# Patient Record
Sex: MALE | Race: WHITE | Employment: OTHER | URBAN - METROPOLITAN AREA
[De-identification: names, ages, dates, MRNs, and addresses within clinical notes are randomized per-mention and may not be internally consistent; named-entity substitution may affect disease eponyms.]

---

## 2017-01-17 ENCOUNTER — OFFICE VISIT (OUTPATIENT)
Dept: INTERNAL MEDICINE CLINIC | Age: 77
End: 2017-01-17

## 2017-01-17 VITALS
WEIGHT: 202 LBS | HEART RATE: 88 BPM | SYSTOLIC BLOOD PRESSURE: 132 MMHG | BODY MASS INDEX: 27.4 KG/M2 | OXYGEN SATURATION: 98 % | DIASTOLIC BLOOD PRESSURE: 78 MMHG

## 2017-01-17 DIAGNOSIS — J01.00 ACUTE NON-RECURRENT MAXILLARY SINUSITIS: Primary | ICD-10-CM

## 2017-01-17 DIAGNOSIS — E03.9 ACQUIRED HYPOTHYROIDISM: ICD-10-CM

## 2017-01-17 PROCEDURE — 99214 OFFICE O/P EST MOD 30 MIN: CPT | Performed by: FAMILY MEDICINE

## 2017-01-17 RX ORDER — AZELASTINE 1 MG/ML
2 SPRAY, METERED NASAL
COMMUNITY
Start: 2016-09-07 | End: 2018-08-15 | Stop reason: SDUPTHER

## 2017-01-17 RX ORDER — AMOXICILLIN AND CLAVULANATE POTASSIUM 875; 125 MG/1; MG/1
1 TABLET, FILM COATED ORAL 2 TIMES DAILY
Qty: 20 TABLET | Refills: 0 | Status: SHIPPED | OUTPATIENT
Start: 2017-01-17 | End: 2017-01-27

## 2017-01-17 ASSESSMENT — ENCOUNTER SYMPTOMS
NAUSEA: 0
SORE THROAT: 0
VOMITING: 0
DIARRHEA: 0
EYE DISCHARGE: 0
SINUS PRESSURE: 1
CHEST TIGHTNESS: 0
RHINORRHEA: 1
WHEEZING: 0
COUGH: 1
TROUBLE SWALLOWING: 0

## 2017-02-02 ENCOUNTER — HOSPITAL ENCOUNTER (OUTPATIENT)
Dept: GENERAL RADIOLOGY | Age: 77
Discharge: HOME | End: 2017-02-02
Attending: FAMILY MEDICINE | Admitting: FAMILY MEDICINE

## 2017-02-02 DIAGNOSIS — E03.9 ACQUIRED HYPOTHYROIDISM: ICD-10-CM

## 2017-02-02 LAB — TSH SERPL DL<=0.05 MIU/L-ACNC: 0.54 UIU/ML (ref 0.27–4.2)

## 2017-02-07 ENCOUNTER — TELEPHONE (OUTPATIENT)
Dept: INTERNAL MEDICINE CLINIC | Age: 77
End: 2017-02-07

## 2017-02-07 RX ORDER — LEVOTHYROXINE SODIUM 0.15 MG/1
150 TABLET ORAL DAILY
Qty: 30 TABLET | Refills: 11 | Status: SHIPPED | OUTPATIENT
Start: 2017-02-07 | End: 2017-04-05

## 2017-03-23 ENCOUNTER — OFFICE VISIT (OUTPATIENT)
Dept: INTERNAL MEDICINE CLINIC | Age: 77
End: 2017-03-23

## 2017-03-23 VITALS
SYSTOLIC BLOOD PRESSURE: 124 MMHG | OXYGEN SATURATION: 95 % | WEIGHT: 203 LBS | HEART RATE: 53 BPM | BODY MASS INDEX: 27.5 KG/M2 | DIASTOLIC BLOOD PRESSURE: 70 MMHG | HEIGHT: 72 IN

## 2017-03-23 DIAGNOSIS — K46.9 HERNIA: Primary | ICD-10-CM

## 2017-03-23 DIAGNOSIS — I35.0 AORTIC VALVE STENOSIS, UNSPECIFIED ETIOLOGY: ICD-10-CM

## 2017-03-23 LAB
BILIRUBIN, POC: NORMAL
BLOOD URINE, POC: NORMAL
CLARITY, POC: CLEAR
COLOR, POC: YELLOW
GLUCOSE URINE, POC: NORMAL
KETONES, POC: NORMAL
LEUKOCYTE EST, POC: NORMAL
NITRITE, POC: NORMAL
PH, POC: 5
PROTEIN, POC: NORMAL
SPECIFIC GRAVITY, POC: 1.01
UROBILINOGEN, POC: 0.2

## 2017-03-23 PROCEDURE — 99214 OFFICE O/P EST MOD 30 MIN: CPT | Performed by: INTERNAL MEDICINE

## 2017-03-23 PROCEDURE — 81002 URINALYSIS NONAUTO W/O SCOPE: CPT | Performed by: INTERNAL MEDICINE

## 2017-03-31 ENCOUNTER — SURG/PROC ORDERS (OUTPATIENT)
Dept: SURGERY | Age: 77
End: 2017-03-31

## 2017-03-31 ENCOUNTER — OFFICE VISIT (OUTPATIENT)
Dept: SURGERY | Age: 77
End: 2017-03-31

## 2017-03-31 VITALS
DIASTOLIC BLOOD PRESSURE: 76 MMHG | SYSTOLIC BLOOD PRESSURE: 122 MMHG | BODY MASS INDEX: 27.71 KG/M2 | WEIGHT: 204.6 LBS | HEART RATE: 72 BPM | HEIGHT: 72 IN

## 2017-03-31 DIAGNOSIS — K40.91 UNILATERAL RECURRENT INGUINAL HERNIA WITHOUT OBSTRUCTION OR GANGRENE: ICD-10-CM

## 2017-03-31 PROCEDURE — 99213 OFFICE O/P EST LOW 20 MIN: CPT | Performed by: SURGERY

## 2017-03-31 RX ORDER — SODIUM CHLORIDE 0.9 % (FLUSH) 0.9 %
10 SYRINGE (ML) INJECTION EVERY 12 HOURS SCHEDULED
Status: CANCELLED | OUTPATIENT
Start: 2017-03-31

## 2017-03-31 RX ORDER — SODIUM CHLORIDE 0.9 % (FLUSH) 0.9 %
10 SYRINGE (ML) INJECTION PRN
Status: CANCELLED | OUTPATIENT
Start: 2017-03-31

## 2017-04-05 ENCOUNTER — OFFICE VISIT (OUTPATIENT)
Dept: INTERNAL MEDICINE CLINIC | Age: 77
End: 2017-04-05

## 2017-04-05 ENCOUNTER — HOSPITAL ENCOUNTER (OUTPATIENT)
Dept: GENERAL RADIOLOGY | Age: 77
Discharge: HOME | End: 2017-04-05
Attending: NURSE PRACTITIONER | Admitting: NURSE PRACTITIONER

## 2017-04-05 VITALS
TEMPERATURE: 97.7 F | SYSTOLIC BLOOD PRESSURE: 120 MMHG | WEIGHT: 203 LBS | DIASTOLIC BLOOD PRESSURE: 68 MMHG | HEART RATE: 60 BPM | HEIGHT: 72 IN | BODY MASS INDEX: 27.5 KG/M2

## 2017-04-05 DIAGNOSIS — K40.91 UNILATERAL RECURRENT INGUINAL HERNIA WITHOUT OBSTRUCTION OR GANGRENE: Primary | ICD-10-CM

## 2017-04-05 DIAGNOSIS — Z01.818 PRE-OP EXAM: ICD-10-CM

## 2017-04-05 DIAGNOSIS — R42 VERTIGO: ICD-10-CM

## 2017-04-05 DIAGNOSIS — I35.0 AORTIC VALVE STENOSIS, UNSPECIFIED ETIOLOGY: ICD-10-CM

## 2017-04-05 DIAGNOSIS — I25.83 CORONARY ARTERY DISEASE DUE TO LIPID RICH PLAQUE: ICD-10-CM

## 2017-04-05 DIAGNOSIS — I25.10 CORONARY ARTERY DISEASE DUE TO LIPID RICH PLAQUE: ICD-10-CM

## 2017-04-05 DIAGNOSIS — E03.9 ACQUIRED HYPOTHYROIDISM: ICD-10-CM

## 2017-04-05 DIAGNOSIS — G45.9 TRANSIENT CEREBRAL ISCHEMIA, UNSPECIFIED TYPE: ICD-10-CM

## 2017-04-05 LAB
A/G RATIO: 1.8 (ref 1.1–2.2)
ALBUMIN SERPL-MCNC: 4.4 G/DL (ref 3.4–5)
ALP BLD-CCNC: 81 U/L (ref 40–129)
ALT SERPL-CCNC: 21 U/L (ref 10–40)
ANION GAP SERPL CALCULATED.3IONS-SCNC: 13 MMOL/L (ref 3–16)
AST SERPL-CCNC: 18 U/L (ref 15–37)
BILIRUB SERPL-MCNC: 0.6 MG/DL (ref 0–1)
BUN BLDV-MCNC: 13 MG/DL (ref 7–20)
CALCIUM SERPL-MCNC: 9.4 MG/DL (ref 8.3–10.6)
CHLORIDE BLD-SCNC: 100 MMOL/L (ref 99–110)
CO2: 28 MMOL/L (ref 21–32)
CREAT SERPL-MCNC: 0.7 MG/DL (ref 0.8–1.3)
GFR AFRICAN AMERICAN: >60
GFR NON-AFRICAN AMERICAN: >60
GLOBULIN: 2.5 G/DL
GLUCOSE BLD-MCNC: 59 MG/DL (ref 70–99)
HCT VFR BLD CALC: 40.4 % (ref 40.5–52.5)
HEMOGLOBIN: 13.4 G/DL (ref 13.5–17.5)
MCH RBC QN AUTO: 31.9 PG (ref 26–34)
MCHC RBC AUTO-ENTMCNC: 33.2 G/DL (ref 31–36)
MCV RBC AUTO: 96 FL (ref 80–100)
PDW BLD-RTO: 14.8 % (ref 12.4–15.4)
PLATELET # BLD: 176 K/UL (ref 135–450)
PMV BLD AUTO: 8.5 FL (ref 5–10.5)
POTASSIUM SERPL-SCNC: 4.4 MMOL/L (ref 3.5–5.1)
RBC # BLD: 4.21 M/UL (ref 4.2–5.9)
SODIUM BLD-SCNC: 141 MMOL/L (ref 136–145)
TOTAL PROTEIN: 6.9 G/DL (ref 6.4–8.2)
WBC # BLD: 6.7 K/UL (ref 4–11)

## 2017-04-05 PROCEDURE — 99214 OFFICE O/P EST MOD 30 MIN: CPT | Performed by: NURSE PRACTITIONER

## 2017-04-05 PROCEDURE — 93000 ELECTROCARDIOGRAM COMPLETE: CPT | Performed by: NURSE PRACTITIONER

## 2017-04-05 RX ORDER — LEVOTHYROXINE SODIUM 175 UG/1
TABLET ORAL
Refills: 3 | COMMUNITY
Start: 2017-02-28 | End: 2017-04-20 | Stop reason: DRUGHIGH

## 2017-04-07 ENCOUNTER — TELEPHONE (OUTPATIENT)
Dept: INTERNAL MEDICINE CLINIC | Age: 77
End: 2017-04-07

## 2017-04-12 ENCOUNTER — HOSPITAL ENCOUNTER (OUTPATIENT)
Dept: SURGERY | Age: 77
Discharge: HOME | End: 2017-04-12
Attending: SURGERY | Admitting: SURGERY

## 2017-04-12 VITALS
SYSTOLIC BLOOD PRESSURE: 130 MMHG | BODY MASS INDEX: 27.44 KG/M2 | OXYGEN SATURATION: 94 % | DIASTOLIC BLOOD PRESSURE: 84 MMHG | TEMPERATURE: 97.5 F | WEIGHT: 202.6 LBS | HEIGHT: 72 IN | RESPIRATION RATE: 16 BRPM | HEART RATE: 94 BPM

## 2017-04-12 PROCEDURE — 49651 LAP ING HERNIA REPAIR RECUR: CPT | Performed by: SURGERY

## 2017-04-12 RX ORDER — SODIUM CHLORIDE 0.9 % (FLUSH) 0.9 %
10 SYRINGE (ML) INJECTION EVERY 12 HOURS SCHEDULED
Status: DISCONTINUED | OUTPATIENT
Start: 2017-04-12 | End: 2017-04-12 | Stop reason: SDUPTHER

## 2017-04-12 RX ORDER — SODIUM CHLORIDE, SODIUM LACTATE, POTASSIUM CHLORIDE, CALCIUM CHLORIDE 600; 310; 30; 20 MG/100ML; MG/100ML; MG/100ML; MG/100ML
INJECTION, SOLUTION INTRAVENOUS CONTINUOUS
Status: DISCONTINUED | OUTPATIENT
Start: 2017-04-12 | End: 2017-04-13 | Stop reason: HOSPADM

## 2017-04-12 RX ORDER — PROMETHAZINE HYDROCHLORIDE 25 MG/ML
6.25 INJECTION, SOLUTION INTRAMUSCULAR; INTRAVENOUS
Status: ACTIVE | OUTPATIENT
Start: 2017-04-12 | End: 2017-04-12

## 2017-04-12 RX ORDER — OXYCODONE HYDROCHLORIDE AND ACETAMINOPHEN 5; 325 MG/1; MG/1
2 TABLET ORAL PRN
Status: ACTIVE | OUTPATIENT
Start: 2017-04-12 | End: 2017-04-12

## 2017-04-12 RX ORDER — SODIUM CHLORIDE 0.9 % (FLUSH) 0.9 %
10 SYRINGE (ML) INJECTION EVERY 12 HOURS SCHEDULED
Status: DISCONTINUED | OUTPATIENT
Start: 2017-04-12 | End: 2017-04-13 | Stop reason: HOSPADM

## 2017-04-12 RX ORDER — MEPERIDINE HYDROCHLORIDE 50 MG/ML
12.5 INJECTION INTRAMUSCULAR; INTRAVENOUS; SUBCUTANEOUS EVERY 5 MIN PRN
Status: DISCONTINUED | OUTPATIENT
Start: 2017-04-12 | End: 2017-04-13 | Stop reason: HOSPADM

## 2017-04-12 RX ORDER — SODIUM CHLORIDE 0.9 % (FLUSH) 0.9 %
10 SYRINGE (ML) INJECTION PRN
Status: DISCONTINUED | OUTPATIENT
Start: 2017-04-12 | End: 2017-04-13 | Stop reason: HOSPADM

## 2017-04-12 RX ORDER — DIPHENHYDRAMINE HYDROCHLORIDE 50 MG/ML
12.5 INJECTION INTRAMUSCULAR; INTRAVENOUS
Status: ACTIVE | OUTPATIENT
Start: 2017-04-12 | End: 2017-04-12

## 2017-04-12 RX ORDER — MORPHINE SULFATE 2 MG/ML
2 INJECTION, SOLUTION INTRAMUSCULAR; INTRAVENOUS EVERY 5 MIN PRN
Status: DISCONTINUED | OUTPATIENT
Start: 2017-04-12 | End: 2017-04-13 | Stop reason: HOSPADM

## 2017-04-12 RX ORDER — SODIUM CHLORIDE 0.9 % (FLUSH) 0.9 %
10 SYRINGE (ML) INJECTION PRN
Status: DISCONTINUED | OUTPATIENT
Start: 2017-04-12 | End: 2017-04-12 | Stop reason: SDUPTHER

## 2017-04-12 RX ORDER — OXYCODONE HYDROCHLORIDE AND ACETAMINOPHEN 5; 325 MG/1; MG/1
1-2 TABLET ORAL EVERY 4 HOURS PRN
Qty: 20 TABLET | Refills: 0 | Status: SHIPPED | OUTPATIENT
Start: 2017-04-12 | End: 2017-04-19

## 2017-04-12 RX ORDER — MORPHINE SULFATE 2 MG/ML
1 INJECTION, SOLUTION INTRAMUSCULAR; INTRAVENOUS EVERY 5 MIN PRN
Status: DISCONTINUED | OUTPATIENT
Start: 2017-04-12 | End: 2017-04-13 | Stop reason: HOSPADM

## 2017-04-12 RX ORDER — OXYCODONE HYDROCHLORIDE AND ACETAMINOPHEN 5; 325 MG/1; MG/1
1 TABLET ORAL PRN
Status: ACTIVE | OUTPATIENT
Start: 2017-04-12 | End: 2017-04-12

## 2017-04-12 RX ORDER — HYDRALAZINE HYDROCHLORIDE 20 MG/ML
5 INJECTION INTRAMUSCULAR; INTRAVENOUS EVERY 10 MIN PRN
Status: DISCONTINUED | OUTPATIENT
Start: 2017-04-12 | End: 2017-04-13 | Stop reason: HOSPADM

## 2017-04-12 RX ORDER — LIDOCAINE HYDROCHLORIDE 10 MG/ML
1 INJECTION, SOLUTION EPIDURAL; INFILTRATION; INTRACAUDAL; PERINEURAL
Status: ACTIVE | OUTPATIENT
Start: 2017-04-12 | End: 2017-04-12

## 2017-04-12 RX ORDER — LABETALOL HYDROCHLORIDE 5 MG/ML
5 INJECTION, SOLUTION INTRAVENOUS EVERY 10 MIN PRN
Status: DISCONTINUED | OUTPATIENT
Start: 2017-04-12 | End: 2017-04-13 | Stop reason: HOSPADM

## 2017-04-12 RX ORDER — ONDANSETRON 2 MG/ML
4 INJECTION INTRAMUSCULAR; INTRAVENOUS
Status: ACTIVE | OUTPATIENT
Start: 2017-04-12 | End: 2017-04-12

## 2017-04-12 RX ADMIN — HYDRALAZINE HYDROCHLORIDE 5 MG: 20 INJECTION INTRAMUSCULAR; INTRAVENOUS at 14:13

## 2017-04-12 ASSESSMENT — PAIN SCALES - GENERAL
PAINLEVEL_OUTOF10: 0
PAINLEVEL_OUTOF10: 0

## 2017-04-12 ASSESSMENT — PAIN - FUNCTIONAL ASSESSMENT
PAIN_FUNCTIONAL_ASSESSMENT: 0-10
PAIN_FUNCTIONAL_ASSESSMENT: 0-10

## 2017-04-20 ENCOUNTER — HOSPITAL ENCOUNTER (OUTPATIENT)
Dept: GENERAL RADIOLOGY | Age: 77
Discharge: HOME | End: 2017-04-20
Attending: FAMILY MEDICINE | Admitting: FAMILY MEDICINE

## 2017-04-20 ENCOUNTER — OFFICE VISIT (OUTPATIENT)
Dept: INTERNAL MEDICINE CLINIC | Age: 77
End: 2017-04-20

## 2017-04-20 VITALS
BODY MASS INDEX: 27.26 KG/M2 | SYSTOLIC BLOOD PRESSURE: 148 MMHG | WEIGHT: 201 LBS | HEART RATE: 81 BPM | DIASTOLIC BLOOD PRESSURE: 64 MMHG | OXYGEN SATURATION: 96 %

## 2017-04-20 DIAGNOSIS — R10.31 GROIN PAIN, CHRONIC, RIGHT: ICD-10-CM

## 2017-04-20 DIAGNOSIS — R63.4 WEIGHT LOSS: ICD-10-CM

## 2017-04-20 DIAGNOSIS — R10.31 GROIN PAIN, CHRONIC, RIGHT: Primary | ICD-10-CM

## 2017-04-20 DIAGNOSIS — G89.29 GROIN PAIN, CHRONIC, RIGHT: ICD-10-CM

## 2017-04-20 DIAGNOSIS — R03.0 ELEVATED BP WITHOUT DIAGNOSIS OF HYPERTENSION: ICD-10-CM

## 2017-04-20 DIAGNOSIS — G89.29 GROIN PAIN, CHRONIC, RIGHT: Primary | ICD-10-CM

## 2017-04-20 LAB
ANION GAP SERPL CALCULATED.3IONS-SCNC: 14 MMOL/L (ref 3–16)
BUN BLDV-MCNC: 13 MG/DL (ref 7–20)
C-REACTIVE PROTEIN: 7 MG/L (ref 0–5.1)
CALCIUM SERPL-MCNC: 9.4 MG/DL (ref 8.3–10.6)
CHLORIDE BLD-SCNC: 97 MMOL/L (ref 99–110)
CO2: 26 MMOL/L (ref 21–32)
CREAT SERPL-MCNC: 0.7 MG/DL (ref 0.8–1.3)
GFR AFRICAN AMERICAN: >60
GFR NON-AFRICAN AMERICAN: >60
GLUCOSE BLD-MCNC: 94 MG/DL (ref 70–99)
POTASSIUM SERPL-SCNC: 5.5 MMOL/L (ref 3.5–5.1)
SEDIMENTATION RATE, ERYTHROCYTE: 30 MM/HR (ref 0–20)
SODIUM BLD-SCNC: 137 MMOL/L (ref 136–145)
TSH SERPL DL<=0.05 MIU/L-ACNC: 0.5 UIU/ML (ref 0.27–4.2)

## 2017-04-20 PROCEDURE — 99214 OFFICE O/P EST MOD 30 MIN: CPT | Performed by: FAMILY MEDICINE

## 2017-04-20 RX ORDER — LEVOTHYROXINE SODIUM 0.15 MG/1
150 TABLET ORAL DAILY
COMMUNITY
End: 2018-06-22 | Stop reason: SDUPTHER

## 2017-04-21 ENCOUNTER — TELEPHONE (OUTPATIENT)
Dept: INTERNAL MEDICINE CLINIC | Age: 77
End: 2017-04-21

## 2017-04-21 DIAGNOSIS — E87.5 HYPERKALEMIA: Primary | ICD-10-CM

## 2017-04-21 ASSESSMENT — ENCOUNTER SYMPTOMS
DIARRHEA: 0
RECTAL PAIN: 0
COUGH: 0
NAUSEA: 0
ABDOMINAL PAIN: 0
CONSTIPATION: 0

## 2017-04-26 ENCOUNTER — HOSPITAL ENCOUNTER (OUTPATIENT)
Dept: CT IMAGING | Age: 77
Discharge: HOME | End: 2017-04-26
Attending: FAMILY MEDICINE | Admitting: FAMILY MEDICINE

## 2017-04-26 DIAGNOSIS — G89.29 GROIN PAIN, CHRONIC, RIGHT: ICD-10-CM

## 2017-04-26 DIAGNOSIS — R10.31 GROIN PAIN, CHRONIC, RIGHT: ICD-10-CM

## 2017-04-26 DIAGNOSIS — R10.31 RIGHT LOWER QUADRANT PAIN: ICD-10-CM

## 2017-05-02 ENCOUNTER — OFFICE VISIT (OUTPATIENT)
Dept: SURGERY | Age: 77
End: 2017-05-02

## 2017-05-02 VITALS
WEIGHT: 204.6 LBS | HEIGHT: 72 IN | HEART RATE: 71 BPM | DIASTOLIC BLOOD PRESSURE: 77 MMHG | BODY MASS INDEX: 27.71 KG/M2 | SYSTOLIC BLOOD PRESSURE: 132 MMHG

## 2017-05-02 DIAGNOSIS — Z09 POSTOP CHECK: Primary | ICD-10-CM

## 2017-05-02 PROCEDURE — 99024 POSTOP FOLLOW-UP VISIT: CPT | Performed by: SURGERY

## 2017-06-13 ENCOUNTER — OFFICE VISIT (OUTPATIENT)
Dept: INTERNAL MEDICINE CLINIC | Age: 77
End: 2017-06-13

## 2017-06-13 VITALS
WEIGHT: 209 LBS | TEMPERATURE: 98.6 F | HEART RATE: 62 BPM | SYSTOLIC BLOOD PRESSURE: 134 MMHG | OXYGEN SATURATION: 97 % | DIASTOLIC BLOOD PRESSURE: 72 MMHG | BODY MASS INDEX: 28.31 KG/M2 | HEIGHT: 72 IN

## 2017-06-13 DIAGNOSIS — H69.83 EUSTACHIAN TUBE DYSFUNCTION, BILATERAL: Primary | ICD-10-CM

## 2017-06-13 DIAGNOSIS — R06.2 WHEEZING: ICD-10-CM

## 2017-06-13 DIAGNOSIS — R05.9 COUGH: ICD-10-CM

## 2017-06-13 PROCEDURE — 99213 OFFICE O/P EST LOW 20 MIN: CPT | Performed by: NURSE PRACTITIONER

## 2017-06-13 RX ORDER — ALBUTEROL SULFATE 90 UG/1
2 AEROSOL, METERED RESPIRATORY (INHALATION) EVERY 6 HOURS PRN
Qty: 1 INHALER | Refills: 3 | Status: SHIPPED | OUTPATIENT
Start: 2017-06-13 | End: 2017-11-15 | Stop reason: CLARIF

## 2017-06-13 ASSESSMENT — ENCOUNTER SYMPTOMS
BLOOD IN STOOL: 0
SHORTNESS OF BREATH: 0
WHEEZING: 0
DIARRHEA: 0
CONSTIPATION: 0
EYES NEGATIVE: 1
COUGH: 1
SORE THROAT: 0
BACK PAIN: 0
RHINORRHEA: 0
COLOR CHANGE: 0
VOICE CHANGE: 1
SINUS PRESSURE: 0

## 2017-06-16 ENCOUNTER — TELEPHONE (OUTPATIENT)
Dept: INTERNAL MEDICINE CLINIC | Age: 77
End: 2017-06-16

## 2017-06-16 DIAGNOSIS — J40 BRONCHITIS: ICD-10-CM

## 2017-06-16 RX ORDER — AZITHROMYCIN 250 MG/1
TABLET, FILM COATED ORAL
Qty: 6 TABLET | Refills: 0 | Status: SHIPPED | OUTPATIENT
Start: 2017-06-16 | End: 2017-11-15 | Stop reason: CLARIF

## 2017-08-28 ENCOUNTER — TELEPHONE (OUTPATIENT)
Dept: INTERNAL MEDICINE CLINIC | Age: 77
End: 2017-08-28

## 2017-09-15 ENCOUNTER — OFFICE VISIT (OUTPATIENT)
Dept: INTERNAL MEDICINE CLINIC | Age: 77
End: 2017-09-15

## 2017-09-15 VITALS
HEART RATE: 64 BPM | HEIGHT: 72 IN | SYSTOLIC BLOOD PRESSURE: 110 MMHG | OXYGEN SATURATION: 97 % | DIASTOLIC BLOOD PRESSURE: 50 MMHG | WEIGHT: 204.8 LBS | TEMPERATURE: 98.8 F | BODY MASS INDEX: 27.74 KG/M2

## 2017-09-15 DIAGNOSIS — J01.90 ACUTE BACTERIAL SINUSITIS: Primary | ICD-10-CM

## 2017-09-15 DIAGNOSIS — B96.89 ACUTE BACTERIAL SINUSITIS: Primary | ICD-10-CM

## 2017-09-15 PROCEDURE — 99213 OFFICE O/P EST LOW 20 MIN: CPT | Performed by: FAMILY MEDICINE

## 2017-09-15 RX ORDER — AMOXICILLIN AND CLAVULANATE POTASSIUM 875; 125 MG/1; MG/1
1 TABLET, FILM COATED ORAL 2 TIMES DAILY
Qty: 20 TABLET | Refills: 0 | Status: SHIPPED | OUTPATIENT
Start: 2017-09-15 | End: 2017-09-25

## 2017-09-15 NOTE — MR AVS SNAPSHOT
After Visit Summary             Martin Dobson   9/15/2017 10:00 AM   Office Visit    Description:  Male : 1940   Provider:  Luanne Edmonds MD   Department:  [facility]              Your Follow-Up and Future Appointments         Below is a list of your follow-up and future appointments. This may not be a complete list as you may have made appointments directly with providers that we are not aware of or your providers may have made some for you. Please call your providers to confirm appointments. It is important to keep your appointments. Please bring your current insurance card, photo ID, co-pay, and all medication bottles to your appointment. If self-pay, payment is expected at the time of service. Your To-Do List     Follow-Up    Return if symptoms worsen or fail to improve. Information from Your Visit        Department     Name Address Phone Fax    [facility] [address]  [address] [phone]      You Were Seen for:         Comments    Acute bacterial sinusitis   [[id number]]         Vital Signs     Blood Pressure Pulse Temperature Height Weight Oxygen Saturation    110/50 64 98.8 °F (37.1 °C) 6' (1.829 m) 204 lb 12.8 oz (92.9 kg) 97%    Body Mass Index Smoking Status                27.78 kg/m2 Former Smoker          Additional Information about your Body Mass Index (BMI)           Your BMI as listed above is considered overweight (25.0-29.9). BMI is an estimate of body fat, calculated from your height and weight. The higher your BMI, the greater your risk of heart disease, high blood pressure, type 2 diabetes, stroke, gallstones, arthritis, sleep apnea, and certain cancers. BMI is not perfect. It may overestimate body fat in athletes and people who are more muscular. If your body fat is high you can improve your BMI by decreasing your calorie intake and becoming more physically active. Learn more at: [employer]ightsco.uk          Instructions         Sinusitis: Care Instructions  Your Care Instructions    Sinusitis is an infection of the lining of the sinus cavities in your head. Sinusitis often follows a cold. It causes pain and pressure in your head and face. In most cases, sinusitis gets better on its own in 1 to 2 weeks. But some mild symptoms may last for several weeks. Sometimes antibiotics are needed. Follow-up care is a key part of your treatment and safety. Be sure to make and go to all appointments, and call your doctor if you are having problems. It's also a good idea to know your test results and keep a list of the medicines you take. How can you care for yourself at home? · Take an over-the-counter pain medicine, such as acetaminophen (Tylenol), ibuprofen (Advil, Motrin), or naproxen (Aleve). Read and follow all instructions on the label. · If the doctor prescribed antibiotics, take them as directed. Do not stop taking them just because you feel better. You need to take the full course of antibiotics. · Be careful when taking over-the-counter cold or flu medicines and Tylenol at the same time. Many of these medicines have acetaminophen, which is Tylenol. Read the labels to make sure that you are not taking more than the recommended dose. Too much acetaminophen (Tylenol) can be harmful. · Breathe warm, moist air from a steamy shower, a hot bath, or a sink filled with hot water. Avoid cold, dry air. Using a humidifier in your home may help. Follow the directions for cleaning the machine. · Use saline (saltwater) nasal washes to help keep your nasal passages open and wash out mucus and bacteria. You can buy saline nose drops at a grocery store or drugstore. Or you can make your own at home by adding 1 teaspoon of salt and 1 teaspoon of baking soda to 2 cups of distilled water.  If you make your own, fill a bulb syringe with the solution, insert These medications were sent to [facility] - [address] [place], [address] [place], [place] [address] [address], [address] [facility]    Hours:  24-hours Phone:  [phone]     amoxicillin-clavulanate 875-125 MG per tablet               Your Current Medications Are              amoxicillin-clavulanate (AUGMENTIN) 875-125 MG per tablet Take 1 tablet by mouth 2 times daily for 10 days    levothyroxine (SYNTHROID) 150 MCG tablet Take 150 mcg by mouth Daily    azelastine (ASTELIN) 0.1 % nasal spray 2 sprays by Nasal route    atorvastatin (LIPITOR) 80 MG tablet Take 1 tablet by mouth daily    Ferrous Sulfate ER (SLOW FE) 142 (45 FE) MG Take 142 mg by mouth daily. cetirizine (ZYRTEC) 10 MG tablet Take 10 mg by mouth daily. Multiple Vitamin (THERA) TABS 1 Tab daily. Calcium Carbonate-Vitamin D (CALCIUM + D PO) 1 Tab daily. Glucosamine-Chondroitin (GLUCOSAMINE CHONDR COMPLEX PO) Take  by mouth. aspirin 81 MG chewable tablet Take 81 mg by mouth nightly. Coenzyme Q-10 100 MG CAPS Take 1 capsule by mouth daily.       azithromycin (ZITHROMAX) 250 MG tablet Take 2 tablets by mouth on Day 1, and 1 tablet by mouth daily on Days 2-5.    albuterol sulfate HFA (PROVENTIL HFA) 108 (90 Base) MCG/ACT inhaler Inhale 2 puffs into the lungs every 6 hours as needed for Wheezing      Allergies              Reopro [Abciximab Injection]          Additional Information        Basic Information     Date Of Birth Sex Race Ethnicity Preferred Language    1940 Male White Non-/Non  English      Problem List as of 9/15/2017  Date Reviewed: 9/15/2017                Sinusitis    Vertigo    Eustachian tube dysfunction    Unilateral recurrent inguinal hernia without obstruction or gangrene    Postop check    Aortic stenosis    Herpes zoster    Cough    Osteoarthritis    Hypothyroid    Food allergy    CAD (coronary artery disease)    TIA (transient ischemic attack) Hyperlipidemia    Generalized weakness      Immunizations as of 9/15/2017     Name Date    Influenza Virus Vaccine 10/21/2011, 10/2/2010, 10/11/2008, 10/6/2007, 10/28/2006, 10/24/2006, 10/30/2003    Influenza, High Dose 9/30/2016, 9/29/2015, 9/24/2014    Pneumococcal 13-valent Conjugate (Hrdkfjh10) 9/29/2015    Pneumococcal Conjugate 7-valent 11/11/2003, 10/15/2000    Pneumococcal Polysaccharide ([id number]) 12/5/2016    Tdap (Boostrix, Adacel) 10/23/2009    Zoster 12/12/2011      Preventive Care        Date Due    Yearly Flu Vaccine (1) 9/1/2017    Tetanus Combination Vaccine (2 - Td) 10/23/2019    Cholesterol Screening 12/5/2021            [employer] Signup           Our records indicate that you have an active Verient account. You can view your After Visit Summary by going to [email or web address] and logging in with your Verient username and password. If you don't have a Verient username and password but a parent or guardian has access to your record, the parent or guardian should login with their own Verient username and password and access your record to view the After Visit Summary. Additional Information  If you have questions, please contact the physician practice where you receive care. Remember, Verient is NOT to be used for urgent needs. For medical emergencies, dial 911. For questions regarding your Verient account call [phone]. If you have a clinical question, please call your doctor's office.

## 2017-09-15 NOTE — PATIENT INSTRUCTIONS
Sinusitis: Care Instructions  Your Care Instructions    Sinusitis is an infection of the lining of the sinus cavities in your head. Sinusitis often follows a cold. It causes pain and pressure in your head and face. In most cases, sinusitis gets better on its own in 1 to 2 weeks. But some mild symptoms may last for several weeks. Sometimes antibiotics are needed. Follow-up care is a key part of your treatment and safety. Be sure to make and go to all appointments, and call your doctor if you are having problems. It's also a good idea to know your test results and keep a list of the medicines you take. How can you care for yourself at home? · Take an over-the-counter pain medicine, such as acetaminophen (Tylenol), ibuprofen (Advil, Motrin), or naproxen (Aleve). Read and follow all instructions on the label. · If the doctor prescribed antibiotics, take them as directed. Do not stop taking them just because you feel better. You need to take the full course of antibiotics. · Be careful when taking over-the-counter cold or flu medicines and Tylenol at the same time. Many of these medicines have acetaminophen, which is Tylenol. Read the labels to make sure that you are not taking more than the recommended dose. Too much acetaminophen (Tylenol) can be harmful. · Breathe warm, moist air from a steamy shower, a hot bath, or a sink filled with hot water. Avoid cold, dry air. Using a humidifier in your home may help. Follow the directions for cleaning the machine. · Use saline (saltwater) nasal washes to help keep your nasal passages open and wash out mucus and bacteria. You can buy saline nose drops at a grocery store or drugstore. Or you can make your own at home by adding 1 teaspoon of salt and 1 teaspoon of baking soda to 2 cups of distilled water. If you make your own, fill a bulb syringe with the solution, insert the tip into your nostril, and squeeze gently. Arturo Cespedes your nose.   · Put a hot, wet towel or a warm gel pack on your face 3 or 4 times a day for 5 to 10 minutes each time. · Try a decongestant nasal spray like oxymetazoline (Afrin). Do not use it for more than 3 days in a row. Using it for more than 3 days can make your congestion worse. When should you call for help? Call your doctor now or seek immediate medical care if:  · You have new or worse swelling or redness in your face or around your eyes. · You have a new or higher fever. Watch closely for changes in your health, and be sure to contact your doctor if:  · You have new or worse facial pain. · The mucus from your nose becomes thicker (like pus) or has new blood in it. · You are not getting better as expected. Where can you learn more? Go to [email or web address] and sign in to your [employer] account. Enter L755 in the [employer] box to learn more about \"Sinusitis: Care Instructions. \"     If you do not have an account, please click on the \"Sign Up Now\" link. Current as of: July 29, 2016  Content Version: 11.3  © 4950-0880 [employer]. Care instructions adapted under license by [facility] ([facility]). If you have questions about a medical condition or this instruction, always ask your healthcare professional. [address] any warranty or liability for your use of this information.

## 2017-09-15 NOTE — PROGRESS NOTES
EXAM:  Vitals:    09/15/17 1015   BP: (!) 110/50   Pulse: 64   Temp: 98.8 °F (37.1 °C)   SpO2: 97%   Weight: 204 lb 12.8 oz (92.9 kg)   Height: 6' (1.829 m)     General:  appears well-developed and well-nourished, in no apparent distress  Skin:  normal   Eyes:  normal    ENT:  bilateral tympanic membrane normal, oropharynx clear and moist with normal mucous membranes, sinus tenderness noted- B maxillary>frontal, and post-nasal drainage present- yellow    Lymph nodes: bilateral submandibular, anterior cervical lymphadenopathy palpable- tender  Pulmonary: clear to auscultation bilaterally- no wheezes, rales or rhonchi, normal air movement, no respiratory distress  Neurological:  alert and oriented to person, place, and time  Psychiatric:  behavior, cognition and memory grossly normal    ASSESSMENT:   Clinical picture is most consistent with a diagnosis of acute sinusitis. PLAN:   Symptomatic therapy recommended/prescribed: rest, fluids, acetaminophen and OTC products prn comfort  Oral antibiotic prescribed for 10 days:  Augmentin-  875 mg bid x10 days  Patient education materials given in AVS.    Return if symptoms worsen or fail to improve.

## 2017-09-27 NOTE — PROGRESS NOTES
Vaccine Information Sheet, \"Influenza - Inactivated\"  given to Nikki Cousins, or parent/legal guardian of  Nikki Cousins and verbalized understanding. Patient responses:    Have you ever had a reaction to a flu vaccine? No  Are you able to eat eggs without adverse effects? Yes  Do you have any current illness? No  Have you ever had Guillian Beatrice Syndrome? No    Flu vaccine given per order. Please see immunization tab.

## 2017-10-06 ENCOUNTER — IMMUNIZATION (OUTPATIENT)
Dept: INTERNAL MEDICINE CLINIC | Age: 77
End: 2017-10-06

## 2017-10-06 DIAGNOSIS — Z23 NEED FOR PROPHYLACTIC VACCINATION AND INOCULATION AGAINST INFLUENZA: Primary | ICD-10-CM

## 2017-10-06 PROCEDURE — G0008 ADMIN INFLUENZA VIRUS VAC: HCPCS | Performed by: FAMILY MEDICINE

## 2017-10-06 PROCEDURE — 90662 IIV NO PRSV INCREASED AG IM: CPT | Performed by: FAMILY MEDICINE

## 2017-11-15 ENCOUNTER — OFFICE VISIT (OUTPATIENT)
Dept: INTERNAL MEDICINE CLINIC | Age: 77
End: 2017-11-15

## 2017-11-15 VITALS
SYSTOLIC BLOOD PRESSURE: 136 MMHG | DIASTOLIC BLOOD PRESSURE: 60 MMHG | BODY MASS INDEX: 27.09 KG/M2 | WEIGHT: 200 LBS | HEIGHT: 72 IN

## 2017-11-15 DIAGNOSIS — I25.10 CORONARY ARTERY DISEASE DUE TO LIPID RICH PLAQUE: ICD-10-CM

## 2017-11-15 DIAGNOSIS — G45.9 TRANSIENT CEREBRAL ISCHEMIA, UNSPECIFIED TYPE: ICD-10-CM

## 2017-11-15 DIAGNOSIS — R42 VERTIGO: ICD-10-CM

## 2017-11-15 DIAGNOSIS — I25.83 CORONARY ARTERY DISEASE DUE TO LIPID RICH PLAQUE: ICD-10-CM

## 2017-11-15 DIAGNOSIS — Z00.00 ROUTINE GENERAL MEDICAL EXAMINATION AT A HEALTH CARE FACILITY: ICD-10-CM

## 2017-11-15 DIAGNOSIS — Z00.00 MEDICARE ANNUAL WELLNESS VISIT, SUBSEQUENT: Primary | ICD-10-CM

## 2017-11-15 DIAGNOSIS — E03.9 ACQUIRED HYPOTHYROIDISM: ICD-10-CM

## 2017-11-15 DIAGNOSIS — M19.90 OSTEOARTHRITIS, UNSPECIFIED OSTEOARTHRITIS TYPE, UNSPECIFIED SITE: ICD-10-CM

## 2017-11-15 DIAGNOSIS — E78.2 MIXED HYPERLIPIDEMIA: ICD-10-CM

## 2017-11-15 PROCEDURE — G0439 PPPS, SUBSEQ VISIT: HCPCS | Performed by: NURSE PRACTITIONER

## 2017-11-15 RX ORDER — ATORVASTATIN CALCIUM 80 MG/1
80 TABLET, FILM COATED ORAL DAILY
Qty: 90 TABLET | Refills: 3 | Status: SHIPPED | OUTPATIENT
Start: 2017-11-15 | End: 2019-04-29 | Stop reason: SDUPTHER

## 2017-11-15 ASSESSMENT — LIFESTYLE VARIABLES: HOW OFTEN DO YOU HAVE A DRINK CONTAINING ALCOHOL: 0

## 2017-11-15 ASSESSMENT — PATIENT HEALTH QUESTIONNAIRE - PHQ9: SUM OF ALL RESPONSES TO PHQ QUESTIONS 1-9: 0

## 2017-11-15 ASSESSMENT — ANXIETY QUESTIONNAIRES: GAD7 TOTAL SCORE: 0

## 2017-11-15 NOTE — PROGRESS NOTES
Medicare Annual Wellness Visit  Name: Aby Kelly Date: 11/15/2017   MRN: [id number] Sex: Male   Age: 69 y.o. Ethnicity: Non-/Non    : 1940 Race: Leatha López is here for Medicare AWV    Screenings for behavioral, psychosocial and functional/safety risks, and cognitive dysfunction are all negative except as indicated below. These results, as well as other patient data from the [address] form, are documented in Flowsheets linked to this Encounter. Allergies   Allergen Reactions    Reopro [Abciximab Injection]      Prior to Visit Medications    Medication Sig Taking? Authorizing Provider   levothyroxine (SYNTHROID) 150 MCG tablet Take 150 mcg by mouth Daily Yes Historical Provider, MD   azelastine (ASTELIN) 0.1 % nasal spray 2 sprays by Nasal route Yes Historical Provider, MD   atorvastatin (LIPITOR) 80 MG tablet Take 1 tablet by mouth daily Yes Enzo Lozano MD   Ferrous Sulfate ER (SLOW FE) 142 (45 FE) MG Take 142 mg by mouth daily. Yes Historical Provider, MD   cetirizine (ZYRTEC) 10 MG tablet Take 10 mg by mouth daily. Yes Historical Provider, MD   Multiple Vitamin (THERA) TABS 1 Tab daily. Yes Historical Provider, MD   Calcium Carbonate-Vitamin D (CALCIUM + D PO) 1 Tab daily. Yes Historical Provider, MD   Glucosamine-Chondroitin (GLUCOSAMINE CHONDR COMPLEX PO) Take  by mouth. Yes Historical Provider, MD   aspirin 81 MG chewable tablet Take 81 mg by mouth nightly. Yes Historical Provider, MD   Coenzyme Q-10 100 MG CAPS Take 1 capsule by mouth daily.    Yes Historical Provider, MD     Past Medical History:   Diagnosis Date    Arthritis     rt hip    CAD (coronary artery disease)     cardiac stents    Food allergy     History of blood transfusion     platelets=    Hyperlipidemia     Hypertension     Hypothyroid     Osteoarthritis     Hip right    Recurrent right inguinal hernia     Shingles 2014    Thyroid disease     TIA (transient ischemic attack)      Past Surgical History:   Procedure Laterality Date    CARDIAC SURGERY  2001    stents    COLONOSCOPY  04/22/99    COLONOSCOPY  7/13/2015    EYE SURGERY      victorino cataracts    INGUINAL HERNIA REPAIR Right 07/07/2016    laparoscopic right inguinal hernia repair with mesh    INGUINAL HERNIA REPAIR Right 04/12/2017    davinci recurrent right inguinal hernia repair with mesh    SIGMOIDOSCOPY  1994    TONSILLECTOMY      UPPER GASTROINTESTINAL ENDOSCOPY  05/12/94    UPPER GASTROINTESTINAL ENDOSCOPY  11/09/04    Gastritis and duodenitis     Family History   Problem Relation Age of Onset    Hypertension Mother     Coronary Art Dis Father     Hypertension Father     Heart Disease Father     Prostate Cancer Brother     Cancer Brother 61     Prostate    Heart Disease Sister        CareTeam (Including outside providers/suppliers regularly involved in providing care):   Patient Care Team:  Lonnie Cheung MD as PCP - General  Raudel Parrish MD (Internal Medicine Cardiovascular Disease)    Wt Readings from Last 3 Encounters:   11/15/17 200 lb (90.7 kg)   09/15/17 204 lb 12.8 oz (92.9 kg)   06/13/17 209 lb (94.8 kg)     Vitals:    11/15/17 0902   BP: 136/60   Weight: 200 lb (90.7 kg)   Height: 6' (1.829 m)       General Appearance: alert and oriented to person, place and time, well developed and well- nourished, in no acute distress  Skin: warm and dry, no rash or erythema  Head: normocephalic and atraumatic  Eyes: pupils equal, round, and reactive to light, extraocular eye movements intact, conjunctivae normal  ENT: tympanic membrane, external ear and ear canal normal bilaterally, nose without deformity, nasal mucosa and turbinates normal without polyps  Neck: supple and non-tender without mass, no thyromegaly or thyroid nodules, no cervical lymphadenopathy  Pulmonary/Chest: clear to auscultation bilaterally- no wheezes, rales or rhonchi, normal air movement, no respiratory orders.   Recommended screening schedule for the next 5-10 years is provided to the patient in written form: see Patient Instructions/AVS.

## 2017-11-20 ENCOUNTER — OFFICE VISIT (OUTPATIENT)
Dept: ORTHOPEDIC SURGERY | Age: 77
End: 2017-11-20

## 2017-11-20 VITALS
BODY MASS INDEX: 26.28 KG/M2 | DIASTOLIC BLOOD PRESSURE: 64 MMHG | HEIGHT: 72 IN | WEIGHT: 194 LBS | SYSTOLIC BLOOD PRESSURE: 128 MMHG | HEART RATE: 64 BPM

## 2017-11-20 DIAGNOSIS — M25.551 RIGHT HIP PAIN: Primary | ICD-10-CM

## 2017-11-20 PROCEDURE — 99203 OFFICE O/P NEW LOW 30 MIN: CPT | Performed by: ORTHOPAEDIC SURGERY

## 2017-11-20 NOTE — PROGRESS NOTES
Chief Complaint    Hip Pain (rt hip pain ongoing for years; now affecting his gait)      History of Present Illness:  Ame Mitchell is a 69 y.o. male who presents with 2 to three-year history of right hip pain. He's had 2 herniorrhaphies performed by Dr. Shavon Harrington. He's continue to have significant amount of pain in the groin. He also has low back pain. He has no numbness or tingling. He also walks \"funny\". He is referred in by Dr. Kate Schulz for orthopedic consultation. Pain Assessment  Location of Pain: Pelvis  Location Modifiers: Right  Severity of Pain: 7  Frequency of Pain: Intermittent  Aggravating Factors: Walking, Other (Comment)  Limiting Behavior: Some  Result of Injury: No  Work-Related Injury: No  Are there other pain locations you wish to document?: No    Medical History:  Patient's medications, allergies, past medical, surgical, social and family histories were reviewed and updated as appropriate. Review of Systems:  Pertinent items are noted in HPI  Review of systems reviewed from Patient History Form dated on 11/20/17 and available in the patient's chart under the Media tab. Vital Signs:  /64   Pulse 64   Ht 6' (1.829 m)   Wt 194 lb (88 kg)   BMI 26.31 kg/m²     General Exam:   Constitutional: Patient is adequately groomed with no evidence of malnutrition  Mental Status: The patient is oriented to time, place and person. The patient's mood and affect are appropriate. Lymphatic: The lymphatic examination bilaterally reveals all areas to be without enlargement or induration. Vascular: Examination reveals no swelling or calf tenderness. Peripheral pulses are palpable and 2+. Neurological: The patient has good coordination. There is no weakness or sensory deficit. Right Hip Examination:    Inspection:  No skin abnormalities noted, no evidence of lymphangitis or lymphedema, ecchymosis or bruising.       Palpation:  Pain to palpation directly over the ilioinguinal ligament. I cannot palpate a large hernia. Range of Motion:  Patient has full range of motion of his hip which is pain-free. He has no flexion contracture. Strength:  5/5 strength in the hip abductors, hip flexors, hip adductor's. Special Tests:  Negative logroll maneuver, negative Stinchfield test, negative David test    Skin: There are no rashes, ulcerations or lesions. Gait: Antalgic gait pattern on the right        Additional Comments:       Additional Examinations:         Left Lower Extremity: Examination of the left lower extremity does not show any tenderness, deformity or injury. Range of motion is unremarkable. There is no gross instability. There are no rashes, ulcerations or lesions. Strength and tone are normal.    Radiology:     X-rays obtained and reviewed in office:  Views 2  Location right hip including AP pelvis, AP lateral of the right hip  Impression well-maintained articular cartilage space bilateral hips. 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 There is no evidence of acute fracture dislocation, advanced AVN or pathologic disease. Assessment : Right hip pain most probably from extra-articular causes. Impression:  Encounter Diagnosis   Name Primary?  Right hip pain Yes       Office Procedures:  Orders Placed This Encounter   Procedures    XR HIP RIGHT (2-3 VIEWS)     [id number]     Order Specific Question:   Reason for exam:     Answer:   Pain       Treatment Plan: Currently I do not see any indication that his hip has any significant pathology. She is asymptomatic femoral acetabular impingement which is actually worse on the left than the right. It seems that most of his kidney pain is coming from the ilioinguinal area. I recommend he see Dr. Amira Don for possibility of recurrent hernia versus inguinal lymphadenopathy.   We did talk about getting an MRI of his hip if there is no

## 2017-11-27 ENCOUNTER — TELEPHONE (OUTPATIENT)
Dept: INTERNAL MEDICINE CLINIC | Age: 77
End: 2017-11-27

## 2017-11-27 NOTE — TELEPHONE ENCOUNTER
Lg Ponce has been having pain in his lower right abdomen. He saw Dr Aidan Verde and had scan. Dr Aidan Verde said it wasn't  Orthpaedic. Should he see Dr Long Anthony or come in to see Dr Jolene Nunez? His # [id number]. He would like a note put in My Chart of what he should do.

## 2017-11-27 NOTE — TELEPHONE ENCOUNTER
I sent him a message in [employer] recommending that he follow up with Dr. Brett Hunt, to evaluate for inguinal hernia or enlargement of lymph node.   Please call him as well to let him now, and give him the number if he needs it

## 2017-11-28 ENCOUNTER — OFFICE VISIT (OUTPATIENT)
Dept: SURGERY | Age: 77
End: 2017-11-28

## 2017-11-28 VITALS
SYSTOLIC BLOOD PRESSURE: 127 MMHG | BODY MASS INDEX: 27.66 KG/M2 | HEART RATE: 65 BPM | DIASTOLIC BLOOD PRESSURE: 67 MMHG | HEIGHT: 72 IN | WEIGHT: 204.2 LBS

## 2017-11-28 DIAGNOSIS — R10.31 GROIN PAIN, CHRONIC, RIGHT: ICD-10-CM

## 2017-11-28 DIAGNOSIS — G89.29 GROIN PAIN, CHRONIC, RIGHT: ICD-10-CM

## 2017-11-28 PROCEDURE — 99213 OFFICE O/P EST LOW 20 MIN: CPT | Performed by: SURGERY

## 2017-12-14 ENCOUNTER — OFFICE VISIT (OUTPATIENT)
Dept: INTERNAL MEDICINE CLINIC | Age: 77
End: 2017-12-14

## 2017-12-14 VITALS
BODY MASS INDEX: 27.53 KG/M2 | HEART RATE: 62 BPM | OXYGEN SATURATION: 98 % | WEIGHT: 203 LBS | SYSTOLIC BLOOD PRESSURE: 124 MMHG | DIASTOLIC BLOOD PRESSURE: 64 MMHG

## 2017-12-14 DIAGNOSIS — M54.50 CHRONIC RIGHT-SIDED LOW BACK PAIN WITHOUT SCIATICA: ICD-10-CM

## 2017-12-14 DIAGNOSIS — I25.10 CORONARY ARTERY DISEASE DUE TO LIPID RICH PLAQUE: Primary | ICD-10-CM

## 2017-12-14 DIAGNOSIS — J30.5 CHRONIC ALLERGIC RHINITIS DUE TO FOOD: ICD-10-CM

## 2017-12-14 DIAGNOSIS — I65.23 STENOSIS OF BOTH INTERNAL CAROTID ARTERIES: ICD-10-CM

## 2017-12-14 DIAGNOSIS — I25.83 CORONARY ARTERY DISEASE DUE TO LIPID RICH PLAQUE: Primary | ICD-10-CM

## 2017-12-14 DIAGNOSIS — E03.9 ACQUIRED HYPOTHYROIDISM: ICD-10-CM

## 2017-12-14 DIAGNOSIS — G89.29 CHRONIC RIGHT-SIDED LOW BACK PAIN WITHOUT SCIATICA: ICD-10-CM

## 2017-12-14 DIAGNOSIS — E78.2 MIXED HYPERLIPIDEMIA: ICD-10-CM

## 2017-12-14 DIAGNOSIS — Z12.5 SCREENING FOR PROSTATE CANCER: ICD-10-CM

## 2017-12-14 PROCEDURE — 99214 OFFICE O/P EST MOD 30 MIN: CPT | Performed by: FAMILY MEDICINE

## 2017-12-14 RX ORDER — GUAIFENESIN 600 MG/1
600 TABLET, EXTENDED RELEASE ORAL 2 TIMES DAILY
Qty: 60 TABLET | Refills: 2 | Status: SHIPPED | OUTPATIENT
Start: 2017-12-14 | End: 2018-12-21
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
## 2017-12-17 ASSESSMENT — ENCOUNTER SYMPTOMS
SHORTNESS OF BREATH: 0
VOMITING: 0
WHEEZING: 0
DIARRHEA: 0
ABDOMINAL PAIN: 0
BACK PAIN: 1
TROUBLE SWALLOWING: 0
SORE THROAT: 0
CONSTIPATION: 0
COUGH: 0
RHINORRHEA: 0
NAUSEA: 0

## 2017-12-18 ENCOUNTER — HOSPITAL ENCOUNTER (OUTPATIENT)
Dept: GENERAL RADIOLOGY | Age: 77
Discharge: HOME | End: 2017-12-18
Attending: FAMILY MEDICINE | Admitting: FAMILY MEDICINE

## 2017-12-18 DIAGNOSIS — I25.83 CORONARY ARTERY DISEASE DUE TO LIPID RICH PLAQUE: ICD-10-CM

## 2017-12-18 DIAGNOSIS — I25.10 CORONARY ARTERY DISEASE DUE TO LIPID RICH PLAQUE: ICD-10-CM

## 2017-12-18 DIAGNOSIS — E78.2 MIXED HYPERLIPIDEMIA: ICD-10-CM

## 2017-12-18 DIAGNOSIS — E03.9 ACQUIRED HYPOTHYROIDISM: ICD-10-CM

## 2017-12-18 DIAGNOSIS — Z12.5 SCREENING FOR PROSTATE CANCER: ICD-10-CM

## 2017-12-18 LAB
A/G RATIO: 1.5 (ref 1.1–2.2)
ALBUMIN SERPL-MCNC: 4.1 G/DL (ref 3.4–5)
ALP BLD-CCNC: 77 U/L (ref 40–129)
ALT SERPL-CCNC: 24 U/L (ref 10–40)
ANION GAP SERPL CALCULATED.3IONS-SCNC: 11 MMOL/L (ref 3–16)
AST SERPL-CCNC: 20 U/L (ref 15–37)
BASOPHILS ABSOLUTE: 0.1 K/UL (ref 0–0.2)
BASOPHILS RELATIVE PERCENT: 0.9 %
BILIRUB SERPL-MCNC: 0.7 MG/DL (ref 0–1)
BUN BLDV-MCNC: 16 MG/DL (ref 7–20)
CALCIUM SERPL-MCNC: 9 MG/DL (ref 8.3–10.6)
CHLORIDE BLD-SCNC: 100 MMOL/L (ref 99–110)
CHOLESTEROL, TOTAL: 121 MG/DL (ref 0–199)
CO2: 29 MMOL/L (ref 21–32)
CREAT SERPL-MCNC: 0.7 MG/DL (ref 0.8–1.3)
EOSINOPHILS ABSOLUTE: 0.7 K/UL (ref 0–0.6)
EOSINOPHILS RELATIVE PERCENT: 10.6 %
GFR AFRICAN AMERICAN: >60
GFR NON-AFRICAN AMERICAN: >60
GLOBULIN: 2.7 G/DL
GLUCOSE BLD-MCNC: 99 MG/DL (ref 70–99)
HCT VFR BLD CALC: 38.4 % (ref 40.5–52.5)
HDLC SERPL-MCNC: 68 MG/DL (ref 40–60)
HEMOGLOBIN: 13 G/DL (ref 13.5–17.5)
LDL CHOLESTEROL CALCULATED: 44 MG/DL
LYMPHOCYTES ABSOLUTE: 1.3 K/UL (ref 1–5.1)
LYMPHOCYTES RELATIVE PERCENT: 20.3 %
MCH RBC QN AUTO: 33.2 PG (ref 26–34)
MCHC RBC AUTO-ENTMCNC: 33.8 G/DL (ref 31–36)
MCV RBC AUTO: 98.2 FL (ref 80–100)
MONOCYTES ABSOLUTE: 0.4 K/UL (ref 0–1.3)
MONOCYTES RELATIVE PERCENT: 6.4 %
NEUTROPHILS ABSOLUTE: 4.1 K/UL (ref 1.7–7.7)
NEUTROPHILS RELATIVE PERCENT: 61.8 %
PDW BLD-RTO: 14.8 % (ref 12.4–15.4)
PLATELET # BLD: 161 K/UL (ref 135–450)
PMV BLD AUTO: 8.5 FL (ref 5–10.5)
POTASSIUM SERPL-SCNC: 4.4 MMOL/L (ref 3.5–5.1)
PROSTATE SPECIFIC ANTIGEN: 0.62 NG/ML (ref 0–4)
RBC # BLD: 3.91 M/UL (ref 4.2–5.9)
SODIUM BLD-SCNC: 140 MMOL/L (ref 136–145)
TOTAL PROTEIN: 6.8 G/DL (ref 6.4–8.2)
TRIGL SERPL-MCNC: 45 MG/DL (ref 0–150)
TSH SERPL DL<=0.05 MIU/L-ACNC: 1.49 UIU/ML (ref 0.27–4.2)
VLDLC SERPL CALC-MCNC: 9 MG/DL
WBC # BLD: 6.6 K/UL (ref 4–11)

## 2017-12-20 NOTE — PROGRESS NOTES
Please call Pt with recent bloodwork results:    CBC and CMP fine  TSH in good range  Lipids are well-controlled  PSA normal

## 2018-01-29 RX ORDER — LEVOTHYROXINE SODIUM 0.15 MG/1
150 TABLET ORAL DAILY
Qty: 30 TABLET | Refills: 11 | Status: SHIPPED | OUTPATIENT
Start: 2018-01-29 | End: 2018-11-22 | Stop reason: SDUPTHER

## 2018-04-28 DIAGNOSIS — E78.2 MIXED HYPERLIPIDEMIA: ICD-10-CM

## 2018-04-30 RX ORDER — ATORVASTATIN CALCIUM 80 MG/1
80 TABLET, FILM COATED ORAL DAILY
Qty: 90 TABLET | Refills: 3 | Status: SHIPPED | OUTPATIENT
Start: 2018-04-30 | End: 2018-06-22 | Stop reason: SDUPTHER

## 2018-06-22 ENCOUNTER — OFFICE VISIT (OUTPATIENT)
Dept: INTERNAL MEDICINE CLINIC | Age: 78
End: 2018-06-22

## 2018-06-22 VITALS
DIASTOLIC BLOOD PRESSURE: 82 MMHG | BODY MASS INDEX: 27.67 KG/M2 | OXYGEN SATURATION: 98 % | WEIGHT: 204 LBS | SYSTOLIC BLOOD PRESSURE: 120 MMHG | HEART RATE: 71 BPM

## 2018-06-22 DIAGNOSIS — I65.23 STENOSIS OF BOTH INTERNAL CAROTID ARTERIES: ICD-10-CM

## 2018-06-22 DIAGNOSIS — E78.2 MIXED HYPERLIPIDEMIA: ICD-10-CM

## 2018-06-22 DIAGNOSIS — E03.9 ACQUIRED HYPOTHYROIDISM: ICD-10-CM

## 2018-06-22 DIAGNOSIS — I35.0 AORTIC VALVE STENOSIS, ETIOLOGY OF CARDIAC VALVE DISEASE UNSPECIFIED: ICD-10-CM

## 2018-06-22 DIAGNOSIS — I25.10 CORONARY ARTERY DISEASE DUE TO LIPID RICH PLAQUE: Primary | ICD-10-CM

## 2018-06-22 DIAGNOSIS — I25.83 CORONARY ARTERY DISEASE DUE TO LIPID RICH PLAQUE: Primary | ICD-10-CM

## 2018-06-22 PROCEDURE — G8598 ASA/ANTIPLAT THER USED: HCPCS | Performed by: FAMILY MEDICINE

## 2018-06-22 PROCEDURE — G8419 CALC BMI OUT NRM PARAM NOF/U: HCPCS | Performed by: FAMILY MEDICINE

## 2018-06-22 PROCEDURE — 99214 OFFICE O/P EST MOD 30 MIN: CPT | Performed by: FAMILY MEDICINE

## 2018-06-22 PROCEDURE — G8427 DOCREV CUR MEDS BY ELIG CLIN: HCPCS | Performed by: FAMILY MEDICINE

## 2018-06-22 PROCEDURE — 1123F ACP DISCUSS/DSCN MKR DOCD: CPT | Performed by: FAMILY MEDICINE

## 2018-06-22 PROCEDURE — 4040F PNEUMOC VAC/ADMIN/RCVD: CPT | Performed by: FAMILY MEDICINE

## 2018-06-22 PROCEDURE — 1036F TOBACCO NON-USER: CPT | Performed by: FAMILY MEDICINE

## 2018-06-22 ASSESSMENT — ENCOUNTER SYMPTOMS
COUGH: 0
SHORTNESS OF BREATH: 0
VOMITING: 0
DIARRHEA: 0
CONSTIPATION: 0
RHINORRHEA: 0
NAUSEA: 0
WHEEZING: 0
SORE THROAT: 0
TROUBLE SWALLOWING: 0
ABDOMINAL PAIN: 0

## 2018-06-25 ASSESSMENT — ENCOUNTER SYMPTOMS: BACK PAIN: 1

## 2018-08-15 ENCOUNTER — OFFICE VISIT (OUTPATIENT)
Dept: INTERNAL MEDICINE CLINIC | Age: 78
End: 2018-08-15

## 2018-08-15 VITALS
OXYGEN SATURATION: 97 % | DIASTOLIC BLOOD PRESSURE: 60 MMHG | WEIGHT: 204 LBS | SYSTOLIC BLOOD PRESSURE: 124 MMHG | HEART RATE: 87 BPM | BODY MASS INDEX: 27.67 KG/M2

## 2018-08-15 DIAGNOSIS — I50.22 CHRONIC SYSTOLIC CONGESTIVE HEART FAILURE (HCC): ICD-10-CM

## 2018-08-15 DIAGNOSIS — J30.9 CHRONIC ALLERGIC RHINITIS: Primary | ICD-10-CM

## 2018-08-15 DIAGNOSIS — I35.0 AORTIC VALVE STENOSIS, ETIOLOGY OF CARDIAC VALVE DISEASE UNSPECIFIED: ICD-10-CM

## 2018-08-15 PROCEDURE — G8598 ASA/ANTIPLAT THER USED: HCPCS | Performed by: FAMILY MEDICINE

## 2018-08-15 PROCEDURE — 1101F PT FALLS ASSESS-DOCD LE1/YR: CPT | Performed by: FAMILY MEDICINE

## 2018-08-15 PROCEDURE — 4040F PNEUMOC VAC/ADMIN/RCVD: CPT | Performed by: FAMILY MEDICINE

## 2018-08-15 PROCEDURE — 1036F TOBACCO NON-USER: CPT | Performed by: FAMILY MEDICINE

## 2018-08-15 PROCEDURE — 99213 OFFICE O/P EST LOW 20 MIN: CPT | Performed by: FAMILY MEDICINE

## 2018-08-15 PROCEDURE — G8427 DOCREV CUR MEDS BY ELIG CLIN: HCPCS | Performed by: FAMILY MEDICINE

## 2018-08-15 PROCEDURE — G8419 CALC BMI OUT NRM PARAM NOF/U: HCPCS | Performed by: FAMILY MEDICINE

## 2018-08-15 PROCEDURE — 1123F ACP DISCUSS/DSCN MKR DOCD: CPT | Performed by: FAMILY MEDICINE

## 2018-08-15 RX ORDER — FUROSEMIDE 20 MG/1
20 TABLET ORAL DAILY PRN
COMMUNITY
Start: 2018-07-18 | End: 2019-09-06 | Stop reason: SDUPTHER

## 2018-08-15 RX ORDER — AZELASTINE 1 MG/ML
2 SPRAY, METERED NASAL 2 TIMES DAILY
Qty: 1 BOTTLE | Refills: 1 | Status: SHIPPED | OUTPATIENT
Start: 2018-08-15 | End: 2019-06-21 | Stop reason: SDUPTHER

## 2018-08-15 ASSESSMENT — PATIENT HEALTH QUESTIONNAIRE - PHQ9
SUM OF ALL RESPONSES TO PHQ QUESTIONS 1-9: 0
SUM OF ALL RESPONSES TO PHQ9 QUESTIONS 1 & 2: 0
SUM OF ALL RESPONSES TO PHQ QUESTIONS 1-9: 0
1. LITTLE INTEREST OR PLEASURE IN DOING THINGS: 0
2. FEELING DOWN, DEPRESSED OR HOPELESS: 0

## 2018-08-15 NOTE — PROGRESS NOTES
Adelina Romo      Chief Complaint   Patient presents with    Chest Congestion       HPI:     He has been having issues with progressive dyspnea, and fatigue. Low EF 20-25% with aortic stenosis and known CAD. He just met with CT surgeon Dr El Felipe last week to discuss possible CABG and AVR. He is considering different options for him, and acknowledges the serious risks involved given his comorbidities. Taking lasix 20 mg daily. No significant leg swelling or weight gain. Wife made him come in today for eval for frequent throat clearing and cough at night. Taking his zyrtec and Astelin, and mucinex at night occasionally for chronic allergy issues. I personally reviewed and updated patient's past medical history, past surgical history, family history, allergies, and social history as captured in the relevant section of patient's chart. Current Outpatient Prescriptions on File Prior to Visit   Medication Sig Dispense Refill    levothyroxine (SYNTHROID) 150 MCG tablet TAKE 1 TABLET BY MOUTH DAILY 30 tablet 11    guaiFENesin (MUCINEX) 600 MG extended release tablet Take 1 tablet by mouth 2 times daily 60 tablet 2    atorvastatin (LIPITOR) 80 MG tablet Take 1 tablet by mouth daily 90 tablet 3    Ferrous Sulfate ER (SLOW FE) 142 (45 FE) MG Take 142 mg by mouth daily.  cetirizine (ZYRTEC) 10 MG tablet Take 10 mg by mouth daily.  Multiple Vitamin (THERA) TABS 1 Tab daily.  Calcium Carbonate-Vitamin D (CALCIUM + D PO) 1 Tab daily.  Glucosamine-Chondroitin (GLUCOSAMINE CHONDR COMPLEX PO) Take  by mouth.  aspirin 81 MG chewable tablet Take 81 mg by mouth nightly.  Coenzyme Q-10 100 MG CAPS Take 1 capsule by mouth daily. No current facility-administered medications on file prior to visit. Review of Systems   Constitutional: Positive for activity change. Negative for appetite change, chills, fatigue, fever and unexpected weight change.    HENT: Positive for congestion and postnasal drip. Negative for nosebleeds, rhinorrhea, sore throat and trouble swallowing. Eyes: Negative for visual disturbance. Respiratory: Positive for cough (intermittent, with throat clearing). Negative for shortness of breath (with exertion) and wheezing. Cardiovascular: Negative for chest pain and leg swelling. Gastrointestinal: Negative for abdominal pain, constipation, diarrhea, nausea and vomiting. Genitourinary: Negative for difficulty urinating. Musculoskeletal: Positive for back pain. Negative for arthralgias, neck pain and neck stiffness. Skin: Negative for rash. Neurological: Negative for dizziness, weakness, numbness and headaches. Psychiatric/Behavioral: Negative for dysphoric mood and sleep disturbance. Physical Exam   Constitutional: He is oriented to person, place, and time. He appears well-developed and well-nourished. No distress. Pleasant   HENT:   Head: Normocephalic and atraumatic. Mouth/Throat: Oropharynx is clear and moist.   Slightly boggy nasal turbinates   Eyes: Conjunctivae and EOM are normal. Right eye exhibits no discharge. Left eye exhibits no discharge. Neck: Normal range of motion. Neck supple. No thyromegaly present. Cardiovascular: Normal rate and regular rhythm. Murmur (systolic) heard. Pulmonary/Chest: Effort normal and breath sounds normal. No respiratory distress. He has no wheezes. Abdominal: Soft. Bowel sounds are normal. He exhibits no distension. Musculoskeletal: Normal range of motion. He exhibits no edema (compression socks in place). Lymphadenopathy:     He has no cervical adenopathy. Neurological: He is alert and oriented to person, place, and time. No cranial nerve deficit. Skin: Skin is warm and dry. No rash noted. He is not diaphoretic. Psychiatric: He has a normal mood and affect.  His behavior is normal. Judgment and thought content normal.   Mildly anxious about upcoming heart surgery   Vitals reviewed. Vitals:    08/15/18 0954   BP: 124/60   Site: Right Arm   Position: Sitting   Cuff Size: Medium Adult   Pulse: 87   SpO2: 97%   Weight: 204 lb (92.5 kg)     Body mass index is 27.67 kg/m². Assessment/Plan:    1. Chronic allergic rhinitis  I believe his throat clearing and tickle of a cough is related to chronic allergies (and not pulmonary edema)  Rec he start using his [address] for sinus irrigation in the AM and PM before bed  Take mucinex before bed for the next week or so as well, until symptoms calm down  Continue on zyrtec and Astelin nose spray    2. Aortic valve stenosis, etiology of cardiac valve disease unspecified  3. Chronic systolic congestive heart failure ([address]  He will follow up with Dr Tammy Iglesias in regards to proceeding with AVR and/or CABG  Continue on lasix 20 mg daily  Pt appears euvolemic today  Stressed importance of low salt diet        Return if symptoms worsen or fail to improve.

## 2018-08-21 PROBLEM — I50.22 CHRONIC SYSTOLIC CONGESTIVE HEART FAILURE (HCC): Status: ACTIVE | Noted: 2018-08-21

## 2018-08-21 ASSESSMENT — ENCOUNTER SYMPTOMS
WHEEZING: 0
SHORTNESS OF BREATH: 0
BACK PAIN: 1
TROUBLE SWALLOWING: 0
DIARRHEA: 0
RHINORRHEA: 0
ABDOMINAL PAIN: 0
COUGH: 1
NAUSEA: 0
SORE THROAT: 0
CONSTIPATION: 0
VOMITING: 0

## 2018-09-26 ENCOUNTER — NURSE ONLY (OUTPATIENT)
Dept: INTERNAL MEDICINE CLINIC | Age: 78
End: 2018-09-26
Payer: MEDICARE

## 2018-09-26 DIAGNOSIS — Z23 NEED FOR INFLUENZA VACCINATION: Primary | ICD-10-CM

## 2018-09-26 PROCEDURE — G0008 ADMIN INFLUENZA VIRUS VAC: HCPCS | Performed by: FAMILY MEDICINE

## 2018-09-26 PROCEDURE — 90662 IIV NO PRSV INCREASED AG IM: CPT | Performed by: FAMILY MEDICINE

## 2018-09-26 NOTE — PROGRESS NOTES
Vaccine Information Sheet, \"Influenza - Inactivated\"  given to Ines Rizzo, or parent/legal guardian of  Ines Rizzo and verbalized understanding. Patient responses:    Have you ever had a reaction to a flu vaccine? No  Are you able to eat eggs without adverse effects? Yes  Do you have any current illness? 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 Please see immunization tab.

## 2018-11-05 ENCOUNTER — HOSPITAL ENCOUNTER (OUTPATIENT)
Dept: CARDIAC REHAB | Age: 78
Setting detail: THERAPIES SERIES
Discharge: HOME | End: 2018-11-05
Payer: MEDICARE

## 2018-11-12 ENCOUNTER — HOSPITAL ENCOUNTER (OUTPATIENT)
Dept: CARDIAC REHAB | Age: 78
Setting detail: THERAPIES SERIES
Discharge: HOME | End: 2018-11-12
Payer: MEDICARE

## 2018-11-12 PROCEDURE — 93798 PHYS/QHP OP CAR RHAB W/ECG: CPT

## 2018-11-14 ENCOUNTER — HOSPITAL ENCOUNTER (OUTPATIENT)
Dept: CARDIAC REHAB | Age: 78
Setting detail: THERAPIES SERIES
Discharge: HOME | End: 2018-11-14
Payer: MEDICARE

## 2018-11-14 PROCEDURE — 93798 PHYS/QHP OP CAR RHAB W/ECG: CPT

## 2018-11-16 ENCOUNTER — HOSPITAL ENCOUNTER (OUTPATIENT)
Dept: CARDIAC REHAB | Age: 78
Setting detail: THERAPIES SERIES
Discharge: HOME | End: 2018-11-16
Payer: MEDICARE

## 2018-11-16 PROCEDURE — 93798 PHYS/QHP OP CAR RHAB W/ECG: CPT

## 2018-11-19 ENCOUNTER — HOSPITAL ENCOUNTER (OUTPATIENT)
Dept: CARDIAC REHAB | Age: 78
Setting detail: THERAPIES SERIES
Discharge: HOME | End: 2018-11-19
Payer: MEDICARE

## 2018-11-19 PROCEDURE — 93798 PHYS/QHP OP CAR RHAB W/ECG: CPT

## 2018-11-21 ENCOUNTER — HOSPITAL ENCOUNTER (OUTPATIENT)
Dept: CARDIAC REHAB | Age: 78
Setting detail: THERAPIES SERIES
Discharge: HOME | End: 2018-11-21
Payer: MEDICARE

## 2018-11-21 ENCOUNTER — OFFICE VISIT (OUTPATIENT)
Dept: INTERNAL MEDICINE CLINIC | Age: 78
End: 2018-11-21
Payer: MEDICARE

## 2018-11-21 VITALS
DIASTOLIC BLOOD PRESSURE: 76 MMHG | HEIGHT: 72 IN | WEIGHT: 192 LBS | BODY MASS INDEX: 26.01 KG/M2 | SYSTOLIC BLOOD PRESSURE: 120 MMHG

## 2018-11-21 DIAGNOSIS — Z00.00 MEDICARE ANNUAL WELLNESS VISIT, SUBSEQUENT: Primary | ICD-10-CM

## 2018-11-21 PROCEDURE — G8482 FLU IMMUNIZE ORDER/ADMIN: HCPCS | Performed by: NURSE PRACTITIONER

## 2018-11-21 PROCEDURE — G8598 ASA/ANTIPLAT THER USED: HCPCS | Performed by: NURSE PRACTITIONER

## 2018-11-21 PROCEDURE — 4040F PNEUMOC VAC/ADMIN/RCVD: CPT | Performed by: NURSE PRACTITIONER

## 2018-11-21 PROCEDURE — 93798 PHYS/QHP OP CAR RHAB W/ECG: CPT

## 2018-11-21 PROCEDURE — G0439 PPPS, SUBSEQ VISIT: HCPCS | Performed by: NURSE PRACTITIONER

## 2018-11-21 RX ORDER — CLOPIDOGREL BISULFATE 75 MG/1
TABLET ORAL
Refills: 11 | COMMUNITY
Start: 2018-10-09 | End: 2020-01-20 | Stop reason: ALTCHOICE

## 2018-11-21 RX ORDER — ENALAPRIL MALEATE 10 MG/1
10 TABLET ORAL DAILY
Status: ON HOLD | COMMUNITY
Start: 2018-11-20 | End: 2021-07-12 | Stop reason: HOSPADM

## 2018-11-21 RX ORDER — ENALAPRIL MALEATE 5 MG/1
TABLET ORAL
Refills: 11 | COMMUNITY
Start: 2018-10-19 | End: 2018-11-21

## 2018-11-21 ASSESSMENT — PATIENT HEALTH QUESTIONNAIRE - PHQ9
SUM OF ALL RESPONSES TO PHQ QUESTIONS 1-9: 0
SUM OF ALL RESPONSES TO PHQ QUESTIONS 1-9: 0

## 2018-11-21 ASSESSMENT — LIFESTYLE VARIABLES: HOW OFTEN DO YOU HAVE A DRINK CONTAINING ALCOHOL: 0

## 2018-11-21 ASSESSMENT — ANXIETY QUESTIONNAIRES: GAD7 TOTAL SCORE: 0

## 2018-11-21 NOTE — PATIENT INSTRUCTIONS
Personalized Preventive Plan for Latisha Holder - 11/21/2018  Medicare offers a range of preventive health benefits. Some of the tests and screenings are paid in full while other may be subject to a deductible, co-insurance, and/or copay. Some of these benefits include a comprehensive review of your medical history including lifestyle, illnesses that may run in your family, and various assessments and screenings as appropriate. After reviewing your medical record and screening and assessments performed today your provider may have ordered immunizations, labs, imaging, and/or referrals for you. A list of these orders (if applicable) as well as your Preventive Care list are included within your After Visit Summary for your review. Other Preventive Recommendations:    · A preventive eye exam performed by an eye specialist is recommended every 1-2 years to screen for glaucoma; cataracts, macular degeneration, and other eye disorders. · A preventive dental visit is recommended every 6 months. · Try to get at least 150 minutes of exercise per week or 10,000 steps per day on a pedometer . · Order or download the FREE \"Exercise & Physical Activity: Your Everyday Guide\" from [employer]. Call 3-550.881.2784 or search [employer] online. · You need 7334-5085 mg of calcium and 5478-6052 IU of vitamin D per day. It is possible to meet your calcium requirement with diet alone, but a vitamin D supplement is usually necessary to meet this goal.  · When exposed to the sun, use a sunscreen that protects against both UVA and UVB radiation with an SPF of 30 or greater. Reapply every 2 to 3 hours or after sweating, drying off with a towel, or swimming. · Always wear a seat belt when traveling in a car. Always wear a helmet when riding a bicycle or motorcycle. · Get Shingrix when it is available  · Fill out advanced directives--wait to have your signature notarized.   Then

## 2018-11-26 ENCOUNTER — HOSPITAL ENCOUNTER (OUTPATIENT)
Dept: CARDIAC REHAB | Age: 78
Setting detail: THERAPIES SERIES
Discharge: HOME | End: 2018-11-26
Payer: MEDICARE

## 2018-11-26 PROCEDURE — 93798 PHYS/QHP OP CAR RHAB W/ECG: CPT

## 2018-11-26 RX ORDER — LEVOTHYROXINE SODIUM 0.15 MG/1
150 TABLET ORAL DAILY
Qty: 30 TABLET | Refills: 3 | Status: SHIPPED | OUTPATIENT
Start: 2018-11-26 | End: 2019-02-23 | Stop reason: SDUPTHER

## 2018-11-28 ENCOUNTER — HOSPITAL ENCOUNTER (OUTPATIENT)
Dept: CARDIAC REHAB | Age: 78
Setting detail: THERAPIES SERIES
Discharge: HOME | End: 2018-11-28
Payer: MEDICARE

## 2018-11-28 PROCEDURE — 93798 PHYS/QHP OP CAR RHAB W/ECG: CPT

## 2018-11-30 ENCOUNTER — HOSPITAL ENCOUNTER (OUTPATIENT)
Dept: CARDIAC REHAB | Age: 78
Setting detail: THERAPIES SERIES
Discharge: HOME | End: 2018-11-30
Payer: MEDICARE

## 2018-11-30 PROCEDURE — 93798 PHYS/QHP OP CAR RHAB W/ECG: CPT

## 2018-12-03 ENCOUNTER — HOSPITAL ENCOUNTER (OUTPATIENT)
Dept: CARDIAC REHAB | Age: 78
Setting detail: THERAPIES SERIES
Discharge: HOME | End: 2018-12-03
Payer: MEDICARE

## 2018-12-03 PROCEDURE — 93798 PHYS/QHP OP CAR RHAB W/ECG: CPT

## 2018-12-05 ENCOUNTER — HOSPITAL ENCOUNTER (OUTPATIENT)
Dept: CARDIAC REHAB | Age: 78
Setting detail: THERAPIES SERIES
Discharge: HOME | End: 2018-12-05
Payer: MEDICARE

## 2018-12-05 PROCEDURE — 93798 PHYS/QHP OP CAR RHAB W/ECG: CPT

## 2018-12-07 ENCOUNTER — HOSPITAL ENCOUNTER (OUTPATIENT)
Dept: CARDIAC REHAB | Age: 78
Setting detail: THERAPIES SERIES
Discharge: HOME | End: 2018-12-07
Payer: MEDICARE

## 2018-12-07 PROCEDURE — 93798 PHYS/QHP OP CAR RHAB W/ECG: CPT

## 2018-12-10 ENCOUNTER — HOSPITAL ENCOUNTER (OUTPATIENT)
Dept: CARDIAC REHAB | Age: 78
Setting detail: THERAPIES SERIES
Discharge: HOME | End: 2018-12-10
Payer: MEDICARE

## 2018-12-10 PROCEDURE — 93798 PHYS/QHP OP CAR RHAB W/ECG: CPT

## 2018-12-12 ENCOUNTER — HOSPITAL ENCOUNTER (OUTPATIENT)
Dept: CARDIAC REHAB | Age: 78
Setting detail: THERAPIES SERIES
Discharge: HOME | End: 2018-12-12
Payer: MEDICARE

## 2018-12-12 PROCEDURE — 93798 PHYS/QHP OP CAR RHAB W/ECG: CPT

## 2018-12-14 ENCOUNTER — HOSPITAL ENCOUNTER (OUTPATIENT)
Dept: CARDIAC REHAB | Age: 78
Setting detail: THERAPIES SERIES
Discharge: HOME | End: 2018-12-14
Payer: MEDICARE

## 2018-12-14 PROCEDURE — 93798 PHYS/QHP OP CAR RHAB W/ECG: CPT

## 2018-12-17 ENCOUNTER — APPOINTMENT (OUTPATIENT)
Dept: CARDIAC REHAB | Age: 78
End: 2018-12-17
Payer: MEDICARE

## 2018-12-19 ENCOUNTER — APPOINTMENT (OUTPATIENT)
Dept: CARDIAC REHAB | Age: 78
End: 2018-12-19
Payer: MEDICARE

## 2018-12-21 ENCOUNTER — HOSPITAL ENCOUNTER (OUTPATIENT)
Age: 78
Discharge: HOME | End: 2018-12-21
Payer: MEDICARE

## 2018-12-21 ENCOUNTER — APPOINTMENT (OUTPATIENT)
Dept: CARDIAC REHAB | Age: 78
End: 2018-12-21
Payer: MEDICARE

## 2018-12-21 ENCOUNTER — OFFICE VISIT (OUTPATIENT)
Dept: INTERNAL MEDICINE CLINIC | Age: 78
End: 2018-12-21
Payer: MEDICARE

## 2018-12-21 VITALS
OXYGEN SATURATION: 97 % | DIASTOLIC BLOOD PRESSURE: 70 MMHG | BODY MASS INDEX: 26.31 KG/M2 | SYSTOLIC BLOOD PRESSURE: 120 MMHG | HEART RATE: 79 BPM | WEIGHT: 194 LBS

## 2018-12-21 DIAGNOSIS — I50.22 CHRONIC SYSTOLIC CONGESTIVE HEART FAILURE (HCC): ICD-10-CM

## 2018-12-21 DIAGNOSIS — I25.10 CORONARY ARTERY DISEASE INVOLVING NATIVE CORONARY ARTERY OF NATIVE HEART WITHOUT ANGINA PECTORIS: ICD-10-CM

## 2018-12-21 DIAGNOSIS — E03.9 ACQUIRED HYPOTHYROIDISM: ICD-10-CM

## 2018-12-21 DIAGNOSIS — I25.10 CORONARY ARTERY DISEASE INVOLVING NATIVE CORONARY ARTERY OF NATIVE HEART WITHOUT ANGINA PECTORIS: Primary | ICD-10-CM

## 2018-12-21 DIAGNOSIS — J34.89 SINUS DRAINAGE: ICD-10-CM

## 2018-12-21 DIAGNOSIS — I65.23 STENOSIS OF BOTH INTERNAL CAROTID ARTERIES: ICD-10-CM

## 2018-12-21 DIAGNOSIS — E78.2 MIXED HYPERLIPIDEMIA: ICD-10-CM

## 2018-12-21 LAB
A/G RATIO: 1.5 (ref 1.1–2.2)
ALBUMIN SERPL-MCNC: 4.1 G/DL (ref 3.4–5)
ALP BLD-CCNC: 83 U/L (ref 40–129)
ALT SERPL-CCNC: 24 U/L (ref 10–40)
ANION GAP SERPL CALCULATED.3IONS-SCNC: 9 MMOL/L (ref 3–16)
AST SERPL-CCNC: 25 U/L (ref 15–37)
BASOPHILS ABSOLUTE: 0 K/UL (ref 0–0.2)
BASOPHILS RELATIVE PERCENT: 1 %
BILIRUB SERPL-MCNC: 0.7 MG/DL (ref 0–1)
BUN BLDV-MCNC: 12 MG/DL (ref 7–20)
CALCIUM SERPL-MCNC: 9.5 MG/DL (ref 8.3–10.6)
CHLORIDE BLD-SCNC: 93 MMOL/L (ref 99–110)
CO2: 30 MMOL/L (ref 21–32)
CREAT SERPL-MCNC: 0.7 MG/DL (ref 0.8–1.3)
EOSINOPHILS ABSOLUTE: 0.6 K/UL (ref 0–0.6)
EOSINOPHILS RELATIVE PERCENT: 11.8 %
GFR AFRICAN AMERICAN: >60
GFR NON-AFRICAN AMERICAN: >60
GLOBULIN: 2.7 G/DL
GLUCOSE BLD-MCNC: 79 MG/DL (ref 70–99)
HCT VFR BLD CALC: 33.4 % (ref 40.5–52.5)
HEMOGLOBIN: 11.5 G/DL (ref 13.5–17.5)
LYMPHOCYTES ABSOLUTE: 0.6 K/UL (ref 1–5.1)
LYMPHOCYTES RELATIVE PERCENT: 12.7 %
MCH RBC QN AUTO: 32.7 PG (ref 26–34)
MCHC RBC AUTO-ENTMCNC: 34.4 G/DL (ref 31–36)
MCV RBC AUTO: 95 FL (ref 80–100)
MONOCYTES ABSOLUTE: 0.3 K/UL (ref 0–1.3)
MONOCYTES RELATIVE PERCENT: 7 %
NEUTROPHILS ABSOLUTE: 3.3 K/UL (ref 1.7–7.7)
NEUTROPHILS RELATIVE PERCENT: 67.5 %
PDW BLD-RTO: 15.7 % (ref 12.4–15.4)
PLATELET # BLD: 148 K/UL (ref 135–450)
PMV BLD AUTO: 7.9 FL (ref 5–10.5)
POTASSIUM SERPL-SCNC: 5.2 MMOL/L (ref 3.5–5.1)
RBC # BLD: 3.52 M/UL (ref 4.2–5.9)
SODIUM BLD-SCNC: 132 MMOL/L (ref 136–145)
TOTAL PROTEIN: 6.8 G/DL (ref 6.4–8.2)
TSH SERPL DL<=0.05 MIU/L-ACNC: 1.13 UIU/ML (ref 0.27–4.2)
WBC # BLD: 4.9 K/UL (ref 4–11)

## 2018-12-21 PROCEDURE — 36415 COLL VENOUS BLD VENIPUNCTURE: CPT

## 2018-12-21 PROCEDURE — G8419 CALC BMI OUT NRM PARAM NOF/U: HCPCS | Performed by: FAMILY MEDICINE

## 2018-12-21 PROCEDURE — G8427 DOCREV CUR MEDS BY ELIG CLIN: HCPCS | Performed by: FAMILY MEDICINE

## 2018-12-21 PROCEDURE — 85025 COMPLETE CBC W/AUTO DIFF WBC: CPT

## 2018-12-21 PROCEDURE — 99214 OFFICE O/P EST MOD 30 MIN: CPT | Performed by: FAMILY MEDICINE

## 2018-12-21 PROCEDURE — 4040F PNEUMOC VAC/ADMIN/RCVD: CPT | Performed by: FAMILY MEDICINE

## 2018-12-21 PROCEDURE — 1123F ACP DISCUSS/DSCN MKR DOCD: CPT | Performed by: FAMILY MEDICINE

## 2018-12-21 PROCEDURE — 80053 COMPREHEN METABOLIC PANEL: CPT

## 2018-12-21 PROCEDURE — G8598 ASA/ANTIPLAT THER USED: HCPCS | Performed by: FAMILY MEDICINE

## 2018-12-21 PROCEDURE — 1036F TOBACCO NON-USER: CPT | Performed by: FAMILY MEDICINE

## 2018-12-21 PROCEDURE — 1101F PT FALLS ASSESS-DOCD LE1/YR: CPT | Performed by: FAMILY MEDICINE

## 2018-12-21 PROCEDURE — 84443 ASSAY THYROID STIM HORMONE: CPT

## 2018-12-21 PROCEDURE — G8482 FLU IMMUNIZE ORDER/ADMIN: HCPCS | Performed by: FAMILY MEDICINE

## 2018-12-21 RX ORDER — BENZONATATE 100 MG/1
100-200 CAPSULE ORAL 3 TIMES DAILY PRN
Qty: 30 CAPSULE | Refills: 1 | Status: SHIPPED | OUTPATIENT
Start: 2018-12-21 | End: 2020-01-28

## 2018-12-21 ASSESSMENT — ENCOUNTER SYMPTOMS
COUGH: 0
SHORTNESS OF BREATH: 0
CONSTIPATION: 0
SORE THROAT: 0
NAUSEA: 0
ABDOMINAL PAIN: 0
WHEEZING: 0
VOMITING: 0
RHINORRHEA: 0
DIARRHEA: 0
TROUBLE SWALLOWING: 0

## 2018-12-21 NOTE — PROGRESS NOTES
mouth daily. No current facility-administered medications on file prior to visit. Review of Systems   Constitutional: Negative for activity change, appetite change, chills, fatigue, fever and unexpected weight change. HENT: Positive for congestion and postnasal drip. Negative for nosebleeds, rhinorrhea, sore throat and trouble swallowing. Eyes: Negative for visual disturbance. Respiratory: Negative for cough, shortness of breath and wheezing. Cardiovascular: Negative for chest pain and leg swelling. Gastrointestinal: Negative for abdominal pain, constipation, diarrhea, nausea and vomiting. Genitourinary: Negative for difficulty urinating. Musculoskeletal: Negative for arthralgias, back pain, neck pain and neck stiffness. Skin: Negative for rash. Neurological: Negative for dizziness, weakness, numbness and headaches. Psychiatric/Behavioral: Negative for dysphoric mood and sleep disturbance. Physical Exam   Constitutional: He is oriented to person, place, and time. He appears well-developed and well-nourished. No distress. Pleasant   HENT:   Head: Normocephalic and atraumatic. Mouth/Throat: Oropharynx is clear and moist.   Slightly boggy nasal turbinates   Eyes: Conjunctivae and EOM are normal. Right eye exhibits no discharge. Left eye exhibits no discharge. Neck: Normal range of motion. Neck supple. No thyromegaly present. Cardiovascular: Normal rate and regular rhythm. Murmur (systolic, faint) heard. No carotid bruits   Pulmonary/Chest: Effort normal and breath sounds normal. No respiratory distress. He has no wheezes. Abdominal: Soft. Bowel sounds are normal. He exhibits no distension and no mass. Musculoskeletal: Normal range of motion. He exhibits no edema. Lymphadenopathy:     He has no cervical adenopathy. Neurological: He is alert and oriented to person, place, and time. No cranial nerve deficit.    Slightly shuffling gait   Skin: Skin is warm

## 2018-12-21 NOTE — PATIENT INSTRUCTIONS
Start using flonase again  Can use tessalon perles for cough  Try using a vaporizer in your bedroom at night.

## 2018-12-24 ENCOUNTER — APPOINTMENT (OUTPATIENT)
Dept: CARDIAC REHAB | Age: 78
End: 2018-12-24
Payer: MEDICARE

## 2018-12-26 ENCOUNTER — APPOINTMENT (OUTPATIENT)
Dept: CARDIAC REHAB | Age: 78
End: 2018-12-26
Payer: MEDICARE

## 2018-12-28 ENCOUNTER — APPOINTMENT (OUTPATIENT)
Dept: CARDIAC REHAB | Age: 78
End: 2018-12-28
Payer: MEDICARE

## 2018-12-30 ASSESSMENT — ENCOUNTER SYMPTOMS: BACK PAIN: 0

## 2018-12-31 ENCOUNTER — APPOINTMENT (OUTPATIENT)
Dept: CARDIAC REHAB | Age: 78
End: 2018-12-31
Payer: MEDICARE

## 2019-02-23 RX ORDER — LEVOTHYROXINE SODIUM 0.15 MG/1
TABLET ORAL
Qty: 30 TABLET | Refills: 0 | Status: SHIPPED | OUTPATIENT
Start: 2019-02-23 | End: 2019-02-26 | Stop reason: SDUPTHER

## 2019-06-17 NOTE — TELEPHONE ENCOUNTER
Refill request for levotyroxine medication.      Name of Pharmacy- [facility]    Last visit - 12-21-18     Pending visit - 6-21-19    Last refill -2-26-19    Medication Contract signed -   Ricky patel-     Additional Comments

## 2019-06-18 RX ORDER — LEVOTHYROXINE SODIUM 0.15 MG/1
TABLET ORAL
Qty: 30 TABLET | Refills: 11 | Status: SHIPPED | OUTPATIENT
Start: 2019-06-18 | End: 2019-09-06 | Stop reason: SDUPTHER

## 2019-06-21 ENCOUNTER — OFFICE VISIT (OUTPATIENT)
Dept: INTERNAL MEDICINE CLINIC | Age: 79
End: 2019-06-21
Payer: MEDICARE

## 2019-06-21 VITALS
BODY MASS INDEX: 25.7 KG/M2 | SYSTOLIC BLOOD PRESSURE: 120 MMHG | WEIGHT: 183.6 LBS | RESPIRATION RATE: 14 BRPM | OXYGEN SATURATION: 95 % | HEART RATE: 72 BPM | HEIGHT: 71 IN | DIASTOLIC BLOOD PRESSURE: 60 MMHG

## 2019-06-21 DIAGNOSIS — I35.0 AORTIC VALVE STENOSIS, ETIOLOGY OF CARDIAC VALVE DISEASE UNSPECIFIED: ICD-10-CM

## 2019-06-21 DIAGNOSIS — R29.898 RIGHT LEG WEAKNESS: ICD-10-CM

## 2019-06-21 DIAGNOSIS — I25.10 CORONARY ARTERY DISEASE INVOLVING NATIVE CORONARY ARTERY OF NATIVE HEART WITHOUT ANGINA PECTORIS: Primary | ICD-10-CM

## 2019-06-21 DIAGNOSIS — E03.9 ACQUIRED HYPOTHYROIDISM: ICD-10-CM

## 2019-06-21 DIAGNOSIS — D50.9 IRON DEFICIENCY ANEMIA, UNSPECIFIED IRON DEFICIENCY ANEMIA TYPE: ICD-10-CM

## 2019-06-21 DIAGNOSIS — E78.2 MIXED HYPERLIPIDEMIA: ICD-10-CM

## 2019-06-21 DIAGNOSIS — I65.23 STENOSIS OF BOTH INTERNAL CAROTID ARTERIES: ICD-10-CM

## 2019-06-21 PROCEDURE — G8419 CALC BMI OUT NRM PARAM NOF/U: HCPCS | Performed by: FAMILY MEDICINE

## 2019-06-21 PROCEDURE — 1036F TOBACCO NON-USER: CPT | Performed by: FAMILY MEDICINE

## 2019-06-21 PROCEDURE — 99214 OFFICE O/P EST MOD 30 MIN: CPT | Performed by: FAMILY MEDICINE

## 2019-06-21 PROCEDURE — G8427 DOCREV CUR MEDS BY ELIG CLIN: HCPCS | Performed by: FAMILY MEDICINE

## 2019-06-21 PROCEDURE — 4040F PNEUMOC VAC/ADMIN/RCVD: CPT | Performed by: FAMILY MEDICINE

## 2019-06-21 PROCEDURE — 1123F ACP DISCUSS/DSCN MKR DOCD: CPT | Performed by: FAMILY MEDICINE

## 2019-06-21 PROCEDURE — G8598 ASA/ANTIPLAT THER USED: HCPCS | Performed by: FAMILY MEDICINE

## 2019-06-21 RX ORDER — ATORVASTATIN CALCIUM 80 MG/1
TABLET, FILM COATED ORAL
Qty: 90 TABLET | Refills: 3 | Status: SHIPPED | OUTPATIENT
Start: 2019-06-21 | End: 2019-07-29

## 2019-06-21 RX ORDER — AZELASTINE 1 MG/ML
2 SPRAY, METERED NASAL 2 TIMES DAILY
Qty: 1 BOTTLE | Refills: 1 | Status: SHIPPED | OUTPATIENT
Start: 2019-06-21 | End: 2021-06-22 | Stop reason: ALTCHOICE

## 2019-06-21 ASSESSMENT — PATIENT HEALTH QUESTIONNAIRE - PHQ9
SUM OF ALL RESPONSES TO PHQ QUESTIONS 1-9: 0
1. LITTLE INTEREST OR PLEASURE IN DOING THINGS: 0
SUM OF ALL RESPONSES TO PHQ9 QUESTIONS 1 & 2: 0
SUM OF ALL RESPONSES TO PHQ QUESTIONS 1-9: 0
2. FEELING DOWN, DEPRESSED OR HOPELESS: 0

## 2019-06-21 ASSESSMENT — ENCOUNTER SYMPTOMS
SORE THROAT: 0
TROUBLE SWALLOWING: 0
WHEEZING: 0
ABDOMINAL PAIN: 0
BACK PAIN: 0
DIARRHEA: 0
COUGH: 0
RHINORRHEA: 0
NAUSEA: 0
CONSTIPATION: 0
VOMITING: 0
SHORTNESS OF BREATH: 0

## 2019-06-21 NOTE — PATIENT INSTRUCTIONS
Stop downstairs for bloodwork    Make an appt with Dr Sylvester Chance to help evaluate right leg weakness    Discuss possibly changing enalapril to valsartan or similar medication    Consider establishing with one of the following MD PCP's:    Dr Karime Pierre, [address]  Ph: [phone]    OR    Dr Lisa Luna, Dr Rupinder Evans, Dr Luigi Hammond, Selena Bolaños, [address] [place] [address] [address] [facility] [address] [facility]  Ph: [phone]

## 2019-06-21 NOTE — PROGRESS NOTES
Arielle Bar          Chief Complaint   Patient presents with    Coronary Artery Disease    Hypothyroidism       HPI:     Doing \"great\"  Heart issues have been quiet. Still going to the [facility] 3 times/week to exercise, on a bicycle mostly  Seeing cardiologist Dr Chetna Paiz regularly at [facility]  Had an fall 3 weeks ago, when he tripped over an air  in his bedroom, and hit his back right ribcage on the side of dresser. Used ice and heat. Pain has resolved. No trouble breathing    Issues with right leg with foot trying to turn in. Drags his foot a bit. Thinks it might be due to his hip or knee, but denies pain in those joints, and no low back pain. Has seen Dr Emilie Singer for his hip in the past.     Taking 1 iron tablet daily still. I personally reviewed and updated patient's past medical history, past surgical history, family history, allergies, and social history as captured in the relevant section of patient's chart. Current Outpatient Prescriptions on File Prior to Visit   Medication Sig Dispense Refill    levothyroxine (SYNTHROID) 150 MCG tablet TAKE 1 TABLET BY MOUTH DAILY 30 tablet 3    enalapril (VASOTEC) 10 MG tablet Take 10 mg by mouth      clopidogrel (PLAVIX) 75 MG tablet TK 1 T PO D  11    furosemide (LASIX) 20 MG tablet 20 mg daily as needed      azelastine (ASTELIN) 0.1 % nasal spray 2 sprays by Nasal route 2 times daily 1 Bottle 1    atorvastatin (LIPITOR) 80 MG tablet Take 1 tablet by mouth daily 90 tablet 3    Ferrous Sulfate ER (SLOW FE) 142 (45 FE) MG Take 142 mg by mouth daily.  cetirizine (ZYRTEC) 10 MG tablet Take 10 mg by mouth daily.  Multiple Vitamin (THERA) TABS 1 Tab daily.  Calcium Carbonate-Vitamin D (CALCIUM + D PO) 1 Tab daily.  Glucosamine-Chondroitin (GLUCOSAMINE CHONDR COMPLEX PO) Take  by mouth.  aspirin 81 MG chewable tablet Take 81 mg by mouth nightly.  Coenzyme Q-10 100 MG CAPS Take 1 capsule by mouth daily.          No current facility-administered medications on file prior to visit. Review of Systems   Constitutional: Negative for activity change, appetite change, chills, fatigue, fever and unexpected weight change. HENT: Positive for congestion (chronic) and postnasal drip. Negative for nosebleeds, rhinorrhea, sore throat and trouble swallowing. Eyes: Negative for visual disturbance. Respiratory: Negative for cough, shortness of breath and wheezing. Cardiovascular: Negative for chest pain and leg swelling. Gastrointestinal: Negative for abdominal pain, constipation, diarrhea, nausea and vomiting. Genitourinary: Negative for difficulty urinating. Musculoskeletal: Negative for arthralgias, back pain, neck pain and neck stiffness. Right leg turns inward, mild gait disturbance   Skin: Negative for rash. Neurological: Negative for dizziness, weakness, numbness and headaches. Psychiatric/Behavioral: Negative for dysphoric mood and sleep disturbance. Physical Exam   Constitutional: He is oriented to person, place, and time. He appears well-developed and well-nourished. No distress. Pleasant   HENT:   Head: Normocephalic and atraumatic. Mouth/Throat: Oropharynx is clear and moist.   Slightly boggy nasal turbinates   Eyes: Conjunctivae and EOM are normal. Right eye exhibits no discharge. Left eye exhibits no discharge. Neck: Normal range of motion. Neck supple. No thyromegaly present. Cardiovascular: Normal rate and regular rhythm. Murmur (systolic, faint) heard. No carotid bruits   Pulmonary/Chest: Effort normal and breath sounds normal. No respiratory distress. He has no wheezes. Abdominal: Soft. Bowel sounds are normal. He exhibits no distension and no mass. Musculoskeletal: Normal range of motion. He exhibits no edema. Disturbed gait with right foot turning inward slightly and drags some   Lymphadenopathy:     He has no cervical adenopathy.    Neurological: He is alert and oriented to person, place, and time. No cranial nerve deficit. Slightly shuffling gait   Skin: Skin is warm and dry. No rash noted. He is not diaphoretic. Psychiatric: He has a normal mood and affect. His behavior is normal. Judgment and thought content normal.   Vitals reviewed. Vitals:    06/21/19 0953   BP: 120/60   Site: Right Upper Arm   Position: Sitting   Cuff Size: Medium Adult   Pulse: 72   Resp: 14   SpO2: 95%   Weight: 183 lb 9.6 oz (83.3 kg)   Height: 5' 11\" (1.803 m)     Body mass index is 25.61 kg/m². Assessment/Plan:    1. Coronary artery disease due to lipid rich plaque  S/p 3 stents several years ago, and again recently in 8/18 at [facility]  Stable without symptoms  Continue on statin, ACEI, and ASA  Follows regularly with cardiologist Dr. Seng Varela with regular exercising; s/p cardiac rehab    2. Mixed hyperlipidemia  Currently on Lipitor 80 mg  Tolerating this dose well without any side effects  Lipids were well-controlled in 8/18, recheck    - atorvastatin (LIPITOR) 80 MG tablet; TAKE 1 TABLET BY MOUTH EVERY DAY  Dispense: 90 tablet; Refill: 3  - Comprehensive Metabolic Panel; Future  - Lipid Panel; Future      3. Stenosis of both internal carotid arteries  Following with serial carotid dopplers; last in 7/18 at [facility] shows bilateral ICA stenosis 50-69%; stable   Asymptomatic  Continue on lipitor 80 mg daily to optimize medical management    4. Aortic valve stenosis, etiology of cardiac valve disease unspecified  S/p AVR, with significant improvement in quality of life  Follows with cards Dr Orvilla Kanner    5. Acquired hypothyroidism  Stable  Continue on levothyroxine 150 mcg daily    - TSH without Reflex; Future    6. Right leg weakness  Encouraged him to have this evaluated further by neurology; discussed that he might benefit from an EMG to determine origin of suspected neuropathy    - Artur Reynoso MD, Neurology, [facility]    7.  Iron deficiency anemia, unspecified iron deficiency anemia type  Taking 325 mg iron daily  Recheck levels    - CBC Auto Differential; Future  - Iron and TIBC; Future  - Ferritin; Future        Instructed Pt to obtain the new shingles vaccine (Shingrix) at the pharmacy - 2 dose series    I notified Pt  of my upcoming departure, and helped guide him on plans for f/u    No follow-ups on file.

## 2019-06-24 ENCOUNTER — HOSPITAL ENCOUNTER (OUTPATIENT)
Age: 79
Discharge: HOME | End: 2019-06-24
Payer: MEDICARE

## 2019-06-24 DIAGNOSIS — E78.2 MIXED HYPERLIPIDEMIA: ICD-10-CM

## 2019-06-24 DIAGNOSIS — D50.9 IRON DEFICIENCY ANEMIA, UNSPECIFIED IRON DEFICIENCY ANEMIA TYPE: ICD-10-CM

## 2019-06-24 LAB
A/G RATIO: 1.8 (ref 1.1–2.2)
ALBUMIN SERPL-MCNC: 4.4 G/DL (ref 3.4–5)
ALP BLD-CCNC: 86 U/L (ref 40–129)
ALT SERPL-CCNC: 25 U/L (ref 10–40)
ANION GAP SERPL CALCULATED.3IONS-SCNC: 11 MMOL/L (ref 3–16)
AST SERPL-CCNC: 27 U/L (ref 15–37)
BASOPHILS ABSOLUTE: 0.1 K/UL (ref 0–0.2)
BASOPHILS RELATIVE PERCENT: 1 %
BILIRUB SERPL-MCNC: 0.8 MG/DL (ref 0–1)
BUN BLDV-MCNC: 10 MG/DL (ref 7–20)
CALCIUM SERPL-MCNC: 9.2 MG/DL (ref 8.3–10.6)
CHLORIDE BLD-SCNC: 93 MMOL/L (ref 99–110)
CHOLESTEROL, TOTAL: 122 MG/DL (ref 0–199)
CO2: 27 MMOL/L (ref 21–32)
CREAT SERPL-MCNC: 0.7 MG/DL (ref 0.8–1.3)
EOSINOPHILS ABSOLUTE: 0.9 K/UL (ref 0–0.6)
EOSINOPHILS RELATIVE PERCENT: 16.8 %
FERRITIN: 123.1 NG/ML (ref 30–400)
GFR AFRICAN AMERICAN: >60
GFR NON-AFRICAN AMERICAN: >60
GLOBULIN: 2.5 G/DL
GLUCOSE BLD-MCNC: 101 MG/DL (ref 70–99)
HCT VFR BLD CALC: 35.9 % (ref 40.5–52.5)
HDLC SERPL-MCNC: 74 MG/DL (ref 40–60)
HEMOGLOBIN: 12.2 G/DL (ref 13.5–17.5)
IRON SATURATION: 36 % (ref 20–50)
IRON: 100 UG/DL (ref 59–158)
LDL CHOLESTEROL CALCULATED: 39 MG/DL
LYMPHOCYTES ABSOLUTE: 1.1 K/UL (ref 1–5.1)
LYMPHOCYTES RELATIVE PERCENT: 20.6 %
MCH RBC QN AUTO: 33.2 PG (ref 26–34)
MCHC RBC AUTO-ENTMCNC: 34.1 G/DL (ref 31–36)
MCV RBC AUTO: 97.6 FL (ref 80–100)
MONOCYTES ABSOLUTE: 0.3 K/UL (ref 0–1.3)
MONOCYTES RELATIVE PERCENT: 6.3 %
NEUTROPHILS ABSOLUTE: 2.9 K/UL (ref 1.7–7.7)
NEUTROPHILS RELATIVE PERCENT: 55.3 %
PDW BLD-RTO: 14 % (ref 12.4–15.4)
PLATELET # BLD: 158 K/UL (ref 135–450)
PMV BLD AUTO: 7.6 FL (ref 5–10.5)
POTASSIUM SERPL-SCNC: 4.7 MMOL/L (ref 3.5–5.1)
RBC # BLD: 3.68 M/UL (ref 4.2–5.9)
SODIUM BLD-SCNC: 131 MMOL/L (ref 136–145)
TOTAL IRON BINDING CAPACITY: 275 UG/DL (ref 260–445)
TOTAL PROTEIN: 6.9 G/DL (ref 6.4–8.2)
TRIGL SERPL-MCNC: 43 MG/DL (ref 0–150)
VLDLC SERPL CALC-MCNC: 9 MG/DL
WBC # BLD: 5.3 K/UL (ref 4–11)

## 2019-06-24 PROCEDURE — 82728 ASSAY OF FERRITIN: CPT

## 2019-06-24 PROCEDURE — 36415 COLL VENOUS BLD VENIPUNCTURE: CPT

## 2019-06-24 PROCEDURE — 83540 ASSAY OF IRON: CPT

## 2019-06-24 PROCEDURE — 80053 COMPREHEN METABOLIC PANEL: CPT

## 2019-06-24 PROCEDURE — 83550 IRON BINDING TEST: CPT

## 2019-06-24 PROCEDURE — 85025 COMPLETE CBC W/AUTO DIFF WBC: CPT

## 2019-06-24 PROCEDURE — 80061 LIPID PANEL: CPT

## 2019-06-28 RX ORDER — LANOLIN ALCOHOL/MO/W.PET/CERES
325 CREAM (GRAM) TOPICAL
Qty: 90 TABLET | Refills: 0 | Status: SHIPPED | OUTPATIENT
Start: 2019-06-28

## 2019-07-15 RX ORDER — LEVOTHYROXINE SODIUM 0.15 MG/1
TABLET ORAL
Qty: 90 TABLET | Refills: 1 | OUTPATIENT
Start: 2019-07-15

## 2019-07-27 DIAGNOSIS — E78.2 MIXED HYPERLIPIDEMIA: ICD-10-CM

## 2019-07-29 RX ORDER — ATORVASTATIN CALCIUM 80 MG/1
TABLET, FILM COATED ORAL
Qty: 90 TABLET | Refills: 0 | Status: SHIPPED | OUTPATIENT
Start: 2019-07-29 | End: 2019-09-06 | Stop reason: SDUPTHER

## 2019-08-23 ENCOUNTER — OFFICE VISIT (OUTPATIENT)
Dept: INTERNAL MEDICINE CLINIC | Age: 79
End: 2019-08-23
Payer: MEDICARE

## 2019-08-23 ENCOUNTER — HOSPITAL ENCOUNTER (OUTPATIENT)
Age: 79
Discharge: HOME | End: 2019-08-23
Payer: MEDICARE

## 2019-08-23 VITALS
SYSTOLIC BLOOD PRESSURE: 136 MMHG | RESPIRATION RATE: 16 BRPM | HEIGHT: 71 IN | OXYGEN SATURATION: 98 % | BODY MASS INDEX: 25.48 KG/M2 | HEART RATE: 68 BPM | DIASTOLIC BLOOD PRESSURE: 70 MMHG | WEIGHT: 182 LBS

## 2019-08-23 DIAGNOSIS — R07.89 SENSATION OF CHEST PRESSURE: ICD-10-CM

## 2019-08-23 DIAGNOSIS — R10.84 GENERALIZED ABDOMINAL PAIN: ICD-10-CM

## 2019-08-23 DIAGNOSIS — K21.9 GASTROESOPHAGEAL REFLUX DISEASE WITHOUT ESOPHAGITIS: ICD-10-CM

## 2019-08-23 DIAGNOSIS — K44.9 HIATAL HERNIA: ICD-10-CM

## 2019-08-23 DIAGNOSIS — R10.84 GENERALIZED ABDOMINAL PAIN: Primary | ICD-10-CM

## 2019-08-23 LAB
BASOPHILS ABSOLUTE: 0.1 K/UL (ref 0–0.2)
BASOPHILS RELATIVE PERCENT: 0.8 %
EOSINOPHILS ABSOLUTE: 0 K/UL (ref 0–0.6)
EOSINOPHILS RELATIVE PERCENT: 0.5 %
HCT VFR BLD CALC: 35.5 % (ref 40.5–52.5)
HEMOGLOBIN: 12.2 G/DL (ref 13.5–17.5)
LYMPHOCYTES ABSOLUTE: 0.4 K/UL (ref 1–5.1)
LYMPHOCYTES RELATIVE PERCENT: 5 %
MCH RBC QN AUTO: 33.8 PG (ref 26–34)
MCHC RBC AUTO-ENTMCNC: 34.2 G/DL (ref 31–36)
MCV RBC AUTO: 99 FL (ref 80–100)
MONOCYTES ABSOLUTE: 0.4 K/UL (ref 0–1.3)
MONOCYTES RELATIVE PERCENT: 4.3 %
NEUTROPHILS ABSOLUTE: 7.4 K/UL (ref 1.7–7.7)
NEUTROPHILS RELATIVE PERCENT: 89.4 %
PDW BLD-RTO: 14.1 % (ref 12.4–15.4)
PLATELET # BLD: 166 K/UL (ref 135–450)
PMV BLD AUTO: 7.7 FL (ref 5–10.5)
RBC # BLD: 3.59 M/UL (ref 4.2–5.9)
WBC # BLD: 8.3 K/UL (ref 4–11)

## 2019-08-23 PROCEDURE — G8598 ASA/ANTIPLAT THER USED: HCPCS | Performed by: NURSE PRACTITIONER

## 2019-08-23 PROCEDURE — 4040F PNEUMOC VAC/ADMIN/RCVD: CPT | Performed by: NURSE PRACTITIONER

## 2019-08-23 PROCEDURE — 80053 COMPREHEN METABOLIC PANEL: CPT

## 2019-08-23 PROCEDURE — 1123F ACP DISCUSS/DSCN MKR DOCD: CPT | Performed by: NURSE PRACTITIONER

## 2019-08-23 PROCEDURE — G8419 CALC BMI OUT NRM PARAM NOF/U: HCPCS | Performed by: NURSE PRACTITIONER

## 2019-08-23 PROCEDURE — 36415 COLL VENOUS BLD VENIPUNCTURE: CPT

## 2019-08-23 PROCEDURE — 1036F TOBACCO NON-USER: CPT | Performed by: NURSE PRACTITIONER

## 2019-08-23 PROCEDURE — 85025 COMPLETE CBC W/AUTO DIFF WBC: CPT

## 2019-08-23 PROCEDURE — 99214 OFFICE O/P EST MOD 30 MIN: CPT | Performed by: NURSE PRACTITIONER

## 2019-08-23 PROCEDURE — G8427 DOCREV CUR MEDS BY ELIG CLIN: HCPCS | Performed by: NURSE PRACTITIONER

## 2019-08-23 RX ORDER — OMEPRAZOLE 20 MG/1
20 CAPSULE, DELAYED RELEASE ORAL DAILY
Qty: 60 CAPSULE | Refills: 0 | Status: SHIPPED | OUTPATIENT
Start: 2019-08-23 | End: 2019-08-23 | Stop reason: SDUPTHER

## 2019-08-23 RX ORDER — OMEPRAZOLE 20 MG/1
20 CAPSULE, DELAYED RELEASE ORAL DAILY
Qty: 90 CAPSULE | Refills: 0 | Status: SHIPPED | OUTPATIENT
Start: 2019-08-23 | End: 2020-01-28

## 2019-08-23 NOTE — PROGRESS NOTES
of Onset    Hypertension Mother     Coronary Art Dis Father     Hypertension Father     Heart Disease Father     Prostate Cancer Brother     Cancer Brother 61        Prostate    Heart Disease Sister        Social History     Socioeconomic History    Marital status:      Spouse name: Not on file    Number of children: Not on file    Years of education: Not on file    Highest education level: Not on file   Occupational History    Not on file   Social Needs    Financial resource strain: Not on file    Food insecurity:     Worry: Not on file     Inability: Not on file    Transportation needs:     Medical: Not on file     Non-medical: Not on file   Tobacco Use    Smoking status: Former Smoker     Packs/day: 0.50     Years: 15.00     Pack years: 7.50     Start date:      Last attempt to quit: 1995     Years since quittin.9    Smokeless tobacco: Never Used   Substance and Sexual Activity    Alcohol use: No    Drug use: No    Sexual activity: Never   Lifestyle    Physical activity:     Days per week: Not on file     Minutes per session: Not on file    Stress: Not on file   Relationships    Social connections:     Talks on phone: Not on file     Gets together: Not on file     Attends [employer] service: Not on file     Active member of club or organization: Not on file     Attends meetings of clubs or organizations: Not on file     Relationship status: Not on file    Intimate partner violence:     Fear of current or ex partner: Not on file     Emotionally abused: Not on file     Physically abused: Not on file     Forced sexual activity: Not on file   Other Topics Concern    Not on file   Social History Narrative    Not on file       Review of Systems   Constitutional: Negative for chills and fever. Respiratory: Negative for cough, chest tightness and shortness of breath. Cardiovascular: Negative for chest pain and palpitations.    Gastrointestinal: Positive for abdominal lab work  Imaging  Depending outcome of referral to general surgery may be indicated. - CBC Auto Differential; Future  - Comprehensive Metabolic Panel, Fasting; Future  - CT CHEST ABDOMEN PELVIS WO CONTRAST; Future    2. Gastroesophageal reflux disease without esophagitis  Start Omeprazole as directed. Avoid eating spicy/citrus food. Avoid laying down 2-3 hours after eating. Notify office if you have no improvement or worsening of condition. 3. Hiatal hernia  Imaging  Depending outcome of imaging general surgery may be indicated. - CT CHEST ABDOMEN PELVIS WO CONTRAST; Future    4. Sensation of chest pressure  Notify office if you have no improvement or worsening of condition. CXR-     - CT CHEST ABDOMEN PELVIS WO CONTRAST; Future      Return in about 2 weeks (around 9/6/2019), or if symptoms worsen or fail to improve, for abd pain, GERD, Rib pain .

## 2019-08-24 LAB
A/G RATIO: 1.7 (ref 1.1–2.2)
ALBUMIN SERPL-MCNC: 4.6 G/DL (ref 3.4–5)
ALP BLD-CCNC: 79 U/L (ref 40–129)
ALT SERPL-CCNC: 27 U/L (ref 10–40)
ANION GAP SERPL CALCULATED.3IONS-SCNC: 14 MMOL/L (ref 3–16)
AST SERPL-CCNC: 28 U/L (ref 15–37)
BILIRUB SERPL-MCNC: 1 MG/DL (ref 0–1)
BUN BLDV-MCNC: 12 MG/DL (ref 7–20)
CALCIUM SERPL-MCNC: 9.6 MG/DL (ref 8.3–10.6)
CHLORIDE BLD-SCNC: 88 MMOL/L (ref 99–110)
CO2: 26 MMOL/L (ref 21–32)
CREAT SERPL-MCNC: 0.7 MG/DL (ref 0.8–1.3)
GFR AFRICAN AMERICAN: >60
GFR NON-AFRICAN AMERICAN: >60
GLOBULIN: 2.7 G/DL
GLUCOSE FASTING: 118 MG/DL (ref 70–99)
POTASSIUM SERPL-SCNC: 4.2 MMOL/L (ref 3.5–5.1)
SODIUM BLD-SCNC: 128 MMOL/L (ref 136–145)
TOTAL PROTEIN: 7.3 G/DL (ref 6.4–8.2)

## 2019-09-06 ENCOUNTER — TELEPHONE (OUTPATIENT)
Dept: INTERNAL MEDICINE CLINIC | Age: 79
End: 2019-09-06

## 2019-09-06 ENCOUNTER — HOSPITAL ENCOUNTER (OUTPATIENT)
Dept: GENERAL RADIOLOGY | Age: 79
Discharge: HOME | End: 2019-09-06
Payer: MEDICARE

## 2019-09-06 ENCOUNTER — OFFICE VISIT (OUTPATIENT)
Dept: INTERNAL MEDICINE CLINIC | Age: 79
End: 2019-09-06
Payer: MEDICARE

## 2019-09-06 VITALS
HEIGHT: 71 IN | DIASTOLIC BLOOD PRESSURE: 52 MMHG | BODY MASS INDEX: 25.26 KG/M2 | OXYGEN SATURATION: 98 % | RESPIRATION RATE: 16 BRPM | HEART RATE: 77 BPM | WEIGHT: 180.4 LBS | SYSTOLIC BLOOD PRESSURE: 116 MMHG

## 2019-09-06 DIAGNOSIS — R07.81 RIB PAIN ON RIGHT SIDE: ICD-10-CM

## 2019-09-06 DIAGNOSIS — R07.81 RIB PAIN ON RIGHT SIDE: Primary | ICD-10-CM

## 2019-09-06 DIAGNOSIS — E03.9 ACQUIRED HYPOTHYROIDISM: ICD-10-CM

## 2019-09-06 DIAGNOSIS — M54.50 LUMBAR PAIN: ICD-10-CM

## 2019-09-06 DIAGNOSIS — E78.2 MIXED HYPERLIPIDEMIA: ICD-10-CM

## 2019-09-06 DIAGNOSIS — M54.6 ACUTE RIGHT-SIDED THORACIC BACK PAIN: ICD-10-CM

## 2019-09-06 PROCEDURE — G8419 CALC BMI OUT NRM PARAM NOF/U: HCPCS | Performed by: NURSE PRACTITIONER

## 2019-09-06 PROCEDURE — 71100 X-RAY EXAM RIBS UNI 2 VIEWS: CPT

## 2019-09-06 PROCEDURE — 4040F PNEUMOC VAC/ADMIN/RCVD: CPT | Performed by: NURSE PRACTITIONER

## 2019-09-06 PROCEDURE — 1123F ACP DISCUSS/DSCN MKR DOCD: CPT | Performed by: NURSE PRACTITIONER

## 2019-09-06 PROCEDURE — G8598 ASA/ANTIPLAT THER USED: HCPCS | Performed by: NURSE PRACTITIONER

## 2019-09-06 PROCEDURE — 99214 OFFICE O/P EST MOD 30 MIN: CPT | Performed by: NURSE PRACTITIONER

## 2019-09-06 PROCEDURE — G8427 DOCREV CUR MEDS BY ELIG CLIN: HCPCS | Performed by: NURSE PRACTITIONER

## 2019-09-06 PROCEDURE — 1036F TOBACCO NON-USER: CPT | Performed by: NURSE PRACTITIONER

## 2019-09-06 RX ORDER — FUROSEMIDE 20 MG/1
20 TABLET ORAL SEE ADMIN INSTRUCTIONS
Qty: 60 TABLET | Refills: 3 | Status: SHIPPED | OUTPATIENT
Start: 2019-09-06 | End: 2019-09-16 | Stop reason: SDUPTHER

## 2019-09-06 RX ORDER — LEVOTHYROXINE SODIUM 0.15 MG/1
150 TABLET ORAL DAILY
Qty: 90 TABLET | Refills: 11 | Status: SHIPPED | OUTPATIENT
Start: 2019-09-06 | End: 2020-10-16

## 2019-09-06 RX ORDER — ATORVASTATIN CALCIUM 80 MG/1
TABLET, FILM COATED ORAL
Qty: 90 TABLET | Refills: 0 | Status: SHIPPED | OUTPATIENT
Start: 2019-09-06 | End: 2019-11-18 | Stop reason: SDUPTHER

## 2019-09-06 NOTE — PROGRESS NOTES
SUBJECTIVE:  Earnest Galeano a 67 y.o.male    Chief Complaint   Patient presents with    Gastroesophageal Reflux     2 Week F/U    Abdominal Pain    Chest Pain     Ribs are in Pain       Patient presents for follow up of rib pain, reflux and abd pain. with ongoing rib pain. States reflux is improved and abdominal pain is improved. Accompanied by his wife today. Denies fever/chills. No shortness of breath. No dizziness. No syncope. No numbness/tingling. No issues with bowel or bladder. No hematuria. No melena. He also need a refill of medications today.          Past Medical History:   Diagnosis Date    Arthritis     rt hip    CAD (coronary artery disease) 2001    cardiac stents    Chronic systolic congestive heart failure ([facility] [address] 8/21/2018    Food allergy     History of blood transfusion     platelets=2001    Hyperlipidemia     Hypertension     Hypothyroid     Obesity 10/2/2012    Occlusion and stenosis of carotid artery 10/2/2012    Osteoarthritis     Hip right    Recurrent right inguinal hernia     Shingles 2014    Thyroid disease     TIA (transient ischemic attack)        Past Surgical History:   Procedure Laterality Date    AORTIC VALVE REPLACEMENT  2018    CARDIAC SURGERY  2001    stents    CARDIAC VALVE REPLACEMENT  09/15/2018    COLONOSCOPY  04/22/99    COLONOSCOPY  7/13/2015    EYE SURGERY      victorino cataracts    INGUINAL HERNIA REPAIR Right 07/07/2016    laparoscopic right inguinal hernia repair with mesh    INGUINAL HERNIA REPAIR Right 04/12/2017    davinci recurrent right inguinal hernia repair with mesh    PTCA  2018    SIGMOIDOSCOPY  1994    TONSILLECTOMY      UPPER GASTROINTESTINAL ENDOSCOPY  05/12/94    UPPER GASTROINTESTINAL ENDOSCOPY  11/09/04    Gastritis and duodenitis       Family History   Problem Relation Age of Onset    Hypertension Mother     Coronary Art Dis Father     Hypertension Father     Heart Disease Father     Prostate Cancer Brother     Cancer Brother 61        Prostate    Heart Disease Sister        Social History     Socioeconomic History    Marital status:      Spouse name: Not on file    Number of children: Not on file    Years of education: Not on file    Highest education level: Not on file   Occupational History    Not on file   Social Needs    Financial resource strain: Not on file    Food insecurity:     Worry: Not on file     Inability: Not on file    Transportation needs:     Medical: Not on file     Non-medical: Not on file   Tobacco Use    Smoking status: Former Smoker     Packs/day: 0.50     Years: 15.00     Pack years: 7.50     Start date:      Last attempt to quit: 1995     Years since quittin.0    Smokeless tobacco: Never Used   Substance and Sexual Activity    Alcohol use: No    Drug use: No    Sexual activity: Never   Lifestyle    Physical activity:     Days per week: Not on file     Minutes per session: Not on file    Stress: Not on file   Relationships    Social connections:     Talks on phone: Not on file     Gets together: Not on file     Attends [employer] service: Not on file     Active member of club or organization: Not on file     Attends meetings of clubs or organizations: Not on file     Relationship status: Not on file    Intimate partner violence:     Fear of current or ex partner: Not on file     Emotionally abused: Not on file     Physically abused: Not on file     Forced sexual activity: Not on file   Other Topics Concern    Not on file   Social History Narrative    Not on file       Review of Systems   Constitutional: Negative for chills and fever. Respiratory: Negative for cough, chest tightness, shortness of breath and wheezing. Cardiovascular: Negative for chest pain. Gastrointestinal: Negative for abdominal pain, constipation, diarrhea, nausea and vomiting. Endocrine: Negative. Genitourinary: Negative.     Musculoskeletal: Positive for back pain (right side and rib

## 2019-09-06 NOTE — TELEPHONE ENCOUNTER
Patient is at [facility] and is insisting that we not do not do an xray chest and instead would like an xray of his ribs. Thinks that, that will show what we are looking for. Patient fell 8 months ago.    If agreeable please place order in epic if not please call radiology back

## 2019-09-09 ENCOUNTER — TELEPHONE (OUTPATIENT)
Dept: INTERNAL MEDICINE CLINIC | Age: 79
End: 2019-09-09

## 2019-09-09 NOTE — TELEPHONE ENCOUNTER
Patient asking if he suppose to still do the CT Scan?   Please call back [phone]  OK to leave a message

## 2019-09-09 NOTE — TELEPHONE ENCOUNTER
I didn't see Mr. Felix Mayela so I'm not entirely sure.  It doesn't look like Olamide's note is finished yet either

## 2019-09-11 ENCOUNTER — TELEPHONE (OUTPATIENT)
Dept: INTERNAL MEDICINE CLINIC | Age: 79
End: 2019-09-11

## 2019-09-16 DIAGNOSIS — I50.22 CHRONIC SYSTOLIC CONGESTIVE HEART FAILURE (HCC): Primary | ICD-10-CM

## 2019-09-16 RX ORDER — FUROSEMIDE 20 MG/1
20 TABLET ORAL DAILY
Qty: 60 TABLET | Refills: 3 | Status: SHIPPED | OUTPATIENT
Start: 2019-09-16 | End: 2019-09-19 | Stop reason: SDUPTHER

## 2019-09-18 ASSESSMENT — ENCOUNTER SYMPTOMS
ABDOMINAL DISTENTION: 1
CONSTIPATION: 0
CHEST TIGHTNESS: 0
DIARRHEA: 0
BACK PAIN: 1
VOMITING: 0
COUGH: 0
SHORTNESS OF BREATH: 0
NAUSEA: 1
ABDOMINAL PAIN: 1
ALLERGIC/IMMUNOLOGIC NEGATIVE: 1

## 2019-09-19 ENCOUNTER — TELEPHONE (OUTPATIENT)
Dept: INTERNAL MEDICINE CLINIC | Age: 79
End: 2019-09-19

## 2019-09-19 DIAGNOSIS — I50.22 CHRONIC SYSTOLIC CONGESTIVE HEART FAILURE (HCC): ICD-10-CM

## 2019-09-19 NOTE — TELEPHONE ENCOUNTER
Pt called states that he received furosemide (LASIX) 20 MG tablet from the mail order pharmacy. And he states that he takes 2 pills a day, what was sent to him has him taking it 1 pill a day as needed per the instructions. He wants it fixed. He received Quantity of 90. Made him aware provider will be in tomorrow and he understood. I have re pended the refill with the right directions and quantity. Refill request for Lasix 20 MG medication.      Name of [facility]acy[facility]    Last visit - 9/6/2019       Pending visit -   Future Appointments   Date Time Provider Ciara Elias   12/17/2019 10:00 AM VALE Keyes [facility]         Last refill -9/19    Medication Contract signed -   Last Annluz elena patel-     Additional Comments

## 2019-09-20 RX ORDER — FUROSEMIDE 20 MG/1
20 TABLET ORAL 2 TIMES DAILY
Qty: 180 TABLET | Refills: 0 | Status: ON HOLD | OUTPATIENT
Start: 2019-09-20 | End: 2021-06-06 | Stop reason: HOSPADM

## 2019-09-23 ASSESSMENT — ENCOUNTER SYMPTOMS
VOMITING: 0
BACK PAIN: 1
CHEST TIGHTNESS: 0
SHORTNESS OF BREATH: 0
ABDOMINAL PAIN: 0
NAUSEA: 0
WHEEZING: 0
ALLERGIC/IMMUNOLOGIC NEGATIVE: 1
DIARRHEA: 0
COUGH: 0
CONSTIPATION: 0

## 2019-10-22 DIAGNOSIS — E78.2 MIXED HYPERLIPIDEMIA: ICD-10-CM

## 2019-10-22 RX ORDER — ATORVASTATIN CALCIUM 80 MG/1
TABLET, FILM COATED ORAL
Qty: 90 TABLET | Refills: 0 | Status: SHIPPED | OUTPATIENT
Start: 2019-10-22 | End: 2019-11-18

## 2019-11-18 DIAGNOSIS — E78.2 MIXED HYPERLIPIDEMIA: ICD-10-CM

## 2019-11-18 RX ORDER — ATORVASTATIN CALCIUM 80 MG/1
TABLET, FILM COATED ORAL
Qty: 90 TABLET | Refills: 0 | Status: SHIPPED | OUTPATIENT
Start: 2019-11-18 | End: 2020-06-12

## 2019-12-13 ENCOUNTER — PREP FOR PROCEDURE (OUTPATIENT)
Dept: SURGERY | Age: 79
End: 2019-12-13

## 2019-12-13 ENCOUNTER — INITIAL CONSULT (OUTPATIENT)
Dept: SURGERY | Age: 79
End: 2019-12-13
Payer: MEDICARE

## 2019-12-13 VITALS
HEIGHT: 71 IN | SYSTOLIC BLOOD PRESSURE: 94 MMHG | BODY MASS INDEX: 25.2 KG/M2 | HEART RATE: 86 BPM | DIASTOLIC BLOOD PRESSURE: 47 MMHG | WEIGHT: 180 LBS

## 2019-12-13 DIAGNOSIS — K40.90 NON-RECURRENT UNILATERAL INGUINAL HERNIA WITHOUT OBSTRUCTION OR GANGRENE: ICD-10-CM

## 2019-12-13 PROCEDURE — 99213 OFFICE O/P EST LOW 20 MIN: CPT | Performed by: SURGERY

## 2019-12-13 PROCEDURE — 1036F TOBACCO NON-USER: CPT | Performed by: SURGERY

## 2019-12-13 PROCEDURE — G8484 FLU IMMUNIZE NO ADMIN: HCPCS | Performed by: SURGERY

## 2019-12-13 PROCEDURE — G8417 CALC BMI ABV UP PARAM F/U: HCPCS | Performed by: SURGERY

## 2019-12-13 PROCEDURE — G8427 DOCREV CUR MEDS BY ELIG CLIN: HCPCS | Performed by: SURGERY

## 2019-12-13 PROCEDURE — G8598 ASA/ANTIPLAT THER USED: HCPCS | Performed by: SURGERY

## 2019-12-13 PROCEDURE — 4040F PNEUMOC VAC/ADMIN/RCVD: CPT | Performed by: SURGERY

## 2019-12-13 PROCEDURE — 1123F ACP DISCUSS/DSCN MKR DOCD: CPT | Performed by: SURGERY

## 2019-12-13 RX ORDER — SODIUM CHLORIDE 0.9 % (FLUSH) 0.9 %
10 SYRINGE (ML) INJECTION EVERY 12 HOURS SCHEDULED
Status: CANCELLED | OUTPATIENT
Start: 2019-12-13

## 2019-12-13 RX ORDER — SODIUM CHLORIDE 0.9 % (FLUSH) 0.9 %
10 SYRINGE (ML) INJECTION PRN
Status: CANCELLED | OUTPATIENT
Start: 2019-12-13

## 2019-12-17 ENCOUNTER — TELEPHONE (OUTPATIENT)
Dept: SURGERY | Age: 79
End: 2019-12-17

## 2019-12-18 ENCOUNTER — TELEPHONE (OUTPATIENT)
Dept: SURGERY | Age: 79
End: 2019-12-18

## 2019-12-23 ENCOUNTER — TELEPHONE (OUTPATIENT)
Dept: SURGERY | Age: 79
End: 2019-12-23

## 2020-01-08 ENCOUNTER — TELEPHONE (OUTPATIENT)
Dept: SURGERY | Age: 80
End: 2020-01-08

## 2020-01-14 ENCOUNTER — TELEPHONE (OUTPATIENT)
Dept: SURGERY | Age: 80
End: 2020-01-14

## 2020-01-14 ENCOUNTER — PREP FOR PROCEDURE (OUTPATIENT)
Dept: SURGERY | Age: 80
End: 2020-01-14

## 2020-01-14 ENCOUNTER — OFFICE VISIT (OUTPATIENT)
Dept: SURGERY | Age: 80
End: 2020-01-14
Payer: MEDICARE

## 2020-01-14 VITALS
DIASTOLIC BLOOD PRESSURE: 55 MMHG | HEIGHT: 71 IN | WEIGHT: 182.8 LBS | BODY MASS INDEX: 25.59 KG/M2 | HEART RATE: 72 BPM | SYSTOLIC BLOOD PRESSURE: 110 MMHG

## 2020-01-14 PROCEDURE — G8427 DOCREV CUR MEDS BY ELIG CLIN: HCPCS | Performed by: SURGERY

## 2020-01-14 PROCEDURE — 1123F ACP DISCUSS/DSCN MKR DOCD: CPT | Performed by: SURGERY

## 2020-01-14 PROCEDURE — G8417 CALC BMI ABV UP PARAM F/U: HCPCS | Performed by: SURGERY

## 2020-01-14 PROCEDURE — 4040F PNEUMOC VAC/ADMIN/RCVD: CPT | Performed by: SURGERY

## 2020-01-14 PROCEDURE — 99213 OFFICE O/P EST LOW 20 MIN: CPT | Performed by: SURGERY

## 2020-01-14 PROCEDURE — G8484 FLU IMMUNIZE NO ADMIN: HCPCS | Performed by: SURGERY

## 2020-01-14 PROCEDURE — 1036F TOBACCO NON-USER: CPT | Performed by: SURGERY

## 2020-01-14 RX ORDER — SODIUM CHLORIDE 0.9 % (FLUSH) 0.9 %
10 SYRINGE (ML) INJECTION PRN
Status: CANCELLED | OUTPATIENT
Start: 2020-01-14

## 2020-01-14 RX ORDER — SODIUM CHLORIDE 0.9 % (FLUSH) 0.9 %
10 SYRINGE (ML) INJECTION EVERY 12 HOURS SCHEDULED
Status: CANCELLED | OUTPATIENT
Start: 2020-01-14

## 2020-01-14 NOTE — PROGRESS NOTES
Pt here to re-check [facility] and proceed with scheduling repair. Has had a traumatic vertebral fracture the past month, healing from this. He still notes increasing bulge in L groin which is becoming increasingly symptomatic. But still reducible when supine. Exam:  Well-appearing, no acute distress               Abd - scaphoid, non-tender, no masses               Hernia - (+) LIH, mod-sized, reducible, non-tender      Impression:  LIH    Plan:  Schedule robotic preperitoneal LIH repair w/ mesh. Technical aspects, risks and complications were reviewed with patient. He appears to understand, asks appropriate questions, and agrees to proceed.

## 2020-01-14 NOTE — TELEPHONE ENCOUNTER
Pt saw Dr Eric Blandon in the office and was given surgery instructions for a Robotic Left Inguinal Hernia Repair with Mesh, Possible Open Procedure scheduled on 2/5/20 @ 11:30am arrival 9:30am [facility] - NPO after midnight - Pt to provide clearance from cardiologist @ [facility] on when to stop blood thinner - Sol Freeman CNP to complete H&P (form given to pt @ OV)

## 2020-01-20 ENCOUNTER — TELEPHONE (OUTPATIENT)
Dept: SURGERY | Age: 80
End: 2020-01-20

## 2020-01-20 NOTE — TELEPHONE ENCOUNTER
Spoke to patient, notified him that the spot is still open but he would need to have his H&P and Cardiac clearance completed. Patient will see his PCP today and states that he was told no cardiac clearance is needed. I explained to the patient that I will confirm with Dr. Destin Posada about the cardiac clearance and give him a call tomorrow.

## 2020-01-21 NOTE — TELEPHONE ENCOUNTER
Called and spoke with pt - informed him we did not get his cardiac clearance yet but it is required according to H&P results - Pt gave me phone number for Alfredo Culver [phone] - will call her at [facility] to have them fax cardiac risk assessment w/ EKG results - Kept pts surgery scheduled on 2/5/20 for now until I speak w/ Dr Christiano Keys

## 2020-01-28 ENCOUNTER — TELEPHONE (OUTPATIENT)
Dept: SURGERY | Age: 80
End: 2020-01-28

## 2020-01-28 RX ORDER — SIMETHICONE 125 MG
1 CAPSULE ORAL DAILY PRN
COMMUNITY
End: 2021-05-10

## 2020-01-28 NOTE — TELEPHONE ENCOUNTER
Called and spoke with pt - changed time of surgery on 2/5/20 to 3pm arrival 1pm p/ Mcclendon  Pt understood and agreed

## 2020-01-28 NOTE — TELEPHONE ENCOUNTER
Pt asked if he still had to be NPO after midnight since hsi surgery was moved to 3pm - called PAT and pt may have clear liquids only b/t midnight and 7am - called pt and informed - pt understood NPO after 7am day of surgery and clear liquids only

## 2020-01-29 ENCOUNTER — HOSPITAL ENCOUNTER (OUTPATIENT)
Dept: PREADMISSION TESTING | Age: 80
Discharge: HOME | End: 2020-02-02
Payer: MEDICARE

## 2020-01-29 LAB
ANION GAP SERPL CALCULATED.3IONS-SCNC: 12 MMOL/L (ref 3–16)
BASOPHILS ABSOLUTE: 0 K/UL (ref 0–0.2)
BASOPHILS RELATIVE PERCENT: 0.8 %
BUN BLDV-MCNC: 13 MG/DL (ref 7–20)
CALCIUM SERPL-MCNC: 9.6 MG/DL (ref 8.3–10.6)
CHLORIDE BLD-SCNC: 89 MMOL/L (ref 99–110)
CO2: 28 MMOL/L (ref 21–32)
CREAT SERPL-MCNC: 0.7 MG/DL (ref 0.8–1.3)
EOSINOPHILS ABSOLUTE: 0.7 K/UL (ref 0–0.6)
EOSINOPHILS RELATIVE PERCENT: 10.5 %
GFR AFRICAN AMERICAN: >60
GFR NON-AFRICAN AMERICAN: >60
GLUCOSE BLD-MCNC: 93 MG/DL (ref 70–99)
HCT VFR BLD CALC: 35.2 % (ref 40.5–52.5)
HEMOGLOBIN: 12 G/DL (ref 13.5–17.5)
LYMPHOCYTES ABSOLUTE: 0.9 K/UL (ref 1–5.1)
LYMPHOCYTES RELATIVE PERCENT: 13.7 %
MCH RBC QN AUTO: 33.9 PG (ref 26–34)
MCHC RBC AUTO-ENTMCNC: 34.1 G/DL (ref 31–36)
MCV RBC AUTO: 99.6 FL (ref 80–100)
MONOCYTES ABSOLUTE: 0.4 K/UL (ref 0–1.3)
MONOCYTES RELATIVE PERCENT: 6.7 %
NEUTROPHILS ABSOLUTE: 4.3 K/UL (ref 1.7–7.7)
NEUTROPHILS RELATIVE PERCENT: 68.3 %
PDW BLD-RTO: 15 % (ref 12.4–15.4)
PLATELET # BLD: 169 K/UL (ref 135–450)
PMV BLD AUTO: 7.5 FL (ref 5–10.5)
POTASSIUM SERPL-SCNC: 4.7 MMOL/L (ref 3.5–5.1)
RBC # BLD: 3.53 M/UL (ref 4.2–5.9)
SODIUM BLD-SCNC: 129 MMOL/L (ref 136–145)
WBC # BLD: 6.2 K/UL (ref 4–11)

## 2020-01-29 PROCEDURE — 36415 COLL VENOUS BLD VENIPUNCTURE: CPT

## 2020-01-29 PROCEDURE — 80048 BASIC METABOLIC PNL TOTAL CA: CPT

## 2020-01-29 PROCEDURE — 85025 COMPLETE CBC W/AUTO DIFF WBC: CPT

## 2020-02-04 ENCOUNTER — TELEPHONE (OUTPATIENT)
Dept: SURGERY | Age: 80
End: 2020-02-04

## 2020-02-04 ENCOUNTER — ANESTHESIA EVENT (OUTPATIENT)
Dept: OPERATING ROOM | Age: 80
End: 2020-02-04
Payer: MEDICARE

## 2020-02-05 ENCOUNTER — ANESTHESIA (OUTPATIENT)
Dept: OPERATING ROOM | Age: 80
End: 2020-02-05
Payer: MEDICARE

## 2020-02-05 ENCOUNTER — HOSPITAL ENCOUNTER (OUTPATIENT)
Age: 80
Setting detail: OUTPATIENT SURGERY
Discharge: HOME | End: 2020-02-05
Attending: SURGERY | Admitting: SURGERY
Payer: MEDICARE

## 2020-02-05 VITALS
RESPIRATION RATE: 14 BRPM | HEIGHT: 71 IN | HEART RATE: 98 BPM | SYSTOLIC BLOOD PRESSURE: 161 MMHG | TEMPERATURE: 97.2 F | DIASTOLIC BLOOD PRESSURE: 79 MMHG | OXYGEN SATURATION: 91 % | WEIGHT: 180 LBS | BODY MASS INDEX: 25.2 KG/M2

## 2020-02-05 VITALS
TEMPERATURE: 96 F | SYSTOLIC BLOOD PRESSURE: 131 MMHG | DIASTOLIC BLOOD PRESSURE: 59 MMHG | RESPIRATION RATE: 9 BRPM | OXYGEN SATURATION: 97 %

## 2020-02-05 LAB
ANION GAP SERPL CALCULATED.3IONS-SCNC: 11 MMOL/L (ref 3–16)
BUN BLDV-MCNC: 11 MG/DL (ref 7–20)
CALCIUM SERPL-MCNC: 8.8 MG/DL (ref 8.3–10.6)
CHLORIDE BLD-SCNC: 93 MMOL/L (ref 99–110)
CO2: 26 MMOL/L (ref 21–32)
CREAT SERPL-MCNC: <0.5 MG/DL (ref 0.8–1.3)
GFR AFRICAN AMERICAN: >60
GFR NON-AFRICAN AMERICAN: >60
GLUCOSE BLD-MCNC: 103 MG/DL (ref 70–99)
POTASSIUM REFLEX MAGNESIUM: 4 MMOL/L (ref 3.5–5.1)
SODIUM BLD-SCNC: 130 MMOL/L (ref 136–145)

## 2020-02-05 PROCEDURE — 49650 LAP ING HERNIA REPAIR INIT: CPT | Performed by: SURGERY

## 2020-02-05 PROCEDURE — 3700000000 HC ANESTHESIA ATTENDED CARE: Performed by: SURGERY

## 2020-02-05 PROCEDURE — 6360000002 HC RX W HCPCS: Performed by: NURSE ANESTHETIST, CERTIFIED REGISTERED

## 2020-02-05 PROCEDURE — 2500000003 HC RX 250 WO HCPCS: Performed by: SURGERY

## 2020-02-05 PROCEDURE — 3600000009 HC SURGERY ROBOT BASE: Performed by: SURGERY

## 2020-02-05 PROCEDURE — S2900 ROBOTIC SURGICAL SYSTEM: HCPCS | Performed by: SURGERY

## 2020-02-05 PROCEDURE — 7100000010 HC PHASE II RECOVERY - FIRST 15 MIN: Performed by: SURGERY

## 2020-02-05 PROCEDURE — C1781 MESH (IMPLANTABLE): HCPCS | Performed by: SURGERY

## 2020-02-05 PROCEDURE — 2500000003 HC RX 250 WO HCPCS: Performed by: NURSE ANESTHETIST, CERTIFIED REGISTERED

## 2020-02-05 PROCEDURE — 3700000001 HC ADD 15 MINUTES (ANESTHESIA): Performed by: SURGERY

## 2020-02-05 PROCEDURE — 7100000000 HC PACU RECOVERY - FIRST 15 MIN: Performed by: SURGERY

## 2020-02-05 PROCEDURE — 2580000003 HC RX 258: Performed by: ANESTHESIOLOGY

## 2020-02-05 PROCEDURE — 2709999900 HC NON-CHARGEABLE SUPPLY: Performed by: SURGERY

## 2020-02-05 PROCEDURE — 80048 BASIC METABOLIC PNL TOTAL CA: CPT

## 2020-02-05 PROCEDURE — 3600000019 HC SURGERY ROBOT ADDTL 15MIN: Performed by: SURGERY

## 2020-02-05 DEVICE — MESH HERN XL W5XL7IN L INGUINAL POLYPR REP CRV SHP SEAL: Type: IMPLANTABLE DEVICE | Site: INGUINAL | Status: FUNCTIONAL

## 2020-02-05 RX ORDER — GLYCOPYRROLATE 0.2 MG/ML
INJECTION INTRAMUSCULAR; INTRAVENOUS PRN
Status: DISCONTINUED | OUTPATIENT
Start: 2020-02-05 | End: 2020-02-05 | Stop reason: SDUPTHER

## 2020-02-05 RX ORDER — PROMETHAZINE HYDROCHLORIDE 25 MG/ML
6.25 INJECTION, SOLUTION INTRAMUSCULAR; INTRAVENOUS
Status: DISCONTINUED | OUTPATIENT
Start: 2020-02-05 | End: 2020-02-05 | Stop reason: HOSPADM

## 2020-02-05 RX ORDER — LIDOCAINE HYDROCHLORIDE 20 MG/ML
INJECTION, SOLUTION EPIDURAL; INFILTRATION; INTRACAUDAL; PERINEURAL PRN
Status: DISCONTINUED | OUTPATIENT
Start: 2020-02-05 | End: 2020-02-05 | Stop reason: SDUPTHER

## 2020-02-05 RX ORDER — FENTANYL CITRATE 50 UG/ML
INJECTION, SOLUTION INTRAMUSCULAR; INTRAVENOUS PRN
Status: DISCONTINUED | OUTPATIENT
Start: 2020-02-05 | End: 2020-02-05 | Stop reason: SDUPTHER

## 2020-02-05 RX ORDER — ROCURONIUM BROMIDE 10 MG/ML
INJECTION, SOLUTION INTRAVENOUS PRN
Status: DISCONTINUED | OUTPATIENT
Start: 2020-02-05 | End: 2020-02-05 | Stop reason: SDUPTHER

## 2020-02-05 RX ORDER — FENTANYL CITRATE 50 UG/ML
25 INJECTION, SOLUTION INTRAMUSCULAR; INTRAVENOUS EVERY 5 MIN PRN
Status: DISCONTINUED | OUTPATIENT
Start: 2020-02-05 | End: 2020-02-05 | Stop reason: HOSPADM

## 2020-02-05 RX ORDER — BUPIVACAINE HYDROCHLORIDE 5 MG/ML
INJECTION, SOLUTION EPIDURAL; INTRACAUDAL PRN
Status: DISCONTINUED | OUTPATIENT
Start: 2020-02-05 | End: 2020-02-05 | Stop reason: ALTCHOICE

## 2020-02-05 RX ORDER — SODIUM CHLORIDE, SODIUM LACTATE, POTASSIUM CHLORIDE, CALCIUM CHLORIDE 600; 310; 30; 20 MG/100ML; MG/100ML; MG/100ML; MG/100ML
INJECTION, SOLUTION INTRAVENOUS CONTINUOUS
Status: DISCONTINUED | OUTPATIENT
Start: 2020-02-05 | End: 2020-02-05 | Stop reason: HOSPADM

## 2020-02-05 RX ORDER — SODIUM CHLORIDE 0.9 % (FLUSH) 0.9 %
10 SYRINGE (ML) INJECTION PRN
Status: DISCONTINUED | OUTPATIENT
Start: 2020-02-05 | End: 2020-02-05 | Stop reason: SDUPTHER

## 2020-02-05 RX ORDER — OXYCODONE HYDROCHLORIDE AND ACETAMINOPHEN 5; 325 MG/1; MG/1
2 TABLET ORAL PRN
Status: DISCONTINUED | OUTPATIENT
Start: 2020-02-05 | End: 2020-02-05 | Stop reason: HOSPADM

## 2020-02-05 RX ORDER — OXYCODONE HYDROCHLORIDE AND ACETAMINOPHEN 5; 325 MG/1; MG/1
1 TABLET ORAL EVERY 4 HOURS PRN
Qty: 15 TABLET | Refills: 0 | Status: SHIPPED | OUTPATIENT
Start: 2020-02-05 | End: 2020-02-08

## 2020-02-05 RX ORDER — DEXAMETHASONE SODIUM PHOSPHATE 4 MG/ML
INJECTION, SOLUTION INTRA-ARTICULAR; INTRALESIONAL; INTRAMUSCULAR; INTRAVENOUS; SOFT TISSUE PRN
Status: DISCONTINUED | OUTPATIENT
Start: 2020-02-05 | End: 2020-02-05 | Stop reason: SDUPTHER

## 2020-02-05 RX ORDER — SODIUM CHLORIDE 0.9 % (FLUSH) 0.9 %
10 SYRINGE (ML) INJECTION EVERY 12 HOURS SCHEDULED
Status: DISCONTINUED | OUTPATIENT
Start: 2020-02-05 | End: 2020-02-05 | Stop reason: SDUPTHER

## 2020-02-05 RX ORDER — ONDANSETRON 2 MG/ML
INJECTION INTRAMUSCULAR; INTRAVENOUS PRN
Status: DISCONTINUED | OUTPATIENT
Start: 2020-02-05 | End: 2020-02-05 | Stop reason: SDUPTHER

## 2020-02-05 RX ORDER — ONDANSETRON 2 MG/ML
4 INJECTION INTRAMUSCULAR; INTRAVENOUS
Status: DISCONTINUED | OUTPATIENT
Start: 2020-02-05 | End: 2020-02-05 | Stop reason: HOSPADM

## 2020-02-05 RX ORDER — PROPOFOL 10 MG/ML
INJECTION, EMULSION INTRAVENOUS PRN
Status: DISCONTINUED | OUTPATIENT
Start: 2020-02-05 | End: 2020-02-05 | Stop reason: SDUPTHER

## 2020-02-05 RX ORDER — HYDRALAZINE HYDROCHLORIDE 20 MG/ML
5 INJECTION INTRAMUSCULAR; INTRAVENOUS EVERY 10 MIN PRN
Status: DISCONTINUED | OUTPATIENT
Start: 2020-02-05 | End: 2020-02-05 | Stop reason: HOSPADM

## 2020-02-05 RX ORDER — SODIUM CHLORIDE 0.9 % (FLUSH) 0.9 %
10 SYRINGE (ML) INJECTION EVERY 12 HOURS SCHEDULED
Status: DISCONTINUED | OUTPATIENT
Start: 2020-02-05 | End: 2020-02-05 | Stop reason: HOSPADM

## 2020-02-05 RX ORDER — OXYCODONE HYDROCHLORIDE AND ACETAMINOPHEN 5; 325 MG/1; MG/1
1 TABLET ORAL PRN
Status: DISCONTINUED | OUTPATIENT
Start: 2020-02-05 | End: 2020-02-05 | Stop reason: HOSPADM

## 2020-02-05 RX ORDER — PHENYLEPHRINE HCL IN 0.9% NACL 1 MG/10 ML
SYRINGE (ML) INTRAVENOUS PRN
Status: DISCONTINUED | OUTPATIENT
Start: 2020-02-05 | End: 2020-02-05 | Stop reason: SDUPTHER

## 2020-02-05 RX ORDER — SODIUM CHLORIDE 0.9 % (FLUSH) 0.9 %
10 SYRINGE (ML) INJECTION PRN
Status: DISCONTINUED | OUTPATIENT
Start: 2020-02-05 | End: 2020-02-05 | Stop reason: HOSPADM

## 2020-02-05 RX ADMIN — ONDANSETRON 4 MG: 2 INJECTION INTRAMUSCULAR; INTRAVENOUS at 18:44

## 2020-02-05 RX ADMIN — DEXAMETHASONE SODIUM PHOSPHATE 8 MG: 4 INJECTION, SOLUTION INTRAMUSCULAR; INTRAVENOUS at 17:18

## 2020-02-05 RX ADMIN — ROCURONIUM BROMIDE 50 MG: 10 SOLUTION INTRAVENOUS at 17:06

## 2020-02-05 RX ADMIN — SUGAMMADEX 200 MG: 100 INJECTION, SOLUTION INTRAVENOUS at 18:44

## 2020-02-05 RX ADMIN — SODIUM CHLORIDE, POTASSIUM CHLORIDE, SODIUM LACTATE AND CALCIUM CHLORIDE: 600; 310; 30; 20 INJECTION, SOLUTION INTRAVENOUS at 14:40

## 2020-02-05 RX ADMIN — Medication 40 MCG: at 17:27

## 2020-02-05 RX ADMIN — Medication 80 MCG: at 17:13

## 2020-02-05 RX ADMIN — SODIUM CHLORIDE, POTASSIUM CHLORIDE, SODIUM LACTATE AND CALCIUM CHLORIDE: 600; 310; 30; 20 INJECTION, SOLUTION INTRAVENOUS at 17:55

## 2020-02-05 RX ADMIN — FENTANYL CITRATE 50 MCG: 50 INJECTION INTRAMUSCULAR; INTRAVENOUS at 18:02

## 2020-02-05 RX ADMIN — PROPOFOL 50 MG: 10 INJECTION, EMULSION INTRAVENOUS at 18:47

## 2020-02-05 RX ADMIN — Medication 80 MCG: at 17:19

## 2020-02-05 RX ADMIN — FENTANYL CITRATE 50 MCG: 50 INJECTION INTRAMUSCULAR; INTRAVENOUS at 17:31

## 2020-02-05 RX ADMIN — FENTANYL CITRATE 100 MCG: 50 INJECTION INTRAMUSCULAR; INTRAVENOUS at 17:06

## 2020-02-05 RX ADMIN — PROPOFOL 150 MG: 10 INJECTION, EMULSION INTRAVENOUS at 17:06

## 2020-02-05 RX ADMIN — LIDOCAINE HYDROCHLORIDE 40 MG: 20 INJECTION, SOLUTION EPIDURAL; INFILTRATION; INTRACAUDAL; PERINEURAL at 17:06

## 2020-02-05 RX ADMIN — GLYCOPYRROLATE 0.2 MG: 0.2 INJECTION, SOLUTION INTRAMUSCULAR; INTRAVENOUS at 17:21

## 2020-02-05 ASSESSMENT — PULMONARY FUNCTION TESTS
PIF_VALUE: 17
PIF_VALUE: 28
PIF_VALUE: 28
PIF_VALUE: 29
PIF_VALUE: 29
PIF_VALUE: 19
PIF_VALUE: 27
PIF_VALUE: 17
PIF_VALUE: 28
PIF_VALUE: 27
PIF_VALUE: 18
PIF_VALUE: 17
PIF_VALUE: 15
PIF_VALUE: 29
PIF_VALUE: 18
PIF_VALUE: 0
PIF_VALUE: 30
PIF_VALUE: 5
PIF_VALUE: 28
PIF_VALUE: 6
PIF_VALUE: 29
PIF_VALUE: 29
PIF_VALUE: 28
PIF_VALUE: 20
PIF_VALUE: 28
PIF_VALUE: 29
PIF_VALUE: 29
PIF_VALUE: 27
PIF_VALUE: 29
PIF_VALUE: 17
PIF_VALUE: 17
PIF_VALUE: 29
PIF_VALUE: 3
PIF_VALUE: 19
PIF_VALUE: 17
PIF_VALUE: 21
PIF_VALUE: 29
PIF_VALUE: 24
PIF_VALUE: 30
PIF_VALUE: 28
PIF_VALUE: 30
PIF_VALUE: 3
PIF_VALUE: 28
PIF_VALUE: 27
PIF_VALUE: 30
PIF_VALUE: 19
PIF_VALUE: 16
PIF_VALUE: 29
PIF_VALUE: 28
PIF_VALUE: 0
PIF_VALUE: 27
PIF_VALUE: 4
PIF_VALUE: 29
PIF_VALUE: 4
PIF_VALUE: 29
PIF_VALUE: 27
PIF_VALUE: 1
PIF_VALUE: 30
PIF_VALUE: 4
PIF_VALUE: 27
PIF_VALUE: 15
PIF_VALUE: 29
PIF_VALUE: 1
PIF_VALUE: 27
PIF_VALUE: 18
PIF_VALUE: 29
PIF_VALUE: 3
PIF_VALUE: 27
PIF_VALUE: 18
PIF_VALUE: 3
PIF_VALUE: 29
PIF_VALUE: 1
PIF_VALUE: 16
PIF_VALUE: 27
PIF_VALUE: 20
PIF_VALUE: 29
PIF_VALUE: 28
PIF_VALUE: 29
PIF_VALUE: 27
PIF_VALUE: 19
PIF_VALUE: 27
PIF_VALUE: 23
PIF_VALUE: 16
PIF_VALUE: 28
PIF_VALUE: 29
PIF_VALUE: 29
PIF_VALUE: 28
PIF_VALUE: 29
PIF_VALUE: 28
PIF_VALUE: 21
PIF_VALUE: 28
PIF_VALUE: 27
PIF_VALUE: 4
PIF_VALUE: 16
PIF_VALUE: 22
PIF_VALUE: 28
PIF_VALUE: 29
PIF_VALUE: 0
PIF_VALUE: 16
PIF_VALUE: 29
PIF_VALUE: 27
PIF_VALUE: 28
PIF_VALUE: 29
PIF_VALUE: 15
PIF_VALUE: 28
PIF_VALUE: 30
PIF_VALUE: 27
PIF_VALUE: 27
PIF_VALUE: 17

## 2020-02-05 ASSESSMENT — LIFESTYLE VARIABLES: SMOKING_STATUS: 0

## 2020-02-05 ASSESSMENT — PAIN - FUNCTIONAL ASSESSMENT: PAIN_FUNCTIONAL_ASSESSMENT: 0-10

## 2020-02-05 NOTE — BRIEF OP NOTE
Brief Postoperative Note  ______________________________________________________________    Patient: Nadir Moore  YOB: 1940  MRN: [id number]  Date of Procedure: 2/5/2020    Pre-Op Diagnosis: NON RECURRENT UNILATERAL INGUINAL HERNIA WITHOUT OBSTRUCTION OR GANGRENE    Post-Op Diagnosis: Same       Procedure(s):  ROBOTIC LEFT INGUINAL HERNIA REPAIR WITH MESH    Anesthesia: General    Surgeon(s):  Lele Pereira MD    Assistant: Eric Ramos    Estimated Blood Loss (mL): less than 50     Complications: None    Specimens:   * No specimens in log *    Implants:  Implant Name Type Inv.  Item Serial No.  Lot No. LRB No. Used   MESH SURG BEATRIZ GROIN 3DMAX XL LT 5X7IN Mesh MESH SURG BEATRIZ GROIN 3DMAX XL LT 5X7IN  [employer]  Left 1         Drains: * No LDAs found *    Findings: large indirect LIH, reduced, repaired w/ XL 3D Max preperitoneal mesh    Job#: [id number]    Emeli Garcia MD  Date: 2/7/2020  Time: 4:42 PM

## 2020-02-05 NOTE — ANESTHESIA PRE PROCEDURE
Department of Anesthesiology  Preprocedure Note       Name:  Sven Florence   Age:  67 y.o.  :  1940                                          MRN:  [id number]         Date:  2020      Surgeon:  Jade Cali MD    Procedure: ROBOTIC LEFT INGUINAL HERNIA REPAIR WITH MESH, POSSIBLE OPEN PROCEDURE (Left Abdomen)    HPI:  Patient is 67y.o. year old male with a left left inguinal hernia. Symptoms were first noted a few weeks ago. Pain is dull, intermittent. Lump is reducible. Pt has no symptoms of  chronic constipation, chronic cough, difficulty urinating. Pt. has previous history of prior  Inguinal right hernia surgery, repaired twice - laparoscopic and then robotic preperitoneal approaches. Medications prior to admission:    Simethicone 125 MG CAPS Take 1 capsule by mouth daily as needed   atorvastatin (LIPITOR) 80 MG tablet TAKE 1 TABLET BY MOUTH  EVERY DAY  Patient taking differently: Take 80 mg by mouth nightly TAKE 1 TABLET BY MOUTH EVERY DAY   levothyroxine (SYNTHROID) 150 MCG tablet Take 1 tablet by mouth Daily   ferrous sulfate (FE TABS) 325 (65 Fe) MG EC tablet Take 1 tablet by mouth daily (with breakfast)   azelastine (ASTELIN) 0.1 % nasal spray 2 sprays by Nasal route 2 times daily  Patient taking differently: 2 sprays by Nasal route 2 times daily as needed    enalapril (VASOTEC) 10 MG tablet Take 10 mg by mouth 2 times daily    cetirizine (ZYRTEC) 10 MG tablet Take 10 mg by mouth daily. Multiple Vitamin (THERA) TABS Take 1 tablet by mouth daily 1 Tab daily. Glucosamine-Chondroitin (GLUCOSAMINE CHONDR COMPLEX PO) Take 1 tablet by mouth daily    Coenzyme Q-10 100 MG CAPS Take 1 capsule by mouth daily. furosemide (LASIX) 20 MG tablet Take 1 tablet by mouth 2 times daily   aspirin 81 MG chewable tablet Take 81 mg by mouth nightly.        Allergies:     Reopro [Abciximab Injection]      \"destroyed platelets\"    Chocolate     Coffee Bean Extract [Coffea Arabica]      Problem

## 2020-02-08 NOTE — OP NOTE
[address] [address]                                OPERATIVE REPORT    PATIENT NAME: Oralia Del Cid                  :        1940  MED REC NO:   [id number]                          ROOM:  ACCOUNT NO:   [de-identified]                           ADMIT DATE: 2020  PROVIDER:     Edvin Alamo MD    DATE OF PROCEDURE:  2020    PREOPERATIVE DIAGNOSIS:  Left inguinal hernia. POSTOPERATIVE DIAGNOSIS:  Left inguinal hernia. OPERATION PERFORMED:  Robotic preperitoneal left inguinal hernia repair  with mesh. SURGEON:  Edvin Alamo MD    ANESTHESIA:  General.    ESTIMATED BLOOD LOSS:  Less than 50 mL. SPECIMEN:  None. COUNT:  Sponge and needle count correct. INDICATIONS:  The patient is a 61-year-old male, who has had two prior  right inguinal hernias repaired, who has now developed a fairly large  left inguinal hernia and presents for elective repair. FINDINGS:  This large indirect left inguinal hernia, reduced and  repaired with an extra large 3DMax preperitoneal mesh. OPERATIVE PROCEDURE:  After informed consent was obtained, the patient  was taken to the operating room and placed in the supine position. Preoperative antibiotics were initiated in the holding area. Athrombic  pumps were placed on the legs. General anesthesia was administered  without difficulty. The abdomen was prepped and draped in a sterile  fashion. A trocar incision was made in the left upper quadrant after  infiltrating with local anesthesia. The trocar with a 0-degree scope  was passed carefully under direct vision through the abdominal wall  layers into the peritoneal cavity. Insufflation was initiated. A  contralateral right upper quadrant trocar was placed followed by a 54-LY  supraumbilical midline trocar, both under direct vision.   The bed was  positioned appropriately to expose the pelvis and the robot suture. At  the end, a very large protuberant prolapsed inguinal hernia sac was  lifted up intact to the peritoneal flap to help minimize the chance of  re-herniation. At this point, I was satisfied with the repair. The  robot was undocked and the trocars were all removed. The 12-mm fascial  defect was closed with a figure-of-eight 0 Vicryl suture. The skin  incisions were closed with 3-0 Vicryl subcutaneous sutures and  Dermabond. The patient was then extubated and taken to the recovery  room in stable condition. Desi Brock MD    D: 02/07/2020 16:42:15       T: 02/07/2020 22:20:48     [id number]  Job#: [id number]     Doc#: [id number]    CC:   Rachele Brown MD

## 2020-02-21 ENCOUNTER — OFFICE VISIT (OUTPATIENT)
Dept: SURGERY | Age: 80
End: 2020-02-21

## 2020-02-21 VITALS
WEIGHT: 180.4 LBS | HEART RATE: 69 BPM | HEIGHT: 71 IN | SYSTOLIC BLOOD PRESSURE: 109 MMHG | BODY MASS INDEX: 25.26 KG/M2 | DIASTOLIC BLOOD PRESSURE: 49 MMHG

## 2020-02-21 PROBLEM — L76.32 POSTOPERATIVE HEMATOMA OF SUBCUTANEOUS TISSUE FOLLOWING NON-DERMATOLOGIC PROCEDURE: Status: ACTIVE | Noted: 2020-02-21

## 2020-02-21 PROCEDURE — 99024 POSTOP FOLLOW-UP VISIT: CPT | Performed by: SURGERY

## 2020-03-05 RX ORDER — ATORVASTATIN CALCIUM 80 MG/1
TABLET, FILM COATED ORAL
Qty: 90 TABLET | Refills: 0 | OUTPATIENT
Start: 2020-03-05

## 2020-03-16 ENCOUNTER — TELEPHONE (OUTPATIENT)
Dept: SURGERY | Age: 80
End: 2020-03-16

## 2020-03-16 NOTE — TELEPHONE ENCOUNTER
Called and spoke w/ pt to cancel his OV this Fri (3/20) w/ Dr Torito Hong - Pt has no infections, fever or pain but does have some concerns he wants to discuss via phone - informed pt Dr Torito Hong will be calling him for his post op visit this Friday morning  Pt understood and agreed

## 2020-03-20 ENCOUNTER — TELEPHONE (OUTPATIENT)
Dept: SURGERY | Age: 80
End: 2020-03-20

## 2020-03-20 NOTE — TELEPHONE ENCOUNTER
Pt stated that Dr. Samuel Olmstead was suppose to call him or he was suppose to call here in the office at 12:30pm,to discuss prior surgeries that the patient had.

## 2020-03-20 NOTE — TELEPHONE ENCOUNTER
Patient called to follow up regarding the postop findings he had noted at his last visit, s/p robotic inguinal hernia repair with mesh. At his last visit I had diagnosed him with a likely residual hematoma/seroma within the L inguinal canal.  I recommended observation, warm compress, and expect gradual resolution. Pt today notes the fixed, non-tender swelling persists. Denies increase in size, pain. I reiterated the typical nature of these findings and the likely gradual resolution. Reinforced applying warm heat as needed. Pt indicates he understands, asks appropriate questions, and agrees with the plan. He will follow up as needed if further changes or symptoms occur.

## 2020-07-23 ENCOUNTER — OFFICE VISIT (OUTPATIENT)
Dept: SURGERY | Age: 80
End: 2020-07-23
Payer: MEDICARE

## 2020-07-23 VITALS
DIASTOLIC BLOOD PRESSURE: 76 MMHG | HEART RATE: 79 BPM | TEMPERATURE: 98 F | BODY MASS INDEX: 25.77 KG/M2 | WEIGHT: 180 LBS | SYSTOLIC BLOOD PRESSURE: 132 MMHG | HEIGHT: 70 IN

## 2020-07-23 PROCEDURE — G8417 CALC BMI ABV UP PARAM F/U: HCPCS | Performed by: SURGERY

## 2020-07-23 PROCEDURE — 4040F PNEUMOC VAC/ADMIN/RCVD: CPT | Performed by: SURGERY

## 2020-07-23 PROCEDURE — 1123F ACP DISCUSS/DSCN MKR DOCD: CPT | Performed by: SURGERY

## 2020-07-23 PROCEDURE — 1036F TOBACCO NON-USER: CPT | Performed by: SURGERY

## 2020-07-23 PROCEDURE — 99212 OFFICE O/P EST SF 10 MIN: CPT | Performed by: SURGERY

## 2020-07-23 PROCEDURE — G8427 DOCREV CUR MEDS BY ELIG CLIN: HCPCS | Performed by: SURGERY

## 2020-07-23 NOTE — PROGRESS NOTES
Surgery Post-op Progress Note    HPI:  Notes reviewed, and agree with documentation in pt's chart. Postoperative Follow-up: Patient is ~6 months s/p robotic preperitoneal LIH repair w/ mesh. Early in postop period noted a fixed painless \"lump\" in left inguinal canal, c/w likely retained fluid/hematoma/seroma. Plan was for observation and anticipated gradual resolution/resorption of the fluid. However, \"lump\" has persisted until now. Patient denies acute pain or discomfort, no difficulty w/ voiding. Pt recently saw his PCP who felt her should have the \"lump\" reassessed by men. ROS:    · 10 point review of systems performed; please refer to HPI with pertinent positives, all other ROS are negative    PE:   CONSTITUTIONAL:  awake and alert    ABDOMEN: soft, non-distended, non-tender     L GROIN: firm, non-tender rounded, smooth mass within the inguinal canal at the orifice of the external inguinal ring. Mobile, non-tender, non-reducible      ASSESSMENT:   Diagnosis Orders   1. Postoperative hematoma of subcutaneous tissue following non-dermatologic procedure  CT PELVIS W CONTRAST   2. Left groin pain  CT PELVIS W CONTRAST   ·   I still suspect this is a benign result of the reduction of the large indirect hernia sac at prior surgery  However, to be sure, will obtain CT imaging to better clarify diagnosis and possible treatment options. Pt indicates he understands, asks appropriate questions, and agrees to proceed.     PLAN:    · See above

## 2020-07-24 ENCOUNTER — TELEPHONE (OUTPATIENT)
Dept: SURGERY | Age: 80
End: 2020-07-24

## 2020-07-24 NOTE — TELEPHONE ENCOUNTER
Called and spoke w/ pt - informed his CT Pelvis w/ Contrast is scheduled Thursday 7/30/20 @ 3pm arrival 2pm - pt informed to get labs drawn 1st (BUN, Creatinine) - NPO 4 hours - Bring list of meds, photo ID, ins card  Pt agreed to date/time of procedure

## 2020-07-30 ENCOUNTER — HOSPITAL ENCOUNTER (OUTPATIENT)
Age: 80
Discharge: HOME | End: 2020-07-30
Payer: MEDICARE

## 2020-07-30 ENCOUNTER — HOSPITAL ENCOUNTER (OUTPATIENT)
Dept: CT IMAGING | Age: 80
Discharge: HOME | End: 2020-07-30
Payer: MEDICARE

## 2020-07-30 LAB
BUN BLDV-MCNC: 21 MG/DL (ref 7–20)
CREAT SERPL-MCNC: 0.8 MG/DL (ref 0.8–1.3)
GFR AFRICAN AMERICAN: >60
GFR NON-AFRICAN AMERICAN: >60

## 2020-07-30 PROCEDURE — 6360000004 HC RX CONTRAST MEDICATION: Performed by: SURGERY

## 2020-07-30 PROCEDURE — 36415 COLL VENOUS BLD VENIPUNCTURE: CPT

## 2020-07-30 PROCEDURE — 72193 CT PELVIS W/DYE: CPT

## 2020-07-30 PROCEDURE — 84520 ASSAY OF UREA NITROGEN: CPT

## 2020-07-30 PROCEDURE — 82565 ASSAY OF CREATININE: CPT

## 2020-07-30 RX ADMIN — IOPAMIDOL 75 ML: 755 INJECTION, SOLUTION INTRAVENOUS at 14:44

## 2020-08-06 ENCOUNTER — TELEPHONE (OUTPATIENT)
Dept: SURGERY | Age: 80
End: 2020-08-06

## 2020-08-06 NOTE — TELEPHONE ENCOUNTER
Pt called wanting CT Results - explained to pt that Dr Amira Kline is in surgery until this afternoon but I would send him a mess stating pt wants to discuss results  Pt will await my call-back

## 2020-08-07 ENCOUNTER — VIRTUAL VISIT (OUTPATIENT)
Dept: SURGERY | Age: 80
End: 2020-08-07
Payer: MEDICARE

## 2020-08-07 PROCEDURE — 99212 OFFICE O/P EST SF 10 MIN: CPT | Performed by: SURGERY

## 2020-08-07 PROCEDURE — G8417 CALC BMI ABV UP PARAM F/U: HCPCS | Performed by: SURGERY

## 2020-08-07 PROCEDURE — 4040F PNEUMOC VAC/ADMIN/RCVD: CPT | Performed by: SURGERY

## 2020-08-07 PROCEDURE — 1036F TOBACCO NON-USER: CPT | Performed by: SURGERY

## 2020-08-07 PROCEDURE — G8427 DOCREV CUR MEDS BY ELIG CLIN: HCPCS | Performed by: SURGERY

## 2020-08-07 PROCEDURE — 1123F ACP DISCUSS/DSCN MKR DOCD: CPT | Performed by: SURGERY

## 2020-08-07 NOTE — PROGRESS NOTES
Alberto Mcclelland is a 79 y.o. male evaluated via telephone on 8/7/2020. Consent:  He and/or health care decision maker is aware that that he may receive a bill for this telephone service, depending on his insurance coverage, and has provided verbal consent to proceed: Yes      Documentation:  I communicated with the patient and/or health care decision maker about post-op issues. Details of this discussion including any medical advice provided: Pt calls to discuss findings on recent CT to assess swelling in L inguinal canal.  CT confirms my diagnosis of seroma, no sx of infection. If he notes any increased symptoms related to the seroma, we will arrange for U/S-guided aspiration of the fluid      I affirm this is a Patient Initiated Episode with a Patient who has not had a related appointment within my department in the past 7 days or scheduled within the next 24 hours.     Patient identification was verified at the start of the visit: Yes    Total Time: minutes: 5-10 minutes    Note: not billable if this call serves to triage the patient into an appointment for the relevant concern      [address] [place]

## 2020-10-16 RX ORDER — LEVOTHYROXINE SODIUM 0.15 MG/1
150 TABLET ORAL DAILY
Qty: 90 TABLET | Refills: 3 | Status: SHIPPED | OUTPATIENT
Start: 2020-10-16

## 2020-10-16 NOTE — TELEPHONE ENCOUNTER
Refill request for levothyroxine  medication.      Name of Pharmacy- [facility]       Last visit - 9-6-2019     Pending visit - none     Last refill -9-6-2019      Medication Contract signed -   Ricky Sanford ran-         Additional Comments

## 2020-11-19 ENCOUNTER — HOSPITAL ENCOUNTER (EMERGENCY)
Age: 80
Discharge: HOME | End: 2020-11-19
Payer: MEDICARE

## 2020-11-19 ENCOUNTER — APPOINTMENT (OUTPATIENT)
Dept: CT IMAGING | Age: 80
End: 2020-11-19
Payer: MEDICARE

## 2020-11-19 VITALS
RESPIRATION RATE: 16 BRPM | OXYGEN SATURATION: 96 % | WEIGHT: 169 LBS | TEMPERATURE: 98.7 F | BODY MASS INDEX: 24.25 KG/M2 | HEART RATE: 82 BPM | DIASTOLIC BLOOD PRESSURE: 49 MMHG | SYSTOLIC BLOOD PRESSURE: 101 MMHG

## 2020-11-19 LAB
A/G RATIO: 1.6 (ref 1.1–2.2)
ALBUMIN SERPL-MCNC: 3.9 G/DL (ref 3.4–5)
ALP BLD-CCNC: 59 U/L (ref 40–129)
ALT SERPL-CCNC: 15 U/L (ref 10–40)
ANION GAP SERPL CALCULATED.3IONS-SCNC: 11 MMOL/L (ref 3–16)
AST SERPL-CCNC: 13 U/L (ref 15–37)
BASOPHILS ABSOLUTE: 0 K/UL (ref 0–0.2)
BASOPHILS RELATIVE PERCENT: 0.3 %
BILIRUB SERPL-MCNC: 1 MG/DL (ref 0–1)
BILIRUBIN URINE: ABNORMAL
BLOOD, URINE: NEGATIVE
BUN BLDV-MCNC: 37 MG/DL (ref 7–20)
CALCIUM SERPL-MCNC: 9.3 MG/DL (ref 8.3–10.6)
CHLORIDE BLD-SCNC: 89 MMOL/L (ref 99–110)
CLARITY: CLEAR
CO2: 28 MMOL/L (ref 21–32)
COLOR: YELLOW
CREAT SERPL-MCNC: 1 MG/DL (ref 0.8–1.3)
EOSINOPHILS ABSOLUTE: 0 K/UL (ref 0–0.6)
EOSINOPHILS RELATIVE PERCENT: 0.4 %
GFR AFRICAN AMERICAN: >60
GFR NON-AFRICAN AMERICAN: >60
GLOBULIN: 2.4 G/DL
GLUCOSE BLD-MCNC: 125 MG/DL (ref 70–99)
GLUCOSE URINE: NEGATIVE MG/DL
HCT VFR BLD CALC: 37.5 % (ref 40.5–52.5)
HEMOGLOBIN: 12.7 G/DL (ref 13.5–17.5)
KETONES, URINE: NEGATIVE MG/DL
LEUKOCYTE ESTERASE, URINE: NEGATIVE
LIPASE: 13 U/L (ref 13–60)
LYMPHOCYTES ABSOLUTE: 0.5 K/UL (ref 1–5.1)
LYMPHOCYTES RELATIVE PERCENT: 14.9 %
MCH RBC QN AUTO: 33.5 PG (ref 26–34)
MCHC RBC AUTO-ENTMCNC: 34 G/DL (ref 31–36)
MCV RBC AUTO: 98.5 FL (ref 80–100)
MICROSCOPIC EXAMINATION: ABNORMAL
MONOCYTES ABSOLUTE: 0.5 K/UL (ref 0–1.3)
MONOCYTES RELATIVE PERCENT: 14.8 %
NEUTROPHILS ABSOLUTE: 2.2 K/UL (ref 1.7–7.7)
NEUTROPHILS RELATIVE PERCENT: 69.6 %
NITRITE, URINE: NEGATIVE
OCCULT BLOOD SCREENING: NORMAL
PDW BLD-RTO: 14 % (ref 12.4–15.4)
PH UA: 5 (ref 5–8)
PLATELET # BLD: 179 K/UL (ref 135–450)
PMV BLD AUTO: 7.5 FL (ref 5–10.5)
POTASSIUM SERPL-SCNC: 4.6 MMOL/L (ref 3.5–5.1)
PROTEIN UA: NEGATIVE MG/DL
RBC # BLD: 3.8 M/UL (ref 4.2–5.9)
SODIUM BLD-SCNC: 128 MMOL/L (ref 136–145)
SPECIFIC GRAVITY UA: >=1.03 (ref 1–1.03)
TOTAL PROTEIN: 6.3 G/DL (ref 6.4–8.2)
URINE TYPE: ABNORMAL
UROBILINOGEN, URINE: 0.2 E.U./DL
WBC # BLD: 3.2 K/UL (ref 4–11)

## 2020-11-19 PROCEDURE — 80053 COMPREHEN METABOLIC PANEL: CPT

## 2020-11-19 PROCEDURE — 83690 ASSAY OF LIPASE: CPT

## 2020-11-19 PROCEDURE — G0328 FECAL BLOOD SCRN IMMUNOASSAY: HCPCS

## 2020-11-19 PROCEDURE — 2580000003 HC RX 258: Performed by: NURSE PRACTITIONER

## 2020-11-19 PROCEDURE — 81003 URINALYSIS AUTO W/O SCOPE: CPT

## 2020-11-19 PROCEDURE — 6360000004 HC RX CONTRAST MEDICATION: Performed by: NURSE PRACTITIONER

## 2020-11-19 PROCEDURE — 74177 CT ABD & PELVIS W/CONTRAST: CPT

## 2020-11-19 PROCEDURE — 99284 EMERGENCY DEPT VISIT MOD MDM: CPT

## 2020-11-19 PROCEDURE — 85025 COMPLETE CBC W/AUTO DIFF WBC: CPT

## 2020-11-19 RX ORDER — DICYCLOMINE HYDROCHLORIDE 10 MG/1
10 CAPSULE ORAL EVERY 6 HOURS PRN
Qty: 20 CAPSULE | Refills: 0 | Status: SHIPPED | OUTPATIENT
Start: 2020-11-19 | End: 2021-05-10

## 2020-11-19 RX ORDER — AMOXICILLIN AND CLAVULANATE POTASSIUM 875; 125 MG/1; MG/1
1 TABLET, FILM COATED ORAL 2 TIMES DAILY
Qty: 20 TABLET | Refills: 0 | Status: SHIPPED | OUTPATIENT
Start: 2020-11-19 | End: 2020-11-29

## 2020-11-19 RX ORDER — SODIUM CHLORIDE, SODIUM LACTATE, POTASSIUM CHLORIDE, CALCIUM CHLORIDE 600; 310; 30; 20 MG/100ML; MG/100ML; MG/100ML; MG/100ML
1000 INJECTION, SOLUTION INTRAVENOUS ONCE
Status: COMPLETED | OUTPATIENT
Start: 2020-11-19 | End: 2020-11-19

## 2020-11-19 RX ORDER — ONDANSETRON 4 MG/1
4 TABLET, ORALLY DISINTEGRATING ORAL EVERY 8 HOURS PRN
Qty: 20 TABLET | Refills: 0 | Status: SHIPPED | OUTPATIENT
Start: 2020-11-19 | End: 2021-05-10

## 2020-11-19 RX ADMIN — IOPAMIDOL 75 ML: 755 INJECTION, SOLUTION INTRAVENOUS at 14:10

## 2020-11-19 RX ADMIN — SODIUM CHLORIDE, POTASSIUM CHLORIDE, SODIUM LACTATE AND CALCIUM CHLORIDE 1000 ML: 600; 310; 30; 20 INJECTION, SOLUTION INTRAVENOUS at 10:56

## 2020-11-19 ASSESSMENT — ENCOUNTER SYMPTOMS
COUGH: 0
SHORTNESS OF BREATH: 0
VOMITING: 1
SORE THROAT: 0
CHEST TIGHTNESS: 0
NAUSEA: 1
ABDOMINAL PAIN: 0
DIARRHEA: 1

## 2020-11-19 NOTE — ED PROVIDER NOTES
**ADVANCED PRACTICE PROVIDER, I HAVE EVALUATED THIS [facility]  ED  EMERGENCY DEPARTMENT ENCOUNTER      Pt Name: Tano Henriquez  [facility]:[id number]  [place] 1940  Date of evaluation: 11/19/2020  Provider: VALE Pimentel CNP      Chief Complaint:    Chief Complaint   Patient presents with    Abdominal Pain     unable to keep food down.  Diarrhea       Nursing Notes, Past Medical Hx, Past Surgical Hx, Social Hx, Allergies, and Family Hx were all reviewed and agreed with or any disagreements were addressed in the HPI.    HPI:  (Location, Duration, Timing, Severity, Quality, Assoc Sx, Context, Modifying factors)  This is a  79 y.o. male who presents today complaining of dark watery diarrhea, and mild abdominal cramping. He states this began Monday or Tuesday of last week, was seen by his PCP and was told that he was anemic and started on iron tablets. He states his black stools continued throughout the week and his last one was this morning. He is also complaining of feeling \"terrible\", fatigue, and reports no interest in eating. He has been trying to drink Pedialyte to stay hydrated, but is not likely to eat or drink anything. He has had episodes of nausea and vomiting since his symptoms began, but currently denies nausea and has not recently vomited. He denies any chest pain pleuritic chest pain or shortness of breath, no cough, congestion, fever or chills, no concerns for Covid denies shortness of breath. He denies abdominal pain or tenderness on exam.  Denies taking any medicine for symptoms. He has a GI appointment to see Dr. Leni Martinez in December. He denies any additional complaints, no additional alleviating and aggravating factors. He presents awake, alert and in no acute distress or toxic appearance.     PastMedical/Surgical History:      Diagnosis Date    Allergic rhinitis     Anemia     Arthritis     rt hip    CAD (coronary artery disease) 2001 cardiac stents    Chronic systolic congestive heart failure ([facility] [address] 8/21/2018    Compression fracture of T11 vertebra (HCC)     back brace    Food allergy     History of blood transfusion     platelets=2001    Hyperlipidemia     Hypertension     Hypothyroid     Obesity 10/2/2012    Occlusion and stenosis of carotid artery 10/2/2012    Osteoarthritis     Hip right    Recurrent right inguinal hernia     Shingles 2014    Thyroid disease     TIA (transient ischemic attack)          Procedure Laterality Date    AORTIC VALVE REPLACEMENT  2018    CARDIAC SURGERY  2001    stents    CARDIAC VALVE REPLACEMENT  09/15/2018    COLONOSCOPY  04/22/99    COLONOSCOPY  7/13/2015    EYE SURGERY      victorino cataracts    HERNIA REPAIR Left 2/5/2020    ROBOTIC LEFT INGUINAL HERNIA REPAIR WITH MESH performed by Ciera Fletcher MD at [address] Right 07/07/2016    laparoscopic right inguinal hernia repair with mesh    INGUINAL HERNIA REPAIR Right 04/12/2017    davinci recurrent right inguinal hernia repair with mesh    PTCA  2018    SIGMOIDOSCOPY  1994    TONSILLECTOMY      UPPER GASTROINTESTINAL ENDOSCOPY  05/12/94    UPPER GASTROINTESTINAL ENDOSCOPY  11/09/04    Gastritis and duodenitis       Medications:  Discharge Medication List as of 11/19/2020  4:33 PM      CONTINUE these medications which have NOT CHANGED    Details   levothyroxine (SYNTHROID) 150 MCG tablet TAKE 1 TABLET BY MOUTH  DAILY, Disp-90 tablet,R-3Requesting 1 year supplyNormal      atorvastatin (LIPITOR) 80 MG tablet TAKE 1 TABLET BY MOUTH  EVERY DAY, Disp-90 tablet,R-1For high cholesterolNormal      Simethicone 125 MG CAPS Take 1 capsule by mouth daily as neededHistorical Med      furosemide (LASIX) 20 MG tablet Take 1 tablet by mouth 2 times daily, Disp-180 tablet, R-0Normal      ferrous sulfate (FE TABS) 325 (65 Fe) MG EC tablet Take 1 tablet by mouth daily (with breakfast), Disp-90 tablet, R-0Normal      azelastine (ASTELIN) 0.1 % nasal spray 2 sprays by Nasal route 2 times daily, Disp-1 Bottle, R-1Normal      enalapril (VASOTEC) 10 MG tablet Take 10 mg by mouth 2 times daily Historical Med      cetirizine (ZYRTEC) 10 MG tablet Take 10 mg by mouth daily. Multiple Vitamin (THERA) TABS Take 1 tablet by mouth daily 1 Tab daily. Historical Med      Glucosamine-Chondroitin (GLUCOSAMINE CHONDR COMPLEX PO) Take 1 tablet by mouth daily Historical Med      aspirin 81 MG chewable tablet Take 81 mg by mouth nightly. Coenzyme Q-10 100 MG CAPS Take 1 capsule by mouth daily. Review of Systems:  Review of Systems   Constitutional: Positive for activity change, appetite change and fatigue. Negative for chills and fever. States he has had lack of activity and fatigue. However denies any fever or chills. HENT: Negative for congestion and sore throat. Respiratory: Negative for cough, chest tightness and shortness of breath. Cardiovascular: Negative for chest pain. Gastrointestinal: Positive for diarrhea, nausea and vomiting. Negative for abdominal pain. Patient reports having a no mass studies had in left lower inguinal region that is a seroma. He reports having concerns of blood in stool as he is anemic and has been on iron tablets. Been dealing with intermittent abdominal cramping and decreased appetite. Genitourinary: Negative for difficulty urinating, dysuria and hematuria. Neurological: Positive for light-headedness. Negative for syncope. Patient reports feeling fatigue, lack of energy, periodically lightheaded however denies any lightheadedness, headache or dizziness on exam.     Positives and Pertinent negatives as per HPI. Except as noted above in the ROS, problem specific ROS was completed and is negative. Physical Exam:  Physical Exam  Constitutional:       General: He is not in acute distress. Eyes:      General: No scleral icterus.   Cardiovascular:      Rate and Rhythm: Normal rate. Heart sounds: Murmur present. Comments: Normal S1 and 2, peripheral pulses are 2+, no edema observed, audible murmur which is not new for the patient. Pulmonary:      Effort: Pulmonary effort is normal. No respiratory distress. Breath sounds: Normal breath sounds. Abdominal:      General: Abdomen is flat. Bowel sounds are normal. There is no distension. Palpations: There is mass. Tenderness: There is no abdominal tenderness. Comments: Abdomen is soft and nondistended. Bowel sounds are hypoactive, he has no specific abdominal tenderness, guarding or rebound tenderness however he does have a left inguinal firm movable tender mass, patient states this is not a new finding. No lymphadenopathy. Skin:     General: Skin is warm and dry. Capillary Refill: Capillary refill takes less than 2 seconds. Coloration: Skin is not pale. Neurological:      General: No focal deficit present. Mental Status: He is alert and oriented to person, place, and time. GCS: GCS eye subscore is 4. GCS verbal subscore is 5. GCS motor subscore is 6.          MEDICAL DECISION MAKING    Vitals:    Vitals:    11/19/20 1300 11/19/20 1324 11/19/20 1500 11/19/20 1603   BP: (!) 112/57  102/61 (!) 101/49   Pulse:   76 82   Resp:    16   Temp:       TempSrc:       SpO2: 98% 99% 97% 96%   Weight:           LABS:  Labs Reviewed   CBC WITH AUTO DIFFERENTIAL - Abnormal; Notable for the following components:       Result Value    WBC 3.2 (*)     RBC 3.80 (*)     Hemoglobin 12.7 (*)     Hematocrit 37.5 (*)     Lymphocytes Absolute 0.5 (*)     All other components within normal limits    Narrative:     Performed at:  [facility]  [address] [place] [address],  [facility], [address] [place] [address]   Phone [phone]   COMPREHENSIVE METABOLIC PANEL - Abnormal; Notable for the following components:    Sodium 128 (*)     Chloride 89 (*)     Glucose 125 (*)     BUN 37 (*)     Total normal, this should improve with fluids. Liver functions and lipase are normal.  My nurse petitioner student performed a rectal exam with my assistance, occult stool is negative. No fissures or acute hemorrhoids. CT abdomen negative for acute findings, but indicative of enteritis, which I will treat accordingly. Additionally, he has a left inguinal mass likely seroma or cyst.  Upon chart review, it appears patient's tender inguinal mass is likely a seroma and has been evaluated by Dr. Brayan Silver in the past this is not a new finding. No intervention necessary at this time. Upon reevaluation vital signs are stable, he eagerly wants to go home. We did discuss about him following up on outpatient basis with his PCP and GI doctor. However, at this time of no concerns for sepsis, surgical abdomen, emergent etiology and agreed the patient can go home. Therefore, shared medical decision was made between the patient myself and we agreed he could be discharged home with outpatient follow-up. Patient was discharged home with Augmentin Bentyl and Zofran. Educated take medicine as prescribed. Follow-up with his surgeon again regards to the seroma however, to follow-up with the PCP and GI doctor on Monday for reevaluation but to return to the ER for any worsening symptoms. He was educated on hydrating himself with sips of oral fluids and intake. The patient tolerated their visit well. I evaluated the patient. The physician was available for consultation as needed. The patient and / or the family were informed of the results of any tests, a time was given to answer questions, a plan was proposed and they agreed with plan. Patient verbalized understanding of discharge instructions and was discharged from the department stable condition. CLINICAL IMPRESSION:  1. Enteritis    2.  Diarrhea of presumed infectious origin        DISPOSITION Decision To Discharge 11/19/2020 04:30:31 PM      PATIENT REFERRED TO:  Margaret Nagel

## 2020-11-19 NOTE — ED NOTES
Rectal exam performed and hemoccult obtained by MLP. RN sent to lab. Pt tolerated well.       Jonnathan Diego RN  11/19/20 0018

## 2020-11-19 NOTE — ED NOTES
Discharge paperwork given to and reviewed with pt. Pt verbalized understanding and all questions answered. Pt encouraged to return if having worsening symptoms or new symptoms discussed in discharge paperwork. Pt to follow up with PCP  Rx x 3 given and medications reviewed with pt. IV removed with catheter intact. Pt in NAD, RR even and unlabored.  Pt off unit ambulatory to vestibule to wait for wife to pick him up     Vitaliy Goode RN  11/19/20 6877

## 2020-11-19 NOTE — ED NOTES
Pt calm and resting quietly with equal rise and fall of chest. Pt in NAD, RR even and unlabored. Side rails up, bed locked and in lowest position. Pt updated on plan of care. No needs at this time. Pt instructed on use of call light if needed.       Justin Torres RN  11/19/20 7788

## 2020-11-19 NOTE — ED NOTES
Pt calm and resting quietly with equal rise and fall of chest. Pt in NAD, RR even and unlabored. Side rails up, bed locked and in lowest position. Pt updated on plan of care. No needs at this time. Pt instructed on use of call light if needed.       Hans Benito RN  11/19/20 6611

## 2020-11-19 NOTE — ED NOTES

## 2020-11-19 NOTE — ED NOTES
RN told pt need for urine sample. Unable to at this time.  Will try shortly     Sophia Williamson RN  11/19/20 6111

## 2020-12-18 ENCOUNTER — HOSPITAL ENCOUNTER (OUTPATIENT)
Dept: GENERAL RADIOLOGY | Age: 80
Discharge: HOME | End: 2020-12-18
Payer: MEDICARE

## 2020-12-18 PROCEDURE — 74240 X-RAY XM UPR GI TRC 1CNTRST: CPT

## 2021-05-03 ENCOUNTER — HOSPITAL ENCOUNTER (INPATIENT)
Age: 81
LOS: 4 days | Discharge: HOME | DRG: 392 | End: 2021-05-07
Attending: EMERGENCY MEDICINE | Admitting: INTERNAL MEDICINE
Payer: MEDICARE

## 2021-05-03 ENCOUNTER — APPOINTMENT (OUTPATIENT)
Dept: CT IMAGING | Age: 81
DRG: 392 | End: 2021-05-03
Payer: MEDICARE

## 2021-05-03 DIAGNOSIS — K56.609: ICD-10-CM

## 2021-05-03 DIAGNOSIS — K44.9 HIATAL HERNIA: Primary | ICD-10-CM

## 2021-05-03 DIAGNOSIS — E87.1 HYPONATREMIA: ICD-10-CM

## 2021-05-03 LAB
A/G RATIO: 1.6 (ref 1.1–2.2)
ALBUMIN SERPL-MCNC: 4.5 G/DL (ref 3.4–5)
ALP BLD-CCNC: 85 U/L (ref 40–129)
ALT SERPL-CCNC: 22 U/L (ref 10–40)
ANION GAP SERPL CALCULATED.3IONS-SCNC: 12 MMOL/L (ref 3–16)
AST SERPL-CCNC: 23 U/L (ref 15–37)
BASOPHILS ABSOLUTE: 0 K/UL (ref 0–0.2)
BASOPHILS RELATIVE PERCENT: 0.4 %
BILIRUB SERPL-MCNC: 0.9 MG/DL (ref 0–1)
BILIRUBIN URINE: NEGATIVE
BLOOD, URINE: NEGATIVE
BUN BLDV-MCNC: 11 MG/DL (ref 7–20)
CALCIUM SERPL-MCNC: 9.6 MG/DL (ref 8.3–10.6)
CHLORIDE BLD-SCNC: 88 MMOL/L (ref 99–110)
CLARITY: CLEAR
CO2: 26 MMOL/L (ref 21–32)
COLOR: ABNORMAL
CREAT SERPL-MCNC: 0.6 MG/DL (ref 0.8–1.3)
EOSINOPHILS ABSOLUTE: 0 K/UL (ref 0–0.6)
EOSINOPHILS RELATIVE PERCENT: 0.2 %
GFR AFRICAN AMERICAN: >60
GFR NON-AFRICAN AMERICAN: >60
GLOBULIN: 2.9 G/DL
GLUCOSE BLD-MCNC: 116 MG/DL (ref 70–99)
GLUCOSE URINE: NEGATIVE MG/DL
HCT VFR BLD CALC: 38.9 % (ref 40.5–52.5)
HEMOGLOBIN: 13.4 G/DL (ref 13.5–17.5)
KETONES, URINE: 15 MG/DL
LEUKOCYTE ESTERASE, URINE: NEGATIVE
LIPASE: 29 U/L (ref 13–60)
LYMPHOCYTES ABSOLUTE: 0.4 K/UL (ref 1–5.1)
LYMPHOCYTES RELATIVE PERCENT: 5.3 %
MCH RBC QN AUTO: 33 PG (ref 26–34)
MCHC RBC AUTO-ENTMCNC: 34.5 G/DL (ref 31–36)
MCV RBC AUTO: 95.6 FL (ref 80–100)
MICROSCOPIC EXAMINATION: ABNORMAL
MONOCYTES ABSOLUTE: 0.3 K/UL (ref 0–1.3)
MONOCYTES RELATIVE PERCENT: 3.5 %
NEUTROPHILS ABSOLUTE: 7 K/UL (ref 1.7–7.7)
NEUTROPHILS RELATIVE PERCENT: 90.6 %
NITRITE, URINE: NEGATIVE
PDW BLD-RTO: 14.1 % (ref 12.4–15.4)
PH UA: 7 (ref 5–8)
PLATELET # BLD: 156 K/UL (ref 135–450)
PMV BLD AUTO: 7.5 FL (ref 5–10.5)
POTASSIUM REFLEX MAGNESIUM: 4.3 MMOL/L (ref 3.5–5.1)
PROTEIN UA: NEGATIVE MG/DL
RBC # BLD: 4.07 M/UL (ref 4.2–5.9)
SODIUM BLD-SCNC: 126 MMOL/L (ref 136–145)
SODIUM URINE: 75 MMOL/L
SPECIFIC GRAVITY UA: 1.02 (ref 1–1.03)
TOTAL PROTEIN: 7.4 G/DL (ref 6.4–8.2)
TROPONIN: <0.01 NG/ML
URINE TYPE: ABNORMAL
UROBILINOGEN, URINE: 0.2 E.U./DL
WBC # BLD: 7.8 K/UL (ref 4–11)

## 2021-05-03 PROCEDURE — 96375 TX/PRO/DX INJ NEW DRUG ADDON: CPT

## 2021-05-03 PROCEDURE — 80053 COMPREHEN METABOLIC PANEL: CPT

## 2021-05-03 PROCEDURE — 74177 CT ABD & PELVIS W/CONTRAST: CPT

## 2021-05-03 PROCEDURE — 84300 ASSAY OF URINE SODIUM: CPT

## 2021-05-03 PROCEDURE — 96374 THER/PROPH/DIAG INJ IV PUSH: CPT

## 2021-05-03 PROCEDURE — 81003 URINALYSIS AUTO W/O SCOPE: CPT

## 2021-05-03 PROCEDURE — 83930 ASSAY OF BLOOD OSMOLALITY: CPT

## 2021-05-03 PROCEDURE — 83935 ASSAY OF URINE OSMOLALITY: CPT

## 2021-05-03 PROCEDURE — 99285 EMERGENCY DEPT VISIT HI MDM: CPT

## 2021-05-03 PROCEDURE — 36415 COLL VENOUS BLD VENIPUNCTURE: CPT

## 2021-05-03 PROCEDURE — 84484 ASSAY OF TROPONIN QUANT: CPT

## 2021-05-03 PROCEDURE — 83690 ASSAY OF LIPASE: CPT

## 2021-05-03 PROCEDURE — 1200000000 HC SEMI PRIVATE

## 2021-05-03 PROCEDURE — 6360000002 HC RX W HCPCS: Performed by: PHYSICIAN ASSISTANT

## 2021-05-03 PROCEDURE — 2580000003 HC RX 258: Performed by: INTERNAL MEDICINE

## 2021-05-03 PROCEDURE — 6360000004 HC RX CONTRAST MEDICATION: Performed by: PHYSICIAN ASSISTANT

## 2021-05-03 PROCEDURE — 85025 COMPLETE CBC W/AUTO DIFF WBC: CPT

## 2021-05-03 RX ORDER — SODIUM CHLORIDE 0.9 % (FLUSH) 0.9 %
10 SYRINGE (ML) INJECTION EVERY 12 HOURS SCHEDULED
Status: DISCONTINUED | OUTPATIENT
Start: 2021-05-03 | End: 2021-05-07 | Stop reason: HOSPADM

## 2021-05-03 RX ORDER — MAGNESIUM SULFATE IN WATER 40 MG/ML
2000 INJECTION, SOLUTION INTRAVENOUS PRN
Status: DISCONTINUED | OUTPATIENT
Start: 2021-05-03 | End: 2021-05-07 | Stop reason: HOSPADM

## 2021-05-03 RX ORDER — ONDANSETRON 2 MG/ML
4 INJECTION INTRAMUSCULAR; INTRAVENOUS ONCE
Status: COMPLETED | OUTPATIENT
Start: 2021-05-03 | End: 2021-05-03

## 2021-05-03 RX ORDER — SODIUM CHLORIDE 0.9 % (FLUSH) 0.9 %
10 SYRINGE (ML) INJECTION PRN
Status: DISCONTINUED | OUTPATIENT
Start: 2021-05-03 | End: 2021-05-07 | Stop reason: HOSPADM

## 2021-05-03 RX ORDER — PROMETHAZINE HYDROCHLORIDE 25 MG/1
12.5 TABLET ORAL EVERY 6 HOURS PRN
Status: DISCONTINUED | OUTPATIENT
Start: 2021-05-03 | End: 2021-05-07 | Stop reason: HOSPADM

## 2021-05-03 RX ORDER — SODIUM CHLORIDE 9 MG/ML
25 INJECTION, SOLUTION INTRAVENOUS PRN
Status: DISCONTINUED | OUTPATIENT
Start: 2021-05-03 | End: 2021-05-07 | Stop reason: HOSPADM

## 2021-05-03 RX ORDER — POTASSIUM CHLORIDE 7.45 MG/ML
10 INJECTION INTRAVENOUS PRN
Status: DISCONTINUED | OUTPATIENT
Start: 2021-05-03 | End: 2021-05-07 | Stop reason: HOSPADM

## 2021-05-03 RX ORDER — ONDANSETRON 2 MG/ML
4 INJECTION INTRAMUSCULAR; INTRAVENOUS EVERY 6 HOURS PRN
Status: DISCONTINUED | OUTPATIENT
Start: 2021-05-03 | End: 2021-05-07 | Stop reason: HOSPADM

## 2021-05-03 RX ORDER — SODIUM CHLORIDE 9 MG/ML
1000 INJECTION, SOLUTION INTRAVENOUS ONCE
Status: COMPLETED | OUTPATIENT
Start: 2021-05-03 | End: 2021-05-03

## 2021-05-03 RX ORDER — ACETAMINOPHEN 650 MG/1
650 SUPPOSITORY RECTAL EVERY 6 HOURS PRN
Status: DISCONTINUED | OUTPATIENT
Start: 2021-05-03 | End: 2021-05-07 | Stop reason: HOSPADM

## 2021-05-03 RX ORDER — ACETAMINOPHEN 325 MG/1
650 TABLET ORAL EVERY 6 HOURS PRN
Status: DISCONTINUED | OUTPATIENT
Start: 2021-05-03 | End: 2021-05-07 | Stop reason: HOSPADM

## 2021-05-03 RX ORDER — FAMOTIDINE 20 MG/1
20 TABLET, FILM COATED ORAL DAILY PRN
Status: DISCONTINUED | OUTPATIENT
Start: 2021-05-03 | End: 2021-05-07 | Stop reason: HOSPADM

## 2021-05-03 RX ORDER — MORPHINE SULFATE 4 MG/ML
4 INJECTION, SOLUTION INTRAMUSCULAR; INTRAVENOUS ONCE
Status: COMPLETED | OUTPATIENT
Start: 2021-05-03 | End: 2021-05-03

## 2021-05-03 RX ADMIN — ONDANSETRON 4 MG: 2 INJECTION INTRAMUSCULAR; INTRAVENOUS at 17:41

## 2021-05-03 RX ADMIN — SODIUM CHLORIDE, PRESERVATIVE FREE 10 ML: 5 INJECTION INTRAVENOUS at 22:32

## 2021-05-03 RX ADMIN — SODIUM CHLORIDE 1000 ML: 9 INJECTION, SOLUTION INTRAVENOUS at 22:31

## 2021-05-03 RX ADMIN — IOPAMIDOL 75 ML: 755 INJECTION, SOLUTION INTRAVENOUS at 18:46

## 2021-05-03 RX ADMIN — MORPHINE SULFATE 4 MG: 4 INJECTION, SOLUTION INTRAMUSCULAR; INTRAVENOUS at 17:41

## 2021-05-03 ASSESSMENT — PAIN DESCRIPTION - DESCRIPTORS: DESCRIPTORS: CRAMPING

## 2021-05-03 ASSESSMENT — PAIN SCALES - GENERAL
PAINLEVEL_OUTOF10: 10
PAINLEVEL_OUTOF10: 8

## 2021-05-03 ASSESSMENT — PAIN DESCRIPTION - FREQUENCY: FREQUENCY: CONTINUOUS

## 2021-05-03 ASSESSMENT — PAIN DESCRIPTION - PAIN TYPE: TYPE: ACUTE PAIN

## 2021-05-03 NOTE — ED PROVIDER NOTES
[facility] Emergency Department    CHIEF COMPLAINT  Abdominal Pain (pt reports abd pain and nausea since yesterday with \"gas pains\"; pt denies urinary symptoms)      SHARED SERVICE VISIT  I have seen and evaluated this patient with my supervising physician, Dr. Yaa Badillo. HISTORY OF PRESENT ILLNESS  Tobias Diaz is a 79 y.o. male who presents to the ED complaining of several day history of abdominal pain. Patient brought in by wife today for evaluation. Patient states that he has had lower abdominal pain for the past several days which has been progressively worsening. Pain appears to wax and wane without obvious aggravating or alleviating factors and currently rated at 7 out of 10. Pain does not appear to radiate. He has attempted Motrin without relief of symptoms. States that he has had nausea without vomiting. No diarrhea or constipation. No black or bloody stools. Does have history of hernia repair. No pain in the testicles. Has had no urinary symptoms. No fevers chills. He denies headache, lightheadedness, dizziness confusion. No chest pain or shortness of breath. No back pain. No leg pain or swelling. No other complaints, modifying factors or associated symptoms. Nursing notes reviewed.    Past Medical History:   Diagnosis Date    Allergic rhinitis     Anemia     Arthritis     rt hip    CAD (coronary artery disease) 2001    cardiac stents    Chronic systolic congestive heart failure ([facility] [address] 8/21/2018    Compression fracture of T11 vertebra (HCC)     back brace    Food allergy     History of blood transfusion     platelets=2001    Hyperlipidemia     Hypertension     Hypothyroid     Obesity 10/2/2012    Occlusion and stenosis of carotid artery 10/2/2012    Osteoarthritis     Hip right    Recurrent right inguinal hernia     Shingles 2014    Thyroid disease     TIA (transient ischemic attack)      Past Surgical History:   Procedure Laterality together: Not on file     Attends Gnosticism service: Not on file     Active member of club or organization: Not on file     Attends meetings of clubs or organizations: Not on file     Relationship status: Not on file    Intimate partner violence     Fear of current or ex partner: Not on file     Emotionally abused: Not on file     Physically abused: Not on file     Forced sexual activity: Not on file   Other Topics Concern    Not on file   Social History Narrative    Not on file     No current facility-administered medications for this encounter. Current Outpatient Medications   Medication Sig Dispense Refill    dicyclomine (BENTYL) 10 MG capsule Take 1 capsule by mouth every 6 hours as needed (cramps) 20 capsule 0    ondansetron (ZOFRAN ODT) 4 MG disintegrating tablet Take 1 tablet by mouth every 8 hours as needed for Nausea 20 tablet 0    levothyroxine (SYNTHROID) 150 MCG tablet TAKE 1 TABLET BY MOUTH  DAILY 90 tablet 3    atorvastatin (LIPITOR) 80 MG tablet TAKE 1 TABLET BY MOUTH  EVERY DAY 90 tablet 1    Simethicone 125 MG CAPS Take 1 capsule by mouth daily as needed      furosemide (LASIX) 20 MG tablet Take 1 tablet by mouth 2 times daily 180 tablet 0    ferrous sulfate (FE TABS) 325 (65 Fe) MG EC tablet Take 1 tablet by mouth daily (with breakfast) 90 tablet 0    azelastine (ASTELIN) 0.1 % nasal spray 2 sprays by Nasal route 2 times daily (Patient taking differently: 2 sprays by Nasal route 2 times daily as needed ) 1 Bottle 1    enalapril (VASOTEC) 10 MG tablet Take 10 mg by mouth 2 times daily       cetirizine (ZYRTEC) 10 MG tablet Take 10 mg by mouth daily.  Multiple Vitamin (THERA) TABS Take 1 tablet by mouth daily 1 Tab daily.  Glucosamine-Chondroitin (GLUCOSAMINE CHONDR COMPLEX PO) Take 1 tablet by mouth daily       aspirin 81 MG chewable tablet Take 81 mg by mouth nightly.  Coenzyme Q-10 100 MG CAPS Take 1 capsule by mouth daily.          Allergies   Allergen Reactions unselect if not a suspected or confirmed emergency medical condition->Emergency Medical Condition (MA) Reason for Exam: lower abdominal pain for about 2 days Acuity: Acute Type of Exam: Initial Additional signs and symptoms: nausea Relevant Medical/Surgical History: 3 hernia repairs FINDINGS: Lower Chest: Large hiatal hernia containing the stomach, free fluid and a portion of the transverse colon. Liver: Multiple tiny calcified and patent granulomas. No suspicious liver mass. Smooth liver contour. Gallbladder and Bile Ducts: Normal. Spleen: Multiple tiny calcified splenic granulomas. No splenomegaly. Adrenal Glands: Normal. Pancreas: Normal. Genitourinary: Normal. Bowel: Intrathoracic stomach. Large hiatal hernia contains a portion of the transverse colon. The colon proximal to this loop contained within the hernia is distended. No significant diverticular disease. No dilated small bowel loops. Redemonstration of the: Contained within the right hemiabdomen and the small bowel predominantly in the left hemiabdomen, similar to the prior study. Vasculature: Atherosclerosis. No abdominal aortic aneurysm. Patent portal vein. Bones and Soft Tissues: Degenerative changes in the visualized spine and pelvis. Left inguinal hernia containing fluid. Chronic appearing T11 and T12 vertebral body compression fracture deformities. Retroperitoneum/Mesentery: No intraperitoneal free air, ascites or fluid collection. No lymphadenopathy in the abdomen or pelvis. Diffuse mesenteric edema. Large hiatal hernia containing free fluid, the stomach and a portion of the transverse colon. The colon proximal to the portion contained within the hiatal hernia is distended which may relate to partial mechanical obstruction. No dilated loops of small bowel. No intraperitoneal free air or abscess. ED COURSE  Patient received morphine and Zofran IV for pain and nausea, with good relief. Triage vitals stable.   Given 1 L IV fluid bolus.  CBC without leukocytosis or anemia. CMP showing some hyponatremia at 126. .  Lipase normal.  Urinalysis with some ketonuria without evidence for infectious process otherwise. CT abdomen pelvis with large hiatal hernia with free fluid and containing portion of transverse colon which appears to have some partial mechanical obstruction. After discussion with general surgeon they are recommending admission for observation. Discussed case with hospital medicine and orders have been placed. A discussion was had with Mr. All Elias regarding abdominal pain, ED findings and recommendations for admission. Risk management discussed and shared decision making had with patient and/or surrogate. All questions were answered. Patient in agreement.     MDM  Results for orders placed or performed during the hospital encounter of 05/03/21   CBC Auto Differential   Result Value Ref Range    WBC 7.8 4.0 - 11.0 K/uL    RBC 4.07 (L) 4.20 - 5.90 M/uL    Hemoglobin 13.4 (L) 13.5 - 17.5 g/dL    Hematocrit 38.9 (L) 40.5 - 52.5 %    MCV 95.6 80.0 - 100.0 fL    MCH 33.0 26.0 - 34.0 pg    MCHC 34.5 31.0 - 36.0 g/dL    RDW 14.1 12.4 - 15.4 %    Platelets 631 592 - 676 K/uL    MPV 7.5 5.0 - 10.5 fL    Neutrophils % 90.6 %    Lymphocytes % 5.3 %    Monocytes % 3.5 %    Eosinophils % 0.2 %    Basophils % 0.4 %    Neutrophils Absolute 7.0 1.7 - 7.7 K/uL    Lymphocytes Absolute 0.4 (L) 1.0 - 5.1 K/uL    Monocytes Absolute 0.3 0.0 - 1.3 K/uL    Eosinophils Absolute 0.0 0.0 - 0.6 K/uL    Basophils Absolute 0.0 0.0 - 0.2 K/uL   Comprehensive Metabolic Panel w/ Reflex to MG   Result Value Ref Range    Sodium 126 (L) 136 - 145 mmol/L    Potassium reflex Magnesium 4.3 3.5 - 5.1 mmol/L    Chloride 88 (L) 99 - 110 mmol/L    CO2 26 21 - 32 mmol/L    Anion Gap 12 3 - 16    Glucose 116 (H) 70 - 99 mg/dL    BUN 11 7 - 20 mg/dL    CREATININE 0.6 (L) 0.8 - 1.3 mg/dL    GFR Non-African American >60 >60    GFR African American >60 >60    Calcium 9.6 8.3 - 10.6 mg/dL    Total Protein 7.4 6.4 - 8.2 g/dL    Albumin 4.5 3.4 - 5.0 g/dL    Albumin/Globulin Ratio 1.6 1.1 - 2.2    Total Bilirubin 0.9 0.0 - 1.0 mg/dL    Alkaline Phosphatase 85 40 - 129 U/L    ALT 22 10 - 40 U/L    AST 23 15 - 37 U/L    Globulin 2.9 g/dL   Lipase   Result Value Ref Range    Lipase 29.0 13.0 - 60.0 U/L   Urinalysis   Result Value Ref Range    Color, UA Straw Straw/Yellow    Clarity, UA Clear Clear    Glucose, Ur Negative Negative mg/dL    Bilirubin Urine Negative Negative    Ketones, Urine 15 (A) Negative mg/dL    Specific Gravity, UA 1.020 1.005 - 1.030    Blood, Urine Negative Negative    pH, UA 7.0 5.0 - 8.0    Protein, UA Negative Negative mg/dL    Urobilinogen, Urine 0.2 <2.0 E.U./dL    Nitrite, Urine Negative Negative    Leukocyte Esterase, Urine Negative Negative    Microscopic Examination Not Indicated     Urine Type NotGiven      I spoke with Radha Bingham, and Ezequiel. We thoroughly discussed the history, physical exam, laboratory and imaging studies, as well as, emergency department course. Based upon that discussion, we've decided to admit Rahul Johnston to the hospital for further observation, evaluation, and treatment. Final Impression  1. Hiatal hernia    2. Mechanical obstruction of the intestine ([address]    3. Hyponatremia      Blood pressure (!) 155/74, pulse 72, temperature 98.1 °F (36.7 °C), temperature source Oral, resp. rate 15, height 5' 11\" (1.803 m), weight 174 lb (78.9 kg), SpO2 95 %. DISPOSITION  Patient was admitted to the hospital in stable condition.           Pamella Wardma  05/03/21 2127

## 2021-05-04 ENCOUNTER — APPOINTMENT (OUTPATIENT)
Dept: GENERAL RADIOLOGY | Age: 81
DRG: 392 | End: 2021-05-04
Payer: MEDICARE

## 2021-05-04 LAB
A/G RATIO: 1.5 (ref 1.1–2.2)
ALBUMIN SERPL-MCNC: 3.8 G/DL (ref 3.4–5)
ALP BLD-CCNC: 69 U/L (ref 40–129)
ALT SERPL-CCNC: 17 U/L (ref 10–40)
ANION GAP SERPL CALCULATED.3IONS-SCNC: 10 MMOL/L (ref 3–16)
ANION GAP SERPL CALCULATED.3IONS-SCNC: 8 MMOL/L (ref 3–16)
AST SERPL-CCNC: 16 U/L (ref 15–37)
BASOPHILS ABSOLUTE: 0 K/UL (ref 0–0.2)
BASOPHILS RELATIVE PERCENT: 0.3 %
BILIRUB SERPL-MCNC: 0.8 MG/DL (ref 0–1)
BUN BLDV-MCNC: 11 MG/DL (ref 7–20)
BUN BLDV-MCNC: 14 MG/DL (ref 7–20)
CALCIUM SERPL-MCNC: 9 MG/DL (ref 8.3–10.6)
CALCIUM SERPL-MCNC: 9 MG/DL (ref 8.3–10.6)
CHLORIDE BLD-SCNC: 92 MMOL/L (ref 99–110)
CHLORIDE BLD-SCNC: 93 MMOL/L (ref 99–110)
CO2: 27 MMOL/L (ref 21–32)
CO2: 29 MMOL/L (ref 21–32)
CREAT SERPL-MCNC: 0.7 MG/DL (ref 0.8–1.3)
CREAT SERPL-MCNC: 0.8 MG/DL (ref 0.8–1.3)
EKG ATRIAL RATE: 66 BPM
EKG DIAGNOSIS: NORMAL
EKG P AXIS: 66 DEGREES
EKG P-R INTERVAL: 160 MS
EKG Q-T INTERVAL: 400 MS
EKG QRS DURATION: 106 MS
EKG QTC CALCULATION (BAZETT): 419 MS
EKG R AXIS: 37 DEGREES
EKG T AXIS: 70 DEGREES
EKG VENTRICULAR RATE: 66 BPM
EOSINOPHILS ABSOLUTE: 0.1 K/UL (ref 0–0.6)
EOSINOPHILS RELATIVE PERCENT: 2.1 %
GFR AFRICAN AMERICAN: >60
GFR AFRICAN AMERICAN: >60
GFR NON-AFRICAN AMERICAN: >60
GFR NON-AFRICAN AMERICAN: >60
GLOBULIN: 2.6 G/DL
GLUCOSE BLD-MCNC: 93 MG/DL (ref 70–99)
GLUCOSE BLD-MCNC: 96 MG/DL (ref 70–99)
HCT VFR BLD CALC: 35.3 % (ref 40.5–52.5)
HEMOGLOBIN: 11.9 G/DL (ref 13.5–17.5)
LYMPHOCYTES ABSOLUTE: 0.9 K/UL (ref 1–5.1)
LYMPHOCYTES RELATIVE PERCENT: 14.1 %
MCH RBC QN AUTO: 32.5 PG (ref 26–34)
MCHC RBC AUTO-ENTMCNC: 33.8 G/DL (ref 31–36)
MCV RBC AUTO: 96.3 FL (ref 80–100)
MONOCYTES ABSOLUTE: 0.5 K/UL (ref 0–1.3)
MONOCYTES RELATIVE PERCENT: 7.7 %
NEUTROPHILS ABSOLUTE: 4.8 K/UL (ref 1.7–7.7)
NEUTROPHILS RELATIVE PERCENT: 75.8 %
OSMOLALITY URINE: 482 MOSM/KG (ref 390–1070)
OSMOLALITY: 269 MOSM/KG (ref 280–301)
PDW BLD-RTO: 14.2 % (ref 12.4–15.4)
PLATELET # BLD: 143 K/UL (ref 135–450)
PMV BLD AUTO: 7.4 FL (ref 5–10.5)
POTASSIUM REFLEX MAGNESIUM: 4 MMOL/L (ref 3.5–5.1)
POTASSIUM SERPL-SCNC: 4.1 MMOL/L (ref 3.5–5.1)
RBC # BLD: 3.67 M/UL (ref 4.2–5.9)
SODIUM BLD-SCNC: 129 MMOL/L (ref 136–145)
SODIUM BLD-SCNC: 130 MMOL/L (ref 136–145)
TOTAL PROTEIN: 6.4 G/DL (ref 6.4–8.2)
TROPONIN: <0.01 NG/ML
WBC # BLD: 6.3 K/UL (ref 4–11)

## 2021-05-04 PROCEDURE — 1200000000 HC SEMI PRIVATE

## 2021-05-04 PROCEDURE — 2580000003 HC RX 258: Performed by: STUDENT IN AN ORGANIZED HEALTH CARE EDUCATION/TRAINING PROGRAM

## 2021-05-04 PROCEDURE — 6370000000 HC RX 637 (ALT 250 FOR IP): Performed by: INTERNAL MEDICINE

## 2021-05-04 PROCEDURE — 93010 ELECTROCARDIOGRAM REPORT: CPT | Performed by: INTERNAL MEDICINE

## 2021-05-04 PROCEDURE — 71045 X-RAY EXAM CHEST 1 VIEW: CPT

## 2021-05-04 PROCEDURE — 80053 COMPREHEN METABOLIC PANEL: CPT

## 2021-05-04 PROCEDURE — 85025 COMPLETE CBC W/AUTO DIFF WBC: CPT

## 2021-05-04 PROCEDURE — 93005 ELECTROCARDIOGRAM TRACING: CPT | Performed by: INTERNAL MEDICINE

## 2021-05-04 PROCEDURE — 36415 COLL VENOUS BLD VENIPUNCTURE: CPT

## 2021-05-04 PROCEDURE — 84484 ASSAY OF TROPONIN QUANT: CPT

## 2021-05-04 PROCEDURE — 6360000002 HC RX W HCPCS: Performed by: INTERNAL MEDICINE

## 2021-05-04 PROCEDURE — 6370000000 HC RX 637 (ALT 250 FOR IP): Performed by: NURSE PRACTITIONER

## 2021-05-04 PROCEDURE — 99223 1ST HOSP IP/OBS HIGH 75: CPT | Performed by: SURGERY

## 2021-05-04 PROCEDURE — 6370000000 HC RX 637 (ALT 250 FOR IP): Performed by: STUDENT IN AN ORGANIZED HEALTH CARE EDUCATION/TRAINING PROGRAM

## 2021-05-04 RX ORDER — DOCUSATE SODIUM 100 MG/1
100 CAPSULE, LIQUID FILLED ORAL 2 TIMES DAILY
Status: DISCONTINUED | OUTPATIENT
Start: 2021-05-04 | End: 2021-05-07 | Stop reason: HOSPADM

## 2021-05-04 RX ORDER — SODIUM CHLORIDE 9 MG/ML
INJECTION, SOLUTION INTRAVENOUS CONTINUOUS
Status: DISCONTINUED | OUTPATIENT
Start: 2021-05-04 | End: 2021-05-06

## 2021-05-04 RX ORDER — ENALAPRIL MALEATE 5 MG/1
10 TABLET ORAL 2 TIMES DAILY
Status: DISCONTINUED | OUTPATIENT
Start: 2021-05-04 | End: 2021-05-07 | Stop reason: HOSPADM

## 2021-05-04 RX ORDER — ATORVASTATIN CALCIUM 80 MG/1
80 TABLET, FILM COATED ORAL NIGHTLY
Status: DISCONTINUED | OUTPATIENT
Start: 2021-05-04 | End: 2021-05-07 | Stop reason: HOSPADM

## 2021-05-04 RX ORDER — LEVOTHYROXINE SODIUM 0.15 MG/1
150 TABLET ORAL DAILY
Status: DISCONTINUED | OUTPATIENT
Start: 2021-05-04 | End: 2021-05-07 | Stop reason: HOSPADM

## 2021-05-04 RX ORDER — DICYCLOMINE HYDROCHLORIDE 10 MG/1
10 CAPSULE ORAL EVERY 6 HOURS PRN
Status: DISCONTINUED | OUTPATIENT
Start: 2021-05-04 | End: 2021-05-07 | Stop reason: HOSPADM

## 2021-05-04 RX ORDER — ASPIRIN 81 MG/1
81 TABLET, CHEWABLE ORAL DAILY
Status: DISCONTINUED | OUTPATIENT
Start: 2021-05-04 | End: 2021-05-07 | Stop reason: HOSPADM

## 2021-05-04 RX ADMIN — DOCUSATE SODIUM 100 MG: 100 CAPSULE, LIQUID FILLED ORAL at 21:08

## 2021-05-04 RX ADMIN — ENALAPRIL MALEATE 10 MG: 5 TABLET ORAL at 21:08

## 2021-05-04 RX ADMIN — ENALAPRIL MALEATE 10 MG: 5 TABLET ORAL at 09:21

## 2021-05-04 RX ADMIN — LEVOTHYROXINE SODIUM 150 MCG: 0.15 TABLET ORAL at 09:21

## 2021-05-04 RX ADMIN — FAMOTIDINE 20 MG: 20 TABLET, FILM COATED ORAL at 09:21

## 2021-05-04 RX ADMIN — MINERAL OIL 330 ML: 1000 LIQUID ORAL at 09:22

## 2021-05-04 RX ADMIN — ASPIRIN 81 MG: 81 TABLET, CHEWABLE ORAL at 09:21

## 2021-05-04 RX ADMIN — SODIUM CHLORIDE: 9 INJECTION, SOLUTION INTRAVENOUS at 18:10

## 2021-05-04 RX ADMIN — ATORVASTATIN CALCIUM 80 MG: 80 TABLET, FILM COATED ORAL at 21:08

## 2021-05-04 RX ADMIN — SODIUM CHLORIDE: 9 INJECTION, SOLUTION INTRAVENOUS at 08:04

## 2021-05-04 RX ADMIN — ENOXAPARIN SODIUM 40 MG: 40 INJECTION SUBCUTANEOUS at 09:21

## 2021-05-04 ASSESSMENT — PAIN SCALES - GENERAL
PAINLEVEL_OUTOF10: 0
PAINLEVEL_OUTOF10: 0

## 2021-05-04 NOTE — PROGRESS NOTES
4 Eyes Skin Assessment     The patient is being assess for  Admission    I agree that 2 RN's have performed a thorough Head to Toe Skin Assessment on the patient. ALL assessment sites listed below have been assessed. Areas assessed by both nurses: Adama Foley RN   [x]   Head, Face, and Ears   [x]   Shoulders, Back, and Chest  [x]   Arms, Elbows, and Hands   [x]   Coccyx, Sacrum, and IschIum  [x]   Legs, Feet, and Heels        Does the Patient have Skin Breakdown?   No         Blane Prevention initiated:  No   Wound Care Orders initiated:  No      [facility] nurse consulted for Pressure Injury (Stage 3,4, Unstageable, DTI, NWPT, and Complex wounds), New and Established Ostomies:  Yes      Nurse 1 eSignature: Electronically signed by Laura Wright RN on 5/4/21 at 12:31 AM EDT    **SHARE this note so that the co-signing nurse is able to place an eSignature**    Nurse 2 eSignature: Electronically signed by Sharad Lopez RN on 5/4/21 at 12:42 AM EDT

## 2021-05-04 NOTE — ED NOTES
Patient transported to inpatient unit via wheelchair. Patient care transferred to inpatient nurse at this time. Patient stable at time of transfer. Discussed during nurse to nurse report was, including but not limited to: client's purpose of admission and/or transfer, initial and current mental status, vital signs, medication interventions or procedures, abnormal test results, and significant events or changes that occurred during the admission. The receiving nurse was prompted to ask questions and informed that the current department staff could be contacted for additional questions if needed. Report discussed with Stephanie HOOD.      Joanie Portillo RN  05/03/21 2063

## 2021-05-04 NOTE — H&P
Palpitations  Respiratory:  No Cough, No Sputum, No Dyspnea  Gastrointestinal: No Vomiting, No Diarrhea, + abdominal pain  Genitourinary: No Urinary Frequency, No Hematuria, No Urinary pain  Musculoskeletal:  No worsening Arthralgias, No worsening Myalgias  Skin:  No new Skin Lesions, No new skin rash  Neuro:  No new weakness, No new numbness. Psych:  No suicial ideation, No Violence ideation    PHYSICAL EXAM PERFORMED:    BP (!) 147/70   Pulse 76   Temp 98.1 °F (36.7 °C) (Oral)   Resp 16   Ht 5' 11\" (1.803 m)   Wt 174 lb (78.9 kg)   SpO2 94%   BMI 24.27 kg/m²     General appearance:  mild acute distress, appears older than stated age  HEENT:   atraumatic, sclera anicteric, Conjunctivae clear. Neck: Supple,Trachea midline, no goiter  Respiratory:minimal accessory muscle usage, Normal respiratory effort. Clear to auscultation, bilaterally without wheezing  Cardiovascular:  Regular rate and rhythm, capillary refill 2 seconds  Abdomen: Soft, minimally-tender, non-distended with normal bowel sounds. Negative Rovsing sign  Musculoskeletal:  No clubbing, cyanosis. trace edema LE bilaterally. Skin: turgor normal.  No new rashes or lesions. Neurologic: Alert and oriented x4, no new focal sensory/motor deficits. Labs:     Recent Labs     05/03/21  1737   WBC 7.8   HGB 13.4*   HCT 38.9*        Recent Labs     05/03/21  1737   *   K 4.3   CL 88*   CO2 26   BUN 11   CREATININE 0.6*   CALCIUM 9.6     Recent Labs     05/03/21  1737   AST 23   ALT 22   BILITOT 0.9   ALKPHOS 85     No results for input(s): INR in the last 72 hours.   Recent Labs     05/03/21  1737 05/04/21  0058   TROPONINI <0.01 <0.01       Urinalysis:      Lab Results   Component Value Date    NITRU Negative 05/03/2021    BLOODU Negative 05/03/2021    SPECGRAV 1.020 05/03/2021    GLUCOSEU Negative 05/03/2021       Radiology:   EKG:  I have reviewed the EKG with the following interpretation:   pending    CT ABDOMEN PELVIS W IV CONTRAST Additional Contrast? None   Final Result   Large hiatal hernia containing free fluid, the stomach and a portion of the   transverse colon. The colon proximal to the portion contained within the   hiatal hernia is distended which may relate to partial mechanical obstruction. No dilated loops of small bowel. No intraperitoneal free air or abscess.              ASSESSMENT AND PLAN:    Active Hospital Problems    Diagnosis Date Noted    SBO (small bowel obstruction) ([facility] [address] [K56.609] 05/03/2021     1.acute bowel obstruction:  Suspected secondary to hernia  Surgery consulted    Hyponatremia:  Secondary to Lasix intake given urine sodium elevated  Holding continue to monitor    Large hiatal hernia:  Surgery consulted    Chronic medical conditions:  Essential Hypertension: Continue home medication  Hypothyroidism: Continue medication  Hyperlipidemia: Continue home medication  CAD: Continue home medication    Diet: NPO except meds ordered    DVT Prophylaxis: lovenox    Dispo:   Expected LOS greater than two Jannette 1153, DO

## 2021-05-04 NOTE — PLAN OF CARE
Problem: Falls - Risk of:  Goal: Will remain free from falls  Description: Will remain free from falls  5/4/2021 0027 by Hallie Wilson RN  Outcome: Ongoing  Note: Pt will remain free from falls. Pt is a high fall risk. Call light within reach. Bed side table within reach. Wheels locked. Bed in lowest position. Bed check in place. Pt instructed to call out for assistance. Pt expressesed understanding & calls out appropritately. All care per orders. Will continue to monitor.

## 2021-05-04 NOTE — CONSULTS
HERNIA REPAIR Right 04/12/2017    lukeinci recurrent right inguinal hernia repair with mesh    PTCA  2018    SIGMOIDOSCOPY  1994    TONSILLECTOMY      UPPER GASTROINTESTINAL ENDOSCOPY  05/12/94    UPPER GASTROINTESTINAL ENDOSCOPY  11/09/04    Gastritis and duodenitis       Allergies:  Reopro [abciximab injection], Chocolate, and Coffee bean extract [coffea arabica]    Medications:   Home Meds  No current facility-administered medications on file prior to encounter. Current Outpatient Medications on File Prior to Encounter   Medication Sig Dispense Refill    levothyroxine (SYNTHROID) 150 MCG tablet TAKE 1 TABLET BY MOUTH  DAILY 90 tablet 3    atorvastatin (LIPITOR) 80 MG tablet TAKE 1 TABLET BY MOUTH  EVERY DAY (Patient taking differently: 80 mg nightly TAKE 1 TABLET BY MOUTH EVERY DAY) 90 tablet 1    Simethicone 125 MG CAPS Take 1 capsule by mouth daily as needed      ferrous sulfate (FE TABS) 325 (65 Fe) MG EC tablet Take 1 tablet by mouth daily (with breakfast) 90 tablet 0    enalapril (VASOTEC) 10 MG tablet Take 10 mg by mouth 2 times daily       cetirizine (ZYRTEC) 10 MG tablet Take 10 mg by mouth daily.  Multiple Vitamin (THERA) TABS Take 1 tablet by mouth daily 1 Tab daily.  Glucosamine-Chondroitin (GLUCOSAMINE CHONDR COMPLEX PO) Take 1 tablet by mouth daily       aspirin 81 MG chewable tablet Take 81 mg by mouth daily       Coenzyme Q-10 100 MG CAPS Take 1 capsule by mouth daily.         dicyclomine (BENTYL) 10 MG capsule Take 1 capsule by mouth every 6 hours as needed (cramps) 20 capsule 0    ondansetron (ZOFRAN ODT) 4 MG disintegrating tablet Take 1 tablet by mouth every 8 hours as needed for Nausea 20 tablet 0    furosemide (LASIX) 20 MG tablet Take 1 tablet by mouth 2 times daily 180 tablet 0    azelastine (ASTELIN) 0.1 % nasal spray 2 sprays by Nasal route 2 times daily (Patient taking differently: 2 sprays by Nasal route 2 times daily as needed ) 1 Bottle 1     Current Meds sodium chloride flush 0.9 % injection 10 mL, 2 times per day  sodium chloride flush 0.9 % injection 10 mL, PRN  0.9 % sodium chloride infusion, PRN  potassium chloride 10 mEq/100 mL IVPB (Peripheral Line), PRN  magnesium sulfate 2000 mg in 50 mL IVPB premix, PRN  promethazine (PHENERGAN) tablet 12.5 mg, Q6H PRN    Or  ondansetron (ZOFRAN) injection 4 mg, Q6H PRN  famotidine (PEPCID) tablet 20 mg, Daily PRN  acetaminophen (TYLENOL) tablet 650 mg, Q6H PRN    Or  acetaminophen (TYLENOL) suppository 650 mg, Q6H PRN  enoxaparin (LOVENOX) injection 40 mg, Daily        Family History:   Family History   Problem Relation Age of Onset    Hypertension Mother     Coronary Art Dis Father     Hypertension Father     Heart Disease Father     Prostate Cancer Brother     Cancer Brother 61        Prostate    Heart Disease Sister        Social History:   TOBACCO:   reports that he quit smoking about 25 years ago. He started smoking about 62 years ago. He has a 7.50 pack-year smoking history. He has never used smokeless tobacco.  ETOH:   reports no history of alcohol use. DRUGS:   reports no history of drug use. Review of Systems:   A 14 point review of systems was conducted, significant findings as noted in HPI. All other systems negative. Constitutional: Negative for chills and fever. HENT: Negative for congestion, facial swelling, and voice change. Eyes: Negative for photophobia and visual disturbance. Respiratory: Negative for apnea, cough, chest tightness and shortness of breath. Cardiovascular: Negative for chest pain and palpitations. Gastrointestinal: Negative for dysphagia and early satiety. Genitourinary: Negative for difficulty urinating, dysuria, flank pain, frequency and hematuria. Musculoskeletal: Negative for new gait problem, joint swelling and myalgias. Skin: Negative for color change, pallor and rash. Endocrine: negative for tremors, temperature intolerance or polydipsia. Allergic/Immunologic: Negative for new environmental or food allergies. Neurological: Negative for dizziness, seizures, speech difficulty, numbness. Hematological: Negative for adenopathy. Psychiatric/Behavioral: Negative for agitation and confusion. Physical exam:    Vitals:    05/03/21 2137 05/03/21 2156 05/03/21 2343 05/04/21 0445   BP: (!) 165/77 (!) 168/78 (!) 147/70 (!) 161/76   Pulse: 76 84 76 67   Resp: 17 16 16 16   Temp: 98 °F (36.7 °C) 98.5 °F (36.9 °C) 98.1 °F (36.7 °C) 98.3 °F (36.8 °C)   TempSrc: Oral Oral Oral Oral   SpO2: 95% 96% 94% 94%   Weight:       Height:           General appearance: alert, resting in bed  Neuro: A&Ox3, no focal deficits  HENT: trachea midline, no JVD, no LAD  Eyes: PERRLA, no scleral icterus  Chest/Lungs: CTAB, no crackles/rales, wheezes/rhonchi   Cardiovascular: RRR, no murmurs/gallops/rubs  Abdomen: soft, non-tender, non-distended, no guarding/rigidity  Skin: warm and dry  Extremities: no edema , no cyanosis    Labs:    CBC:   Recent Labs     05/03/21 1737 05/04/21  0426   WBC 7.8 6.3   HGB 13.4* 11.9*   HCT 38.9* 35.3*   MCV 95.6 96.3    143     BMP:   Recent Labs     05/03/21 1737 05/04/21  0426   * 129*   K 4.3 4.0   CL 88* 92*   CO2 26 27   BUN 11 11   CREATININE 0.6* 0.7*     PT/INR: No results for input(s): PROTIME, INR in the last 72 hours. APTT: No results for input(s): APTT in the last 72 hours.   Liver Profile:  Lab Results   Component Value Date    AST 16 05/04/2021    ALT 17 05/04/2021    BILIDIR 0.17 03/07/2011    BILITOT 0.8 05/04/2021    ALKPHOS 69 05/04/2021     Lab Results   Component Value Date    CHOL 122 06/24/2019    HDL 74 06/24/2019    HDL 52 10/25/2011    TRIG 43 06/24/2019     UA:   Lab Results   Component Value Date    NITRITE neg 03/23/2017    COLORU Straw 05/03/2021    PHUR 7.0 05/03/2021    CLARITYU Clear 05/03/2021    SPECGRAV 1.020 05/03/2021    LEUKOCYTESUR Negative 05/03/2021    UROBILINOGEN 0.2 05/03/2021 BILIRUBINUR Negative 05/03/2021    BILIRUBINUR neg 03/23/2017    BLOODU Negative 05/03/2021    GLUCOSEU Negative 05/03/2021       Imaging:   CT ABDOMEN PELVIS W IV CONTRAST Additional Contrast? None   Final Result   Large hiatal hernia containing free fluid, the stomach and a portion of the   transverse colon. The colon proximal to the portion contained within the   hiatal hernia is distended which may relate to partial mechanical obstruction. No dilated loops of small bowel. No intraperitoneal free air or abscess. XR CHEST PORTABLE    (Results Pending)          Assessment/Plan: This is a 79 y.o. male who presents with a possible bowel obstruction. Of note patient does have a large hiatal hernia containing stomach and transverse colon. Patient has remained HDS. WBC within normal limits. CT scan reviewed - patient does not appear to be obstructed. He does have a large stool burden.      - Keep NPO/IVF  - Will give SMOG enema  - Will await return of BF  - Given patient has not had any episodes of emesis, will hold off on NG decompression at this time  - will continue to monitor    Mimi Trevino MD PGY-4  05/04/21  6:52 AM  [phone]

## 2021-05-04 NOTE — PROGRESS NOTES
Report received from ED RN Adelina Robertson. Pt transported to room 323 via wheelchair in stable conditions. Pt alert and oriented. VSS. RA. Pt c/o 4/10 abd pain. Pt states pain is tolerable at this time and does not need pain medication. BS hypoactive x4. Pt denies nausea at this time. Admission assessment completed and documented. Call light within reach. Bed side table within reach. Wheels locked. Bed in lowest position. Bed check in place. Pt instructed to call out for assistance. Pt expressesed understanding & calls out appropritately. All care per orders. Will continue to monitor.  Electronically signed by Glenn Alba RN on 5/4/2021 at 12:30 AM

## 2021-05-04 NOTE — PLAN OF CARE
Pt remained free of falls. Call light within reach. Bed alarm on. Non skid footwear in place. Bed locked and in lowest position. X1 assist to bathroom.

## 2021-05-04 NOTE — CARE COORDINATION
CASE MANAGEMENT INITIAL ASSESSMENT      Reviewed chart and completed assessment  with wife at bedside. Explained Case Management role/services, discussed dc options and plan of care. Primary contact information: Delilah Mandel, spouse, [address] :   Wife, above        Can this person be reached and be able to respond quickly, such as within a few minutes or hours? Yes    Who would be your back-up decision maker? Name Karime Holland, daughter  Phone Number: [phone]    Admit date/status:5/3/21  Diagnosis:SBO  Is this a Readmission?: N    Insurance:[facility] Medicare  Precert required for SNF: Y   3 night stay required: N    Living arrangements, Adls, care needs, prior to admission; wife reports patient is IPTA; active     Transportation:self/others    Durable Medical Equipment at home:  Has cane if needed   Walker__Cane__RTS__ BSC__Shower Chair__  02__ HHN__ CPAP__  BiPap__  Hospital Bed__ W/C___ Other__________    Services in the home and/or outpatient, prior to admission:none current    Dialysis Facility: n/a  · Name:  · Address:  · Dialysis Schedule:  · Phone:  · Fax:    PT/OT recs: await any [place] [facility] Exemption Notification (HEN): n/a    Barriers to discharge:none identified; supportive wife and family; home at dc. Plan/comments:wife states plan for home at dc and denies any needs; declines referrals at the current time. Explained services and contact info.      ECOC on chart for MD signature

## 2021-05-04 NOTE — ED NOTES
Ps jeny general surgery @ 2052   Re; abd pain/large hital hernia w/mechanical obstruction  [place] @ 2052     [facility]  05/03/21 2053

## 2021-05-04 NOTE — ED PROVIDER NOTES
[address] [place] [address]  ED  [address] [address]  Dept: [phone]  Dept Fax: [phone]  Loc: [phone]    Open Note Time:  9:50 PM EDT    I independently performed a history and physical on Haydee Olvera. Chief Complaint  Abdominal Pain (pt reports abd pain and nausea since yesterday with \"gas pains\"; pt denies urinary symptoms)       This is a very pleasant 79 y.o. male  who was evaluated in the emergency department for right groin pain. Unless otherwise stated in this report or unable to obtain because of the patient's clinical or mental status as evidenced by the medical record, this patient's positive and negative responses for review of systems, constitutional, psych, eyes, ENT, cardiovascular, respiratory, gastrointestinal, neurological, genitourinary, musculoskeletal, integument systems and systems related to the presenting problem are either stated in the preceding paragraph or were not pertinent or were negative for the symptoms and/or complaints related to the medical problem. I wore goggles, surgical mask, N95 mask and gloves when I evaluated the patient. Focused exam:  Body mass index is 24.27 kg/m². The physical exam reveals an alert and oriented patient who does not appear to be confused, non-ill-appearing, no abnormal heart sounds, no abnormal lung sounds, speaking comfortably in full sentences, very focal tenderness in the right dental region without any palpable hernia.   Epigastric region is benign    Brief ED course/MDM:     Procedures/interventions/images ordered for this visit  Orders Placed This Encounter   Procedures    CT ABDOMEN PELVIS W IV CONTRAST Additional Contrast? None    CBC Auto Differential    Comprehensive Metabolic Panel w/ Reflex to MG    Lipase    Urinalysis    Troponin    Osmolality    Osmolality, Urine    Sodium, urine, random    Troponin    Telemetry Monitoring    Inpatient consult to General Surgery    PATIENT STATUS (FROM ED OR OR/PROCEDURAL) Inpatient       Medications ordered for this visit  Orders Placed This Encounter   Medications    morphine (PF) injection 4 mg    ondansetron (ZOFRAN) injection 4 mg    iopamidol (ISOVUE-370) 76 % injection 75 mL       ED course notes for this visit           Patient seen and evaluated in room 20/20        Final Impression  1. Hiatal hernia    2. Mechanical obstruction of the intestine ([address]    3. Hyponatremia        Blood pressure (!) 155/74, pulse 72, temperature 98.1 °F (36.7 °C), temperature source Oral, resp. rate 15, height 5' 11\" (1.803 m), weight 174 lb (78.9 kg), SpO2 95 %. Disposition  Patient understands that they are going to be admitted to the hospital.  There was shared decision making between myself as well as the patient and/or their surrogate and we are in agreement with admission. There is an opportunity for questions and all questions answered to the best of my ability and to the satisfaction of the patient and/or patient's family. Pt is in stable condition upon Admit to med/surg floor. All diagnostic, treatment, and disposition decisions were made by myself in conjunction with the BRANDIE/resident. For further details of the patient's emergency department visit, please see BRANDIE/resident documentation. Initial history and physical exam information was obtained by the BRANDIE/resident, also dictated a record of this visit. I independently examined and evaluated this patient and participated in the diagnostic, treatment and disposition decisions. The note was completed using Dragon voice recognition transcription. Every effort was made to ensure accuracy; however, inadvertent transcription errors may be present despite my best efforts to edit errors.     Adelso Elizondo MD  [facility]        Adelso Elizondo MD  05/03/21 8566

## 2021-05-05 LAB
A/G RATIO: 1.4 (ref 1.1–2.2)
ALBUMIN SERPL-MCNC: 3.6 G/DL (ref 3.4–5)
ALP BLD-CCNC: 68 U/L (ref 40–129)
ALT SERPL-CCNC: 18 U/L (ref 10–40)
ANION GAP SERPL CALCULATED.3IONS-SCNC: 9 MMOL/L (ref 3–16)
AST SERPL-CCNC: 16 U/L (ref 15–37)
BASOPHILS ABSOLUTE: 0.1 K/UL (ref 0–0.2)
BASOPHILS RELATIVE PERCENT: 0.8 %
BILIRUB SERPL-MCNC: 1.2 MG/DL (ref 0–1)
BUN BLDV-MCNC: 14 MG/DL (ref 7–20)
CALCIUM SERPL-MCNC: 8.8 MG/DL (ref 8.3–10.6)
CHLORIDE BLD-SCNC: 95 MMOL/L (ref 99–110)
CO2: 27 MMOL/L (ref 21–32)
CREAT SERPL-MCNC: 0.6 MG/DL (ref 0.8–1.3)
EOSINOPHILS ABSOLUTE: 0.5 K/UL (ref 0–0.6)
EOSINOPHILS RELATIVE PERCENT: 7.3 %
GFR AFRICAN AMERICAN: >60
GFR NON-AFRICAN AMERICAN: >60
GLOBULIN: 2.5 G/DL
GLUCOSE BLD-MCNC: 74 MG/DL (ref 70–99)
HCT VFR BLD CALC: 35 % (ref 40.5–52.5)
HEMOGLOBIN: 12 G/DL (ref 13.5–17.5)
LYMPHOCYTES ABSOLUTE: 0.9 K/UL (ref 1–5.1)
LYMPHOCYTES RELATIVE PERCENT: 13.7 %
MAGNESIUM: 1.9 MG/DL (ref 1.8–2.4)
MCH RBC QN AUTO: 33.1 PG (ref 26–34)
MCHC RBC AUTO-ENTMCNC: 34.1 G/DL (ref 31–36)
MCV RBC AUTO: 97 FL (ref 80–100)
MONOCYTES ABSOLUTE: 0.4 K/UL (ref 0–1.3)
MONOCYTES RELATIVE PERCENT: 5.7 %
NEUTROPHILS ABSOLUTE: 4.6 K/UL (ref 1.7–7.7)
NEUTROPHILS RELATIVE PERCENT: 72.5 %
PDW BLD-RTO: 14.6 % (ref 12.4–15.4)
PLATELET # BLD: 143 K/UL (ref 135–450)
PMV BLD AUTO: 7.6 FL (ref 5–10.5)
POTASSIUM REFLEX MAGNESIUM: 4.3 MMOL/L (ref 3.5–5.1)
RBC # BLD: 3.61 M/UL (ref 4.2–5.9)
SODIUM BLD-SCNC: 131 MMOL/L (ref 136–145)
TOTAL PROTEIN: 6.1 G/DL (ref 6.4–8.2)
WBC # BLD: 6.3 K/UL (ref 4–11)

## 2021-05-05 PROCEDURE — 6360000002 HC RX W HCPCS: Performed by: INTERNAL MEDICINE

## 2021-05-05 PROCEDURE — 36415 COLL VENOUS BLD VENIPUNCTURE: CPT

## 2021-05-05 PROCEDURE — 2580000003 HC RX 258: Performed by: STUDENT IN AN ORGANIZED HEALTH CARE EDUCATION/TRAINING PROGRAM

## 2021-05-05 PROCEDURE — 80053 COMPREHEN METABOLIC PANEL: CPT

## 2021-05-05 PROCEDURE — 83735 ASSAY OF MAGNESIUM: CPT

## 2021-05-05 PROCEDURE — 6370000000 HC RX 637 (ALT 250 FOR IP): Performed by: INTERNAL MEDICINE

## 2021-05-05 PROCEDURE — 1200000000 HC SEMI PRIVATE

## 2021-05-05 PROCEDURE — 85025 COMPLETE CBC W/AUTO DIFF WBC: CPT

## 2021-05-05 PROCEDURE — 6370000000 HC RX 637 (ALT 250 FOR IP): Performed by: SURGERY

## 2021-05-05 PROCEDURE — 2580000003 HC RX 258: Performed by: SURGERY

## 2021-05-05 PROCEDURE — 99232 SBSQ HOSP IP/OBS MODERATE 35: CPT | Performed by: SURGERY

## 2021-05-05 PROCEDURE — 6370000000 HC RX 637 (ALT 250 FOR IP): Performed by: NURSE PRACTITIONER

## 2021-05-05 RX ORDER — POLYETHYLENE GLYCOL 3350 17 G/17G
17 POWDER, FOR SOLUTION ORAL ONCE
Status: COMPLETED | OUTPATIENT
Start: 2021-05-05 | End: 2021-05-05

## 2021-05-05 RX ADMIN — ENALAPRIL MALEATE 10 MG: 5 TABLET ORAL at 08:53

## 2021-05-05 RX ADMIN — POLYETHYLENE GLYCOL 3350 17 G: 17 POWDER, FOR SOLUTION ORAL at 11:09

## 2021-05-05 RX ADMIN — ASPIRIN 81 MG: 81 TABLET, CHEWABLE ORAL at 08:53

## 2021-05-05 RX ADMIN — ENOXAPARIN SODIUM 40 MG: 40 INJECTION SUBCUTANEOUS at 08:53

## 2021-05-05 RX ADMIN — ATORVASTATIN CALCIUM 80 MG: 80 TABLET, FILM COATED ORAL at 21:18

## 2021-05-05 RX ADMIN — ENALAPRIL MALEATE 10 MG: 5 TABLET ORAL at 21:18

## 2021-05-05 RX ADMIN — SODIUM CHLORIDE: 9 INJECTION, SOLUTION INTRAVENOUS at 03:18

## 2021-05-05 RX ADMIN — DOCUSATE SODIUM 100 MG: 100 CAPSULE, LIQUID FILLED ORAL at 08:53

## 2021-05-05 RX ADMIN — SODIUM CHLORIDE: 9 INJECTION, SOLUTION INTRAVENOUS at 14:36

## 2021-05-05 RX ADMIN — LEVOTHYROXINE SODIUM 150 MCG: 0.15 TABLET ORAL at 05:53

## 2021-05-05 RX ADMIN — DOCUSATE SODIUM 100 MG: 100 CAPSULE, LIQUID FILLED ORAL at 21:18

## 2021-05-05 NOTE — PLAN OF CARE
Problem: Falls - Risk of:  Goal: Will remain free from falls  Description: Will remain free from falls  5/4/2021 2202 by Peter Palomo RN  Outcome: Ongoing  Note: Pt will remain free from falls. Pt is a medium fall risk. Call light within reach. Bed side table within reach. Wheels locked. Bed in lowest position. Pt instructed to call out for assistance. Pt expressesed understanding & calls out appropritately. All care per orders. Will continue to monitor.

## 2021-05-05 NOTE — PROGRESS NOTES
[facility]    PATIENT NAME: Refugio Fitzpatrick     TODAY'S DATE: 5/5/2021    CHIEF COMPLAINT: none    INTERVAL HISTORY/HPI:    Pt without pain or nausea, having flatus, small bms with enema. REVIEW OF SYSTEMS:  Pertinent positives and negatives as per interval history section    OBJECTIVE:  VITALS:  BP (!) 160/57   Pulse 68   Temp 97.6 °F (36.4 °C) (Oral)   Resp 16   Ht 5' 11\" (1.803 m)   Wt 167 lb 1.6 oz (75.8 kg)   SpO2 95%   BMI 23.31 kg/m²     INTAKE/OUTPUT:    I/O last 3 completed shifts: In: 2088 [P.O.:120; I.V.:1968]  Out: -   No intake/output data recorded. CONSTITUTIONAL:  awake and alert  LUNGS:  Respirations easy and unlabored, clear to auscultation  CARD:  regular rate and rhythm  ABDOMEN:  normal bowel sounds, soft, non-distended, non-tender     Data:  CBC:   Recent Labs     05/03/21 1737 05/04/21 0426 05/05/21 0322   WBC 7.8 6.3 6.3   HGB 13.4* 11.9* 12.0*   HCT 38.9* 35.3* 35.0*    143 143     BMP:    Recent Labs     05/04/21 0426 05/04/21  1356 05/05/21  0322   * 130* 131*   K 4.0 4.1 4.3   CL 92* 93* 95*   CO2 27 29 27   BUN 11 14 14   CREATININE 0.7* 0.8 0.6*   GLUCOSE 96 93 74     Hepatic:   Recent Labs     05/03/21 1737 05/04/21 0426 05/05/21  0322   AST 23 16 16   ALT 22 17 18   BILITOT 0.9 0.8 1.2*   ALKPHOS 85 69 68     Mag:      Recent Labs     05/05/21  0322   MG 1.90      Phos:   No results for input(s): PHOS in the last 72 hours. INR: No results for input(s): INR in the last 72 hours. Radiology Review:  *Imaging personally reviewed by me. 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 Asymptomatic so will start liquid diet and also give laxatives  2. Once having further bowel movements will advance diet further and discharge with plans for eventual operation. If signs of continued obstruction then would need operation sooner.      Electronically signed by Jennifer Zimmerman

## 2021-05-05 NOTE — PROGRESS NOTES
Hospitalist Progress Note      PCP: Clement Sharp    Date of Admission: 5/3/2021    Chief Complaint: abdominal pain    Hospital Course:    79 y.o. male who presented to [facility] with past medical history of CAD status post stent, hypertension, hyperlipidemia, hypothyroidism, TIA presented to the ED for worsening abdominal pain nausea vomiting.      Patient is a corn with baked potatoes and filet mignon on Sunday night and then started Monday having some nausea with close to vomiting and is unable to keep anything down, patient does admit to passing gas and stool. Patient reported he has not been able to eat anything at all and due to the abdominal pain came to the ED. Abdominal pain is lower abdomen at the right groin going on for several days worsened especially today. 7/10 no known alleviating factor exacerbated with meals acutely, patient has taken Motrin with minimal relief. No association of fever chills chest pain shortness of breath palpitations or dysuria. CODE STATUS cussed and the patient is a full code patient reported that he was told before that his hernia is causing him problems that he may need surgery. Subjective: Improved abdominal pain. Walking around the room and starting clears.      Medications:  Reviewed    Infusion Medications    sodium chloride 75 mL/hr at 05/05/21 1109    sodium chloride       Scheduled Medications    aspirin  81 mg Oral Daily    atorvastatin  80 mg Oral Nightly    levothyroxine  150 mcg Oral Daily    enalapril  10 mg Oral BID    docusate sodium  100 mg Oral BID    sodium chloride flush  10 mL Intravenous 2 times per day    enoxaparin  40 mg Subcutaneous Daily     PRN Meds: dicyclomine, sodium chloride flush, sodium chloride, potassium chloride, magnesium sulfate, promethazine **OR** ondansetron, famotidine, acetaminophen **OR** acetaminophen      Intake/Output Summary (Last 24 hours) at 5/5/2021 8392  Last data filed at 5/5/2021 1997  Gross per 24 hour   Intake 2088 ml   Output    Net 2088 ml       Physical Exam Performed:    BP (!) 142/66   Pulse 78   Temp 97.3 °F (36.3 °C) (Oral)   Resp 16   Ht 5' 11\" (1.803 m)   Wt 167 lb 1.6 oz (75.8 kg)   SpO2 94%   BMI 23.31 kg/m²     General appearance: No apparent distress, appears stated age and cooperative. HEENT: Pupils equal, round, and reactive to light. Conjunctivae/corneas clear. Neck: Supple, with full range of motion. No jugular venous distention. Trachea midline. Respiratory:  Normal respiratory effort. Clear to auscultation, bilaterally without Rales/Wheezes/Rhonchi. Cardiovascular: Regular rate and rhythm with normal S1/S2 without murmurs, rubs or gallops. Abdomen: Soft,  Mildly tender, non-distended with normal bowel sounds. Musculoskeletal: No clubbing, cyanosis or edema bilaterally. Full range of motion without deformity. Skin: Skin color, texture, turgor normal.  No rashes or lesions. Neurologic:  Neurovascularly intact without any focal sensory/motor deficits. Cranial nerves: II-XII intact, grossly non-focal.  Psychiatric: Alert and oriented, thought content appropriate, normal insight  Capillary Refill: Brisk,3 seconds, normal   Peripheral Pulses: +2 palpable, equal bilaterally       Labs:   Recent Labs     05/03/21 1737 05/04/21 0426 05/05/21  0322   WBC 7.8 6.3 6.3   HGB 13.4* 11.9* 12.0*   HCT 38.9* 35.3* 35.0*    143 143     Recent Labs     05/04/21 0426 05/04/21  1356 05/05/21  0322   * 130* 131*   K 4.0 4.1 4.3   CL 92* 93* 95*   CO2 27 29 27   BUN 11 14 14   CREATININE 0.7* 0.8 0.6*   CALCIUM 9.0 9.0 8.8     Recent Labs     05/03/21 1737 05/04/21 0426 05/05/21  0322   AST 23 16 16   ALT 22 17 18   BILITOT 0.9 0.8 1.2*   ALKPHOS 85 69 68     No results for input(s): INR in the last 72 hours.   Recent Labs     05/03/21 1737 05/04/21  0058   TROPONINI <0.01 <0.01       Urinalysis:      Lab Results   Component Value Date    NITRU Negative 05/03/2021

## 2021-05-05 NOTE — PROGRESS NOTES
Perfect serve sent to cross cover NP \"[facility] Called and stated pt had 10 beats of Vtach. VSS. Asymptomatic and was sleeping when I walked in to check on him. Pt states his heart skips a beat at times. Thanks!\"     0116: Message back from NP [facility] CHANG them draw labs early. Id like to see his mag. He hasnt had one. \"     4310: Message sent back to NP \" Sounds good.  Do you want me to have them come now or wait until around 3am?\"      0139: Message back from NP \"3 am\"

## 2021-05-05 NOTE — PROGRESS NOTES
VSS. Pt tolerating clear liq diet, denies pain/nausea. Active BS, pt reports he is still passing gas, still w/o BM today. Wife at bedside. Pt denies needs at this time. Will monitor.

## 2021-05-06 LAB
A/G RATIO: 1.6 (ref 1.1–2.2)
ALBUMIN SERPL-MCNC: 3.9 G/DL (ref 3.4–5)
ALP BLD-CCNC: 73 U/L (ref 40–129)
ALT SERPL-CCNC: 20 U/L (ref 10–40)
ANION GAP SERPL CALCULATED.3IONS-SCNC: 9 MMOL/L (ref 3–16)
AST SERPL-CCNC: 23 U/L (ref 15–37)
BILIRUB SERPL-MCNC: 0.8 MG/DL (ref 0–1)
BUN BLDV-MCNC: 7 MG/DL (ref 7–20)
CALCIUM SERPL-MCNC: 8.9 MG/DL (ref 8.3–10.6)
CHLORIDE BLD-SCNC: 93 MMOL/L (ref 99–110)
CO2: 26 MMOL/L (ref 21–32)
CREAT SERPL-MCNC: 0.6 MG/DL (ref 0.8–1.3)
GFR AFRICAN AMERICAN: >60
GFR NON-AFRICAN AMERICAN: >60
GLOBULIN: 2.4 G/DL
GLUCOSE BLD-MCNC: 94 MG/DL (ref 70–99)
POTASSIUM REFLEX MAGNESIUM: 4.2 MMOL/L (ref 3.5–5.1)
SODIUM BLD-SCNC: 128 MMOL/L (ref 136–145)
TOTAL PROTEIN: 6.3 G/DL (ref 6.4–8.2)

## 2021-05-06 PROCEDURE — 1200000000 HC SEMI PRIVATE

## 2021-05-06 PROCEDURE — 36415 COLL VENOUS BLD VENIPUNCTURE: CPT

## 2021-05-06 PROCEDURE — 6360000002 HC RX W HCPCS: Performed by: INTERNAL MEDICINE

## 2021-05-06 PROCEDURE — 2580000003 HC RX 258: Performed by: INTERNAL MEDICINE

## 2021-05-06 PROCEDURE — 80053 COMPREHEN METABOLIC PANEL: CPT

## 2021-05-06 PROCEDURE — 6370000000 HC RX 637 (ALT 250 FOR IP): Performed by: NURSE PRACTITIONER

## 2021-05-06 PROCEDURE — 99232 SBSQ HOSP IP/OBS MODERATE 35: CPT | Performed by: SURGERY

## 2021-05-06 PROCEDURE — 6370000000 HC RX 637 (ALT 250 FOR IP): Performed by: STUDENT IN AN ORGANIZED HEALTH CARE EDUCATION/TRAINING PROGRAM

## 2021-05-06 PROCEDURE — 2580000003 HC RX 258: Performed by: SURGERY

## 2021-05-06 PROCEDURE — 6370000000 HC RX 637 (ALT 250 FOR IP): Performed by: INTERNAL MEDICINE

## 2021-05-06 RX ORDER — POLYETHYLENE GLYCOL 3350 17 G/17G
17 POWDER, FOR SOLUTION ORAL 2 TIMES DAILY
Status: DISCONTINUED | OUTPATIENT
Start: 2021-05-06 | End: 2021-05-07 | Stop reason: HOSPADM

## 2021-05-06 RX ORDER — LACTULOSE 10 G/15ML
20 SOLUTION ORAL 3 TIMES DAILY
Status: DISCONTINUED | OUTPATIENT
Start: 2021-05-06 | End: 2021-05-07 | Stop reason: HOSPADM

## 2021-05-06 RX ADMIN — POLYETHYLENE GLYCOL 3350 17 G: 17 POWDER, FOR SOLUTION ORAL at 07:55

## 2021-05-06 RX ADMIN — LACTULOSE 20 G: 20 SOLUTION ORAL at 21:39

## 2021-05-06 RX ADMIN — ATORVASTATIN CALCIUM 80 MG: 80 TABLET, FILM COATED ORAL at 20:43

## 2021-05-06 RX ADMIN — ASPIRIN 81 MG: 81 TABLET, CHEWABLE ORAL at 07:55

## 2021-05-06 RX ADMIN — DOCUSATE SODIUM 100 MG: 100 CAPSULE, LIQUID FILLED ORAL at 07:55

## 2021-05-06 RX ADMIN — SODIUM CHLORIDE, PRESERVATIVE FREE 10 ML: 5 INJECTION INTRAVENOUS at 07:56

## 2021-05-06 RX ADMIN — POLYETHYLENE GLYCOL 3350 17 G: 17 POWDER, FOR SOLUTION ORAL at 20:43

## 2021-05-06 RX ADMIN — ENALAPRIL MALEATE 10 MG: 5 TABLET ORAL at 20:43

## 2021-05-06 RX ADMIN — ENOXAPARIN SODIUM 40 MG: 40 INJECTION SUBCUTANEOUS at 07:55

## 2021-05-06 RX ADMIN — LEVOTHYROXINE SODIUM 150 MCG: 0.15 TABLET ORAL at 06:20

## 2021-05-06 RX ADMIN — ENALAPRIL MALEATE 10 MG: 5 TABLET ORAL at 07:55

## 2021-05-06 RX ADMIN — SODIUM CHLORIDE, PRESERVATIVE FREE 10 ML: 5 INJECTION INTRAVENOUS at 20:43

## 2021-05-06 RX ADMIN — SODIUM CHLORIDE: 9 INJECTION, SOLUTION INTRAVENOUS at 02:22

## 2021-05-06 RX ADMIN — DOCUSATE SODIUM 100 MG: 100 CAPSULE, LIQUID FILLED ORAL at 20:43

## 2021-05-06 NOTE — PROGRESS NOTES
Hospitalist Progress Note      PCP: Gayle Hodge    Date of Admission: 5/3/2021    Chief Complaint: abdominal pain    Hospital Course:    79 y.o. male who presented to [facility] with past medical history of CAD status post stent, hypertension, hyperlipidemia, hypothyroidism, TIA presented to the ED for worsening abdominal pain nausea vomiting.      Patient is a corn with baked potatoes and filet mignon on Sunday night and then started Monday having some nausea with close to vomiting and is unable to keep anything down, patient does admit to passing gas and stool. Patient reported he has not been able to eat anything at all and due to the abdominal pain came to the ED. Abdominal pain is lower abdomen at the right groin going on for several days worsened especially today. 7/10 no known alleviating factor exacerbated with meals acutely, patient has taken Motrin with minimal relief. No association of fever chills chest pain shortness of breath palpitations or dysuria. CODE STATUS cussed and the patient is a full code patient reported that he was told before that his hernia is causing him problems that he may need surgery. Subjective: No abdominal pain. No BM yet. Walking around the room and started clears.      Medications:  Reviewed    Infusion Medications    sodium chloride 75 mL/hr at 05/06/21 0222    sodium chloride       Scheduled Medications    polyethylene glycol  17 g Oral BID    aspirin  81 mg Oral Daily    atorvastatin  80 mg Oral Nightly    levothyroxine  150 mcg Oral Daily    enalapril  10 mg Oral BID    docusate sodium  100 mg Oral BID    sodium chloride flush  10 mL Intravenous 2 times per day    enoxaparin  40 mg Subcutaneous Daily     PRN Meds: dicyclomine, sodium chloride flush, sodium chloride, potassium chloride, magnesium sulfate, promethazine **OR** ondansetron, famotidine, acetaminophen **OR** acetaminophen      Intake/Output Summary (Last 24 hours) at 5/6/2021 4649 Last data filed at 5/6/2021 0445  Gross per 24 hour   Intake 3788.5 ml   Output    Net 3788.5 ml       Physical Exam Performed:    BP (!) 156/82   Pulse 74   Temp 97.6 °F (36.4 °C) (Oral)   Resp 16   Ht 5' 11\" (1.803 m)   Wt 171 lb (77.6 kg)   SpO2 98%   BMI 23.85 kg/m²     General appearance: No apparent distress, appears stated age and cooperative. HEENT: Pupils equal, round, and reactive to light. Conjunctivae/corneas clear. Neck: Supple, with full range of motion. No jugular venous distention. Trachea midline. Respiratory:  Normal respiratory effort. Clear to auscultation, bilaterally without Rales/Wheezes/Rhonchi. Cardiovascular: Regular rate and rhythm with normal S1/S2 without murmurs, rubs or gallops. Abdomen: Soft,  Non-tender, non-distended with normal bowel sounds. Musculoskeletal: No clubbing, cyanosis or edema bilaterally. Full range of motion without deformity. Skin: Skin color, texture, turgor normal.  No rashes or lesions. Neurologic:  Neurovascularly intact without any focal sensory/motor deficits. Cranial nerves: II-XII intact, grossly non-focal.  Psychiatric: Alert and oriented, thought content appropriate, normal insight  Capillary Refill: Brisk,3 seconds, normal   Peripheral Pulses: +2 palpable, equal bilaterally       Labs:   Recent Labs     05/03/21 1737 05/04/21 0426 05/05/21  0322   WBC 7.8 6.3 6.3   HGB 13.4* 11.9* 12.0*   HCT 38.9* 35.3* 35.0*    143 143     Recent Labs     05/04/21 0426 05/04/21  1356 05/05/21  0322   * 130* 131*   K 4.0 4.1 4.3   CL 92* 93* 95*   CO2 27 29 27   BUN 11 14 14   CREATININE 0.7* 0.8 0.6*   CALCIUM 9.0 9.0 8.8     Recent Labs     05/03/21 1737 05/04/21 0426 05/05/21  0322   AST 23 16 16   ALT 22 17 18   BILITOT 0.9 0.8 1.2*   ALKPHOS 85 69 68     No results for input(s): INR in the last 72 hours.   Recent Labs     05/03/21  1737 05/04/21  0058   TROPONINI <0.01 <0.01       Urinalysis:      Lab Results   Component Value Date NITRU Negative 05/03/2021    BLOODU Negative 05/03/2021    SPECGRAV 1.020 05/03/2021    GLUCOSEU Negative 05/03/2021       Radiology:  XR CHEST PORTABLE   Final Result   Heterogeneous bilateral perihilar opacity, for which atelectasis,   pneumonitis, or pulmonary edema are considerations. Additional homogeneous   opacity at the right base, potentially prominent fat pad or focal   diaphragmatic eventration; pulmonary nodule would be difficult to exclude. CT could be performed for further characterization as warranted. CT ABDOMEN PELVIS W IV CONTRAST Additional Contrast? None   Final Result   Large hiatal hernia containing free fluid, the stomach and a portion of the   transverse colon. The colon proximal to the portion contained within the   hiatal hernia is distended which may relate to partial mechanical obstruction. No dilated loops of small bowel. No intraperitoneal free air or abscess. Assessment/Plan:    Active Hospital Problems    Diagnosis    Hiatal hernia [K44.9]    SBO (small bowel obstruction) ([facility] [address] [K56.609]     1. Acute bowel obstruction in setting of paraesophageal hernia:  Surgery consulted - conservative management - liquid diet and laxatives. No BM yet. Plan for outpt surgery     2. Hyponatremia:  Secondary to Lasix intake given urine sodium elevated. Holding continue to monitor  Improving.     3. Large hiatal hernia:  Surgery consulted     4. Essential Hypertension: Continued home medication    5. Hypothyroidism: Continued medication    6. Hyperlipidemia: Continued home medication    7.  CAD: Continued home medication     DVT Prophylaxis: lovenox  Diet: DIET CLEAR LIQUID;  Code Status: Full Code    PT/OT Eval Status: baseline    Dispo - 1 day    Genesis Ny MD

## 2021-05-06 NOTE — PLAN OF CARE
Pt remained free of falls. Call light within reach. Non skid footwear in place. Bed locked and in lowest position. A/ox4, calls out appropriately. Ambulates independently.

## 2021-05-06 NOTE — PROGRESS NOTES
Assessment completed and documented. VSS. A/ox4. Denies pain. Bed locked and in lowest position. Bedside table and call light within reach. Denies further needs at this time. Very anxious about having a bowel movement. Tolerating clear liquid diet. Ambulates independently. IV fluids running.

## 2021-05-06 NOTE — PROGRESS NOTES
[facility] SURGERY    PATIENT NAME: Rahul Johnston     TODAY'S DATE: 5/6/2021    CHIEF COMPLAINT: none    INTERVAL HISTORY/HPI:    Pt without pain or nausea, no bloating. Having flatus. REVIEW OF SYSTEMS:  Pertinent positives and negatives as per interval history section    OBJECTIVE:  VITALS:  BP (!) 158/76   Pulse 60   Temp 97.5 °F (36.4 °C) (Axillary)   Resp 16   Ht 5' 11\" (1.803 m)   Wt 171 lb (77.6 kg)   SpO2 98%   BMI 23.85 kg/m²     INTAKE/OUTPUT:    I/O last 3 completed shifts: In: 3788.5 [P.O.:1170; I.V.:2618.5]  Out: -   I/O this shift: In: 480 [P.O.:480]  Out: -     CONSTITUTIONAL:  awake and alert  LUNGS:  Respirations easy and unlabored, no crackles or wheezing  CARD:  regular rate and rhythm  ABDOMEN:  normal bowel sounds, soft, non-distended, non-tender     Data:  CBC:   Recent Labs     05/03/21  1737 05/04/21  0426 05/05/21  0322   WBC 7.8 6.3 6.3   HGB 13.4* 11.9* 12.0*   HCT 38.9* 35.3* 35.0*    143 143     BMP:    Recent Labs     05/04/21  1356 05/05/21  0322 05/06/21  1120   * 131* 128*   K 4.1 4.3 4.2   CL 93* 95* 93*   CO2 29 27 26   BUN 14 14 7   CREATININE 0.8 0.6* 0.6*   GLUCOSE 93 74 94     Hepatic:   Recent Labs     05/04/21  0426 05/05/21  0322 05/06/21  1120   AST 16 16 23   ALT 17 18 20   BILITOT 0.8 1.2* 0.8   ALKPHOS 69 68 73     Mag:      Recent Labs     05/05/21  0322   MG 1.90      Phos:   No results for input(s): PHOS in the last 72 hours. INR: No results for input(s): INR in the last 72 hours. Radiology Review:  *Imaging personally reviewed by me. 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 Asymptomatic but no BMs yet  2. Bid laxative  3. Full liquid diet  4. Once having bms can discharge home. F/u in office in one week to arrange for repair.       Electronically signed by Phuc Chu, [address]

## 2021-05-06 NOTE — CARE COORDINATION
5/6/21 Pt was IPTA. Advancing pt's diet and waiting for pt to have a BM. Depending on progress of obstruction pt may have surgery OP or this admission. Follow.

## 2021-05-07 ENCOUNTER — APPOINTMENT (OUTPATIENT)
Dept: GENERAL RADIOLOGY | Age: 81
DRG: 392 | End: 2021-05-07
Payer: MEDICARE

## 2021-05-07 VITALS
BODY MASS INDEX: 23.94 KG/M2 | HEART RATE: 64 BPM | OXYGEN SATURATION: 97 % | TEMPERATURE: 97.5 F | SYSTOLIC BLOOD PRESSURE: 144 MMHG | WEIGHT: 171 LBS | HEIGHT: 71 IN | DIASTOLIC BLOOD PRESSURE: 64 MMHG | RESPIRATION RATE: 16 BRPM

## 2021-05-07 LAB
ANION GAP SERPL CALCULATED.3IONS-SCNC: 8 MMOL/L (ref 3–16)
BASOPHILS ABSOLUTE: 0 K/UL (ref 0–0.2)
BASOPHILS RELATIVE PERCENT: 0.7 %
BUN BLDV-MCNC: 5 MG/DL (ref 7–20)
CALCIUM SERPL-MCNC: 8.8 MG/DL (ref 8.3–10.6)
CHLORIDE BLD-SCNC: 98 MMOL/L (ref 99–110)
CO2: 27 MMOL/L (ref 21–32)
CREAT SERPL-MCNC: <0.5 MG/DL (ref 0.8–1.3)
EOSINOPHILS ABSOLUTE: 0.7 K/UL (ref 0–0.6)
EOSINOPHILS RELATIVE PERCENT: 15.8 %
GFR AFRICAN AMERICAN: >60
GFR NON-AFRICAN AMERICAN: >60
GLUCOSE BLD-MCNC: 97 MG/DL (ref 70–99)
HCT VFR BLD CALC: 33.6 % (ref 40.5–52.5)
HEMOGLOBIN: 11.7 G/DL (ref 13.5–17.5)
LYMPHOCYTES ABSOLUTE: 0.8 K/UL (ref 1–5.1)
LYMPHOCYTES RELATIVE PERCENT: 17.5 %
MAGNESIUM: 1.9 MG/DL (ref 1.8–2.4)
MCH RBC QN AUTO: 33.5 PG (ref 26–34)
MCHC RBC AUTO-ENTMCNC: 34.8 G/DL (ref 31–36)
MCV RBC AUTO: 96.4 FL (ref 80–100)
MONOCYTES ABSOLUTE: 0.3 K/UL (ref 0–1.3)
MONOCYTES RELATIVE PERCENT: 6 %
NEUTROPHILS ABSOLUTE: 2.8 K/UL (ref 1.7–7.7)
NEUTROPHILS RELATIVE PERCENT: 60 %
PDW BLD-RTO: 14.2 % (ref 12.4–15.4)
PLATELET # BLD: 129 K/UL (ref 135–450)
PMV BLD AUTO: 7.7 FL (ref 5–10.5)
POTASSIUM SERPL-SCNC: 4 MMOL/L (ref 3.5–5.1)
RBC # BLD: 3.49 M/UL (ref 4.2–5.9)
SODIUM BLD-SCNC: 133 MMOL/L (ref 136–145)
WBC # BLD: 4.7 K/UL (ref 4–11)

## 2021-05-07 PROCEDURE — 6370000000 HC RX 637 (ALT 250 FOR IP): Performed by: STUDENT IN AN ORGANIZED HEALTH CARE EDUCATION/TRAINING PROGRAM

## 2021-05-07 PROCEDURE — 74280 X-RAY XM COLON 2CNTRST STD: CPT

## 2021-05-07 PROCEDURE — 2580000003 HC RX 258: Performed by: INTERNAL MEDICINE

## 2021-05-07 PROCEDURE — 83735 ASSAY OF MAGNESIUM: CPT

## 2021-05-07 PROCEDURE — 6360000004 HC RX CONTRAST MEDICATION: Performed by: SURGERY

## 2021-05-07 PROCEDURE — 80048 BASIC METABOLIC PNL TOTAL CA: CPT

## 2021-05-07 PROCEDURE — 36415 COLL VENOUS BLD VENIPUNCTURE: CPT

## 2021-05-07 PROCEDURE — 6370000000 HC RX 637 (ALT 250 FOR IP): Performed by: NURSE PRACTITIONER

## 2021-05-07 PROCEDURE — 6370000000 HC RX 637 (ALT 250 FOR IP): Performed by: INTERNAL MEDICINE

## 2021-05-07 PROCEDURE — 6360000002 HC RX W HCPCS: Performed by: INTERNAL MEDICINE

## 2021-05-07 PROCEDURE — 85025 COMPLETE CBC W/AUTO DIFF WBC: CPT

## 2021-05-07 PROCEDURE — 99233 SBSQ HOSP IP/OBS HIGH 50: CPT | Performed by: SURGERY

## 2021-05-07 RX ORDER — POLYETHYLENE GLYCOL 3350 17 G/17G
17 POWDER, FOR SOLUTION ORAL 2 TIMES DAILY
Qty: 527 G | Refills: 1 | Status: SHIPPED | OUTPATIENT
Start: 2021-05-07 | End: 2021-06-06

## 2021-05-07 RX ORDER — PSEUDOEPHEDRINE HCL 30 MG
100 TABLET ORAL 2 TIMES DAILY
Qty: 60 CAPSULE | Refills: 1 | Status: ON HOLD | OUTPATIENT
Start: 2021-05-07 | End: 2021-07-22 | Stop reason: CLARIF

## 2021-05-07 RX ADMIN — DOCUSATE SODIUM 100 MG: 100 CAPSULE, LIQUID FILLED ORAL at 08:47

## 2021-05-07 RX ADMIN — POLYETHYLENE GLYCOL 3350 17 G: 17 POWDER, FOR SOLUTION ORAL at 08:47

## 2021-05-07 RX ADMIN — ENALAPRIL MALEATE 10 MG: 5 TABLET ORAL at 08:48

## 2021-05-07 RX ADMIN — ASPIRIN 81 MG: 81 TABLET, CHEWABLE ORAL at 08:48

## 2021-05-07 RX ADMIN — LACTULOSE 20 G: 20 SOLUTION ORAL at 08:47

## 2021-05-07 RX ADMIN — SODIUM CHLORIDE, PRESERVATIVE FREE 10 ML: 5 INJECTION INTRAVENOUS at 08:48

## 2021-05-07 RX ADMIN — ENOXAPARIN SODIUM 40 MG: 40 INJECTION SUBCUTANEOUS at 08:47

## 2021-05-07 RX ADMIN — DIATRIZOATE MEGLUMINE AND DIATRIZOATE SODIUM 360 ML: 660; 100 LIQUID ORAL; RECTAL at 11:13

## 2021-05-07 RX ADMIN — LEVOTHYROXINE SODIUM 150 MCG: 0.15 TABLET ORAL at 06:24

## 2021-05-07 NOTE — PROGRESS NOTES
[facility]    PATIENT NAME: Estefany Tobar     TODAY'S DATE: 5/7/2021    CHIEF COMPLAINT: none    INTERVAL HISTORY/HPI:    Pt without pain or nausea, no bloating. Tolerating full liquids. Having flatus but no BMs. Afebrile, hemodynamically stable. REVIEW OF SYSTEMS:  Pertinent positives and negatives as per interval history section    OBJECTIVE:  VITALS:  BP (!) 145/65   Pulse 68   Temp 97.3 °F (36.3 °C)   Resp 18   Ht 5' 11\" (1.803 m)   Wt 171 lb (77.6 kg)   SpO2 94%   BMI 23.85 kg/m²     INTAKE/OUTPUT:    I/O last 3 completed shifts: In: 2329.3 [P.O.:1770; I.V.:559.3]  Out: -   No intake/output data recorded. CONSTITUTIONAL:  awake and alert  LUNGS:  Respirations easy and unlabored, no crackles or wheezing  CARD:  regular rate and rhythm  ABDOMEN:  normal bowel sounds, soft, non-distended, non-tender     Data:  CBC:   Recent Labs     05/05/21 0322 05/07/21  0515   WBC 6.3 4.7   HGB 12.0* 11.7*   HCT 35.0* 33.6*    129*     BMP:    Recent Labs     05/05/21  0322 05/06/21  1120 05/07/21  0514   * 128* 133*   K 4.3 4.2 4.0   CL 95* 93* 98*   CO2 27 26 27   BUN 14 7 5*   CREATININE 0.6* 0.6* <0.5*   GLUCOSE 74 94 97     Hepatic:   Recent Labs     05/05/21 0322 05/06/21  1120   AST 16 23   ALT 18 20   BILITOT 1.2* 0.8   ALKPHOS 68 73     Mag:      Recent Labs     05/05/21 0322 05/07/21  0514   MG 1.90 1.90      Phos:   No results for input(s): PHOS in the last 72 hours. INR: No results for input(s): INR in the last 72 hours. Radiology Review:  *Imaging personally reviewed by me. 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 Asymptomatic but no BMs yet  2. Bid laxative  3. Full liquid diet  4. Once having bms can discharge home. F/u in office in one week to arrange for repair.       Electronically signed by Kita Carlisle, [address]

## 2021-05-07 NOTE — PROGRESS NOTES
D/c to home instructions given to pt. Expressed understanding; denied questions. All pt belongings  given to pt. Pt aware to pickup prescriptions from pharmacy. Pt transported in stable condition via wheelchair to personal vehicle by staff.   Electronically signed by Leif Siegel RN on 5/7/2021 at 5:02 PM

## 2021-05-07 NOTE — PROGRESS NOTES
Pt A&O, talkative and pleasant. VSS. Denies pain/discomfort. Denies N/V; BS active RLQ; hypoactive all others; passing flatus. Denies dyspnea. Call light within reach. Bed side table within reach. Wheels locked. Bed in lowest position. Refused bed alarm; independent with ADLs. Pt instructed to call out for assistance. Pt expressed understanding & calls out appropriately. Shift assessment complete. All care cont per orders.  Electronically signed by Edilberto Castañeda RN on 5/7/2021 at 11:54 AM

## 2021-05-07 NOTE — DISCHARGE SUMMARY
Hospital Medicine Discharge Summary    Patient ID: Silvina Young      Patient's PCP: Ricky Zhao    Admit Date: 5/3/2021     Discharge Date: 5/7/2021     Admitting Physician: Min De La Vega DO     Discharge Physician: Michelle Chaudhry MD     Discharge Diagnoses: Active Hospital Problems    Diagnosis    Hiatal hernia [K44.9]    SBO (small bowel obstruction) ([facility] [address] [K56.609]       The patient was seen and examined on day of discharge and this discharge summary is in conjunction with any daily progress note from day of discharge. Hospital Course:     79 y. o. male who presented to [facility] with past medical history of CAD status post stent, hypertension, hyperlipidemia, hypothyroidism, TIA presented to the ED for worsening abdominal pain nausea vomiting.      Patient is a corn with baked potatoes and filet mignon on Sunday night and then started Monday having some nausea with close to vomiting and is unable to keep anything down, patient does admit to passing gas and stool.  Patient reported he has not been able to eat anything at all and due to the abdominal pain came to the ED.  Abdominal pain is lower abdomen at the right groin going on for several days worsened especially today.  7/10 no known alleviating factor exacerbated with meals acutely, patient has taken Motrin with minimal relief.  No association of fever chills chest pain shortness of breath palpitations or dysuria.  CODE STATUS cussed and the patient is a full code patient reported that he was told before that his hernia is causing him problems that he may need surgery. 1. Acute bowel obstruction in setting of paraesophageal hernia:  Surgery consulted - conservative management - liquid diet and laxatives. + BM Barium enema. Plan for outpt surgery     2. Hyponatremia:  Secondary to Lasix intake given urine sodium elevated. Holding continue to monitor  Improving.     3. Large hiatal hernia:  Surgery consulted     4. Essential Hypertension: Continued home medication     5. Hypothyroidism: Continued medication     6. Hyperlipidemia: Continued home medication     7. CAD: Continued home medication    Physical Exam Performed:     BP (!) 144/64   Pulse 64   Temp 97.5 °F (36.4 °C) (Oral)   Resp 16   Ht 5' 11\" (1.803 m)   Wt 171 lb (77.6 kg)   SpO2 97%   BMI 23.85 kg/m²       General appearance:  No apparent distress, appears stated age and cooperative. HEENT:  Normal cephalic, atraumatic without obvious deformity. Pupils equal, round, and reactive to light. Extra ocular muscles intact. Conjunctivae/corneas clear. Neck: Supple, with full range of motion. No jugular venous distention. Trachea midline. Respiratory:  Normal respiratory effort. Clear to auscultation, bilaterally without Rales/Wheezes/Rhonchi. Cardiovascular:  Regular rate and rhythm with normal S1/S2 without murmurs, rubs or gallops. Abdomen: Soft, non-tender, non-distended with normal bowel sounds. Musculoskeletal:  No clubbing, cyanosis or edema bilaterally. Full range of motion without deformity. Skin: Skin color, texture, turgor normal.  No rashes or lesions. Neurologic:  Neurovascularly intact without any focal sensory/motor deficits. Cranial nerves: II-XII intact, grossly non-focal.  Psychiatric:  Alert and oriented, thought content appropriate, normal insight  Capillary Refill: Brisk,< 3 seconds   Peripheral Pulses: +2 palpable, equal bilaterally       Labs:  For convenience and continuity at follow-up the following most recent labs are provided:      CBC:    Lab Results   Component Value Date    WBC 4.7 05/07/2021    HGB 11.7 05/07/2021    HCT 33.6 05/07/2021     05/07/2021       Renal:    Lab Results   Component Value Date     05/07/2021    K 4.0 05/07/2021    K 4.2 05/06/2021    CL 98 05/07/2021    CO2 27 05/07/2021    BUN 5 05/07/2021    CREATININE <0.5 05/07/2021    CALCIUM 8.8 05/07/2021         Significant Diagnostic Studies Radiology:   FL BARIUM ENEMA W AIR CONTRAST   Final Result   No obstruction noted. Contrast flows through the transverse colon into the   known hiatal hernia without difficulty, and than into to the remainder of the   colon. XR CHEST PORTABLE   Final Result   Heterogeneous bilateral perihilar opacity, for which atelectasis,   pneumonitis, or pulmonary edema are considerations. Additional homogeneous   opacity at the right base, potentially prominent fat pad or focal   diaphragmatic eventration; pulmonary nodule would be difficult to exclude. CT could be performed for further characterization as warranted. CT ABDOMEN PELVIS W IV CONTRAST Additional Contrast? None   Final Result   Large hiatal hernia containing free fluid, the stomach and a portion of the   transverse colon. The colon proximal to the portion contained within the   hiatal hernia is distended which may relate to partial mechanical obstruction. No dilated loops of small bowel. No intraperitoneal free air or abscess.                 Consults:     IP CONSULT TO GENERAL SURGERY    Disposition:  Home    Condition at Discharge: Stable    Discharge Instructions/Follow-up:  PCP    Code Status:  Full Code     Activity: activity as tolerated    Diet: regular diet      Discharge Medications:     Discharge Medication List as of 5/7/2021  3:33 PM           Details   docusate sodium (COLACE, DULCOLAX) 100 MG CAPS Take 100 mg by mouth 2 times daily, Disp-60 capsule, R-1Normal      polyethylene glycol (GLYCOLAX) 17 g packet Take 17 g by mouth 2 times daily, Disp-527 g, R-1Please stop if having diarrheaNormal              Details   dicyclomine (BENTYL) 10 MG capsule Take 1 capsule by mouth every 6 hours as needed (cramps), Disp-20 capsule,R-0Print      ondansetron (ZOFRAN ODT) 4 MG disintegrating tablet Take 1 tablet by mouth every 8 hours as needed for Nausea, Disp-20 tablet,R-0Print      levothyroxine (SYNTHROID) 150 MCG tablet TAKE 1 TABLET BY MOUTH  DAILY, Disp-90 tablet,R-3Requesting 1 year supplyNormal      atorvastatin (LIPITOR) 80 MG tablet TAKE 1 TABLET BY MOUTH  EVERY DAY, Disp-90 tablet,R-1For high cholesterolNormal      Simethicone 125 MG CAPS Take 1 capsule by mouth daily as neededHistorical Med      furosemide (LASIX) 20 MG tablet Take 1 tablet by mouth 2 times daily, Disp-180 tablet, R-0Normal      ferrous sulfate (FE TABS) 325 (65 Fe) MG EC tablet Take 1 tablet by mouth daily (with breakfast), Disp-90 tablet, R-0Normal      azelastine (ASTELIN) 0.1 % nasal spray 2 sprays by Nasal route 2 times daily, Disp-1 Bottle, R-1Normal      enalapril (VASOTEC) 10 MG tablet Take 10 mg by mouth 2 times daily Historical Med      cetirizine (ZYRTEC) 10 MG tablet Take 10 mg by mouth daily. Multiple Vitamin (THERA) TABS Take 1 tablet by mouth daily 1 Tab daily. Historical Med      Glucosamine-Chondroitin (GLUCOSAMINE CHONDR COMPLEX PO) Take 1 tablet by mouth daily Historical Med      aspirin 81 MG chewable tablet Take 81 mg by mouth daily Historical Med      Coenzyme Q-10 100 MG CAPS Take 1 capsule by mouth daily. Time Spent on discharge is more than 30 minutes in the examination, evaluation, counseling and review of medications and discharge plan. Signed:    Nuha Melendez MD   5/7/2021      Thank you Rudy Turner for the opportunity to be involved in this patient's care. If you have any questions or concerns please feel free to contact me at [phone].

## 2021-05-07 NOTE — CARE COORDINATION
Chart reviewed day 4. Spoke with dr Kim Marques. Patient to have BA Enema today. Will likely d/c with no DCP needs. IPTA.  Magen Sky RN

## 2021-05-07 NOTE — PLAN OF CARE
Problem: Falls - Risk of:  Goal: Will remain free from falls  Description: Will remain free from falls  Outcome: Ongoing  Goal: Absence of physical injury  Description: Absence of physical injury  Outcome: Ongoing     Problem: Falls - Risk of: Intervention: Assess risk factors for falls  Note: Pt is high fall risk  Intervention: Postfall assessment  Note: No falls sustained thus far this shift  Intervention: Manage a safe environment  Note: Call light within reach. Bed side table within reach. Wheels locked. Bed in lowest position. Refused bed alarm; independent with ADLs. Pt instructed to call out for assistance. Pt expressed understanding & calls out appropriately. Intervention: Toileting assistance  Note: Pt is independent with toileting needs.

## 2021-05-07 NOTE — PROGRESS NOTES
Assessment as charted. A/O x 4. VSS. Passing flatus. No BM thus far this shift. BS active x 4. Tolerating full liquid diet.

## 2021-05-07 NOTE — PROGRESS NOTES
Hospitalist Progress Note      PCP: Hayde Campuzano    Date of Admission: 5/3/2021    Chief Complaint: abdominal pain    Hospital Course:    79 y.o. male who presented to [facility] [place] with past medical history of CAD status post stent, hypertension, hyperlipidemia, hypothyroidism, TIA presented to the ED for worsening abdominal pain nausea vomiting.      Patient is a corn with baked potatoes and filet mignon on Sunday night and then started Monday having some nausea with close to vomiting and is unable to keep anything down, patient does admit to passing gas and stool. Patient reported he has not been able to eat anything at all and due to the abdominal pain came to the ED. Abdominal pain is lower abdomen at the right groin going on for several days worsened especially today. 7/10 no known alleviating factor exacerbated with meals acutely, patient has taken Motrin with minimal relief. No association of fever chills chest pain shortness of breath palpitations or dysuria. CODE STATUS cussed and the patient is a full code patient reported that he was told before that his hernia is causing him problems that he may need surgery. Subjective: No abdominal pain. BM after barium enema.      Medications:  Reviewed    Infusion Medications    sodium chloride       Scheduled Medications    polyethylene glycol  17 g Oral BID    lactulose  20 g Oral TID    aspirin  81 mg Oral Daily    atorvastatin  80 mg Oral Nightly    levothyroxine  150 mcg Oral Daily    enalapril  10 mg Oral BID    docusate sodium  100 mg Oral BID    sodium chloride flush  10 mL Intravenous 2 times per day    enoxaparin  40 mg Subcutaneous Daily     PRN Meds: dicyclomine, sodium chloride flush, sodium chloride, potassium chloride, magnesium sulfate, promethazine **OR** ondansetron, famotidine, acetaminophen **OR** acetaminophen      Intake/Output Summary (Last 24 hours) at 5/7/2021 1056  Last data filed at 5/7/2021 0847  Gross per 24 hour   Intake 1690 ml   Output    Net 1690 ml       Physical Exam Performed:    /72   Pulse 81   Temp 97.3 °F (36.3 °C) (Oral)   Resp 18   Ht 5' 11\" (1.803 m)   Wt 171 lb (77.6 kg)   SpO2 98%   BMI 23.85 kg/m²     General appearance: No apparent distress, appears stated age and cooperative. HEENT: Pupils equal, round, and reactive to light. Conjunctivae/corneas clear. Neck: Supple, with full range of motion. No jugular venous distention. Trachea midline. Respiratory:  Normal respiratory effort. Clear to auscultation, bilaterally without Rales/Wheezes/Rhonchi. Cardiovascular: Regular rate and rhythm with normal S1/S2 without murmurs, rubs or gallops. Abdomen: Soft,  Non-tender, non-distended with normal bowel sounds. Musculoskeletal: No clubbing, cyanosis or edema bilaterally. Full range of motion without deformity. Skin: Skin color, texture, turgor normal.  No rashes or lesions. Neurologic:  Neurovascularly intact without any focal sensory/motor deficits. Cranial nerves: II-XII intact, grossly non-focal.  Psychiatric: Alert and oriented, thought content appropriate, normal insight  Capillary Refill: Brisk,3 seconds, normal   Peripheral Pulses: +2 palpable, equal bilaterally       Labs:   Recent Labs     05/05/21  0322 05/07/21  0515   WBC 6.3 4.7   HGB 12.0* 11.7*   HCT 35.0* 33.6*    129*     Recent Labs     05/05/21  0322 05/06/21  1120 05/07/21  0514   * 128* 133*   K 4.3 4.2 4.0   CL 95* 93* 98*   CO2 27 26 27   BUN 14 7 5*   CREATININE 0.6* 0.6* <0.5*   CALCIUM 8.8 8.9 8.8     Recent Labs     05/05/21  0322 05/06/21  1120   AST 16 23   ALT 18 20   BILITOT 1.2* 0.8   ALKPHOS 68 73     No results for input(s): INR in the last 72 hours. No results for input(s): Lorayne Adele in the last 72 hours.     Urinalysis:      Lab Results   Component Value Date    NITRU Negative 05/03/2021    BLOODU Negative 05/03/2021    SPECGRAV 1.020 05/03/2021    GLUCOSEU Negative 05/03/2021 Radiology:  XR CHEST PORTABLE   Final Result   Heterogeneous bilateral perihilar opacity, for which atelectasis,   pneumonitis, or pulmonary edema are considerations. Additional homogeneous   opacity at the right base, potentially prominent fat pad or focal   diaphragmatic eventration; pulmonary nodule would be difficult to exclude. CT could be performed for further characterization as warranted. CT ABDOMEN PELVIS W IV CONTRAST Additional Contrast? None   Final Result   Large hiatal hernia containing free fluid, the stomach and a portion of the   transverse colon. The colon proximal to the portion contained within the   hiatal hernia is distended which may relate to partial mechanical obstruction. No dilated loops of small bowel. No intraperitoneal free air or abscess. FL BARIUM ENEMA W AIR CONTRAST    (Results Pending)           Assessment/Plan:    Active Hospital Problems    Diagnosis    Hiatal hernia [K44.9]    SBO (small bowel obstruction) ([facility] [address] [K56.609]     1. Acute bowel obstruction in setting of paraesophageal hernia:  Surgery consulted - conservative management - liquid diet and laxatives. + BM Barium enema. Plan for outpt surgery     2. Hyponatremia:  Secondary to Lasix intake given urine sodium elevated. Holding continue to monitor  Improving.     3. Large hiatal hernia:  Surgery consulted     4. Essential Hypertension: Continued home medication    5. Hypothyroidism: Continued medication    6. Hyperlipidemia: Continued home medication    7.  CAD: Continued home medication     DVT Prophylaxis: lovenox  Diet: DIET FULL LIQUID;  Code Status: Full Code    PT/OT Eval Status: baseline    Dispo - DC today    Chiquis Bonner MD

## 2021-05-10 ENCOUNTER — TELEPHONE (OUTPATIENT)
Dept: SURGERY | Age: 81
End: 2021-05-10

## 2021-05-10 NOTE — PROGRESS NOTES
Preoperative Screening for Elective Surgery/Invasive Procedures While COVID-19 present in the community    o Have you had any of the following symptoms? NONE  o Fever, chills  o Cough  o Shortness of breath  o Muscle aches/pain  o Diarrhea  o Abdominal pain, nausea, vomiting  o Loss or decrease in taste and / or smell   Risk of Exposure  o Have you recently been hospitalized for COVID-19 or flu-like illness, if so when? NO  o Recently diagnosed with COVID-19, if so when? NO  o Recently tested for COVID-19, if so when? NO  o Have you been in close contact with a person or family member who currently has or recently had COVID-23? If yes, when and in what context? NO  o Do you live with anybody who in the last 14 days has had fever, chills, shortness of breath, muscle aches, flu-like illness? NO  o Do you have any close contacts or family members who are currently in the hospital for COVID-19 or flu-like illness? If yes, assess recent close contact with this person. NO    Indicate if the patient has a positive screen by answering yes to one or more of the above questions. Patients who test positive or screen positive prior to surgery or on the day of surgery should be evaluated in conjunction with the surgeon/proceduralist/anesthesiologist to determine the urgency of the procedure.

## 2021-05-10 NOTE — TELEPHONE ENCOUNTER
Called and spoke w/ pt - Scheduled for a Robotic Nissen Fundoplication, Possible Open Procedure (General Anes) w/ Dr Claudell Good on 5/13/21 @ 11am arrival [address] - NPO after midnight - Dr Juan Ramon Wyatt to provide cardiac clearance - Dr Mays Section to complete pre-op physical - Pt will need to have a Rapid COVID test prior to surgery Pt understood and agreed to above noted - Surgery instructions emailed to pt: [email or web address]

## 2021-05-10 NOTE — TELEPHONE ENCOUNTER
Called and spoke w/ pt - Informed that his surgery time on 5/13/21 was changed to 11am arrival 9am - Pt was fine with this and said he would be there.

## 2021-05-10 NOTE — PROGRESS NOTES
Kal Yuli    Age 79 y.o.    male    1940    MRN [id number]    5/13/2021  Arrival Time_____________  OR Time____________154 Min     Procedure(s):  ROBOTIC NISSEN FUNDOPLICATION, POSSIBLE OPEN PROCEDURE                      General     Surgeon(s):  Raymond Yoo, MD      DAY ADMIT ___  SDS/OP ___  OUTPT IN BED ___         Phone [phone] (home)    PCP _____________________ Phone_________________ Epic ( ) Epic CE ( ) Appt ________    ADDITIONAL INFO __________________________________ Cardio/Consult _____________    NOTES _____________________________________________________________________    ____________________________________________________________________________    PAT APPT DATE:________ TIME: ________  FAXED QAD: _______  (__) H&P w/ hospitalist  ____________________________________________________________________________    COVID TEST: Date/Location______________        NURSING HISTORY COMPLETE: _______  (__) CBC       (__) W/ DIFF ___________  (__)  ECHO    __________  (__) Hgb A1C    ___________  (__) CHEST X RAY   __________  (__) LIPID PROFILE  ___________  (__) EKG   __________  (__) PT/PTT   ___________  (__) PFT's   __________  (__) BMP   ___________  (__) CAROTIDS  __________  (__) CMP   ___________  (__) VEIN MAPPING  __________  (__) U/A   ___________  (__) HISTORY & PHYSICAL __________  (__) URINE C & S  ___________  (__) CARDIAC CLEARANCE __________  (__) U/A W/ FLEX  ___________  (__) PULM.  CLEARANCE __________  (__) SERUM PREGNANCY ___________  (__) Check Epic DOS orders __________  (__) TYPE & SCREEN ________ repeat ( ) (__)  __________________ __________  (__) ALBUMIN   ___________  (__)  __________________ __________  (__) TRANSFERRIN  ___________  (__)  __________________ __________  (__) LIVER PROFILE  ___________  (__)  __________________ __________  (__) CARBOXY HGB  ___________  (__) URINE PREG DOS __________  (__) NICOTINE & MET.  ___________  (__) BLOOD SUGAR DOS __________  (__) PREALBUMIN  ___________    (__) MRSA NASAL SWAB ___________  (__) BLOOD THINNERS __________  (__) ACE/ ARBS: _____________________    (__) BETABLOCKERS ___________________

## 2021-05-11 ENCOUNTER — OFFICE VISIT (OUTPATIENT)
Dept: PRIMARY CARE CLINIC | Age: 81
End: 2021-05-11
Payer: MEDICARE

## 2021-05-11 DIAGNOSIS — Z01.818 PREOP TESTING: Primary | ICD-10-CM

## 2021-05-11 LAB — SARS-COV-2: NOT DETECTED

## 2021-05-11 PROCEDURE — G8420 CALC BMI NORM PARAMETERS: HCPCS | Performed by: NURSE PRACTITIONER

## 2021-05-11 PROCEDURE — G8428 CUR MEDS NOT DOCUMENT: HCPCS | Performed by: NURSE PRACTITIONER

## 2021-05-11 PROCEDURE — 99211 OFF/OP EST MAY X REQ PHY/QHP: CPT | Performed by: NURSE PRACTITIONER

## 2021-05-11 NOTE — PATIENT INSTRUCTIONS

## 2021-05-11 NOTE — PROGRESS NOTES
Nikki Dick received a viral test for COVID-19. They were educated on isolation and quarantine as appropriate. For any symptoms, they were directed to seek care from their PCP, given contact information to establish with a doctor, directed to an urgent care or the emergency room.

## 2021-05-13 ENCOUNTER — TELEPHONE (OUTPATIENT)
Dept: SURGERY | Age: 81
End: 2021-05-13

## 2021-05-13 NOTE — TELEPHONE ENCOUNTER
Called and spoke w/ pts wife - pt is rescheduled for his Nissen Fundoplication on 7/55/57 @ 7:30am arrival [address] - NPO after midnight - COVID testing rescheduled for 5/20. Pre-Op H&P already completed 5/11. Wife understood and agreed to above noted.

## 2021-05-13 NOTE — PROGRESS NOTES
Ivar Nickel    Age 79 y.o.    male    1940    MRN [id number]    5/25/2021  Arrival Time_____________  OR Time____________[address]     Procedure(s):  ROBOTIC NISSEN FUNDOPLICATION, POSSIBLE OPEN PROCEDURE                      General     Surgeon(s):  Lady Salinas, MD      DAY ADMIT ___  SDS/OP ___  OUTPT IN BED ___         Phone [phone] (home)    PCP _____________________ Phone_________________ Epic ( ) Epic CE ( ) Appt ________    ADDITIONAL INFO __________________________________ Cardio/Consult _____________    NOTES _____________________________________________________________________    ____________________________________________________________________________    PAT APPT DATE:________ TIME: ________  FAXED QAD: _______  (__) H&P w/ hospitalist  ____________________________________________________________________________    COVID TEST: Date/Location______________        NURSING HISTORY COMPLETE: _______  (__) CBC       (__) W/ DIFF ___________  (__)  ECHO    __________  (__) Hgb A1C    ___________  (__) CHEST X RAY   __________  (__) LIPID PROFILE  ___________  (__) EKG   __________  (__) PT/PTT   ___________  (__) PFT's   __________  (__) BMP   ___________  (__) CAROTIDS  __________  (__) CMP   ___________  (__) VEIN MAPPING  __________  (__) U/A   ___________  (__) HISTORY & PHYSICAL __________  (__) URINE C & S  ___________  (__) CARDIAC CLEARANCE __________  (__) U/A W/ FLEX  ___________  (__) PULM.  CLEARANCE __________  (__) SERUM PREGNANCY ___________  (__) Check Epic DOS orders __________  (__) TYPE & SCREEN ________ repeat ( ) (__)  __________________ __________  (__) ALBUMIN   ___________  (__)  __________________ __________  (__) TRANSFERRIN  ___________  (__)  __________________ __________  (__) LIVER PROFILE  ___________  (__)  __________________ __________  (__) CARBOXY HGB  ___________  (__) URINE PREG DOS __________  (__) NICOTINE & MET.  ___________  (__) BLOOD SUGAR DOS __________  (__) PREALBUMIN  ___________    (__) MRSA NASAL SWAB ___________  (__) BLOOD THINNERS __________  (__) ACE/ ARBS: _____________________    (__) BETABLOCKERS ___________________

## 2021-05-13 NOTE — PROGRESS NOTES
Kae Bond    Age 79 y.o.    male    1940    MRN [id number]    5/25/2021  Arrival Time_____________  OR Time____________[address]     Procedure(s):  ROBOTIC NISSEN FUNDOPLICATION, POSSIBLE OPEN PROCEDURE                      General     Surgeon(s):  Latasha Kenney, MD      DAY ADMIT ___  SDS/OP ___  OUTPT IN BED ___         Phone [phone] (home)    PCP _____________________ Phone_________________ Epic ( ) Epic CE ( ) Appt ________    ADDITIONAL INFO __________________________________ Cardio/Consult _____________    NOTES _____________________________________________________________________    ____________________________________________________________________________    PAT APPT DATE:________ TIME: ________  FAXED QAD: _______  (__) H&P w/ hospitalist  ____________________________________________________________________________    COVID TEST: Date/Location______________        NURSING HISTORY COMPLETE: _______  (__) CBC       (__) W/ DIFF ___________  (__)  ECHO    __________  (__) Hgb A1C    ___________  (__) CHEST X RAY   __________  (__) LIPID PROFILE  ___________  (__) EKG   __________  (__) PT/PTT   ___________  (__) PFT's   __________  (__) BMP   ___________  (__) CAROTIDS  __________  (__) CMP   ___________  (__) VEIN MAPPING  __________  (__) U/A   ___________  (__) HISTORY & PHYSICAL __________  (__) URINE C & S  ___________  (__) CARDIAC CLEARANCE __________  (__) U/A W/ FLEX  ___________  (__) PULM.  CLEARANCE __________  (__) SERUM PREGNANCY ___________  (__) Check Epic DOS orders __________  (__) TYPE & SCREEN ________ repeat ( ) (__)  __________________ __________  (__) ALBUMIN   ___________  (__)  __________________ __________  (__) TRANSFERRIN  ___________  (__)  __________________ __________  (__) LIVER PROFILE  ___________  (__)  __________________ __________  (__) CARBOXY HGB  ___________  (__) URINE PREG DOS __________  (__) NICOTINE & MET.  ___________  (__) BLOOD SUGAR DOS __________  (__) PREALBUMIN  ___________    (__) MRSA NASAL SWAB ___________  (__) BLOOD THINNERS __________  (__) ACE/ ARBS: _____________________    (__) BETABLOCKERS ___________________

## 2021-05-13 NOTE — PROGRESS NOTES
DOS __________  (__) PREALBUMIN  ___________    (__) MRSA NASAL SWAB ___________  (__) BLOOD THINNERS __________  (__) ACE/ ARBS: _____________________    (__) BETABLOCKERS ___________________

## 2021-05-17 NOTE — PROGRESS NOTES
Patient had 2nd COVID vaccine dose 3/10/21 per him. Preop instructions reviewed with patient and his wife, they verbalize understanding and have no questions. Patient will hold Enalapril 24 hrs prior   to surgery per anesthesia protocol instructions. Last dose for ASA & MVI 5/17/21.

## 2021-05-24 ENCOUNTER — ANESTHESIA EVENT (OUTPATIENT)
Dept: OPERATING ROOM | Age: 81
DRG: 328 | End: 2021-05-24
Payer: MEDICARE

## 2021-05-25 ENCOUNTER — HOSPITAL ENCOUNTER (INPATIENT)
Age: 81
LOS: 3 days | Discharge: HOME | DRG: 328 | End: 2021-05-28
Attending: SURGERY | Admitting: SURGERY
Payer: MEDICARE

## 2021-05-25 ENCOUNTER — APPOINTMENT (OUTPATIENT)
Dept: GENERAL RADIOLOGY | Age: 81
DRG: 328 | End: 2021-05-25
Attending: SURGERY
Payer: MEDICARE

## 2021-05-25 ENCOUNTER — ANESTHESIA (OUTPATIENT)
Dept: OPERATING ROOM | Age: 81
DRG: 328 | End: 2021-05-25
Payer: MEDICARE

## 2021-05-25 VITALS
DIASTOLIC BLOOD PRESSURE: 73 MMHG | SYSTOLIC BLOOD PRESSURE: 156 MMHG | RESPIRATION RATE: 19 BRPM | OXYGEN SATURATION: 97 %

## 2021-05-25 DIAGNOSIS — K44.9 HIATAL HERNIA: ICD-10-CM

## 2021-05-25 LAB
ABO/RH: NORMAL
ANION GAP SERPL CALCULATED.3IONS-SCNC: 10 MMOL/L (ref 3–16)
ANTIBODY SCREEN: NORMAL
ANTIBODY SCREEN: NORMAL
BASOPHILS ABSOLUTE: 0 K/UL (ref 0–0.2)
BASOPHILS RELATIVE PERCENT: 1.1 %
BUN BLDV-MCNC: 10 MG/DL (ref 7–20)
CALCIUM SERPL-MCNC: 9 MG/DL (ref 8.3–10.6)
CHLORIDE BLD-SCNC: 96 MMOL/L (ref 99–110)
CO2: 28 MMOL/L (ref 21–32)
CREAT SERPL-MCNC: 0.6 MG/DL (ref 0.8–1.3)
EOSINOPHILS ABSOLUTE: 0.6 K/UL (ref 0–0.6)
EOSINOPHILS RELATIVE PERCENT: 15.1 %
GFR AFRICAN AMERICAN: >60
GFR NON-AFRICAN AMERICAN: >60
GLUCOSE BLD-MCNC: 98 MG/DL (ref 70–99)
HCT VFR BLD CALC: 31.9 % (ref 40.5–52.5)
HEMOGLOBIN: 11 G/DL (ref 13.5–17.5)
LYMPHOCYTES ABSOLUTE: 0.7 K/UL (ref 1–5.1)
LYMPHOCYTES RELATIVE PERCENT: 16.2 %
MCH RBC QN AUTO: 33.1 PG (ref 26–34)
MCHC RBC AUTO-ENTMCNC: 34.3 G/DL (ref 31–36)
MCV RBC AUTO: 96.4 FL (ref 80–100)
MONOCYTES ABSOLUTE: 0.3 K/UL (ref 0–1.3)
MONOCYTES RELATIVE PERCENT: 7.4 %
NEUTROPHILS ABSOLUTE: 2.6 K/UL (ref 1.7–7.7)
NEUTROPHILS RELATIVE PERCENT: 60.2 %
PDW BLD-RTO: 14.7 % (ref 12.4–15.4)
PLATELET # BLD: 125 K/UL (ref 135–450)
PMV BLD AUTO: 7.4 FL (ref 5–10.5)
POTASSIUM REFLEX MAGNESIUM: 4.5 MMOL/L (ref 3.5–5.1)
RBC # BLD: 3.31 M/UL (ref 4.2–5.9)
SODIUM BLD-SCNC: 134 MMOL/L (ref 136–145)
WBC # BLD: 4.2 K/UL (ref 4–11)

## 2021-05-25 PROCEDURE — 3600000009 HC SURGERY ROBOT BASE: Performed by: SURGERY

## 2021-05-25 PROCEDURE — 51701 INSERT BLADDER CATHETER: CPT

## 2021-05-25 PROCEDURE — 2720000010 HC SURG SUPPLY STERILE: Performed by: SURGERY

## 2021-05-25 PROCEDURE — 2580000003 HC RX 258: Performed by: ANESTHESIOLOGY

## 2021-05-25 PROCEDURE — 80048 BASIC METABOLIC PNL TOTAL CA: CPT

## 2021-05-25 PROCEDURE — 2500000003 HC RX 250 WO HCPCS: Performed by: SURGERY

## 2021-05-25 PROCEDURE — 6360000002 HC RX W HCPCS: Performed by: NURSE ANESTHETIST, CERTIFIED REGISTERED

## 2021-05-25 PROCEDURE — 6360000002 HC RX W HCPCS: Performed by: SURGERY

## 2021-05-25 PROCEDURE — 3600000019 HC SURGERY ROBOT ADDTL 15MIN: Performed by: SURGERY

## 2021-05-25 PROCEDURE — 7100000001 HC PACU RECOVERY - ADDTL 15 MIN: Performed by: SURGERY

## 2021-05-25 PROCEDURE — 86900 BLOOD TYPING SEROLOGIC ABO: CPT

## 2021-05-25 PROCEDURE — 6360000002 HC RX W HCPCS: Performed by: ANESTHESIOLOGY

## 2021-05-25 PROCEDURE — 2709999900 HC NON-CHARGEABLE SUPPLY: Performed by: SURGERY

## 2021-05-25 PROCEDURE — 71045 X-RAY EXAM CHEST 1 VIEW: CPT

## 2021-05-25 PROCEDURE — 2580000003 HC RX 258: Performed by: STUDENT IN AN ORGANIZED HEALTH CARE EDUCATION/TRAINING PROGRAM

## 2021-05-25 PROCEDURE — 85025 COMPLETE CBC W/AUTO DIFF WBC: CPT

## 2021-05-25 PROCEDURE — 43282 LAP PARAESOPH HER RPR W/MESH: CPT | Performed by: SURGERY

## 2021-05-25 PROCEDURE — 3700000001 HC ADD 15 MINUTES (ANESTHESIA): Performed by: SURGERY

## 2021-05-25 PROCEDURE — 1200000000 HC SEMI PRIVATE

## 2021-05-25 PROCEDURE — C1781 MESH (IMPLANTABLE): HCPCS | Performed by: SURGERY

## 2021-05-25 PROCEDURE — 2500000003 HC RX 250 WO HCPCS

## 2021-05-25 PROCEDURE — 88302 TISSUE EXAM BY PATHOLOGIST: CPT

## 2021-05-25 PROCEDURE — S2900 ROBOTIC SURGICAL SYSTEM: HCPCS | Performed by: SURGERY

## 2021-05-25 PROCEDURE — 86850 RBC ANTIBODY SCREEN: CPT

## 2021-05-25 PROCEDURE — 3700000000 HC ANESTHESIA ATTENDED CARE: Performed by: SURGERY

## 2021-05-25 PROCEDURE — 51798 US URINE CAPACITY MEASURE: CPT

## 2021-05-25 PROCEDURE — 86901 BLOOD TYPING SEROLOGIC RH(D): CPT

## 2021-05-25 PROCEDURE — 7100000000 HC PACU RECOVERY - FIRST 15 MIN: Performed by: SURGERY

## 2021-05-25 PROCEDURE — 6370000000 HC RX 637 (ALT 250 FOR IP): Performed by: SURGERY

## 2021-05-25 PROCEDURE — 2500000003 HC RX 250 WO HCPCS: Performed by: NURSE ANESTHETIST, CERTIFIED REGISTERED

## 2021-05-25 DEVICE — MESH HERN W7XL10CM SYN TISS REINF BIOABSRB DISP BIO-A: Type: IMPLANTABLE DEVICE | Site: ABDOMEN | Status: FUNCTIONAL

## 2021-05-25 RX ORDER — SODIUM CHLORIDE 9 MG/ML
25 INJECTION, SOLUTION INTRAVENOUS PRN
Status: DISCONTINUED | OUTPATIENT
Start: 2021-05-25 | End: 2021-05-28 | Stop reason: HOSPADM

## 2021-05-25 RX ORDER — FENTANYL CITRATE 50 UG/ML
INJECTION, SOLUTION INTRAMUSCULAR; INTRAVENOUS PRN
Status: DISCONTINUED | OUTPATIENT
Start: 2021-05-25 | End: 2021-05-25 | Stop reason: SDUPTHER

## 2021-05-25 RX ORDER — MORPHINE SULFATE 4 MG/ML
4 INJECTION, SOLUTION INTRAMUSCULAR; INTRAVENOUS
Status: DISCONTINUED | OUTPATIENT
Start: 2021-05-25 | End: 2021-05-28 | Stop reason: HOSPADM

## 2021-05-25 RX ORDER — SODIUM CHLORIDE 9 MG/ML
25 INJECTION, SOLUTION INTRAVENOUS PRN
Status: DISCONTINUED | OUTPATIENT
Start: 2021-05-25 | End: 2021-05-25

## 2021-05-25 RX ORDER — LIDOCAINE HYDROCHLORIDE 10 MG/ML
0.3 INJECTION, SOLUTION EPIDURAL; INFILTRATION; INTRACAUDAL; PERINEURAL
Status: DISCONTINUED | OUTPATIENT
Start: 2021-05-25 | End: 2021-05-25

## 2021-05-25 RX ORDER — OXYCODONE HYDROCHLORIDE AND ACETAMINOPHEN 5; 325 MG/1; MG/1
1 TABLET ORAL PRN
Status: DISCONTINUED | OUTPATIENT
Start: 2021-05-25 | End: 2021-05-25

## 2021-05-25 RX ORDER — ROCURONIUM BROMIDE 10 MG/ML
INJECTION, SOLUTION INTRAVENOUS PRN
Status: DISCONTINUED | OUTPATIENT
Start: 2021-05-25 | End: 2021-05-25 | Stop reason: SDUPTHER

## 2021-05-25 RX ORDER — SODIUM CHLORIDE 0.9 % (FLUSH) 0.9 %
5-40 SYRINGE (ML) INJECTION EVERY 12 HOURS SCHEDULED
Status: DISCONTINUED | OUTPATIENT
Start: 2021-05-25 | End: 2021-05-25

## 2021-05-25 RX ORDER — SODIUM CHLORIDE 9 MG/ML
INJECTION, SOLUTION INTRAVENOUS CONTINUOUS
Status: DISCONTINUED | OUTPATIENT
Start: 2021-05-25 | End: 2021-05-28

## 2021-05-25 RX ORDER — SODIUM CHLORIDE 0.9 % (FLUSH) 0.9 %
5-40 SYRINGE (ML) INJECTION PRN
Status: DISCONTINUED | OUTPATIENT
Start: 2021-05-25 | End: 2021-05-25

## 2021-05-25 RX ORDER — ONDANSETRON 4 MG/1
4 TABLET, ORALLY DISINTEGRATING ORAL EVERY 8 HOURS PRN
Status: DISCONTINUED | OUTPATIENT
Start: 2021-05-25 | End: 2021-05-28 | Stop reason: HOSPADM

## 2021-05-25 RX ORDER — BUPIVACAINE HYDROCHLORIDE 5 MG/ML
INJECTION, SOLUTION EPIDURAL; INTRACAUDAL PRN
Status: DISCONTINUED | OUTPATIENT
Start: 2021-05-25 | End: 2021-05-25 | Stop reason: ALTCHOICE

## 2021-05-25 RX ORDER — LEVOTHYROXINE SODIUM ANHYDROUS 100 UG/5ML
75 INJECTION, POWDER, LYOPHILIZED, FOR SOLUTION INTRAVENOUS DAILY
Status: DISCONTINUED | OUTPATIENT
Start: 2021-05-26 | End: 2021-05-28 | Stop reason: HOSPADM

## 2021-05-25 RX ORDER — ONDANSETRON 2 MG/ML
4 INJECTION INTRAMUSCULAR; INTRAVENOUS EVERY 10 MIN PRN
Status: DISCONTINUED | OUTPATIENT
Start: 2021-05-25 | End: 2021-05-25 | Stop reason: HOSPADM

## 2021-05-25 RX ORDER — MORPHINE SULFATE 2 MG/ML
1 INJECTION, SOLUTION INTRAMUSCULAR; INTRAVENOUS EVERY 5 MIN PRN
Status: DISCONTINUED | OUTPATIENT
Start: 2021-05-25 | End: 2021-05-25

## 2021-05-25 RX ORDER — HYDRALAZINE HYDROCHLORIDE 20 MG/ML
5 INJECTION INTRAMUSCULAR; INTRAVENOUS EVERY 10 MIN PRN
Status: DISCONTINUED | OUTPATIENT
Start: 2021-05-25 | End: 2021-05-25 | Stop reason: HOSPADM

## 2021-05-25 RX ORDER — ONDANSETRON 2 MG/ML
INJECTION INTRAMUSCULAR; INTRAVENOUS PRN
Status: DISCONTINUED | OUTPATIENT
Start: 2021-05-25 | End: 2021-05-25 | Stop reason: SDUPTHER

## 2021-05-25 RX ORDER — OXYCODONE HYDROCHLORIDE AND ACETAMINOPHEN 5; 325 MG/1; MG/1
1 TABLET ORAL PRN
Status: DISCONTINUED | OUTPATIENT
Start: 2021-05-25 | End: 2021-05-25 | Stop reason: HOSPADM

## 2021-05-25 RX ORDER — MEPERIDINE HYDROCHLORIDE 50 MG/ML
12.5 INJECTION INTRAMUSCULAR; INTRAVENOUS; SUBCUTANEOUS EVERY 5 MIN PRN
Status: DISCONTINUED | OUTPATIENT
Start: 2021-05-25 | End: 2021-05-25

## 2021-05-25 RX ORDER — MORPHINE SULFATE 2 MG/ML
2 INJECTION, SOLUTION INTRAMUSCULAR; INTRAVENOUS
Status: DISCONTINUED | OUTPATIENT
Start: 2021-05-25 | End: 2021-05-28 | Stop reason: HOSPADM

## 2021-05-25 RX ORDER — MORPHINE SULFATE 2 MG/ML
2 INJECTION, SOLUTION INTRAMUSCULAR; INTRAVENOUS EVERY 5 MIN PRN
Status: DISCONTINUED | OUTPATIENT
Start: 2021-05-25 | End: 2021-05-25

## 2021-05-25 RX ORDER — ONDANSETRON 2 MG/ML
4 INJECTION INTRAMUSCULAR; INTRAVENOUS EVERY 6 HOURS PRN
Status: DISCONTINUED | OUTPATIENT
Start: 2021-05-25 | End: 2021-05-28 | Stop reason: HOSPADM

## 2021-05-25 RX ORDER — SODIUM CHLORIDE, SODIUM LACTATE, POTASSIUM CHLORIDE, CALCIUM CHLORIDE 600; 310; 30; 20 MG/100ML; MG/100ML; MG/100ML; MG/100ML
INJECTION, SOLUTION INTRAVENOUS CONTINUOUS
Status: DISCONTINUED | OUTPATIENT
Start: 2021-05-25 | End: 2021-05-25

## 2021-05-25 RX ORDER — ONDANSETRON 2 MG/ML
4 INJECTION INTRAMUSCULAR; INTRAVENOUS
Status: DISCONTINUED | OUTPATIENT
Start: 2021-05-25 | End: 2021-05-25

## 2021-05-25 RX ORDER — OXYCODONE HYDROCHLORIDE AND ACETAMINOPHEN 5; 325 MG/1; MG/1
2 TABLET ORAL PRN
Status: DISCONTINUED | OUTPATIENT
Start: 2021-05-25 | End: 2021-05-25 | Stop reason: HOSPADM

## 2021-05-25 RX ORDER — OXYCODONE HYDROCHLORIDE AND ACETAMINOPHEN 5; 325 MG/1; MG/1
2 TABLET ORAL PRN
Status: DISCONTINUED | OUTPATIENT
Start: 2021-05-25 | End: 2021-05-25

## 2021-05-25 RX ORDER — SODIUM CHLORIDE 0.9 % (FLUSH) 0.9 %
5-40 SYRINGE (ML) INJECTION EVERY 12 HOURS SCHEDULED
Status: DISCONTINUED | OUTPATIENT
Start: 2021-05-25 | End: 2021-05-28 | Stop reason: HOSPADM

## 2021-05-25 RX ORDER — MEPERIDINE HYDROCHLORIDE 50 MG/ML
12.5 INJECTION INTRAMUSCULAR; INTRAVENOUS; SUBCUTANEOUS EVERY 5 MIN PRN
Status: DISCONTINUED | OUTPATIENT
Start: 2021-05-25 | End: 2021-05-25 | Stop reason: HOSPADM

## 2021-05-25 RX ORDER — SODIUM CHLORIDE 0.9 % (FLUSH) 0.9 %
5-40 SYRINGE (ML) INJECTION PRN
Status: DISCONTINUED | OUTPATIENT
Start: 2021-05-25 | End: 2021-05-28 | Stop reason: HOSPADM

## 2021-05-25 RX ORDER — LABETALOL HYDROCHLORIDE 5 MG/ML
5 INJECTION, SOLUTION INTRAVENOUS EVERY 10 MIN PRN
Status: DISCONTINUED | OUTPATIENT
Start: 2021-05-25 | End: 2021-05-25 | Stop reason: HOSPADM

## 2021-05-25 RX ORDER — PROPOFOL 10 MG/ML
INJECTION, EMULSION INTRAVENOUS PRN
Status: DISCONTINUED | OUTPATIENT
Start: 2021-05-25 | End: 2021-05-25 | Stop reason: SDUPTHER

## 2021-05-25 RX ORDER — DEXAMETHASONE SODIUM PHOSPHATE 4 MG/ML
INJECTION, SOLUTION INTRA-ARTICULAR; INTRALESIONAL; INTRAMUSCULAR; INTRAVENOUS; SOFT TISSUE PRN
Status: DISCONTINUED | OUTPATIENT
Start: 2021-05-25 | End: 2021-05-25 | Stop reason: SDUPTHER

## 2021-05-25 RX ORDER — LIDOCAINE HYDROCHLORIDE 10 MG/ML
INJECTION, SOLUTION INFILTRATION; PERINEURAL PRN
Status: DISCONTINUED | OUTPATIENT
Start: 2021-05-25 | End: 2021-05-25 | Stop reason: SDUPTHER

## 2021-05-25 RX ORDER — EPHEDRINE SULFATE 50 MG/ML
INJECTION INTRAVENOUS PRN
Status: DISCONTINUED | OUTPATIENT
Start: 2021-05-25 | End: 2021-05-25 | Stop reason: SDUPTHER

## 2021-05-25 RX ADMIN — CEFAZOLIN 2000 MG: 10 INJECTION, POWDER, FOR SOLUTION INTRAVENOUS at 07:40

## 2021-05-25 RX ADMIN — FENTANYL CITRATE 50 MCG: 50 INJECTION INTRAMUSCULAR; INTRAVENOUS at 12:39

## 2021-05-25 RX ADMIN — EPHEDRINE SULFATE 10 MG: 50 INJECTION INTRAVENOUS at 07:59

## 2021-05-25 RX ADMIN — FENTANYL CITRATE 50 MCG: 50 INJECTION INTRAMUSCULAR; INTRAVENOUS at 07:55

## 2021-05-25 RX ADMIN — CEFAZOLIN 1 G: 10 INJECTION, POWDER, FOR SOLUTION INTRAVENOUS at 11:30

## 2021-05-25 RX ADMIN — SUGAMMADEX 200 MG: 100 INJECTION, SOLUTION INTRAVENOUS at 12:39

## 2021-05-25 RX ADMIN — LIDOCAINE HYDROCHLORIDE 60 MG: 10 INJECTION, SOLUTION INFILTRATION; PERINEURAL at 07:36

## 2021-05-25 RX ADMIN — DEXAMETHASONE SODIUM PHOSPHATE 8 MG: 4 INJECTION, SOLUTION INTRAMUSCULAR; INTRAVENOUS at 07:51

## 2021-05-25 RX ADMIN — SODIUM CHLORIDE, POTASSIUM CHLORIDE, SODIUM LACTATE AND CALCIUM CHLORIDE: 600; 310; 30; 20 INJECTION, SOLUTION INTRAVENOUS at 07:22

## 2021-05-25 RX ADMIN — ROCURONIUM BROMIDE 25 MG: 10 SOLUTION INTRAVENOUS at 08:19

## 2021-05-25 RX ADMIN — ONDANSETRON 4 MG: 2 INJECTION INTRAMUSCULAR; INTRAVENOUS at 07:51

## 2021-05-25 RX ADMIN — MEPERIDINE HYDROCHLORIDE 12.5 MG: 50 INJECTION, SOLUTION INTRAMUSCULAR; INTRAVENOUS; SUBCUTANEOUS at 13:09

## 2021-05-25 RX ADMIN — SODIUM CHLORIDE, POTASSIUM CHLORIDE, SODIUM LACTATE AND CALCIUM CHLORIDE: 600; 310; 30; 20 INJECTION, SOLUTION INTRAVENOUS at 07:31

## 2021-05-25 RX ADMIN — SODIUM CHLORIDE: 9 INJECTION, SOLUTION INTRAVENOUS at 13:24

## 2021-05-25 RX ADMIN — PROPOFOL 150 MG: 10 INJECTION, EMULSION INTRAVENOUS at 07:36

## 2021-05-25 RX ADMIN — FAMOTIDINE 20 MG: 10 INJECTION, SOLUTION INTRAVENOUS at 07:20

## 2021-05-25 RX ADMIN — ROCURONIUM BROMIDE 50 MG: 10 SOLUTION INTRAVENOUS at 07:36

## 2021-05-25 RX ADMIN — SODIUM CHLORIDE, POTASSIUM CHLORIDE, SODIUM LACTATE AND CALCIUM CHLORIDE: 600; 310; 30; 20 INJECTION, SOLUTION INTRAVENOUS at 08:39

## 2021-05-25 RX ADMIN — ROCURONIUM BROMIDE 25 MG: 10 SOLUTION INTRAVENOUS at 11:19

## 2021-05-25 RX ADMIN — PHENOL 1 SPRAY: 1.5 LIQUID ORAL at 18:27

## 2021-05-25 RX ADMIN — HYDRALAZINE HYDROCHLORIDE 5 MG: 20 INJECTION INTRAMUSCULAR; INTRAVENOUS at 13:26

## 2021-05-25 ASSESSMENT — PULMONARY FUNCTION TESTS
PIF_VALUE: 18
PIF_VALUE: 19
PIF_VALUE: 18
PIF_VALUE: 18
PIF_VALUE: 13
PIF_VALUE: 19
PIF_VALUE: 20
PIF_VALUE: 18
PIF_VALUE: 18
PIF_VALUE: 19
PIF_VALUE: 4
PIF_VALUE: 18
PIF_VALUE: 13
PIF_VALUE: 18
PIF_VALUE: 17
PIF_VALUE: 20
PIF_VALUE: 19
PIF_VALUE: 14
PIF_VALUE: 18
PIF_VALUE: 18
PIF_VALUE: 14
PIF_VALUE: 13
PIF_VALUE: 0
PIF_VALUE: 19
PIF_VALUE: 14
PIF_VALUE: 18
PIF_VALUE: 20
PIF_VALUE: 18
PIF_VALUE: 19
PIF_VALUE: 19
PIF_VALUE: 18
PIF_VALUE: 12
PIF_VALUE: 18
PIF_VALUE: 13
PIF_VALUE: 13
PIF_VALUE: 19
PIF_VALUE: 18
PIF_VALUE: 18
PIF_VALUE: 19
PIF_VALUE: 20
PIF_VALUE: 11
PIF_VALUE: 20
PIF_VALUE: 18
PIF_VALUE: 19
PIF_VALUE: 18
PIF_VALUE: 18
PIF_VALUE: 12
PIF_VALUE: 18
PIF_VALUE: 19
PIF_VALUE: 20
PIF_VALUE: 19
PIF_VALUE: 20
PIF_VALUE: 19
PIF_VALUE: 18
PIF_VALUE: 19
PIF_VALUE: 19
PIF_VALUE: 12
PIF_VALUE: 18
PIF_VALUE: 17
PIF_VALUE: 18
PIF_VALUE: 17
PIF_VALUE: 19
PIF_VALUE: 14
PIF_VALUE: 14
PIF_VALUE: 19
PIF_VALUE: 20
PIF_VALUE: 19
PIF_VALUE: 20
PIF_VALUE: 18
PIF_VALUE: 18
PIF_VALUE: 16
PIF_VALUE: 18
PIF_VALUE: 19
PIF_VALUE: 19
PIF_VALUE: 17
PIF_VALUE: 18
PIF_VALUE: 14
PIF_VALUE: 13
PIF_VALUE: 18
PIF_VALUE: 18
PIF_VALUE: 19
PIF_VALUE: 13
PIF_VALUE: 18
PIF_VALUE: 20
PIF_VALUE: 17
PIF_VALUE: 18
PIF_VALUE: 18
PIF_VALUE: 19
PIF_VALUE: 19
PIF_VALUE: 1
PIF_VALUE: 13
PIF_VALUE: 18
PIF_VALUE: 19
PIF_VALUE: 14
PIF_VALUE: 12
PIF_VALUE: 20
PIF_VALUE: 20
PIF_VALUE: 19
PIF_VALUE: 19
PIF_VALUE: 13
PIF_VALUE: 19
PIF_VALUE: 2
PIF_VALUE: 18
PIF_VALUE: 1
PIF_VALUE: 19
PIF_VALUE: 13
PIF_VALUE: 20
PIF_VALUE: 18
PIF_VALUE: 20
PIF_VALUE: 1
PIF_VALUE: 18
PIF_VALUE: 18
PIF_VALUE: 4
PIF_VALUE: 20
PIF_VALUE: 20
PIF_VALUE: 18
PIF_VALUE: 19
PIF_VALUE: 17
PIF_VALUE: 18
PIF_VALUE: 19
PIF_VALUE: 19
PIF_VALUE: 14
PIF_VALUE: 19
PIF_VALUE: 18
PIF_VALUE: 19
PIF_VALUE: 13
PIF_VALUE: 19
PIF_VALUE: 19
PIF_VALUE: 18
PIF_VALUE: 20
PIF_VALUE: 19
PIF_VALUE: 18
PIF_VALUE: 13
PIF_VALUE: 19
PIF_VALUE: 20
PIF_VALUE: 18
PIF_VALUE: 12
PIF_VALUE: 20
PIF_VALUE: 18
PIF_VALUE: 19
PIF_VALUE: 18
PIF_VALUE: 18
PIF_VALUE: 19
PIF_VALUE: 14
PIF_VALUE: 3
PIF_VALUE: 19
PIF_VALUE: 19
PIF_VALUE: 13
PIF_VALUE: 17
PIF_VALUE: 14
PIF_VALUE: 20
PIF_VALUE: 11

## 2021-05-25 ASSESSMENT — PAIN SCALES - GENERAL
PAINLEVEL_OUTOF10: 0
PAINLEVEL_OUTOF10: 0

## 2021-05-25 ASSESSMENT — ENCOUNTER SYMPTOMS: SHORTNESS OF BREATH: 0

## 2021-05-25 ASSESSMENT — LIFESTYLE VARIABLES: SMOKING_STATUS: 0

## 2021-05-25 NOTE — ANESTHESIA POSTPROCEDURE EVALUATION
Department of Anesthesiology  Postprocedure Note    Patient: Nikki Dick  MRN: [id number]  YOB: 1940  Date of evaluation: 5/25/2021  Time:  1:22 PM     Procedure Summary     Date: 05/25/21 Room / Location: [address] [place] [address]    Anesthesia Start: 0725 Anesthesia Stop: 1250    Procedure: ROBOTIC NISSEN FUNDOPLICATION (N/A Abdomen) Diagnosis:       Hiatal hernia      (HIATEL HERNIA)    Surgeons: Danielle Grover MD Responsible Provider: Elizabeth Tejada MD    Anesthesia Type: general ASA Status: 3          Anesthesia Type: general    Noe Phase I: Noe Score: 8    Noe Phase II:      Last vitals: Reviewed and per EMR flowsheets.      Vitals:    05/10/21 1616 05/25/21 0705 05/25/21 1254   BP:  (!) 113/58 (!) 160/78   Pulse:  66 98   Resp:  16 21   Temp:  98.2 °F (36.8 °C) 96.8 °F (36 °C)   TempSrc:  Temporal Temporal   SpO2:  96% 96%   Weight: 173 lb (78.5 kg) 171 lb (77.6 kg)    Height: 5' 11\" (1.803 m) 5' 11\" (1.803 m)      Anesthesia Post Evaluation    Patient location during evaluation: bedside  Patient participation: complete - patient participated  Level of consciousness: awake and alert  Airway patency: patent  Nausea & Vomiting: no nausea  Complications: no  Cardiovascular status: hemodynamically stable  Respiratory status: acceptable  Hydration status: euvolemic

## 2021-05-25 NOTE — BRIEF OP NOTE
Brief Postoperative Note      Patient: Tara Selfelor  YOB: 1940  MRN: [id number]    Date of Procedure: 5/25/2021    Pre-Op Diagnosis: HIATEL HERNIA    Post-Op Diagnosis: Same       Procedure(s):  ROBOTIC NISSEN FUNDOPLICATION    Surgeon(s):  MD Mer Figueroa MD, PGY-4 Resident    Assistant:  Surgical Assistant: Albert Milton  First Assistant: Petrona Cisneros RN    Anesthesia: General    Estimated Blood Loss (mL): Minimal    Complications: None    Specimens:   ID Type Source Tests Collected by Time Destination   A : HIATAL HERNIA [facility] Tissue Tissue SURGICAL PATHOLOGY Kelley Ryan MD 5/25/2021 1206        Implants:  Implant Name Type Inv.  Item Serial No.  Lot No. LRB No. Used Action   MESH BEATRIZ [id number] SYN TISS REINF Inderjit Gallop - [id number]  [address] SYN TISS REINF BIO[employer]SRB DISP BIO-A [id number]  [employer]  N/A 1 Implanted         Drains: * No LDAs found *    Findings: Large hiatal hernia containing stomach and colon    Electronically signed by Mer Girno MD on 5/25/2021 at 12:08 PM

## 2021-05-25 NOTE — PROGRESS NOTES
Patient straight cath, tolerating sterile procedure well, clear/yellow urine, 500ML of urine returned. Patient unable to urinate on his own x 3 attempts. Telephone order received from  [facility].

## 2021-05-25 NOTE — ANESTHESIA PRE PROCEDURE
Department of Anesthesiology  Preprocedure Note       Name:  Perla Burrows   Age:  79 y.o.  :  1940                                          MRN:  [id number]         Date:  2021      Surgeon: Cruzito Brand):  Tess Bess MD    Procedure: Procedure(s):  ROBOTIC NISSEN FUNDOPLICATION, POSSIBLE OPEN PROCEDURE    Medications prior to admission:   Prior to Admission medications    Medication Sig Start Date End Date Taking? Authorizing Provider   docusate sodium (COLACE, DULCOLAX) 100 MG CAPS Take 100 mg by mouth 2 times daily  Patient taking differently: Take 100 mg by mouth 2 times daily as needed  21   Amy Israel MD   polyethylene glycol (GLYCOLAX) 17 g packet Take 17 g by mouth 2 times daily  Patient taking differently: Take 17 g by mouth 2 times daily as needed  21  Amy Israel MD   levothyroxine (SYNTHROID) 150 MCG tablet TAKE 1 TABLET BY MOUTH  DAILY 10/16/20   VALE Wolfe CNP   atorvastatin (LIPITOR) 80 MG tablet TAKE 1 TABLET BY MOUTH  EVERY DAY  Patient taking differently: Take 80 mg by mouth nightly TAKE 1 TABLET BY MOUTH EVERY DAY 20   Mandy Parikh MD   furosemide (LASIX) 20 MG tablet Take 1 tablet by mouth 2 times daily 19  VALE Wolfe CNP   ferrous sulfate (FE TABS) 325 (65 Fe) MG EC tablet Take 1 tablet by mouth daily (with breakfast) 19   VALE Wolfe CNP   azelastine (ASTELIN) 0.1 % nasal spray 2 sprays by Nasal route 2 times daily  Patient taking differently: 2 sprays by Nasal route 2 times daily as needed  19   Janina Ayers MD   enalapril (VASOTEC) 10 MG tablet Take 10 mg by mouth daily  18   Historical Provider, MD   cetirizine (ZYRTEC) 10 MG tablet Take 10 mg by mouth daily. Historical Provider, MD   Multiple Vitamin (THERA) TABS Take 1 tablet by mouth daily 1 Tab daily.     Historical Provider, MD   Glucosamine-Chondroitin (GLUCOSAMINE CHONDR COMPLEX PO) Take 1 tablet by mouth daily     Historical Provider, MD   aspirin 81 MG chewable tablet Take 81 mg by mouth daily     Historical Provider, MD   Coenzyme Q-10 100 MG CAPS Take 1 capsule by mouth daily. Historical Provider, MD       Current medications:    Current Facility-Administered Medications   Medication Dose Route Frequency Provider Last Rate Last Admin    CEFAZOLIN 2000 MG D5W 100 ML IVPB IVPB             famotidine (PEPCID) 20 MG/2ML injection             lactated ringers infusion   Intravenous Continuous Capri Mcclendon MD        sodium chloride flush 0.9 % injection 5-40 mL  5-40 mL Intravenous 2 times per day Capri Mcclendon MD        sodium chloride flush 0.9 % injection 5-40 mL  5-40 mL Intravenous PRN Capri Mcclendon MD        0.9 % sodium chloride infusion  25 mL Intravenous PRN Capri Mcclendon MD        lidocaine PF 1 % injection 0.3 mL  0.3 mL Intradermal Once PRN Capri Mcclendon MD        lactated ringers infusion   Intravenous Continuous Capri Mcclendon MD        sodium chloride flush 0.9 % injection 5-40 mL  5-40 mL Intravenous 2 times per day Capri Mcclendon MD        sodium chloride flush 0.9 % injection 5-40 mL  5-40 mL Intravenous PRN Capri Mcclendon MD        0.9 % sodium chloride infusion  25 mL Intravenous PRN Capri Mcclendon MD        lidocaine PF 1 % injection 0.3 mL  0.3 mL Intradermal Once PRN Capri Mcclendon MD        sodium chloride flush 0.9 % injection 5-40 mL  5-40 mL Intravenous 2 times per day Jayleen Rivera MD        sodium chloride flush 0.9 % injection 5-40 mL  5-40 mL Intravenous PRN Jayleen Rivera MD        0.9 % sodium chloride infusion  25 mL Intravenous PRN Jayleen Rivera MD        ceFAZolin (ANCEF) 2000 mg in dextrose 5 % 100 mL IVPB  2,000 mg Intravenous On Call to [address] [place] [address], MD           Allergies:     Allergies   Allergen Reactions    Reopro [Abciximab Injection]      \"destroyed platelets\"    Chocolate      sneezing    Coffee Bean Extract [Coffea Arabica]      sneezing       Problem List:    Patient Active Problem List   Diagnosis Code    Generalized weakness R53.1    Hypothyroid E03.9    Food allergy Z91.018    CAD (coronary artery disease) I25.10    TIA (transient ischemic attack) G45.9    Hyperlipidemia E78.5    Osteoarthritis M19.90    Cough R05    Herpes zoster B02.9    Vertigo R42    Eustachian tube dysfunction H69.80    Aortic stenosis I35.0    Unilateral recurrent inguinal hernia without obstruction or gangrene K40.91    Murmur R01.1    Obesity E66.9    Occlusion and stenosis of carotid artery I65.29    Stented coronary artery Z95.5    Groin pain, chronic, right R10.31, G89.29    Chronic systolic congestive heart failure (HCC) I50.22    Non-recurrent unilateral inguinal hernia without obstruction or gangrene K40.90    Postoperative pain G89.18    Postoperative hematoma of subcutaneous tissue following non-dermatologic procedure L76.32    SBO (small bowel obstruction) ([address] K56.609    Hiatal hernia K44.9       Past Medical History:        Diagnosis Date    Allergic rhinitis     Anemia     Arthritis     rt hip    CAD (coronary artery disease) 2001    cardiac stents    Chronic systolic congestive heart failure ([address] 8/21/2018    Compression fracture of T11 vertebra (HCC)     back brace    Food allergy     Hiatal hernia     History of blood transfusion     platelets=2001    Hyperlipidemia     Hypertension     hx of    Hypothyroid     Obesity 10/2/2012    Occlusion and stenosis of carotid artery 10/2/2012    Osteoarthritis     Hip right    Recurrent right inguinal hernia     Shingles 2014    Thyroid disease     TIA (transient ischemic attack)        Past Surgical History:        Procedure Laterality Date    AORTIC VALVE REPLACEMENT  2018    CARDIAC SURGERY  2001    stents    CARDIAC VALVE REPLACEMENT  09/15/2018    COLONOSCOPY  04/22/99    COLONOSCOPY  7/13/2015    EYE SURGERY      victorino cataracts    HERNIA REPAIR Left 2020    ROBOTIC LEFT INGUINAL HERNIA REPAIR WITH MESH performed by Cristian Corcoran MD at [address] Right 2016    laparoscopic right inguinal hernia repair with mesh    INGUINAL HERNIA REPAIR Right 2017    davinci recurrent right inguinal hernia repair with mesh    PTCA  2018    SIGMOIDOSCOPY  1994    TONSILLECTOMY      UPPER GASTROINTESTINAL ENDOSCOPY  94    UPPER GASTROINTESTINAL ENDOSCOPY  04    Gastritis and duodenitis       Social History:    Social History     Tobacco Use    Smoking status: Former Smoker     Packs/day: 0.50     Years: 15.00     Pack years: 7.50     Start date:      Quit date: 1995     Years since quittin.6    Smokeless tobacco: Never Used   Substance Use Topics    Alcohol use: No                                Counseling given: Not Answered      Vital Signs (Current):   Vitals:    05/10/21 1616   Weight: 173 lb (78.5 kg)   Height: 5' 11\" (1.803 m)                                              BP Readings from Last 3 Encounters:   21 (!) 144/64   20 (!) 101/49   20 132/76       NPO Status:  mn+, see mar for am meds                                                                               BMI:   Wt Readings from Last 3 Encounters:   05/10/21 173 lb (78.5 kg)   21 171 lb (77.6 kg)   20 169 lb (76.7 kg)     Body mass index is 24.13 kg/m².     CBC:   Lab Results   Component Value Date    WBC 4.7 2021    RBC 3.49 2021    HGB 11.7 2021    HCT 33.6 2021    MCV 96.4 2021    RDW 14.2 2021     2021       CMP:   Lab Results   Component Value Date     2021    K 4.0 2021    K 4.2 2021    CL 98 2021    CO2 27 2021    BUN 5 2021    CREATININE <0.5 2021    GFRAA >60 2021    GFRAA >60 2013    AGRATIO 1.6 2021    LABGLOM >60 2021    LABGLOM 79 2014    GLUCOSE 97 05/07/2021    PROT 6.3 05/06/2021    PROT 7.1 03/02/2013    CALCIUM 8.8 05/07/2021    BILITOT 0.8 05/06/2021    ALKPHOS 73 05/06/2021    AST 23 05/06/2021    ALT 20 05/06/2021       POC Tests: No results for input(s): POCGLU, POCNA, POCK, POCCL, POCBUN, POCHEMO, POCHCT in the last 72 hours. Coags:   Lab Results   Component Value Date    PROTIME 11.4 03/02/2013    INR 1.00 03/02/2013    APTT 28.1 03/02/2013       HCG (If Applicable): No results found for: PREGTESTUR, PREGSERUM, HCG, HCGQUANT     ABGs: No results found for: PHART, PO2ART, EAA5OLN, CLO1MFI, BEART, V8DWDWTJ     Type & Screen (If Applicable):  No results found for: LABABO, LABRH    Drug/Infectious Status (If Applicable):  No results found for: HIV, HEPCAB    COVID-19 Screening (If Applicable):   Lab Results   Component Value Date    COVID19 Not Detected 05/11/2021           Anesthesia Evaluation  Patient summary reviewed no history of anesthetic complications:   Airway: Mallampati: II  TM distance: >3 FB   Neck ROM: limited  Mouth opening: > = 3 FB Dental:          Pulmonary:Negative Pulmonary ROS breath sounds clear to auscultation      (-) COPD, asthma, shortness of breath, recent URI, sleep apnea and not a current smoker          Patient did not smoke on day of surgery.                  Cardiovascular:    (+) hypertension:, valvular problems/murmurs (s/p tavr, 2018): AS, CAD: obstructive, CABG/stent (multiple stents(6), between 2004-14, stable):, dysrhythmias (pacs/pvcs):, hyperlipidemia    (-) pacemaker, past MI,  angina and  CHF      Rhythm: irregular  Rate: normal           Beta Blocker:  Not on Beta Blocker         Neuro/Psych:   (+) neuromuscular disease (vertigo):, TIA (distant, no residual issues),    (-) seizures and CVA           GI/Hepatic/Renal:   (+) hiatal hernia, GERD: poorly controlled,      (-) liver disease, no renal disease, bowel prep and no morbid obesity       Endo/Other:    (+) hypothyroidism: arthritis:., no malignancy/cancer. (-) diabetes mellitus, blood dyscrasia, no malignancy/cancer               Abdominal:           Vascular: negative vascular ROS. Anesthesia Plan      general     ASA 3     (2 piv / hood)  Induction: intravenous. MIPS: Postoperative opioids intended and Prophylactic antiemetics administered. Anesthetic plan and risks discussed with patient. Use of blood products discussed with patient whom consented to blood products. Plan discussed with CRNA. This pre-anesthesia assessment may be used as a history and physical.    DOS STAFF ADDENDUM:    Pt seen and examined, chart reviewed (including anesthesia, drug and allergy history). No interval changes to history and physical examination. Anesthetic plan, risks, benefits, alternatives, and personnel involved discussed with patient. Patient verbalized an understanding and agrees to proceed.       Fredrick Crow MD  May 25, 2021  6:56 AM      Fredrick Crow MD   5/25/2021

## 2021-05-25 NOTE — PROGRESS NOTES
Pt admitted to room 344. Pt A/O x4. VSS. x5 lap incisions all C/D/I with surgical glue. Non skid socks on. NG tube to CLWS. PRN throat spray given to pt. Pt reporting no pain currently just a sore throat, does not need pain medication. SCD's on. Bed locked and in lowest position. Call light and bedside table within reach. Will continue to monitor.

## 2021-05-25 NOTE — PROGRESS NOTES
Patient assisted with urinal, 275ML out without issues. Patient transported with NG tube, IVF, and all personal belongings, stable on room air, to C3, room 344. Patient denies additional needs at this time.

## 2021-05-25 NOTE — H&P
I have reviewed the history and physical and examined the patient. I find no relevant changes. I have reviewed with the patient and/or family members, during the preoperative office visit the risks, benefits, and alternatives to the procedure.     Danna Mosquera MD

## 2021-05-26 LAB
ALBUMIN SERPL-MCNC: 3.2 G/DL (ref 3.4–5)
ANION GAP SERPL CALCULATED.3IONS-SCNC: 5 MMOL/L (ref 3–16)
BASOPHILS ABSOLUTE: 0 K/UL (ref 0–0.2)
BASOPHILS RELATIVE PERCENT: 0.4 %
BUN BLDV-MCNC: 12 MG/DL (ref 7–20)
CALCIUM SERPL-MCNC: 8.5 MG/DL (ref 8.3–10.6)
CHLORIDE BLD-SCNC: 98 MMOL/L (ref 99–110)
CO2: 28 MMOL/L (ref 21–32)
CREAT SERPL-MCNC: <0.5 MG/DL (ref 0.8–1.3)
EOSINOPHILS ABSOLUTE: 0.1 K/UL (ref 0–0.6)
EOSINOPHILS RELATIVE PERCENT: 0.9 %
GFR AFRICAN AMERICAN: >60
GFR NON-AFRICAN AMERICAN: >60
GLUCOSE BLD-MCNC: 100 MG/DL (ref 70–99)
HCT VFR BLD CALC: 30.5 % (ref 40.5–52.5)
HEMOGLOBIN: 10.5 G/DL (ref 13.5–17.5)
LYMPHOCYTES ABSOLUTE: 0.7 K/UL (ref 1–5.1)
LYMPHOCYTES RELATIVE PERCENT: 9.6 %
MAGNESIUM: 1.9 MG/DL (ref 1.8–2.4)
MCH RBC QN AUTO: 33.4 PG (ref 26–34)
MCHC RBC AUTO-ENTMCNC: 34.4 G/DL (ref 31–36)
MCV RBC AUTO: 97.2 FL (ref 80–100)
MONOCYTES ABSOLUTE: 0.4 K/UL (ref 0–1.3)
MONOCYTES RELATIVE PERCENT: 6.1 %
NEUTROPHILS ABSOLUTE: 5.9 K/UL (ref 1.7–7.7)
NEUTROPHILS RELATIVE PERCENT: 83 %
PDW BLD-RTO: 14.7 % (ref 12.4–15.4)
PHOSPHORUS: 3.6 MG/DL (ref 2.5–4.9)
PLATELET # BLD: 112 K/UL (ref 135–450)
PMV BLD AUTO: 6.8 FL (ref 5–10.5)
POTASSIUM SERPL-SCNC: 4.4 MMOL/L (ref 3.5–5.1)
RBC # BLD: 3.13 M/UL (ref 4.2–5.9)
SODIUM BLD-SCNC: 131 MMOL/L (ref 136–145)
WBC # BLD: 7.1 K/UL (ref 4–11)

## 2021-05-26 PROCEDURE — 85025 COMPLETE CBC W/AUTO DIFF WBC: CPT

## 2021-05-26 PROCEDURE — 0DX64Z5 TRANSFER STOMACH TO ESOPHAGUS, PERCUTANEOUS ENDOSCOPIC APPROACH: ICD-10-PCS | Performed by: SURGERY

## 2021-05-26 PROCEDURE — 1200000000 HC SEMI PRIVATE

## 2021-05-26 PROCEDURE — 99024 POSTOP FOLLOW-UP VISIT: CPT | Performed by: SURGERY

## 2021-05-26 PROCEDURE — 2580000003 HC RX 258: Performed by: STUDENT IN AN ORGANIZED HEALTH CARE EDUCATION/TRAINING PROGRAM

## 2021-05-26 PROCEDURE — 83735 ASSAY OF MAGNESIUM: CPT

## 2021-05-26 PROCEDURE — 8E0W4CZ ROBOTIC ASSISTED PROCEDURE OF TRUNK REGION, PERCUTANEOUS ENDOSCOPIC APPROACH: ICD-10-PCS | Performed by: SURGERY

## 2021-05-26 PROCEDURE — 0DQ44ZZ REPAIR ESOPHAGOGASTRIC JUNCTION, PERCUTANEOUS ENDOSCOPIC APPROACH: ICD-10-PCS | Performed by: SURGERY

## 2021-05-26 PROCEDURE — 0BUT4JZ SUPPLEMENT DIAPHRAGM WITH SYNTHETIC SUBSTITUTE, PERCUTANEOUS ENDOSCOPIC APPROACH: ICD-10-PCS | Performed by: SURGERY

## 2021-05-26 PROCEDURE — APPSS45 APP SPLIT SHARED TIME 31-45 MINUTES: Performed by: CLINICAL NURSE SPECIALIST

## 2021-05-26 PROCEDURE — 6360000002 HC RX W HCPCS: Performed by: CLINICAL NURSE SPECIALIST

## 2021-05-26 PROCEDURE — 36415 COLL VENOUS BLD VENIPUNCTURE: CPT

## 2021-05-26 PROCEDURE — 80069 RENAL FUNCTION PANEL: CPT

## 2021-05-26 PROCEDURE — 2500000003 HC RX 250 WO HCPCS: Performed by: STUDENT IN AN ORGANIZED HEALTH CARE EDUCATION/TRAINING PROGRAM

## 2021-05-26 PROCEDURE — C9113 INJ PANTOPRAZOLE SODIUM, VIA: HCPCS | Performed by: CLINICAL NURSE SPECIALIST

## 2021-05-26 RX ORDER — PANTOPRAZOLE SODIUM 40 MG/10ML
40 INJECTION, POWDER, LYOPHILIZED, FOR SOLUTION INTRAVENOUS DAILY
Status: DISCONTINUED | OUTPATIENT
Start: 2021-05-26 | End: 2021-05-28 | Stop reason: HOSPADM

## 2021-05-26 RX ADMIN — Medication 10 ML: at 12:10

## 2021-05-26 RX ADMIN — PANTOPRAZOLE SODIUM 40 MG: 40 INJECTION, POWDER, FOR SOLUTION INTRAVENOUS at 12:10

## 2021-05-26 RX ADMIN — SODIUM CHLORIDE: 9 INJECTION, SOLUTION INTRAVENOUS at 19:40

## 2021-05-26 RX ADMIN — Medication 10 ML: at 21:14

## 2021-05-26 RX ADMIN — LEVOTHYROXINE SODIUM ANHYDROUS 75 MCG: 100 INJECTION, POWDER, LYOPHILIZED, FOR SOLUTION INTRAVENOUS at 05:40

## 2021-05-26 RX ADMIN — SODIUM CHLORIDE: 9 INJECTION, SOLUTION INTRAVENOUS at 08:49

## 2021-05-26 ASSESSMENT — PAIN SCALES - GENERAL: PAINLEVEL_OUTOF10: 0

## 2021-05-26 ASSESSMENT — PAIN DESCRIPTION - LOCATION: LOCATION: THROAT

## 2021-05-26 ASSESSMENT — PAIN DESCRIPTION - PAIN TYPE: TYPE: ACUTE PAIN

## 2021-05-26 NOTE — PROGRESS NOTES
Pt assessment completed and documented. VSS on RA. Pt A&O x 4. NG to CLWS putting out bloody drainage (MD aware). 4 abdominal lap incisions CDI. SCDs in place. Bed in lowest position and wheels locked. Non-skid footwear in place. Bed check is on. Call light and bedside table within reach. Pt calls out appropriately and denies any other needs at this time.

## 2021-05-26 NOTE — PROGRESS NOTES
Pt A&Ox4, VSS. Talkative and pleasant. NG to L nare to CLWS with dark, bloody output. Denies N/V, BS active x4; passing flatus. Denies dyspnea. Call light within reach. Bed side table within reach. Wheels locked. Bed in lowest position. Bed check in place. Pt instructed to call out for assistance. Pt expressed understanding & calls out appropriately. Shift assessment complete. All care cont per orders.  Electronically signed by Brianne Connelly RN on 5/26/2021 at 6:31 PM

## 2021-05-26 NOTE — PLAN OF CARE
Problem: Falls - Risk of:  Goal: Will remain free from falls  Description: Will remain free from falls  Outcome: Ongoing  Goal: Absence of physical injury  Description: Absence of physical injury  Outcome: Ongoing     Problem: Falls - Risk of: Intervention: Assess risk factors for falls  Note: Pt is high fall risk  Intervention: Postfall assessment  Note: No falls sustained thus far this shift  Intervention: Manage a safe environment  Note: Call light within reach. Bed side table within reach. Wheels locked. Bed in lowest position. Bed check in place. Pt instructed to call out for assistance. Pt expressed understanding & calls out appropriately. Intervention: Toileting assistance  Note: Pt utilizes urinal at bedside.

## 2021-05-26 NOTE — ACP (ADVANCE CARE PLANNING)
Advance Care Planning     General Advance Care Planning (ACP) Conversation    Date of Conversation: 5/10/2021  Conducted with: Patient with Decision Making Capacity    Healthcare Decision Maker:      Primary Decision Maker: Koffi - Donna - [phone]    Secondary Decision Maker: Elvis Severance - [phone]      Content/Action Overview:  Has NO ACP documents/care preferences - information provided, considering goals and options  Reviewed DNR/DNI and patient elects Full Code (Attempt Resuscitation)      Length of Voluntary ACP Conversation in minutes:  <16 minutes (Non-Billable)    Torsten Rodriguez RN

## 2021-05-26 NOTE — PROGRESS NOTES
PS to Dr. Dee Atwood:     [address] [facility] or Facility: [facility] From: Linda Cox RE: Sammy Emiliano RM: 031 Patient underwent \"ROBOTIC NISSEN FUNDOPLICATION\" today and is having increased bloody NG output. He has had 400 out total with less than 100 out when I got here at 7pm. Patient is using cherry throat spray and eating ice chips but the NG output appears more bloody looking. Just wanted to make you aware. Thanks! Need Callback: NO CALLBACK REQ C3 ONCOLOGY ROUTINE\"       0200: RN spoke with Dr. Dee Atwood over the phone. RN reported that output has decreased and Dr. Dee Atwood said to continue to monitor and notify him if output increases again.

## 2021-05-26 NOTE — CARE COORDINATION
CASE MANAGEMENT INITIAL ASSESSMENT      Reviewed chart and completed assessment  with:patient  Explained Case Management role/services. Primary contact information:[facility] Decision Maker :   Primary Decision Maker: Brit Brown Spouse - [phone]    Secondary Decision Maker: Home Garcia - Child - [phone]          Can this person be reached and be able to respond quickly, such as within a few minutes or hours? Yes      Admit date/status:5/25/21  Diagnosis:hiatal hernia   Is this a Readmission?:  Yes      Insurance:[facility]   Precert required for SNF: Yes       3 night stay required: No    Living arrangements, Adls, care needs, prior to admission:lives in 2 story home with wife. IPTA    Transportation:private     Durable Medical Equipment at home:  Walker__Cane__RTS__ BSC__Shower Chair__  02__ HHN__ CPAP__  BiPap__  Hospital Bed__ W/C___ Other__________    Services in the home and/or outpatient, prior to [address] (if applicable)   · Name:  · Address:  · Dialysis Schedule:  · Phone:  · Fax:    PT/OT recs:none    Hospital Exemption Notification (HEN) : needed for SNF    Barriers to discharge:none    Plan/comments:spoke with patient. Plans on returning home at discharge. Reported IPTA and drives at baseline. Will be able to get to any follow up appointments. Denied any DCP needs. Please notify should needs arise.  Tiffanie Richardson RN       ECOC on chart for MD signature

## 2021-05-26 NOTE — PROGRESS NOTES
[facility] GENERAL SURGERY    Surgery Progress Note           POD # 1    PATIENT NAME: Kristy Sorto     TODAY'S DATE: 5/26/2021    INTERVAL HISTORY:    Pt doing okay this AM - had some blood from ngt last night, but scant output this AM. Pt denies much pain, but doesn't like the ngt. OBJECTIVE:   VITALS:  BP (!) 141/78   Pulse 74   Temp 98.2 °F (36.8 °C) (Oral)   Resp 18   Ht 5' 11\" (1.803 m)   Wt 171 lb (77.6 kg)   SpO2 95%   BMI 23.85 kg/m²     INTAKE/OUTPUT:    I/O last 3 completed shifts: In: 2521 [P.O.:120; I.V.:2401]  Out: Sun Cano [XUDSQ:8196; Other:400]  I/O this shift: In: 901 [I.V.:901]  Out: -               CONSTITUTIONAL:  awake and alert  LUNGS:  no crackles or wheezing  ABDOMEN:    soft, non-distended, mildly tender   INCISION: clean, dry    Data:  CBC:   Recent Labs     05/25/21  0713 05/26/21 0537   WBC 4.2 7.1   HGB 11.0* 10.5*   HCT 31.9* 30.5*   * 112*     BMP:    Recent Labs     05/25/21 0713 05/26/21 0537   * 131*   K 4.5 4.4   CL 96* 98*   CO2 28 28   BUN 10 12   CREATININE 0.6* <0.5*   GLUCOSE 98 100*     Hepatic: No results for input(s): AST, ALT, ALB, BILITOT, ALKPHOS in the last 72 hours. Mag:      Recent Labs     05/26/21  0537   MG 1.90      Phos:     Recent Labs     05/26/21 0537   PHOS 3.6          ASSESSMENT AND PLAN:  79 y.o. male status post Robotic paraesophageal hernia repair with mesh,  Jesús stapled gastroplasty, Nissen gastrofundoplication    GI: continue with ngt to suction today, ok for popsicles. Will plan for UGI study tomorrow to assess repair  Hold lovenox today, start protonix  : voiding per urinal  Activity: OOB to chair today. Electronically signed by VALE Modi - CNP     Surgery Staff    I have examined this patient and read and agree with the note by Nanci Anderson CNP from today.     Jose Malone MD

## 2021-05-26 NOTE — OP NOTE
[facility]                                OPERATIVE REPORT    PATIENT NAME: Carolina Mendoza                  :        1940  MED REC NO:   [id number]                          ROOM:       2110  ACCOUNT NO:   [de-identified]                           ADMIT DATE: 2021  PROVIDER:     Catrachito Carlson MD    DATE OF PROCEDURE:  2021    PREOPERATIVE DIAGNOSIS:  Type 4 paraesophageal hernia. POSTOPERATIVE DIAGNOSIS:  Type 4 paraesophageal hernia. OPERATION PERFORMED:  Robotic paraesophageal hernia repair with mesh,  Jesús stapled gastroplasty, Nissen gastrofundoplication. SURGEON:  Catrachito Carlson MD    ANESTHESIA:  General.    ESTIMATED BLOOD LOSS:  Less than 50 mL. SPECIMEN:  Portions of hernia sac. COUNTS:  Sponge and needle count correct. INDICATIONS:  The patient is a 69-year-old male with a longstanding  complex type 4 paraesophageal hernia, including involvement of the  distal transverse colon through the hiatus into the mediastinum. He  presents for elective repair. FINDINGS:  1. Herniation of the entire stomach as well as the distal corner of the  transverse colon up into the mediastinum. 2.  Robotic reduction of herniated stomach and colon with excision of  large bulky hernia sac in fragments. 3.  Inability to achieve intraabdominal position of gastroesophageal  junction. 4.  Approximately 5 cm stapled Jesús gastroplasty creating an  intraabdominal gastroesophageal junction. 5.  Posterior and anterior hiatal closure around the esophagus with  Bio-A mesh reinforcement. 6.  Loose complete gastrofundoplication. DESCRIPTION OF PROCEDURE:  After informed consent was obtained, the  patient was taken to the operating room and placed in the supine  position. Preoperative antibiotics had been initiated in the holding  area. Athrombic pumps were placed on the legs.   General anesthesia was  administered without difficulty. The abdomen was prepped and draped in  a sterile fashion and we began with a trocar incision in the left mid  upper abdomen using a prior trocar site from a previous inguinal hernia  repair. However, we could not get the trocar to penetrate safely to the  peritoneum so I switched to the right mid upper quadrant and passed the  trocar to this incision site without difficulty into the peritoneal  cavity. Insufflation was initiated. We now placed a total of 4 robotic  trocars with three 8-mm and one 12-mm trocar used. The assist trocar  was placed in the left lower quadrant. The bed was placed in a steep  reverse Trendelenburg position and the robot docked. In our right lateral trocar, we inserted an instrument for liver  retractor, which is used to reflect the left lateral segment nicely and  exposed the underlying hiatus. I could see the transverse colon being  drawn up through the hiatus and noted that the entire stomach was absent  from the abdominal cavity as well. Using gentle manipulation, I first  reduced the colon and then the majority of the stomach out of the  mediastinal opening. The stomach, however, very readily retracted back  into the mediastinum due to the attachments of the hernia sac. Beginning on the mid greater curvature of the stomach, I used the vessel  sealing device to release the greater omentum and opened the lesser sac  behind the stomach. I carried this up across the entire fundus of the  stomach to take down the short gastric arteries and eventually reached  the left adelaida of the diaphragm. A fairly bulky hernia sac on this side  of the stomach was gradually mobilized out of the mediastinum. On the  right side, there was similarly large bulky hernia sac extending up into  the mediastinum as well. I opened the lesser sac on this side of the  stomach to reveal the right adelaida of the diaphragm.   I then tracked the  hernia sac up into the mediastinum and released it until I eventually  was able to expose the borders of the esophagus. The esophagus was very  densely adherent in the mediastinal area. I continued to mobilize the  hernia sac on either side of the esophagus and eventually excised the  majority of the hernia sac which allowed me to now see posteriorly and  clear the view between the left and right crura. This entire dissection  of the hernia sac was extremely difficult taking well over one hour at  that time, and for this, I have quoted a 22 modifier. When the hernia sac was finally sufficiently mobilized and excised, I  could finish the posterior dissection and I continued trying to mobilize  the esophagus circumferentially to try to get the esophageal junction  reduced back into the abdominal cavity. However, despite mobilizing  well up into the upper mediastinum, the esophageal junction still  remained above the diaphragm. I felt, I would need to create a Jesús  gastroplasty to restore intraabdominal gastric junction. A robotic  60-mm linear stapler was then inserted and used to create the  gastroplasty. The anesthesia provider placed a 50-[place] dilator down  the esophagus and I was then able to grasp the fundus of the stomach,  retract it and positioned the stapler directly parallel to the esophagus  and fired it creating approximately 5 cm additional length of the  esophagus. I was now ready to close down the esophageal hiatus, retracting the  stomach laterally, I could see the left and right crura nicely and I  used a running 0 barbed suture to plicate the two crura behind the  esophagus. When I reached the point that I felt I would risk angulating  the esophagus, I then used a separate 0 barbed suture to close the  anterior portion of the crura over the front part of the esophagus. This created a nice snug opening around the esophagus.   To reinforce  this closure, I placed a preformed Bio-A patch across the crura  posterior to the gastroesophageal junction. The patch was secured to  the diaphragm with interrupted 0 silk sutures. We now completed the gastrofundoplication using the very floppy fundus  of the stomach as well as the additional appendage created by the  stapled gastroplasty. I was not able to get the esophageal dilators to  pass all the way down into the stomach at this point, but nasogastric  tube was easily inserted. There was no risk of making the wrap at this  point too tight because of how redundant the fundus of the stomach was. I easily brought it around the posterior aspect of the gastroesophageal  junction and created a 360-degree wrap. Interrupted 0 silk sutures were  used to complete the wrap, taking small bites of the anterior wall of  the esophagus with each suture bite. This created a nice loose floppy  wrap. I was satisfied with the repair at this point. The previously  resected portion of the hernia sac were extracted from the abdominal  cavity. The robot was undocked. The trocars were all removed and the  insufflation released. A total of two 12-mm trocars had been placed by  the end of the case and both sites were closed at the fascial level with  0 Vicryl sutures. The skin incisions were all closed with 4-0 Monocryl  subcuticular sutures and Dermabond. The patient was then extubated and  taken to the recovery room in stable condition. Isidro Iqbal MD    D: 05/25/2021 23:37:59       T: 05/26/2021 2:22:18     [id number]  Job#: [id number]     Doc#: [id number]    CC:   Tesfaye Rinaldi MD

## 2021-05-27 ENCOUNTER — APPOINTMENT (OUTPATIENT)
Dept: GENERAL RADIOLOGY | Age: 81
DRG: 328 | End: 2021-05-27
Attending: SURGERY
Payer: MEDICARE

## 2021-05-27 PROCEDURE — 1200000000 HC SEMI PRIVATE

## 2021-05-27 PROCEDURE — 6360000004 HC RX CONTRAST MEDICATION: Performed by: SURGERY

## 2021-05-27 PROCEDURE — 6360000002 HC RX W HCPCS: Performed by: CLINICAL NURSE SPECIALIST

## 2021-05-27 PROCEDURE — 2580000003 HC RX 258: Performed by: STUDENT IN AN ORGANIZED HEALTH CARE EDUCATION/TRAINING PROGRAM

## 2021-05-27 PROCEDURE — 2500000003 HC RX 250 WO HCPCS: Performed by: STUDENT IN AN ORGANIZED HEALTH CARE EDUCATION/TRAINING PROGRAM

## 2021-05-27 PROCEDURE — C9113 INJ PANTOPRAZOLE SODIUM, VIA: HCPCS | Performed by: CLINICAL NURSE SPECIALIST

## 2021-05-27 PROCEDURE — 99024 POSTOP FOLLOW-UP VISIT: CPT | Performed by: SURGERY

## 2021-05-27 PROCEDURE — 74240 X-RAY XM UPR GI TRC 1CNTRST: CPT

## 2021-05-27 PROCEDURE — 6360000002 HC RX W HCPCS: Performed by: STUDENT IN AN ORGANIZED HEALTH CARE EDUCATION/TRAINING PROGRAM

## 2021-05-27 RX ADMIN — LEVOTHYROXINE SODIUM ANHYDROUS 75 MCG: 100 INJECTION, POWDER, LYOPHILIZED, FOR SOLUTION INTRAVENOUS at 05:34

## 2021-05-27 RX ADMIN — IOHEXOL 150 ML: 240 INJECTION, SOLUTION INTRATHECAL; INTRAVASCULAR; INTRAVENOUS; ORAL at 09:09

## 2021-05-27 RX ADMIN — SODIUM CHLORIDE: 9 INJECTION, SOLUTION INTRAVENOUS at 15:03

## 2021-05-27 RX ADMIN — SODIUM CHLORIDE: 9 INJECTION, SOLUTION INTRAVENOUS at 05:34

## 2021-05-27 RX ADMIN — SODIUM CHLORIDE: 9 INJECTION, SOLUTION INTRAVENOUS at 21:00

## 2021-05-27 RX ADMIN — ENOXAPARIN SODIUM 40 MG: 40 INJECTION SUBCUTANEOUS at 11:14

## 2021-05-27 RX ADMIN — PANTOPRAZOLE SODIUM 40 MG: 40 INJECTION, POWDER, FOR SOLUTION INTRAVENOUS at 11:14

## 2021-05-27 NOTE — PROGRESS NOTES
[facility] GENERAL SURGERY    Surgery Progress Note           POD # 1    PATIENT NAME: Kae Bond     TODAY'S DATE: 5/27/2021    INTERVAL HISTORY:    Doing well this morning. Pain controlled. Denies nausea. NG tube in place, no more bloody output. Afebrile, hemodynamically stable. OBJECTIVE:   VITALS:  /61   Pulse 91   Temp 98.5 °F (36.9 °C) (Oral)   Resp 16   Ht 5' 11\" (1.803 m)   Wt 171 lb (77.6 kg)   SpO2 92%   BMI 23.85 kg/m²     INTAKE/OUTPUT:    I/O last 3 completed shifts: In: 3212.2 [P.O.:268; I.V.:2944.2]  Out: 2150 [Urine:1300; Emesis/NG output:850]  No intake/output data recorded. CONSTITUTIONAL:  awake and alert  LUNGS:  no crackles or wheezing  ABDOMEN:    soft, non-distended, mildly tender, NG tube in place with gastric output  INCISION: clean, dry    Data:  CBC:   Recent Labs     05/25/21 0713 05/26/21  0537   WBC 4.2 7.1   HGB 11.0* 10.5*   HCT 31.9* 30.5*   * 112*     BMP:    Recent Labs     05/25/21 0713 05/26/21  0537   * 131*   K 4.5 4.4   CL 96* 98*   CO2 28 28   BUN 10 12   CREATININE 0.6* <0.5*   GLUCOSE 98 100*     Hepatic: No results for input(s): AST, ALT, ALB, BILITOT, ALKPHOS in the last 72 hours. Mag:      Recent Labs     05/26/21  0537   MG 1.90      Phos:     Recent Labs     05/26/21 0537   PHOS 3.6          ASSESSMENT AND PLAN:  79 y.o. male status post Robotic paraesophageal hernia repair with mesh,  Jesús stapled gastroplasty, Nissen gastrofundoplication    GI: continue with ngt to suction today, ok for popsicles. Will plan for UGI study today to assess repair  Protonix  : voiding per urinal  Activity: OOB to chair today.        Electronically signed by Aletha Paredes MD

## 2021-05-27 NOTE — PROGRESS NOTES
Assessment as charted. A/O x 4. VSS. NG to L nare to CLWS, bloody output. Incentive spirometer provided, education by RN. Denies nausea, pain or further needs.

## 2021-05-27 NOTE — CARE COORDINATION
Chart reviewed. Care managed per surgery. POD 2. Continues with NGT no further bleeding per note. Therapy pending. IPTA has support of wife. Denied any DCP needs. Will need return of GI function.  Gena Alvarado RN

## 2021-05-28 VITALS
HEIGHT: 71 IN | WEIGHT: 171 LBS | HEART RATE: 91 BPM | BODY MASS INDEX: 23.94 KG/M2 | SYSTOLIC BLOOD PRESSURE: 124 MMHG | DIASTOLIC BLOOD PRESSURE: 70 MMHG | OXYGEN SATURATION: 95 % | TEMPERATURE: 97.8 F | RESPIRATION RATE: 16 BRPM

## 2021-05-28 PROCEDURE — 2500000003 HC RX 250 WO HCPCS: Performed by: STUDENT IN AN ORGANIZED HEALTH CARE EDUCATION/TRAINING PROGRAM

## 2021-05-28 PROCEDURE — 6360000002 HC RX W HCPCS: Performed by: STUDENT IN AN ORGANIZED HEALTH CARE EDUCATION/TRAINING PROGRAM

## 2021-05-28 PROCEDURE — C9113 INJ PANTOPRAZOLE SODIUM, VIA: HCPCS | Performed by: CLINICAL NURSE SPECIALIST

## 2021-05-28 PROCEDURE — 2580000003 HC RX 258: Performed by: STUDENT IN AN ORGANIZED HEALTH CARE EDUCATION/TRAINING PROGRAM

## 2021-05-28 PROCEDURE — 97116 GAIT TRAINING THERAPY: CPT

## 2021-05-28 PROCEDURE — 97165 OT EVAL LOW COMPLEX 30 MIN: CPT

## 2021-05-28 PROCEDURE — 97535 SELF CARE MNGMENT TRAINING: CPT

## 2021-05-28 PROCEDURE — 6360000002 HC RX W HCPCS: Performed by: CLINICAL NURSE SPECIALIST

## 2021-05-28 PROCEDURE — 97162 PT EVAL MOD COMPLEX 30 MIN: CPT

## 2021-05-28 PROCEDURE — 99238 HOSP IP/OBS DSCHRG MGMT 30/<: CPT | Performed by: SURGERY

## 2021-05-28 RX ORDER — OXYCODONE HYDROCHLORIDE 5 MG/1
5 TABLET ORAL EVERY 6 HOURS PRN
Qty: 20 TABLET | Refills: 0 | Status: SHIPPED | OUTPATIENT
Start: 2021-05-28 | End: 2021-05-28 | Stop reason: HOSPADM

## 2021-05-28 RX ADMIN — LEVOTHYROXINE SODIUM ANHYDROUS 75 MCG: 100 INJECTION, POWDER, LYOPHILIZED, FOR SOLUTION INTRAVENOUS at 06:06

## 2021-05-28 RX ADMIN — Medication 10 ML: at 09:43

## 2021-05-28 RX ADMIN — PANTOPRAZOLE SODIUM 40 MG: 40 INJECTION, POWDER, FOR SOLUTION INTRAVENOUS at 09:43

## 2021-05-28 RX ADMIN — ENOXAPARIN SODIUM 40 MG: 40 INJECTION SUBCUTANEOUS at 09:42

## 2021-05-28 ASSESSMENT — PAIN DESCRIPTION - ORIENTATION: ORIENTATION: LEFT

## 2021-05-28 NOTE — PROGRESS NOTES
Physical Therapy    Facility/Department: [facility] C3 TELE/MED SURG/ONC  Initial Assessment, treatment, DC summary    NAME: Carlo Kaufman  : 1940  MRN: [id number]    Date of Service: 2021    Discharge Recommendations:  Home with assist PRN   PT Equipment Recommendations  Equipment Needed: No    Assessment   Assessment: Pt referred for PT evaluaiton during current hospital stay s/p hiatal hernia repair . Pt is currently functioning at baseline, is at supervision level or better for all mobility. Pt does have some gait abnormalities, but per pt these are baseline. Pt does not require further acute PT at this time. Recommend home with PRN assist at [address] setting. Prognosis: Good  Decision Making: Medium Complexity  PT Education: Goals;PT Role  Patient Education: pt verbalized importance of OOB activity while in acute setting  Barriers to Learning: no  No Skilled PT: At baseline function  REQUIRES PT FOLLOW UP: No  Activity Tolerance  Activity Tolerance: Patient Tolerated treatment well  Activity Tolerance: /72; HR 87; SpO2 95% on RA       Patient Diagnosis(es): The encounter diagnosis was Hiatal hernia. has a past medical history of Allergic rhinitis, Anemia, Arthritis, CAD (coronary artery disease), Chronic systolic congestive heart failure ([address] Compression fracture of T11 vertebra (HCC), Food allergy, Hiatal hernia, History of blood transfusion, Hyperlipidemia, Hypertension, Hypothyroid, Obesity, Occlusion and stenosis of carotid artery, Osteoarthritis, Recurrent right inguinal hernia, Shingles, Thyroid disease, and TIA (transient ischemic attack). has a past surgical history that includes sigmoidoscopy (); Upper gastrointestinal endoscopy (94); Upper gastrointestinal endoscopy (04); Tonsillectomy; eye surgery; Colonoscopy (99); Colonoscopy (2015); Inguinal hernia repair (Right, 2016); Inguinal hernia repair (Right, 2017);  Cardiac surgery (2001); Cardiac valve replacement (09/15/2018); Aortic valve replacement (2018); Percutaneous Transluminal Coronary Angio (2018); hernia repair (Left, 2/5/2020); and Gastric fundoplication (N/A, 9/65/6050). Restrictions  Restrictions/Precautions  Restrictions/Precautions: Up as Tolerated  Position Activity Restriction  Other position/activity restrictions: ambulate pt  Vision/Hearing  Vision: Impaired  Vision Exceptions: Wears glasses at all times  Hearing: Within functional limits     Subjective  General  Chart Reviewed: Yes  Patient assessed for rehabilitation services?: Yes  Response To Previous Treatment: Not applicable  Family / Caregiver Present: No  Referring Practitioner: Renetta Fung CNP  Referral Date : 05/27/21  Diagnosis: hiatal hernia, s/p repair 5/25  Follows Commands: Within Functional Limits  General Comment  Comments: Pt up in bathroom on entry, RN cleared pt for therapy  Subjective  Subjective: Pt agreeable to PT  Pain Screening  Patient Currently in Pain: Yes  Pain Assessment  Pain Assessment: 0-10  Pain Level: 2  Pain Type: Chronic pain  Pain Location: Shoulder  Pain Orientation: Left  Non-Pharmaceutical Pain Intervention(s): Repositioned; Ambulation/Increased Activity; Therapeutic presence  Vital Signs  Patient Currently in Pain: Yes  Pre Treatment Pain Screening  Intervention List: Patient able to continue with treatment    Orientation  Orientation  Overall Orientation Status: Within Normal Limits  Social/Functional History  Social/Functional History  Lives With: Spouse  Type of Home: House  Home Layout: Two level, Bed/Bath upstairs, Able to Live on Main level with bedroom/bathroom  Home Access: Level entry  Bathroom Shower/Tub: Walk-in shower  Bathroom Toilet: Handicap height  Home Equipment: Cane, Rolling walker, Reacher  ADL Assistance: Independent  Homemaking Assistance: Independent  Homemaking Responsibilities: Yes  Meal Prep Responsibility: Secondary  Laundry Responsibility: Secondary  Cleaning Responsibility: Secondary  Shopping Responsibility:  (shares with wife)  Ambulation Assistance: Independent  Transfer Assistance: Independent  Active : Yes  Occupation: Retired  Type of occupation: worked on computers  Leisure & Hobbies: computers         Objective          AROM RLE (degrees)  RLE AROM: WFL  AROM LLE (degrees)  LLE AROM : WFL  Strength RLE  Strength RLE: WFL  Strength LLE  Strength LLE: WFL     Sensation  Overall Sensation Status: WNL  Bed mobility  Supine to Sit: Unable to assess  Sit to Supine: Unable to assess  Scooting: Independent (to scoot forward and back in chair)  Comment: pt up in bathroom upon entry combing hair  Transfers  Sit to Stand: Supervision  Stand to sit: Supervision  Ambulation  Ambulation?: Yes  More Ambulation?: Yes  Ambulation 1  Surface: level tile  Device: No Device  Assistance: Supervision  Quality of Gait: B hip IR with toeing in, mild limp, observed RLE longer than left during gait, pt confirmed, narrow TIAGO with occasional mild scissoring but no overt LOB  Distance: 15 ft  Ambulation 2  Surface - 2: level tile  Device 2: Rolling Walker  Assistance 2: Supervision  Quality of Gait 2: same as above but with improved staediness using walker  Distance: 275 ft  Stairs/Curb  Stairs?: Yes  Stairs  # Steps : 4  Stairs Height: 6\"  Rails: Bilateral  Device: No Device  Assistance: Supervision     Balance  Posture: Good  Sitting - Static: Good  Sitting - Dynamic: Good  Standing - Static: Good;-  Standing - Dynamic: Fair;+        Plan   Plan  Times per week: one time only  Safety Devices  Type of devices:  All fall risk precautions in place, Left in chair, Call light within reach, Chair alarm in place, Nurse notified, Gait belt      AM-PAC Score  AM-PAC Inpatient Mobility Raw Score : 24 (05/28/21 1439)  AM-PAC Inpatient T-Scale Score : 61.14 (05/28/21 1439)  Mobility Inpatient CMS 0-100% Score: 0 (05/28/21 1439)  Mobility Inpatient CMS G-Code Modifier : [facility]

## 2021-05-28 NOTE — PROGRESS NOTES
NG removed per order. Pt tolerated fine. Given clear liquid diet menu. Tolerating fine thus far. Had one BM.

## 2021-05-28 NOTE — CARE COORDINATION
D/c order noted. Spoke with patient. States feeling well. No concerns about d/c. Will have a ride. Denied any DCP needs.  Ginna Cabrera RN

## 2021-05-28 NOTE — PROGRESS NOTES
Physician Progress Note      Osmany Block  CSN #:                  [id number]  :                       1940  ADMIT DATE:       2021 6:16 AM  DISCH DATE:  RESPONDING  PROVIDER #:        Mirta Gonzalez MD          QUERY TEXT:    Pt admitted for hernia repair. Pt noted to have concerns for bloody NGT   drainage per nursing. If possible, please document in progress notes and   discharge summary:    The medical record reflects the following:  Risk Factors: hernia surgery, plt 125 ->112  Clinical Indicators: nursing notes: \" Patient underwent \"ROBOTIC NISSEN   FUNDOPLICATION\" today and is having increased bloody NG output. He has had 400   out total \"  UGI study ordered, no leak  Treatment: serial labs, repeat imaging, supportive care, monitoring    Thank you,  Rosa Bauer RN [facility]  [phone]  Options provided:  -- The patient was evaluated for GI tract bleeding postoperatively related to   surgery  -- The patient experienced GI tract bleeding as an expected/inherent condition   that occurred postoperatively. -- Postoperative GI tract bleeding related to other incidental risk factor   (please specify) occurring following surgery, Please document incidental risk   factor. -- Other - I will add my own diagnosis  -- Disagree - Not applicable / Not valid  -- Disagree - Clinically unable to determine / Unknown  -- Refer to Clinical Documentation Reviewer    PROVIDER RESPONSE TEXT:    Provider disagreed with this query. Not really active GI bleeding in my opinion - most likely just gastric mucosal   irritation from the NGT    Query created by:  Danay Hester on 2021 10:44 AM      Electronically signed by:  Mirta Gonzalez MD 2021 11:38 AM

## 2021-05-28 NOTE — PROGRESS NOTES
[facility] GENERAL SURGERY    Surgery Progress Note           POD # 1    PATIENT NAME: Haydee Olvera     TODAY'S DATE: 5/28/2021    INTERVAL HISTORY:    Doing well this morning. Pain controlled. Denies nausea. Tolerating clear liquids, states he is hungry. OBJECTIVE:   VITALS:  /64   Pulse 81   Temp 98 °F (36.7 °C) (Oral)   Resp 16   Ht 5' 11\" (1.803 m)   Wt 171 lb (77.6 kg)   SpO2 91%   BMI 23.85 kg/m²     INTAKE/OUTPUT:    I/O last 3 completed shifts: In: 2205.1 [P.O.:350; I.V.:1855.1]  Out: 350 [Urine:350]  No intake/output data recorded. CONSTITUTIONAL:  awake and alert  LUNGS:  no crackles or wheezing  ABDOMEN:    soft, non-distended, mildly tender, NG tube in place with gastric output  INCISION: clean, dry    Data:  CBC:   Recent Labs     05/26/21  0537   WBC 7.1   HGB 10.5*   HCT 30.5*   *     BMP:    Recent Labs     05/26/21  0537   *   K 4.4   CL 98*   CO2 28   BUN 12   CREATININE <0.5*   GLUCOSE 100*     Hepatic: No results for input(s): AST, ALT, ALB, BILITOT, ALKPHOS in the last 72 hours.   Mag:      Recent Labs     05/26/21  0537   MG 1.90      Phos:     Recent Labs     05/26/21  0537   PHOS 3.6          ASSESSMENT AND PLAN:  79 y.o. male status post Robotic paraesophageal hernia repair with mesh,  Jesús stapled gastroplasty, Nissen gastrofundoplication    Will advance to full liquids  If continues to do well, will discharge home on full liquids      Electronically signed by Marina Pena MD

## 2021-05-28 NOTE — PROGRESS NOTES
Occupational Therapy   Occupational Therapy Initial Assessment, treatment and discharge  Date: 2021   Patient Name: Ceci Kline  MRN: [id number]     : 1940    Date of Service: 2021    Discharge Recommendations:  24 hour supervision or assist     If pt discharges prior to next session, this note will serve as discharge summary. See case management note for discharge disposition. Assessment   Assessment: Pt agreeable to OT eval, pt admitted for hernia repair, presents as SPV for ADLs and transfers, lives at home with wife. Pt reports he feels he is at baseline, no futher skilled OT indicated at this time. Prognosis: Fair;Good  OT Education: OT Role;Plan of Care;ADL Adaptive Strategies;Transfer Training  Patient Education: OT role, ADls, transfers  No Skilled OT: At baseline function  REQUIRES OT FOLLOW UP: No  Activity Tolerance  Activity Tolerance: Patient Tolerated treatment well  Safety Devices  Safety Devices in place: Yes  Type of devices: Gait belt;Call light within reach; Chair alarm in place; Left in chair           Patient Diagnosis(es): The encounter diagnosis was Hiatal hernia. has a past medical history of Allergic rhinitis, Anemia, Arthritis, CAD (coronary artery disease), Chronic systolic congestive heart failure ([address] Compression fracture of T11 vertebra (HCC), Food allergy, Hiatal hernia, History of blood transfusion, Hyperlipidemia, Hypertension, Hypothyroid, Obesity, Occlusion and stenosis of carotid artery, Osteoarthritis, Recurrent right inguinal hernia, Shingles, Thyroid disease, and TIA (transient ischemic attack). has a past surgical history that includes sigmoidoscopy (); Upper gastrointestinal endoscopy (94); Upper gastrointestinal endoscopy (04); Tonsillectomy; eye surgery; Colonoscopy (99); Colonoscopy (2015); Inguinal hernia repair (Right, 2016); Inguinal hernia repair (Right, 2017); Cardiac surgery ();  Cardiac valve replacement (09/15/2018); Aortic valve replacement (2018); Percutaneous Transluminal Coronary Angio (2018); hernia repair (Left, 2/5/2020); and Gastric fundoplication (N/A, 1/77/1268). Restrictions  Restrictions/Precautions  Restrictions/Precautions: Up as Tolerated  Position Activity Restriction  Other position/activity restrictions: ambulate pt    Subjective   General  Chart Reviewed: Yes  Patient assessed for rehabilitation services?: Yes  Family / Caregiver Present: No  Referring Practitioner: KIT Eid NP  Diagnosis: hernia repair  Subjective  Subjective: Pt agreeable  General Comment  Comments: RN miguel angel for therapy     Social/Functional History  Social/Functional History  Lives With: Spouse  Type of Home: House  Home Layout: Two level, Bed/Bath upstairs, Able to Live on Main level with bedroom/bathroom  Home Access: Level entry  Bathroom Shower/Tub: Walk-in shower  Bathroom Toilet: Handicap height  Home Equipment: Cane, Rolling walker, Reacher  ADL Assistance: Independent  Homemaking Assistance: Independent  Homemaking Responsibilities: Yes  Meal Prep Responsibility: Secondary  Laundry Responsibility: Secondary  Cleaning Responsibility: Secondary  Shopping Responsibility:  (shares with wife)  Ambulation Assistance: Independent  Transfer Assistance: Independent  Active : Yes  Occupation: Retired  Type of occupation: worked on computers  Leisure & Hobbies: computers       Objective        Orientation  Overall Orientation Status: Within [address]  Sitting Balance: Supervision  Standing Balance: Supervision  Standing Balance  Time: >5 min  Activity: functional mobility in room and hallway  [address]: Rolling Walker  Activity: To/from bathroom  Assist Level: Supervision  Toilet Transfers  Toilet - Technique: Ambulating  Equipment Used: Standard toilet  Toilet Transfer: Supervision  ADL  Grooming: Supervision  LE Dressing: Supervision  Toileting: Supervision  Tone RUE  RUE Tone: Normotonic  Tone LUE  LUE Tone: Normotonic  Coordination  Movements Are Fluid And Coordinated: Yes        Transfers  Sit to stand: Supervision  Stand to sit: Supervision     Cognition  Overall Cognitive Status: WFL        Sensation  Overall Sensation Status: WFL        LUE AROM (degrees)  LUE AROM : WFL  Left Hand AROM (degrees)  Left Hand AROM: WFL  RUE AROM (degrees)  RUE AROM : WFL  Right Hand AROM (degrees)  Right Hand AROM: WFL  LUE Strength  Gross LUE Strength: WFL  RUE Strength  Gross RUE Strength: WFL                   Plan   Plan  Times per week: 1 visit      AM-PAC Score        AM-PAC Inpatient Daily Activity Raw Score: 22 (05/28/21 1417)  AM-PAC Inpatient ADL T-Scale Score : 47.1 (05/28/21 1417)  ADL Inpatient CMS 0-100% Score: 25.8 (05/28/21 1417)  ADL Inpatient CMS G-Code Modifier : CJ (05/28/21 1417)    Goals  Short term goals  Time Frame for Short term goals: 1 visit  Short term goal 1: Pt will complete LB dressing and transfers with SPV-MET 5/28  Patient Goals   Patient goals :  \"Go home\"       Therapy Time   Individual Concurrent Group Co-treatment   Time In 1038         Time Out 1104         Minutes 26         Timed Code Treatment Minutes: 16 Minutes (10 min eval)       Chrissy López OT

## 2021-06-01 ENCOUNTER — OFFICE VISIT (OUTPATIENT)
Dept: SURGERY | Age: 81
End: 2021-06-01

## 2021-06-01 ENCOUNTER — HOSPITAL ENCOUNTER (INPATIENT)
Age: 81
LOS: 5 days | Discharge: HOME | DRG: 643 | End: 2021-06-06
Attending: EMERGENCY MEDICINE | Admitting: INTERNAL MEDICINE
Payer: MEDICARE

## 2021-06-01 ENCOUNTER — APPOINTMENT (OUTPATIENT)
Dept: GENERAL RADIOLOGY | Age: 81
DRG: 643 | End: 2021-06-01
Payer: MEDICARE

## 2021-06-01 VITALS
SYSTOLIC BLOOD PRESSURE: 115 MMHG | BODY MASS INDEX: 25.62 KG/M2 | WEIGHT: 183 LBS | DIASTOLIC BLOOD PRESSURE: 60 MMHG | HEIGHT: 71 IN | HEART RATE: 97 BPM | TEMPERATURE: 98.5 F

## 2021-06-01 DIAGNOSIS — E83.42 HYPOMAGNESEMIA: ICD-10-CM

## 2021-06-01 DIAGNOSIS — R26.2 DIFFICULTY WALKING: ICD-10-CM

## 2021-06-01 DIAGNOSIS — Z09 POSTOP CHECK: Primary | ICD-10-CM

## 2021-06-01 DIAGNOSIS — E87.1 HYPONATREMIA: ICD-10-CM

## 2021-06-01 DIAGNOSIS — R53.1 GENERAL WEAKNESS: Primary | ICD-10-CM

## 2021-06-01 DIAGNOSIS — D64.9 ANEMIA, UNSPECIFIED TYPE: ICD-10-CM

## 2021-06-01 DIAGNOSIS — R74.01 ELEVATED TRANSAMINASE LEVEL: ICD-10-CM

## 2021-06-01 DIAGNOSIS — R53.83 FATIGUE, UNSPECIFIED TYPE: ICD-10-CM

## 2021-06-01 LAB
A/G RATIO: 0.8 (ref 1.1–2.2)
ALBUMIN SERPL-MCNC: 2.7 G/DL (ref 3.4–5)
ALP BLD-CCNC: 190 U/L (ref 40–129)
ALT SERPL-CCNC: 109 U/L (ref 10–40)
ANION GAP SERPL CALCULATED.3IONS-SCNC: 8 MMOL/L (ref 3–16)
AST SERPL-CCNC: 74 U/L (ref 15–37)
BASOPHILS ABSOLUTE: 0 K/UL (ref 0–0.2)
BASOPHILS RELATIVE PERCENT: 0.1 %
BILIRUB SERPL-MCNC: 0.8 MG/DL (ref 0–1)
BILIRUBIN URINE: ABNORMAL
BLOOD, URINE: NEGATIVE
BUN BLDV-MCNC: 12 MG/DL (ref 7–20)
CALCIUM SERPL-MCNC: 8.8 MG/DL (ref 8.3–10.6)
CHLORIDE BLD-SCNC: 91 MMOL/L (ref 99–110)
CLARITY: CLEAR
CO2: 28 MMOL/L (ref 21–32)
COLOR: YELLOW
CREAT SERPL-MCNC: <0.5 MG/DL (ref 0.8–1.3)
EOSINOPHILS ABSOLUTE: 0.1 K/UL (ref 0–0.6)
EOSINOPHILS RELATIVE PERCENT: 0.7 %
GFR AFRICAN AMERICAN: >60
GFR NON-AFRICAN AMERICAN: >60
GLOBULIN: 3.2 G/DL
GLUCOSE BLD-MCNC: 112 MG/DL (ref 70–99)
GLUCOSE URINE: NEGATIVE MG/DL
HCT VFR BLD CALC: 30.9 % (ref 40.5–52.5)
HEMOGLOBIN: 10.7 G/DL (ref 13.5–17.5)
KETONES, URINE: ABNORMAL MG/DL
LEUKOCYTE ESTERASE, URINE: NEGATIVE
LYMPHOCYTES ABSOLUTE: 0.4 K/UL (ref 1–5.1)
LYMPHOCYTES RELATIVE PERCENT: 3.8 %
MAGNESIUM: 1.7 MG/DL (ref 1.8–2.4)
MCH RBC QN AUTO: 33.1 PG (ref 26–34)
MCHC RBC AUTO-ENTMCNC: 34.5 G/DL (ref 31–36)
MCV RBC AUTO: 96 FL (ref 80–100)
MICROSCOPIC EXAMINATION: ABNORMAL
MONOCYTES ABSOLUTE: 0.7 K/UL (ref 0–1.3)
MONOCYTES RELATIVE PERCENT: 6.1 %
NEUTROPHILS ABSOLUTE: 9.8 K/UL (ref 1.7–7.7)
NEUTROPHILS RELATIVE PERCENT: 89.3 %
NITRITE, URINE: NEGATIVE
PDW BLD-RTO: 15 % (ref 12.4–15.4)
PH UA: 6 (ref 5–8)
PLATELET # BLD: 123 K/UL (ref 135–450)
PMV BLD AUTO: 7.6 FL (ref 5–10.5)
POTASSIUM REFLEX MAGNESIUM: 4.1 MMOL/L (ref 3.5–5.1)
PRO-BNP: 1251 PG/ML (ref 0–449)
PROTEIN UA: NEGATIVE MG/DL
RBC # BLD: 3.22 M/UL (ref 4.2–5.9)
SODIUM BLD-SCNC: 127 MMOL/L (ref 136–145)
SPECIFIC GRAVITY UA: 1.01 (ref 1–1.03)
TOTAL PROTEIN: 5.9 G/DL (ref 6.4–8.2)
TROPONIN: <0.01 NG/ML
URINE TYPE: ABNORMAL
UROBILINOGEN, URINE: 1 E.U./DL
WBC # BLD: 10.9 K/UL (ref 4–11)

## 2021-06-01 PROCEDURE — 2580000003 HC RX 258: Performed by: NURSE PRACTITIONER

## 2021-06-01 PROCEDURE — 71045 X-RAY EXAM CHEST 1 VIEW: CPT

## 2021-06-01 PROCEDURE — 99024 POSTOP FOLLOW-UP VISIT: CPT | Performed by: SURGERY

## 2021-06-01 PROCEDURE — 85025 COMPLETE CBC W/AUTO DIFF WBC: CPT

## 2021-06-01 PROCEDURE — 99284 EMERGENCY DEPT VISIT MOD MDM: CPT

## 2021-06-01 PROCEDURE — 81003 URINALYSIS AUTO W/O SCOPE: CPT

## 2021-06-01 PROCEDURE — 6360000002 HC RX W HCPCS: Performed by: NURSE PRACTITIONER

## 2021-06-01 PROCEDURE — 83735 ASSAY OF MAGNESIUM: CPT

## 2021-06-01 PROCEDURE — 84484 ASSAY OF TROPONIN QUANT: CPT

## 2021-06-01 PROCEDURE — 6370000000 HC RX 637 (ALT 250 FOR IP): Performed by: NURSE PRACTITIONER

## 2021-06-01 PROCEDURE — 96365 THER/PROPH/DIAG IV INF INIT: CPT

## 2021-06-01 PROCEDURE — 80053 COMPREHEN METABOLIC PANEL: CPT

## 2021-06-01 PROCEDURE — 1200000000 HC SEMI PRIVATE

## 2021-06-01 PROCEDURE — 93005 ELECTROCARDIOGRAM TRACING: CPT | Performed by: NURSE PRACTITIONER

## 2021-06-01 PROCEDURE — 83880 ASSAY OF NATRIURETIC PEPTIDE: CPT

## 2021-06-01 RX ORDER — SODIUM CHLORIDE 9 MG/ML
INJECTION, SOLUTION INTRAVENOUS CONTINUOUS
Status: DISCONTINUED | OUTPATIENT
Start: 2021-06-01 | End: 2021-06-02

## 2021-06-01 RX ORDER — DOCUSATE SODIUM 100 MG/1
100 CAPSULE, LIQUID FILLED ORAL 2 TIMES DAILY PRN
Status: DISCONTINUED | OUTPATIENT
Start: 2021-06-01 | End: 2021-06-06 | Stop reason: HOSPADM

## 2021-06-01 RX ORDER — ASPIRIN 81 MG/1
81 TABLET, CHEWABLE ORAL DAILY
Status: DISCONTINUED | OUTPATIENT
Start: 2021-06-02 | End: 2021-06-06 | Stop reason: HOSPADM

## 2021-06-01 RX ORDER — LEVOTHYROXINE SODIUM 0.15 MG/1
150 TABLET ORAL DAILY
Status: DISCONTINUED | OUTPATIENT
Start: 2021-06-02 | End: 2021-06-06 | Stop reason: HOSPADM

## 2021-06-01 RX ORDER — CETIRIZINE HYDROCHLORIDE 10 MG/1
10 TABLET ORAL DAILY
Status: DISCONTINUED | OUTPATIENT
Start: 2021-06-02 | End: 2021-06-06 | Stop reason: HOSPADM

## 2021-06-01 RX ORDER — FUROSEMIDE 20 MG/1
20 TABLET ORAL 2 TIMES DAILY
Status: DISCONTINUED | OUTPATIENT
Start: 2021-06-01 | End: 2021-06-03

## 2021-06-01 RX ORDER — ONDANSETRON 2 MG/ML
4 INJECTION INTRAMUSCULAR; INTRAVENOUS EVERY 6 HOURS PRN
Status: DISCONTINUED | OUTPATIENT
Start: 2021-06-01 | End: 2021-06-06 | Stop reason: HOSPADM

## 2021-06-01 RX ORDER — PROMETHAZINE HYDROCHLORIDE 25 MG/1
12.5 TABLET ORAL EVERY 6 HOURS PRN
Status: DISCONTINUED | OUTPATIENT
Start: 2021-06-01 | End: 2021-06-06 | Stop reason: HOSPADM

## 2021-06-01 RX ORDER — CHOLECALCIFEROL (VITAMIN D3) 125 MCG
100 CAPSULE ORAL DAILY
Status: DISCONTINUED | OUTPATIENT
Start: 2021-06-02 | End: 2021-06-01

## 2021-06-01 RX ORDER — SODIUM CHLORIDE 0.9 % (FLUSH) 0.9 %
5-40 SYRINGE (ML) INJECTION PRN
Status: DISCONTINUED | OUTPATIENT
Start: 2021-06-01 | End: 2021-06-06 | Stop reason: HOSPADM

## 2021-06-01 RX ORDER — ACETAMINOPHEN 325 MG/1
650 TABLET ORAL EVERY 6 HOURS PRN
Status: DISCONTINUED | OUTPATIENT
Start: 2021-06-01 | End: 2021-06-06 | Stop reason: HOSPADM

## 2021-06-01 RX ORDER — SODIUM CHLORIDE 0.9 % (FLUSH) 0.9 %
5-40 SYRINGE (ML) INJECTION EVERY 12 HOURS SCHEDULED
Status: DISCONTINUED | OUTPATIENT
Start: 2021-06-01 | End: 2021-06-06 | Stop reason: HOSPADM

## 2021-06-01 RX ORDER — FUROSEMIDE 10 MG/ML
20 INJECTION INTRAMUSCULAR; INTRAVENOUS ONCE
Status: COMPLETED | OUTPATIENT
Start: 2021-06-01 | End: 2021-06-01

## 2021-06-01 RX ORDER — SODIUM CHLORIDE 9 MG/ML
25 INJECTION, SOLUTION INTRAVENOUS PRN
Status: DISCONTINUED | OUTPATIENT
Start: 2021-06-01 | End: 2021-06-06 | Stop reason: HOSPADM

## 2021-06-01 RX ORDER — MAGNESIUM SULFATE 1 G/100ML
1000 INJECTION INTRAVENOUS ONCE
Status: COMPLETED | OUTPATIENT
Start: 2021-06-01 | End: 2021-06-01

## 2021-06-01 RX ORDER — ACETAMINOPHEN 650 MG/1
650 SUPPOSITORY RECTAL EVERY 6 HOURS PRN
Status: DISCONTINUED | OUTPATIENT
Start: 2021-06-01 | End: 2021-06-06 | Stop reason: HOSPADM

## 2021-06-01 RX ORDER — ENALAPRIL MALEATE 5 MG/1
10 TABLET ORAL DAILY
Status: DISCONTINUED | OUTPATIENT
Start: 2021-06-02 | End: 2021-06-06 | Stop reason: HOSPADM

## 2021-06-01 RX ORDER — ATORVASTATIN CALCIUM 80 MG/1
80 TABLET, FILM COATED ORAL NIGHTLY
Status: DISCONTINUED | OUTPATIENT
Start: 2021-06-01 | End: 2021-06-04

## 2021-06-01 RX ORDER — POLYETHYLENE GLYCOL 3350 17 G/17G
17 POWDER, FOR SOLUTION ORAL DAILY PRN
Status: DISCONTINUED | OUTPATIENT
Start: 2021-06-01 | End: 2021-06-06 | Stop reason: HOSPADM

## 2021-06-01 RX ORDER — FERROUS SULFATE 325(65) MG
325 TABLET ORAL
Status: DISCONTINUED | OUTPATIENT
Start: 2021-06-02 | End: 2021-06-06 | Stop reason: HOSPADM

## 2021-06-01 RX ADMIN — ATORVASTATIN CALCIUM 80 MG: 80 TABLET, FILM COATED ORAL at 23:35

## 2021-06-01 RX ADMIN — SODIUM CHLORIDE: 9 INJECTION, SOLUTION INTRAVENOUS at 23:35

## 2021-06-01 RX ADMIN — SODIUM CHLORIDE, PRESERVATIVE FREE 10 ML: 5 INJECTION INTRAVENOUS at 23:35

## 2021-06-01 RX ADMIN — FUROSEMIDE 20 MG: 20 TABLET ORAL at 23:38

## 2021-06-01 RX ADMIN — FUROSEMIDE 20 MG: 10 INJECTION, SOLUTION INTRAMUSCULAR; INTRAVENOUS at 22:22

## 2021-06-01 RX ADMIN — MAGNESIUM SULFATE HEPTAHYDRATE 1000 MG: 1 INJECTION, SOLUTION INTRAVENOUS at 17:43

## 2021-06-01 ASSESSMENT — PAIN DESCRIPTION - LOCATION: LOCATION: BACK;FLANK

## 2021-06-01 ASSESSMENT — PAIN DESCRIPTION - PAIN TYPE: TYPE: ACUTE PAIN

## 2021-06-01 ASSESSMENT — PAIN DESCRIPTION - DESCRIPTORS: DESCRIPTORS: CONSTANT

## 2021-06-01 ASSESSMENT — PAIN SCALES - GENERAL
PAINLEVEL_OUTOF10: 0
PAINLEVEL_OUTOF10: 5

## 2021-06-01 ASSESSMENT — PAIN DESCRIPTION - ORIENTATION: ORIENTATION: LEFT

## 2021-06-01 NOTE — ED PROVIDER NOTES
Evaluated by Advanced Practice Provider    [address] [facility]  ED    CHIEF COMPLAINT  Fatigue (pt reporting he had hernia repair last week and was discharged on Friday. pt reports since being discharged he's had no energy and has some left flank pain. ) and Flank Pain    HISTORY OF PRESENT ILLNESS  Tobias Diaz is a 79 y.o. male who presents to the ED complaining of weakness. He got out of here Friday after Surgery on Tuesday. Over the weekend was really weak and could not do much. He was off of his iron pill for about 2 weeks. Also not able to take his vitamins and was blaming it on that. He was feeling weak and as a result he was SOB. He tried to take a shower and he was so exhausted, he fell trying to get out of the shower. He states his body just collapsed down below. This was on Friday. He scraped his knee, denies that he hit his head. He didn't feel right when he left. Denies CP. He denies abdominal pain but reports some left sided flank/back pain, where his kidney is. When he urinates it is dark denies dysuria. Denies nausea, vomiting. Denies fevers, sweats, chills. Denies cough except when he gets a build up of phlegm and can't clear his throat. He was sent from Dr. Sarita Pratt office and was sent him over and felt that he need to be admitted for CHF. Dr. Penelope Field did a hernia repair last week. Dr. Penelope Field did call here and would him admitted to medicine for concerns about CHF. He does have a history of irregular heartbeat. A doctor at [facility] suggested a pacemaker but another did not think it was needed. 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 Also with history of CAD, NSVT, congestive heart failure. He is back on his water pill and he is not discharging as much as he feels he should and urine is dark. Weight has been about 177, now its 180 something. Noticed this over the past 2 weeks. The patient is currently rating their pain as 0/10.      Treatments tried prior to arrival in the ED include: none. The patient arrived to the ED via private car.     PAST MEDICAL HISTORY    Past Medical History:   Diagnosis Date    Allergic rhinitis     Anemia     Arthritis     rt hip    CAD (coronary artery disease) 2001    cardiac stents    Chronic systolic congestive heart failure ([address] 8/21/2018    Compression fracture of T11 vertebra (HCC)     back brace    Food allergy     Hiatal hernia     History of blood transfusion     platelets=2001    Hyperlipidemia     Hypertension     hx of    Hypothyroid     Obesity 10/2/2012    Occlusion and stenosis of carotid artery 10/2/2012    Osteoarthritis     Hip right    Recurrent right inguinal hernia     Shingles 2014    Thyroid disease     TIA (transient ischemic attack)        SURGICAL HISTORY    Past Surgical History:   Procedure Laterality Date    AORTIC VALVE REPLACEMENT  2018    CARDIAC SURGERY  2001    stents    CARDIAC VALVE REPLACEMENT  09/15/2018    COLONOSCOPY  04/22/99    COLONOSCOPY  7/13/2015    EYE SURGERY      victorino cataracts    GASTRIC FUNDOPLICATION N/A 2/77/6795    ROBOTIC NISSEN FUNDOPLICATION performed by Derrick Vaughn MD at [facility] Left 2/5/2020    ROBOTIC LEFT INGUINAL HERNIA REPAIR WITH MESH performed by Derrick Vaughn MD at [address] Right 07/07/2016    laparoscopic right inguinal hernia repair with mesh    INGUINAL HERNIA REPAIR Right 04/12/2017    davinci recurrent right inguinal hernia repair with mesh    PTCA  2018    SIGMOIDOSCOPY  1994    TONSILLECTOMY      UPPER GASTROINTESTINAL ENDOSCOPY  05/12/94    UPPER GASTROINTESTINAL ENDOSCOPY  11/09/04    Gastritis and duodenitis       CURRENT MEDICATIONS    Current Outpatient Rx   Medication Sig Dispense Refill    docusate sodium (COLACE, DULCOLAX) 100 MG CAPS Take 100 mg by mouth 2 times daily (Patient taking differently: Take 100 mg by mouth 2 times daily as needed ) 60 capsule 1    polyethylene glycol (GLYCOLAX) 17 g packet Take 17 g by mouth 2 times daily (Patient taking differently: Take 17 g by mouth 2 times daily as needed ) 527 g 1    levothyroxine (SYNTHROID) 150 MCG tablet TAKE 1 TABLET BY MOUTH  DAILY 90 tablet 3    atorvastatin (LIPITOR) 80 MG tablet TAKE 1 TABLET BY MOUTH  EVERY DAY (Patient taking differently: Take 80 mg by mouth nightly TAKE 1 TABLET BY MOUTH EVERY DAY) 90 tablet 1    furosemide (LASIX) 20 MG tablet Take 1 tablet by mouth 2 times daily 180 tablet 0    ferrous sulfate (FE TABS) 325 (65 Fe) MG EC tablet Take 1 tablet by mouth daily (with breakfast) 90 tablet 0    azelastine (ASTELIN) 0.1 % nasal spray 2 sprays by Nasal route 2 times daily (Patient taking differently: 2 sprays by Nasal route 2 times daily as needed ) 1 Bottle 1    enalapril (VASOTEC) 10 MG tablet Take 10 mg by mouth daily       cetirizine (ZYRTEC) 10 MG tablet Take 10 mg by mouth daily.  Multiple Vitamin (THERA) TABS Take 1 tablet by mouth daily 1 Tab daily.  Glucosamine-Chondroitin (GLUCOSAMINE CHONDR COMPLEX PO) Take 1 tablet by mouth daily       aspirin 81 MG chewable tablet Take 81 mg by mouth daily       Coenzyme Q-10 100 MG CAPS Take 1 capsule by mouth daily.            ALLERGIES    Allergies   Allergen Reactions    Reopro [Abciximab Injection]      \"destroyed platelets\"    Chocolate      sneezing    Coffee Bean Extract [Coffea Arabica]      sneezing       FAMILY HISTORY    Family History   Problem Relation Age of Onset    Hypertension Mother     Coronary Art Dis Father     Hypertension Father     Heart Disease Father     Prostate Cancer Brother     Cancer Brother 61        Prostate    Heart Disease Sister        SOCIAL HISTORY    Social History     Socioeconomic History    Marital status:      Spouse name: Not on file    Number of children: Not on file    Years of education: Not on file    Highest education level: Not on file   Occupational History    Not on file   Tobacco auscultation. Without wheezing, rales, or rhonchi. CADIOVASCULAR:  Tachycardic to irregular rate and rhythm. Normal S1-S2 sounds. No murmurs, rubs, or gallops. Capillary refill is brisk in all 4 extremities. Bilateral lower extremities are equal in size, there is 2+ pitting swelling observed. There is no tenderness to palpation. There is no erythema observed or warmth palpated. GI: Soft, nontender, nondistended with positive bowel sounds. No rebound tenderness, guarding or any peritoneal signs. No masses or hepatosplenomegaly   MUSCULOSKELETAL:  No gross deformities or trauma noted. Moving all extremities equally and appropriately. Normal ROM. SKIN: Warm/dry. Abdominal incisions are well approximated with tissue adhesive. There is no drainage, erythema, edema, induration to the willy-wounds. No rashes/lesions noted. PSYCHIATRIC: Mood and affect appropriate. Speech is clear and articulate. NEUROLOGIC: Alert and oriented. CN II-XII intact. No focal motor or sensory deficits. LABS  I have reviewed all labs for this visit.    Results for orders placed or performed during the hospital encounter of 06/01/21   CBC Auto Differential   Result Value Ref Range    WBC 10.9 4.0 - 11.0 K/uL    RBC 3.22 (L) 4.20 - 5.90 M/uL    Hemoglobin 10.7 (L) 13.5 - 17.5 g/dL    Hematocrit 30.9 (L) 40.5 - 52.5 %    MCV 96.0 80.0 - 100.0 fL    MCH 33.1 26.0 - 34.0 pg    MCHC 34.5 31.0 - 36.0 g/dL    RDW 15.0 12.4 - 15.4 %    Platelets 035 (L) 309 - 450 K/uL    MPV 7.6 5.0 - 10.5 fL    Neutrophils % 89.3 %    Lymphocytes % 3.8 %    Monocytes % 6.1 %    Eosinophils % 0.7 %    Basophils % 0.1 %    Neutrophils Absolute 9.8 (H) 1.7 - 7.7 K/uL    Lymphocytes Absolute 0.4 (L) 1.0 - 5.1 K/uL    Monocytes Absolute 0.7 0.0 - 1.3 K/uL    Eosinophils Absolute 0.1 0.0 - 0.6 K/uL    Basophils Absolute 0.0 0.0 - 0.2 K/uL   Comprehensive Metabolic Panel w/ Reflex to MG   Result Value Ref Range    Sodium 127 (L) 136 - 145 mmol/L    Potassium reflex Magnesium 4.1 3.5 - 5.1 mmol/L    Chloride 91 (L) 99 - 110 mmol/L    CO2 28 21 - 32 mmol/L    Anion Gap 8 3 - 16    Glucose 112 (H) 70 - 99 mg/dL    BUN 12 7 - 20 mg/dL    CREATININE <0.5 (L) 0.8 - 1.3 mg/dL    GFR Non-African American >60 >60    GFR African American >60 >60    Calcium 8.8 8.3 - 10.6 mg/dL    Total Protein 5.9 (L) 6.4 - 8.2 g/dL    Albumin 2.7 (L) 3.4 - 5.0 g/dL    Albumin/Globulin Ratio 0.8 (L) 1.1 - 2.2    Total Bilirubin 0.8 0.0 - 1.0 mg/dL    Alkaline Phosphatase 190 (H) 40 - 129 U/L     (H) 10 - 40 U/L    AST 74 (H) 15 - 37 U/L    Globulin 3.2 g/dL   Brain Natriuretic Peptide   Result Value Ref Range    Pro-BNP 1,251 (H) 0 - 449 pg/mL   Urinalysis, reflex to microscopic   Result Value Ref Range    Color, UA Yellow Straw/Yellow    Clarity, UA Clear Clear    Glucose, Ur Negative Negative mg/dL    Bilirubin Urine SMALL (A) Negative    Ketones, Urine TRACE (A) Negative mg/dL    Specific Gravity, UA 1.015 1.005 - 1.030    Blood, Urine Negative Negative    pH, UA 6.0 5.0 - 8.0    Protein, UA Negative Negative mg/dL    Urobilinogen, Urine 1.0 <2.0 E.U./dL    Nitrite, Urine Negative Negative    Leukocyte Esterase, Urine Negative Negative    Microscopic Examination Not Indicated     Urine Type NotGiven    Troponin   Result Value Ref Range    Troponin <0.01 <0.01 ng/mL   Magnesium   Result Value Ref Range    Magnesium 1.70 (L) 1.80 - 2.40 mg/dL   EKG 12 Lead   Result Value Ref Range    Ventricular Rate 83 BPM    Atrial Rate 83 BPM    P-R Interval 150 ms    QRS Duration 90 ms    Q-T Interval 348 ms    QTc Calculation (Bazett) 408 ms    P Axis 46 degrees    R Axis 33 degrees    T Axis 81 degrees    Diagnosis       Sinus rhythm with frequent Premature ventricular complexesNonspecific T wave abnormalityAbnormal ECGWhen compared with ECG of 04-MAY-2021 08:48,No significant change was found       RADIOLOGY    XR CHEST PORTABLE    Result Date: 6/1/2021  EXAMINATION: ONE XRAY VIEW OF THE CHEST PVCs.    Most recent echo in 8/2020 EF 58-63%. Pt was given the following medications or treatments in the ED:   Medications   magnesium sulfate 1000 mg in dextrose 5% 100 mL IVPB (0 mg Intravenous Stopped 6/1/21 1843)     Patient does not have diagnostic or physical findings concerning for CHF exacerbation. He was given magnesium IV for replacement of his low magnesium level. Patient overall is relatively diagnostically stable. He was ambulated in the ER, he felt he did okay however nursing staff caring for the patient stated that he was very unsteady on his feet, they did not feel comfortable not assisting him while walking as they felt that he would fall as soon as they let go. I discussed with the patient and his wife the possibility that he could be deconditioned given were not finding any thing significant on his work-up. I did discuss with him the possibility of being sent to rehab to build endurance so that he can be safely discharged home. Patient and wife are both in agreement with this plan. Kristy Sorto will be admitted for further observation and evaluation of [facility]'s chest pain. As I have deemed necessary from their history, physical, and studies, I have considered and evaluated Kristy Sorto for the following diagnoses:  ACUTE CORONARY SYNDROME, PERICARDIAL TAMPONADE, PNEUMOTHORAX, PULMONARY EMBOLISM, and THORACIC DISSECTION. CLINICAL IMPRESSION    1. General weakness    2. Fatigue, unspecified type    3. Difficulty walking    4. Hypomagnesemia    5. Elevated transaminase level    6. Hyponatremia    7. Anemia, unspecified type       DISPOSITION  Admit. Comment: Please note this report has been produced using speech recognition software and may contain errors related to that system including errors in grammar, punctuation, and spelling, as well as words and phrases that may be inappropriate.  If there are any questions or concerns please feel free to contact the dictating provider for clarification.        VALE Gamez - VENUS  06/02/21 032

## 2021-06-01 NOTE — ED TRIAGE NOTES
Patient flagged as a fall risk. Bracelet applied. All precautions to be implemented by primary nurse.

## 2021-06-01 NOTE — ED NOTES
Pt ambulated with RN. Pt gait unsteady, pt denies any SOB or dizziness however RN had to help hold pt to make sure pt would not fall d/t unsteady gait.  Tx team made aware     Jose Fraga RN  06/01/21 [id number]

## 2021-06-01 NOTE — ED PROVIDER NOTES
may be partially due to deconditioning from surgery, being on liquid diet. However to feel that he is not safe for discharge home as he lives with his wife who is unable to support him when he is this week. I feel that he is at a high risk for falls as he has already fallen once several days ago. Plan for admission for further workup and treatment for generalized weakness and possible PT OT evaluation discussed with patient and family. Patient and family in agreement with plan and have no further questions/concerns    For further details of Leticia Glez emergency department encounter, please see YURIY Ball documentation.         Fidelina Waters MD  06/01/21 3425

## 2021-06-02 LAB
ALBUMIN SERPL-MCNC: 2.6 G/DL (ref 3.4–5)
ALP BLD-CCNC: 184 U/L (ref 40–129)
ALT SERPL-CCNC: 95 U/L (ref 10–40)
ANION GAP SERPL CALCULATED.3IONS-SCNC: 6 MMOL/L (ref 3–16)
AST SERPL-CCNC: 66 U/L (ref 15–37)
BASOPHILS ABSOLUTE: 0 K/UL (ref 0–0.2)
BASOPHILS RELATIVE PERCENT: 0.2 %
BILIRUB SERPL-MCNC: 0.8 MG/DL (ref 0–1)
BILIRUBIN DIRECT: 0.3 MG/DL (ref 0–0.3)
BILIRUBIN, INDIRECT: 0.5 MG/DL (ref 0–1)
BUN BLDV-MCNC: 9 MG/DL (ref 7–20)
CALCIUM SERPL-MCNC: 8.3 MG/DL (ref 8.3–10.6)
CHLORIDE BLD-SCNC: 90 MMOL/L (ref 99–110)
CO2: 30 MMOL/L (ref 21–32)
CREAT SERPL-MCNC: <0.5 MG/DL (ref 0.8–1.3)
EKG ATRIAL RATE: 83 BPM
EKG DIAGNOSIS: NORMAL
EKG P AXIS: 46 DEGREES
EKG P-R INTERVAL: 150 MS
EKG Q-T INTERVAL: 348 MS
EKG QRS DURATION: 90 MS
EKG QTC CALCULATION (BAZETT): 408 MS
EKG R AXIS: 33 DEGREES
EKG T AXIS: 81 DEGREES
EKG VENTRICULAR RATE: 83 BPM
EOSINOPHILS ABSOLUTE: 0.3 K/UL (ref 0–0.6)
EOSINOPHILS RELATIVE PERCENT: 2.8 %
GFR AFRICAN AMERICAN: >60
GFR NON-AFRICAN AMERICAN: >60
GLUCOSE BLD-MCNC: 101 MG/DL (ref 70–99)
HAV IGM SER IA-ACNC: NORMAL
HCT VFR BLD CALC: 30.5 % (ref 40.5–52.5)
HEMOGLOBIN: 10.5 G/DL (ref 13.5–17.5)
HEPATITIS B CORE IGM ANTIBODY: NORMAL
HEPATITIS B SURFACE ANTIGEN INTERPRETATION: NORMAL
HEPATITIS C ANTIBODY INTERPRETATION: NORMAL
LYMPHOCYTES ABSOLUTE: 0.5 K/UL (ref 1–5.1)
LYMPHOCYTES RELATIVE PERCENT: 5 %
MCH RBC QN AUTO: 33 PG (ref 26–34)
MCHC RBC AUTO-ENTMCNC: 34.5 G/DL (ref 31–36)
MCV RBC AUTO: 95.6 FL (ref 80–100)
MONOCYTES ABSOLUTE: 0.6 K/UL (ref 0–1.3)
MONOCYTES RELATIVE PERCENT: 5.6 %
NEUTROPHILS ABSOLUTE: 8.6 K/UL (ref 1.7–7.7)
NEUTROPHILS RELATIVE PERCENT: 86.4 %
PDW BLD-RTO: 15 % (ref 12.4–15.4)
PLATELET # BLD: 124 K/UL (ref 135–450)
PMV BLD AUTO: 7.8 FL (ref 5–10.5)
POTASSIUM REFLEX MAGNESIUM: 4.1 MMOL/L (ref 3.5–5.1)
RBC # BLD: 3.19 M/UL (ref 4.2–5.9)
SODIUM BLD-SCNC: 126 MMOL/L (ref 136–145)
TOTAL PROTEIN: 5.7 G/DL (ref 6.4–8.2)
TSH REFLEX: 2.3 UIU/ML (ref 0.27–4.2)
WBC # BLD: 10 K/UL (ref 4–11)

## 2021-06-02 PROCEDURE — 85025 COMPLETE CBC W/AUTO DIFF WBC: CPT

## 2021-06-02 PROCEDURE — 84443 ASSAY THYROID STIM HORMONE: CPT

## 2021-06-02 PROCEDURE — 97535 SELF CARE MNGMENT TRAINING: CPT

## 2021-06-02 PROCEDURE — 36415 COLL VENOUS BLD VENIPUNCTURE: CPT

## 2021-06-02 PROCEDURE — 80048 BASIC METABOLIC PNL TOTAL CA: CPT

## 2021-06-02 PROCEDURE — 97530 THERAPEUTIC ACTIVITIES: CPT

## 2021-06-02 PROCEDURE — 1200000000 HC SEMI PRIVATE

## 2021-06-02 PROCEDURE — 80076 HEPATIC FUNCTION PANEL: CPT

## 2021-06-02 PROCEDURE — 6360000002 HC RX W HCPCS: Performed by: NURSE PRACTITIONER

## 2021-06-02 PROCEDURE — 6370000000 HC RX 637 (ALT 250 FOR IP): Performed by: NURSE PRACTITIONER

## 2021-06-02 PROCEDURE — 97110 THERAPEUTIC EXERCISES: CPT

## 2021-06-02 PROCEDURE — 97162 PT EVAL MOD COMPLEX 30 MIN: CPT

## 2021-06-02 PROCEDURE — 93010 ELECTROCARDIOGRAM REPORT: CPT | Performed by: INTERNAL MEDICINE

## 2021-06-02 PROCEDURE — 97166 OT EVAL MOD COMPLEX 45 MIN: CPT

## 2021-06-02 PROCEDURE — 97116 GAIT TRAINING THERAPY: CPT

## 2021-06-02 PROCEDURE — 2580000003 HC RX 258: Performed by: NURSE PRACTITIONER

## 2021-06-02 PROCEDURE — 80074 ACUTE HEPATITIS PANEL: CPT

## 2021-06-02 RX ADMIN — ATORVASTATIN CALCIUM 80 MG: 80 TABLET, FILM COATED ORAL at 10:57

## 2021-06-02 RX ADMIN — ASPIRIN 81 MG: 81 TABLET, CHEWABLE ORAL at 10:56

## 2021-06-02 RX ADMIN — LEVOTHYROXINE SODIUM 150 MCG: 0.15 TABLET ORAL at 06:11

## 2021-06-02 RX ADMIN — SODIUM CHLORIDE, PRESERVATIVE FREE 10 ML: 5 INJECTION INTRAVENOUS at 22:48

## 2021-06-02 RX ADMIN — ATORVASTATIN CALCIUM 80 MG: 80 TABLET, FILM COATED ORAL at 22:48

## 2021-06-02 RX ADMIN — FUROSEMIDE 20 MG: 20 TABLET ORAL at 10:56

## 2021-06-02 RX ADMIN — FERROUS SULFATE TAB 325 MG (65 MG ELEMENTAL FE) 325 MG: 325 (65 FE) TAB at 10:56

## 2021-06-02 RX ADMIN — CETIRIZINE HYDROCHLORIDE 10 MG: 10 TABLET, FILM COATED ORAL at 10:57

## 2021-06-02 RX ADMIN — ENOXAPARIN SODIUM 40 MG: 40 INJECTION SUBCUTANEOUS at 12:22

## 2021-06-02 RX ADMIN — FUROSEMIDE 20 MG: 20 TABLET ORAL at 22:48

## 2021-06-02 RX ADMIN — ENALAPRIL MALEATE 10 MG: 5 TABLET ORAL at 10:57

## 2021-06-02 NOTE — PROGRESS NOTES
Pt arrived to floor via wheelchair at approx 2245 in stable condition. Pt was oriented to his room and his belongings were accounted for and kept at his bedside. Pts admission and 4eye were completed. Pt is a/o x4. VSS. Assessment as charted. - Pt has clear lung sounds, SpO2 >90% on RA, but does complain of GUEVARA.   - Pt has x5 surg lap ixs closed w/ surgical glue d/t recent hernia repair, CALIXTO, C/D/I- Pt states he's had a DE since returning home- hypoactive bowel sounds- small,high caloric foods pt likes was offered and given. - Pt has +2 pitting edema in BLE- compression socks removed to give legs a breather, legs elevated. Pt is currently resting in his bed that is locked and in its lowest position w/ his call light within reach, non-skid socks, and bed alarm on. Pt denies any other needs at this time. 4 Eyes Skin Assessment     The patient is being assess for  Admission    I agree that 2 RN's have performed a thorough Head to Toe Skin Assessment on the patient. ALL assessment sites listed below have been assessed. Areas assessed by both nurses: Donald Mcdonald., RNs   [x]   Head, Face, and Ears   [x]   Shoulders, Back, and Chest  [x]   Arms, Elbows, and Hands   [x]   Coccyx, Sacrum, and Ischum  [x]   Legs, Feet, and Heels   - x5 lap surg ixs- closed w/ surgical glue, CALIXTO, C/D/I   - Abrasion R shin        Does the Patient have Skin Breakdown?   No         Blane Prevention initiated:  No   Wound Care Orders initiated:  No      [facility] nurse consulted for Pressure Injury (Stage 3,4, Unstageable, DTI, NWPT, and Complex wounds):  No      Nurse 1 eSignature: Electronically signed by Serena Bell RN on 6/2/21 at 12:06 AM EDT    **SHARE this note so that the co-signing nurse is able to place an eSignature**    Nurse 2 eSignature: Electronically signed by Tomi Velazquez RN on 6/2/21 at 12:49 AM EDT

## 2021-06-02 NOTE — PROGRESS NOTES
Surgery Post-op Progress Note    HPI:  Notes reviewed, and agree with documentation in pt's chart. Postoperative Follow-up: Patient presents for 1 week follow-up status post robotic repair of complex Type IV paraesophageal hernia, requiring hiatal/crural closure with mesh reinforcement, and Jesús gastroplasty to restore intraabdominal G-E junction, and loose Nissen gastrofundoplication. Postop course unremarkable, with UGI showing intact wrap and slow esophageal clearance through stomach. Discharged home 4 days ago. Over past 48 hours pt has been very fatigued, weak and his wife thinks she hears him wheezing at home. His appetite has been greatly diminished, although he denies dysphagia, bloating, heartburn, inability to belch. Noted occasional hiccups    He also notes increased dependent edema in bilateral lower legs    ROS:    · 10 point review of systems performed; please refer to HPI with pertinent positives, all other ROS are negative    A review of the patient's record including allergies, medication list, tobacco history, family history, problem list, medical history and social history has been completed and updates made to the patient's EMR where indicated. PE:   CONSTITUTIONAL:  fatigued and alert    LUNGS: diminished breath sounds on L side w/ few scattered rales    ABDOMEN: soft, non-distended, non-tender     INCISION: clean, dry, no drainage      ASSESSMENT:   Diagnosis Orders   1. Postop check     · With patient's h/o CHF and prior cardiac surgery I am worried he may be in acute CHF  · I don't think he has any issues with his recent gastroesophageal surgery. I'm sure he still has edema within the G-E region which is still resolving slowly      PLAN:    · I think he should be evaluated in the ED to better clarify if he has any acute-on-chronic cardiopulmonary pathology that would need admission for medical therapy.   · Assuming he is admitted I will follow with him tomorrow in the hospital. If he is not admitted will plan for close short-term f/u.

## 2021-06-02 NOTE — H&P
Hospital Medicine History & Physical      PCP: Tino Cooney    Date of Admission: 2021    Date of Service: Pt seen/examined on 2021 and Admitted to Inpatient with expected LOS greater than two midnights due to medical therapy. Chief Complaint:  Generalized weakness and fatigue    History Of Present Illness:      79 y.o. male, with PMH of HTN, CAD, HLD, hypothyroidism, who presented to [facility] with generalized weakness and fatigue. History obtained from the patient and review of EMR. The patient stated he had hernia repair surgery on  with Dr. Klarissa Graf here at [facility]. He stated he was discharged on . The patient stated since he has been discharged he has felt extremely weak and like he has no energy. He stated he has been off of his home medications such as his iron, multivitamins and Lasix for roughly 2 weeks now. The patient stated he has not been able to do much in regards of activity and thought this was due to him not taking his medications. He stated yesterday he was taking a shower and was so exhausted that he fell trying to get out of the shower. The patient stated \"my body just collapsed underneath of me\". He denied hitting his head and/or loss of consciousness. The patient stated his wife also felt that she could hear him wheezing at times so they called Dr. Shirlene Apgar office. He stated he was seen by Dr. Klarissa Graf today and Dr. Klarissa Graf was concerned he may be having a CHF exacerbation and sent him to the emergency room for further evaluation. The patient denies any pain and/or shortness of breath. The emergency room a chest x-ray was obtained that revealed bilateral pleural effusions bibasilar hypoaeration. A urinalysis was also obtained that was negative for infection. The patient is hyponatremic at 127, but this does appear to be chronic. He was given 20 mg of IV Lasix. The patient will be admitted for further evaluation and treatment.   An echocardiogram has been ordered for the a.m. PT/OT and case management have also been consulted. The patient denied any other associated symptoms as well as any aggravating and/or alleviating factors. At the time of this assessment, the patient was resting comfortably in bed. He currently denies any chest pain, back pain, abdominal pain, shortness of breath, numbness, tingling, N/V/C/D, fever and/or chills.      Past Medical History:          Diagnosis Date    Allergic rhinitis     Anemia     Arthritis     rt hip    CAD (coronary artery disease) 2001    cardiac stents    Chronic systolic congestive heart failure ([address] 8/21/2018    Compression fracture of T11 vertebra (HCC)     back brace    Food allergy     Hiatal hernia     History of blood transfusion     platelets=2001    Hyperlipidemia     Hypertension     hx of    Hypothyroid     Obesity 10/2/2012    Occlusion and stenosis of carotid artery 10/2/2012    Osteoarthritis     Hip right    Recurrent right inguinal hernia     Shingles 2014    Thyroid disease     TIA (transient ischemic attack)      Past Surgical History:          Procedure Laterality Date    AORTIC VALVE REPLACEMENT  2018    CARDIAC SURGERY  2001    stents    CARDIAC VALVE REPLACEMENT  09/15/2018    COLONOSCOPY  04/22/99    COLONOSCOPY  7/13/2015    EYE SURGERY      victorino cataracts    GASTRIC FUNDOPLICATION N/A 8/66/2124    ROBOTIC NISSEN FUNDOPLICATION performed by Joel Washington MD at [facility] Left 2/5/2020    ROBOTIC LEFT INGUINAL HERNIA REPAIR WITH MESH performed by Joel Washington MD at [address] Right 07/07/2016    laparoscopic right inguinal hernia repair with mesh    INGUINAL HERNIA REPAIR Right 04/12/2017    davinci recurrent right inguinal hernia repair with mesh    PTCA  2018    SIGMOIDOSCOPY  1994    TONSILLECTOMY      UPPER GASTROINTESTINAL ENDOSCOPY  05/12/94    UPPER GASTROINTESTINAL ENDOSCOPY  11/09/04    Gastritis and duodenitis Medications Prior to Admission:      Prior to Admission medications    Medication Sig Start Date End Date Taking? Authorizing Provider   docusate sodium (COLACE, DULCOLAX) 100 MG CAPS Take 100 mg by mouth 2 times daily  Patient taking differently: Take 100 mg by mouth 2 times daily as needed  5/7/21   Gabbie Rodriguez MD   polyethylene glycol (GLYCOLAX) 17 g packet Take 17 g by mouth 2 times daily  Patient taking differently: Take 17 g by mouth 2 times daily as needed  5/7/21 6/6/21  Gabbie Rodriguez MD   levothyroxine (SYNTHROID) 150 MCG tablet TAKE 1 TABLET BY MOUTH  DAILY 10/16/20   Jetty Olivia, APRN - CNP   atorvastatin (LIPITOR) 80 MG tablet TAKE 1 TABLET BY MOUTH  EVERY DAY  Patient taking differently: Take 80 mg by mouth nightly TAKE 1 TABLET BY MOUTH EVERY DAY 6/12/20   Lucien Parikh MD   furosemide (LASIX) 20 MG tablet Take 1 tablet by mouth 2 times daily 9/20/19 12/13/19  Jetty Olivia, APRN - CNP   ferrous sulfate (FE TABS) 325 (65 Fe) MG EC tablet Take 1 tablet by mouth daily (with breakfast) 6/28/19   Jetty Olivia, APRN - CNP   azelastine (ASTELIN) 0.1 % nasal spray 2 sprays by Nasal route 2 times daily  Patient taking differently: 2 sprays by Nasal route 2 times daily as needed  6/21/19   Lissette Solis MD   enalapril (VASOTEC) 10 MG tablet Take 10 mg by mouth daily  11/20/18   Historical Provider, MD   cetirizine (ZYRTEC) 10 MG tablet Take 10 mg by mouth daily. Historical Provider, MD   Multiple Vitamin (THERA) TABS Take 1 tablet by mouth daily 1 Tab daily. Historical Provider, MD   Glucosamine-Chondroitin (GLUCOSAMINE CHONDR COMPLEX PO) Take 1 tablet by mouth daily     Historical Provider, MD   aspirin 81 MG chewable tablet Take 81 mg by mouth daily     Historical Provider, MD   Coenzyme Q-10 100 MG CAPS Take 1 capsule by mouth daily.       Historical Provider, MD     Allergies:  Reopro [abciximab injection], Chocolate, and Coffee bean extract [coffea arabica]    Social History:      The patient currently lives at home with his wife    TOBACCO:   reports that he quit smoking about 25 years ago. He started smoking about 62 years ago. He has a 7.50 pack-year smoking history. He has never used smokeless tobacco.  ETOH:   reports no history of alcohol use. E-Cigarettes/Vaping Use     Questions Responses    E-Cigarette/Vaping Use Never User    Start Date     Passive Exposure     Quit Date     Counseling Given     Comments Unknown        Family History:      Reviewed in detail. Positive as follows:        Problem Relation Age of Onset    Hypertension Mother     Coronary Art Dis Father     Hypertension Father     Heart Disease Father     Prostate Cancer Brother     Cancer Brother 61        Prostate    Heart Disease Sister      REVIEW OF SYSTEMS COMPLETED:   Pertinent positives as noted in the HPI. All other systems reviewed and negative. PHYSICAL EXAM PERFORMED:    /82   Pulse 92   Temp 98.9 °F (37.2 °C) (Oral)   Resp 21   Ht 5' 11\" (1.803 m)   Wt 177 lb (80.3 kg)   SpO2 98%   BMI 24.69 kg/m²     General appearance:  Pleasant, elderly male in no apparent distress, appears stated age and cooperative. HEENT: Pupils equal, round, and reactive to light. Glasses   Extra ocular muscles intact. Conjunctivae/corneas clear. Neck: Supple, with full range of motion. No jugular venous distention. Trachea midline. Respiratory:  Normal respiratory effort. Clear to auscultation, bilaterally without Rales/Wheezes/Rhonchi. Cardiovascular:  Regular rate and rhythm with normal S1/S2 without murmurs, rubs or gallops. Abdomen: Soft, non-tender, non-distended with normal bowel sounds. Musculoskeletal:  No clubbing, cyanosis or edema bilaterally. Generalized weakness  Skin: Skin color, texture, turgor normal.  No significant rashes or lesions. Neurologic:  Neurovascularly intact.  Cranial nerves: II-XII intact, grossly non-focal.  Psychiatric:  Alert and oriented, thought content appropriate, normal insight  Capillary Refill: Brisk,3 seconds, normal  Peripheral Pulses: +2 palpable, equal bilaterally     Labs:     Recent Labs     06/01/21  1701   WBC 10.9   HGB 10.7*   HCT 30.9*   *     Recent Labs     06/01/21 1701   *   K 4.1   CL 91*   CO2 28   BUN 12   CREATININE <0.5*   CALCIUM 8.8     Recent Labs     06/01/21 1701   AST 74*   *   BILITOT 0.8   ALKPHOS 190*     Recent Labs     06/01/21 1701   TROPONINI <0.01     Urinalysis:      Lab Results   Component Value Date    NITRU Negative 06/01/2021    BLOODU Negative 06/01/2021    SPECGRAV 1.015 06/01/2021    GLUCOSEU Negative 06/01/2021     Radiology:     CXR: I have reviewed the CXR with the following interpretation: bilateral pleural effusions bibasilar hypoaeration    EKG:  I have reviewed the EKG with the following interpretation: The Ekg interpreted in the absence of a cardiologist shows Sinus rhythm with frequent Premature ventricular complexes, rate 83. Nonspecific T wave abnormality. Abnormal ECG.  When compared with ECG of 04-MAY-2021 08:48, No significant change was found    XR CHEST PORTABLE   Final Result   Bilateral pleural effusions bibasilar hypoaeration      Stable cardiomegaly      No pulmonary edema identified           ASSESSMENT:    Active Hospital Problems    Diagnosis Date Noted    Transaminitis [R74.01] 06/01/2021    CAD (coronary artery disease) [I25.10]     Generalized weakness [R53.1] 09/27/2012     PLAN:    generalized weakness and fatigue in patient with recent hiatal hernia repair (5/25)  -UA negative for infection  -up with assistance  -fall precautions  -pt/ot evaluation  -case management to assist with d/c, possible rehab    Acute transaminitis   -Pt with elevated AST 74 /[id number]  -unclear etiology  -will trend  -hepatitis panel is pending    Essential HTN in setting of known CAD  -continue ASA  -continue enalapril    HLD  -continue atorvastatin    CHF, stable  -does not appear to be in

## 2021-06-02 NOTE — PROGRESS NOTES
Physical Therapy    Facility/Department: [facility] C3 TELE/MED SURG/ONC  Initial Assessment and treatment    NAME: Jaclyn Ma  : 1940  MRN: [id number]    Date of Service: 2021    Discharge Recommendations:  24 hour supervision or assist, Home with Home health PT   PT Equipment Recommendations  Equipment Needed: No    Assessment   Body structures, Functions, Activity limitations: Decreased functional mobility ; Decreased ROM; Decreased balance;Decreased posture;Decreased coordination  Assessment: Pt referred for PT evaluaiton during current hospital stay with a diagnosis of generalized weaknes. Pt is currently functioning beowl baseline, is now requiring the use of RW for safe ambulation, and requires CGA to Loyda at times for gait with RW. Pt required max cues for safe use of walker for mobility as he has never used one in ther past.  Pt's gait deficits seem neurological in nature, but pt reports this is baseline? Pt would benefit from skilled acute PT to address deficits. Recommend home with 24 hr sup/assist and HHPT at DC from acute setting  Treatment Diagnosis: impaired mobility  Prognosis: Good  Decision Making: Medium Complexity  PT Education: Goals;Gait Training;PT Role;Disease Specific Education;Plan of Care; Functional Mobility Training;Home Exercise Program;Transfer Training  Patient Education: pt verbalized understanding of safe use of walker with much reinforcement  Barriers to Learning: no  REQUIRES PT FOLLOW UP: Yes  Activity Tolerance  Activity Tolerance: Patient Tolerated treatment well;Patient limited by fatigue  Activity Tolerance: /72; HR 72; SpO2 on RA 90%       Patient Diagnosis(es): The primary encounter diagnosis was General weakness. Diagnoses of Fatigue, unspecified type, Difficulty walking, Hypomagnesemia, Elevated transaminase level, Hyponatremia, and Anemia, unspecified type were also pertinent to this visit.      has a past medical history of Allergic rhinitis, Anemia, Arthritis, CAD (coronary artery disease), Chronic systolic congestive heart failure ([facility] [address] Compression fracture of T11 vertebra (HCC), Food allergy, Hiatal hernia, History of blood transfusion, Hyperlipidemia, Hypertension, Hypothyroid, Obesity, Occlusion and stenosis of carotid artery, Osteoarthritis, Recurrent right inguinal hernia, Shingles, Thyroid disease, and TIA (transient ischemic attack). has a past surgical history that includes sigmoidoscopy (1994); Upper gastrointestinal endoscopy (05/12/94); Upper gastrointestinal endoscopy (11/09/04); Tonsillectomy; eye surgery; Colonoscopy (04/22/99); Colonoscopy (7/13/2015); Inguinal hernia repair (Right, 07/07/2016); Inguinal hernia repair (Right, 04/12/2017); Cardiac surgery (2001); Cardiac valve replacement (09/15/2018); Aortic valve replacement (2018); Percutaneous Transluminal Coronary Angio (2018); hernia repair (Left, 2/5/2020); and Gastric fundoplication (N/A, 1/74/1380).     Restrictions  Restrictions/Precautions  Restrictions/Precautions: Fall Risk, General Precautions  Position Activity Restriction  Other position/activity restrictions: up with assist  Vision/Hearing  Vision: Impaired  Vision Exceptions: Wears glasses at all times  Hearing: Within functional limits     Subjective  General  Chart Reviewed: Yes  Patient assessed for rehabilitation services?: Yes  Response To Previous Treatment: Not applicable  Family / Caregiver Present: No  Referring Practitioner: Rhett Sauceda CNP  Referral Date : 06/01/21  Diagnosis: generalized weakness  Follows Commands: Within Functional Limits  General Comment  Comments: Pt sitting up in chair upon entry, RN cleaerd pt for therapy  Subjective  Subjective: Pt agreeable to PT  Pain Screening  Patient Currently in Pain: Denies  Vital Signs  Patient Currently in Pain: Denies  Pre Treatment Pain Screening  Intervention List: Patient able to continue with treatment    Orientation  Orientation  Overall Orientation Status: Within Normal Limits  Social/Functional History  Social/Functional History  Lives With: Spouse  Type of Home: House  Home Layout: Two level, Laundry in basement, Able to Live on Main level with bedroom/bathroom  Home Access: Level entry  Bathroom Shower/Tub: Walk-in shower (will be getting shower chair)  Bathroom Toilet: Handicap height  Home Equipment: Rolling walker, Cane, Reacher  ADL Assistance: Independent  Homemaking Assistance: Independent  Homemaking Responsibilities: Yes  Meal Prep Responsibility: Secondary  Laundry Responsibility: Secondary  Cleaning Responsibility: No  Shopping Responsibility: Primary  Ambulation Assistance: Independent (with walker since last DC)  Transfer Assistance: Independent  Active : Yes  Occupation: Retired  Type of occupation: worked on computers  Leisure & Hobbies: computers         Objective          AROM RLE (degrees)  RLE AROM: WFL  AROM LLE (degrees)  LLE AROM : WFL  Strength RLE  Strength RLE: WFL  Comment: grossly 4 to 4+/5 throughout  Strength LLE  Strength LLE: WFL  Comment: grossly 4 to 4+/5 throughout           Transfers  Sit to Stand: Contact guard assistance  Stand to sit: Contact guard assistance  Bed to Chair: Minimal assistance (with max cues for safe use of walker)  Ambulation  Ambulation?: Yes  Ambulation 1  Surface: level tile  Device: Rolling Walker  Assistance: Contact guard assistance;Minimal assistance  Quality of Gait: forward flexed at trunk, toeing in bilaterally, R greater than left, some scissoring noted, mild to moderate limp due to RLE appears longer than left, stiff legged, ataxic with decreased knee extension in stance, almost seems like a neurologic gait pattern.   Pt heavily relying on walker vs last hospital stay ambulated with this PT without AD  Distance: 125 ft  Stairs/Curb  Stairs?: No     Balance  Posture: Fair  Sitting - Static: Good  Sitting - Dynamic: Good;-  Standing - Static: Fair;+  Standing - Dynamic: Fair  Exercises  Quad Sets: x 10

## 2021-06-02 NOTE — ACP (ADVANCE CARE PLANNING)
Advance Care Planning Note     Name: Luci Villa  YOB: 1940  MRN: [id number]  Admission Date: 6/1/2021  4:14 PM     Date of Discussion: 06/01/2021     Active Diagnoses:     Generalized weakness, transaminitis, HTN, CAD     These active diagnoses are of sufficient risk that focused discussion on advance care planning is indicated in order to allow the patient to thoughtfully consider personal goals of care; and, if situations arise that prevent the ability to personally give input, to ensure appropriate representation of their personal desires for different levels and agressiveness of care. Discussion:     Persons present and participating in discussion: Lazaro Mayberry     Patient's decisional capacity or surrogate:  Pt with capacity     Discussion in summary: The patient stated he does not have a living will and/or advanced directive at this time. .  While talking through [address] chest compressions, intubation, medication and defibrillation were discussed at length with the patient. The patient stated he would like to remain a full code at this time. The patient status is listed as a full code to reflect his wishes. Code status: FULL CODE     Time Spent:      Total time spent face-to-face in education and discussion: 25 minutes.     Suzette Robertson, CNP

## 2021-06-02 NOTE — CARE COORDINATION
CASE MANAGEMENT INITIAL ASSESSMENT    Reviewed chart and completed assessment with: Patient     Explained Case Management role/services. Primary contact information: Paula, [address] Decision Maker :   Primary Decision Maker: Nova Sommers Spouse - [phone]    Secondary Decision Maker: Nicko Romero Child - [phone]          Can this person be reached and be able to respond quickly, such as within a few minutes or hours? Yes    Admit date/status:1940 Inpatient   Diagnosis: Generalized weakness  Is this a Readmission?:  Yes  05/25-05/28: hiatal hernia, 05/03-05/07SBO     Insurance:[facility] Medicare  Precert required for SNF: Yes       3 night stay required: No    Living arrangements, Adls, care needs, prior to admission: Home with spouse, using pronged cane for ambulation     Transportation: [address] at home:  Walkerx__Cane_x_RTS__ BSC__Shower Chair__  02__ HHN__ CPAP__  BiPap__  Hospital Bed__ W/C___ Other__________    Services in the home and/or outpatient, prior to admission: denies but is open to [facility] [place]     PT/OT recs:24 hour supervision or assist, Home with [address] [place] Exemption Notification (HEN):NA    Barriers to discharge: Denies     Plan/comments: Patient plans to dc home with spouse. Patient is home to [facility] to work on ct strength and endurance. MARIA LUISA Castellanos       ECO on chart for MD signature

## 2021-06-02 NOTE — PROGRESS NOTES
Primary  Ambulation Assistance: Independent (with walker since last DC)  Transfer Assistance: Independent  Active : Yes  Occupation: Retired  Type of occupation: worked on computers  Leisure & Hobbies: computers       Objective        Orientation  Overall Orientation Status: Within Functional Limits  Observation/Palpation  Posture: Fair  Observation: forward flexion  Balance  Sitting Balance: Stand by assistance  Standing Balance: Contact guard assistance (RW)  Functional Mobility  Functional - Mobility Device: Rolling Walker  Activity: To/from bathroom; Other  Assist Level: Minimal assistance  Functional Mobility Comments: mod verbal cues for safety awareness with RW management  ADL  Grooming: Stand by assistance (in stance at sink)  LE Dressing: Contact guard assistance (for balance)  Tone RUE  RUE Tone: Normotonic  Tone LUE  LUE Tone: Normotonic  Coordination  Movements Are Fluid And Coordinated: Yes     Bed mobility  Supine to Sit: Unable to assess  Sit to Supine: Unable to assess  Comment: up in chair at start/end of session  Transfers  Sit to stand: Contact guard assistance  Stand to sit: Contact guard assistance  Transfer Comments: cues for hand placement     Cognition  Overall Cognitive Status: Exceptions  Arousal/Alertness: Delayed responses to stimuli  Problem Solving: Assistance required to generate solutions;Assistance required to identify errors made           Type of ROM/Therapeutic Exercise  Type of ROM/Therapeutic Exercise: AROM  Comment: Pt educated on BUE exercises for increased endurance and strength     LUE AROM (degrees)  LUE AROM : WFL  RUE AROM (degrees)  RUE AROM : WFL  LUE Strength  Gross LUE Strength: WFL  RUE Strength  Gross RUE Strength: WFL                   Plan   Plan  Times per week: 3-5x/wk  Current Treatment Recommendations: Balance Training, Functional Mobility Training, Safety Education & Training, Self-Care / ADL      AM-PAC Score        AM-PAC Inpatient Daily Activity Raw Score: 19 (06/02/21 1332)  AM-PAC Inpatient ADL T-Scale Score : 40.22 (06/02/21 1332)  ADL Inpatient CMS 0-100% Score: 42.8 (06/02/21 1332)  ADL Inpatient CMS G-Code Modifier : CK (06/02/21 1332)    Goals  Short term goals  Time Frame for Short term goals: 1 week (6/09/21)  Short term goal 1: Pt will complete toilet transfer with S.  Short term goal 2: Pt will complete LB dressing with s. Short term goal 3: Pt will complete 15 reps of BUE exercises for increased endurance and strength. (6/06)  Patient Goals   Patient goals : \"to get stronger\"       Therapy Time   Individual Concurrent Group Co-treatment   Time In 0952         Time Out 1032         Minutes 40         Timed Code Treatment Minutes: 30 Minutes (10 minutes for evaluation)       Roland Armenta, OTR/L    If pt is unable to be seen after this session, please let this note serve as discharge summary. Please see case management note for discharge disposition. Thank you.

## 2021-06-03 LAB
ALBUMIN SERPL-MCNC: 2.2 G/DL (ref 3.4–5)
ALP BLD-CCNC: 145 U/L (ref 40–129)
ALT SERPL-CCNC: 110 U/L (ref 10–40)
AST SERPL-CCNC: 96 U/L (ref 15–37)
BILIRUB SERPL-MCNC: 0.7 MG/DL (ref 0–1)
BILIRUBIN DIRECT: 0.3 MG/DL (ref 0–0.3)
BILIRUBIN, INDIRECT: 0.4 MG/DL (ref 0–1)
LV EF: 43 %
LVEF MODALITY: NORMAL
TOTAL PROTEIN: 5 G/DL (ref 6.4–8.2)

## 2021-06-03 PROCEDURE — 1200000000 HC SEMI PRIVATE

## 2021-06-03 PROCEDURE — 6370000000 HC RX 637 (ALT 250 FOR IP): Performed by: NURSE PRACTITIONER

## 2021-06-03 PROCEDURE — 80076 HEPATIC FUNCTION PANEL: CPT

## 2021-06-03 PROCEDURE — 93306 TTE W/DOPPLER COMPLETE: CPT

## 2021-06-03 PROCEDURE — 6360000002 HC RX W HCPCS: Performed by: NURSE PRACTITIONER

## 2021-06-03 PROCEDURE — 2580000003 HC RX 258: Performed by: NURSE PRACTITIONER

## 2021-06-03 PROCEDURE — 97110 THERAPEUTIC EXERCISES: CPT

## 2021-06-03 PROCEDURE — 97535 SELF CARE MNGMENT TRAINING: CPT

## 2021-06-03 PROCEDURE — 97530 THERAPEUTIC ACTIVITIES: CPT

## 2021-06-03 RX ORDER — FUROSEMIDE 40 MG/1
40 TABLET ORAL DAILY
Status: DISCONTINUED | OUTPATIENT
Start: 2021-06-04 | End: 2021-06-06 | Stop reason: HOSPADM

## 2021-06-03 RX ADMIN — LEVOTHYROXINE SODIUM 150 MCG: 0.15 TABLET ORAL at 06:01

## 2021-06-03 RX ADMIN — FUROSEMIDE 20 MG: 20 TABLET ORAL at 10:26

## 2021-06-03 RX ADMIN — SODIUM CHLORIDE, PRESERVATIVE FREE 10 ML: 5 INJECTION INTRAVENOUS at 21:44

## 2021-06-03 RX ADMIN — ENALAPRIL MALEATE 10 MG: 5 TABLET ORAL at 10:26

## 2021-06-03 RX ADMIN — ATORVASTATIN CALCIUM 80 MG: 80 TABLET, FILM COATED ORAL at 21:44

## 2021-06-03 RX ADMIN — ASPIRIN 81 MG: 81 TABLET, CHEWABLE ORAL at 10:26

## 2021-06-03 RX ADMIN — FERROUS SULFATE TAB 325 MG (65 MG ELEMENTAL FE) 325 MG: 325 (65 FE) TAB at 10:26

## 2021-06-03 RX ADMIN — CETIRIZINE HYDROCHLORIDE 10 MG: 10 TABLET, FILM COATED ORAL at 10:26

## 2021-06-03 RX ADMIN — SODIUM CHLORIDE, PRESERVATIVE FREE 10 ML: 5 INJECTION INTRAVENOUS at 10:26

## 2021-06-03 RX ADMIN — ENOXAPARIN SODIUM 40 MG: 40 INJECTION SUBCUTANEOUS at 10:36

## 2021-06-03 ASSESSMENT — PAIN SCALES - GENERAL: PAINLEVEL_OUTOF10: 0

## 2021-06-03 NOTE — PROGRESS NOTES
Pt is a/o x4. VSS. Assessment as charted.      - Pt has clear lung sounds, SpO2 >90% on RA, but does complain of GUEVARA (improving). - Pt has x5 surg lap ixs closed w/ surgical glue d/t recent hernia repair, CALIXTO, C/D/I- Pt states he's had a ED since returning home but has gotten better since this admission- active bowel sounds. - Pt has +2 pitting edema in BLE- legs elevated and compression socks applied.      Pt is currently resting in his bed that is locked and in its lowest position w/ his call light within reach, non-skid socks, and bed alarm on.  Pt denies any other needs at this time.

## 2021-06-03 NOTE — PROGRESS NOTES
(coronary artery disease), Chronic systolic congestive heart failure (HCC), Compression fracture of T11 vertebra (HCC), Food allergy, Hiatal hernia, History of blood transfusion, Hyperlipidemia, Hypertension, Hypothyroid, Obesity, Occlusion and stenosis of carotid artery, Osteoarthritis, Recurrent right inguinal hernia, Shingles, Thyroid disease, and TIA (transient ischemic attack). has a past surgical history that includes sigmoidoscopy (1994); Upper gastrointestinal endoscopy (05/12/94); Upper gastrointestinal endoscopy (11/09/04); Tonsillectomy; eye surgery; Colonoscopy (04/22/99); Colonoscopy (7/13/2015); Inguinal hernia repair (Right, 07/07/2016); Inguinal hernia repair (Right, 04/12/2017); Cardiac surgery (2001); Cardiac valve replacement (09/15/2018); Aortic valve replacement (2018); Percutaneous Transluminal Coronary Angio (2018); hernia repair (Left, 2/5/2020); and Gastric fundoplication (N/A, 5/34/4905).     Restrictions  Restrictions/Precautions  Restrictions/Precautions: Fall Risk, General Precautions  Position Activity Restriction  Other position/activity restrictions: up with assist     Subjective   General  Chart Reviewed: Yes  Patient assessed for rehabilitation services?: Yes  Family / Caregiver Present: No  Diagnosis: weakness  Subjective  Subjective: Pt agreeable to therapy  General Comment  Comments: RN approved therapy  Vital Signs  Patient Currently in Pain: Denies     Orientation  Orientation  Overall Orientation Status: Within Functional Limits     Objective    ADL  Grooming: Stand by assistance (in stance at sink)  LE Dressing: Stand by assistance (to change pants)  Toileting: Contact guard assistance (for balance during clothing management)        Standing Balance  Time: ~10 minutes  Activity: standing at sink for adls  Functional Mobility  Functional - Mobility Device: Standard Walker  Activity: To/from bathroom  Assist Level: Contact guard assistance  Toilet Transfers  Toilet - Technique: Ambulating  Equipment Used: Grab bars  Toilet Transfer: Contact guard assistance  Bed mobility  Supine to Sit: Unable to assess  Sit to Supine: Unable to assess  Comment: up in chair at start/end of session  Transfers  Sit to stand: Contact guard assistance  Stand to sit: Contact guard assistance  Transfer Comments: cues for hand placement                       Cognition  Overall Cognitive Status: Exceptions  Arousal/Alertness: Delayed responses to stimuli  Problem Solving: Assistance required to generate solutions;Assistance required to identify errors made                    Type of ROM/Therapeutic Exercise  Type of ROM/Therapeutic Exercise: AROM  Comment: Pt educated on BUE exercises for increased endurance and strength  Exercises  Shoulder Elevation: x15  Shoulder Flexion: x15  Elbow Flexion: x15  Elbow Extension: x15  Supination: x15  Pronation: x15  Wrist Flexion: x15  Wrist Extension: x15  Grasp/Release: x15  Other: x15 chest press                    Plan   Plan  Times per week: 3-5x/wk  Current Treatment Recommendations: Balance Training, Functional Mobility Training, Safety Education & Training, Self-Care / ADL    AM-[facility] Score        [facility] Inpatient Daily Activity Raw Score: 19 (06/03/21 1033)  AM-PAC Inpatient ADL T-Scale Score : 40.22 (06/03/21 1033)  ADL Inpatient CMS 0-100% Score: 42.8 (06/03/21 1033)  ADL Inpatient CMS G-Code Modifier : CK (06/03/21 1033)    Goals  Short term goals  Time Frame for Short term goals: 1 week (6/09/21)  Short term goal 1: Pt will complete toilet transfer with S.- ongoing  Short term goal 2: Pt will complete LB dressing with s.- ongoing  Short term goal 3: Pt will complete 15 reps of BUE exercises for increased endurance and strength.  (6/06) goal met 6/03  Patient Goals   Patient goals : \"to get stronger\"       Therapy Time   Individual Concurrent Group Co-treatment   Time In 1000         Time Out 1039         Minutes 39         Timed Code Treatment Minutes: 44 Velvet       [facility], OTR/L

## 2021-06-03 NOTE — CARE COORDINATION
Chart review day 2: Patient on C3 re generalized weakness followed by IM. Patient with therapy recs for home with 24 hr and home PT. Referral made to [facility] following for [facility] needs. Patient lives at home with spouse. Patient with pending ECHO since 06/01.  [facility] MARIA LUISA Monteiro

## 2021-06-04 LAB
ALBUMIN SERPL-MCNC: 2.4 G/DL (ref 3.4–5)
ALP BLD-CCNC: 158 U/L (ref 40–129)
ALT SERPL-CCNC: 116 U/L (ref 10–40)
ANION GAP SERPL CALCULATED.3IONS-SCNC: 5 MMOL/L (ref 3–16)
AST SERPL-CCNC: 100 U/L (ref 15–37)
BASOPHILS ABSOLUTE: 0 K/UL (ref 0–0.2)
BASOPHILS RELATIVE PERCENT: 0.4 %
BILIRUB SERPL-MCNC: 0.7 MG/DL (ref 0–1)
BILIRUBIN DIRECT: 0.3 MG/DL (ref 0–0.3)
BILIRUBIN, INDIRECT: 0.4 MG/DL (ref 0–1)
BUN BLDV-MCNC: 11 MG/DL (ref 7–20)
CALCIUM SERPL-MCNC: 7.9 MG/DL (ref 8.3–10.6)
CHLORIDE BLD-SCNC: 91 MMOL/L (ref 99–110)
CO2: 30 MMOL/L (ref 21–32)
CREAT SERPL-MCNC: <0.5 MG/DL (ref 0.8–1.3)
EOSINOPHILS ABSOLUTE: 0.3 K/UL (ref 0–0.6)
EOSINOPHILS RELATIVE PERCENT: 3.5 %
GFR AFRICAN AMERICAN: >60
GFR NON-AFRICAN AMERICAN: >60
GLUCOSE BLD-MCNC: 111 MG/DL (ref 70–99)
HCT VFR BLD CALC: 28.2 % (ref 40.5–52.5)
HEMOGLOBIN: 9.9 G/DL (ref 13.5–17.5)
LYMPHOCYTES ABSOLUTE: 0.6 K/UL (ref 1–5.1)
LYMPHOCYTES RELATIVE PERCENT: 6.6 %
MCH RBC QN AUTO: 33.2 PG (ref 26–34)
MCHC RBC AUTO-ENTMCNC: 35.1 G/DL (ref 31–36)
MCV RBC AUTO: 94.6 FL (ref 80–100)
MONOCYTES ABSOLUTE: 0.5 K/UL (ref 0–1.3)
MONOCYTES RELATIVE PERCENT: 5.3 %
NEUTROPHILS ABSOLUTE: 8 K/UL (ref 1.7–7.7)
NEUTROPHILS RELATIVE PERCENT: 84.2 %
PDW BLD-RTO: 14.4 % (ref 12.4–15.4)
PLATELET # BLD: 159 K/UL (ref 135–450)
PMV BLD AUTO: 8.1 FL (ref 5–10.5)
POTASSIUM REFLEX MAGNESIUM: 3.8 MMOL/L (ref 3.5–5.1)
RBC # BLD: 2.98 M/UL (ref 4.2–5.9)
SODIUM BLD-SCNC: 126 MMOL/L (ref 136–145)
TOTAL PROTEIN: 5.2 G/DL (ref 6.4–8.2)
WBC # BLD: 9.5 K/UL (ref 4–11)

## 2021-06-04 PROCEDURE — 85025 COMPLETE CBC W/AUTO DIFF WBC: CPT

## 2021-06-04 PROCEDURE — 6370000000 HC RX 637 (ALT 250 FOR IP): Performed by: NURSE PRACTITIONER

## 2021-06-04 PROCEDURE — 80076 HEPATIC FUNCTION PANEL: CPT

## 2021-06-04 PROCEDURE — 97116 GAIT TRAINING THERAPY: CPT

## 2021-06-04 PROCEDURE — 97535 SELF CARE MNGMENT TRAINING: CPT

## 2021-06-04 PROCEDURE — 6360000002 HC RX W HCPCS: Performed by: NURSE PRACTITIONER

## 2021-06-04 PROCEDURE — 80048 BASIC METABOLIC PNL TOTAL CA: CPT

## 2021-06-04 PROCEDURE — 97530 THERAPEUTIC ACTIVITIES: CPT

## 2021-06-04 PROCEDURE — 1200000000 HC SEMI PRIVATE

## 2021-06-04 PROCEDURE — 36415 COLL VENOUS BLD VENIPUNCTURE: CPT

## 2021-06-04 PROCEDURE — 97110 THERAPEUTIC EXERCISES: CPT

## 2021-06-04 PROCEDURE — 2580000003 HC RX 258: Performed by: NURSE PRACTITIONER

## 2021-06-04 PROCEDURE — 6370000000 HC RX 637 (ALT 250 FOR IP): Performed by: INTERNAL MEDICINE

## 2021-06-04 RX ADMIN — SODIUM CHLORIDE, PRESERVATIVE FREE 10 ML: 5 INJECTION INTRAVENOUS at 09:07

## 2021-06-04 RX ADMIN — ENOXAPARIN SODIUM 40 MG: 40 INJECTION SUBCUTANEOUS at 09:06

## 2021-06-04 RX ADMIN — ENALAPRIL MALEATE 10 MG: 5 TABLET ORAL at 09:05

## 2021-06-04 RX ADMIN — FERROUS SULFATE TAB 325 MG (65 MG ELEMENTAL FE) 325 MG: 325 (65 FE) TAB at 09:05

## 2021-06-04 RX ADMIN — ASPIRIN 81 MG: 81 TABLET, CHEWABLE ORAL at 09:06

## 2021-06-04 RX ADMIN — CETIRIZINE HYDROCHLORIDE 10 MG: 10 TABLET, FILM COATED ORAL at 09:05

## 2021-06-04 RX ADMIN — LEVOTHYROXINE SODIUM 150 MCG: 0.15 TABLET ORAL at 06:21

## 2021-06-04 RX ADMIN — FUROSEMIDE 40 MG: 40 TABLET ORAL at 09:05

## 2021-06-04 RX ADMIN — SODIUM CHLORIDE, PRESERVATIVE FREE 10 ML: 5 INJECTION INTRAVENOUS at 20:54

## 2021-06-04 ASSESSMENT — PAIN SCALES - GENERAL
PAINLEVEL_OUTOF10: 0
PAINLEVEL_OUTOF10: 0

## 2021-06-04 ASSESSMENT — PAIN SCALES - WONG BAKER: WONGBAKER_NUMERICALRESPONSE: 4

## 2021-06-04 ASSESSMENT — PAIN DESCRIPTION - ORIENTATION: ORIENTATION: RIGHT

## 2021-06-04 ASSESSMENT — PAIN DESCRIPTION - LOCATION: LOCATION: ANKLE

## 2021-06-04 NOTE — PROGRESS NOTES
Patient in bed awake. A/O x 4. Assessment completed and charted. VSS. Respirations even and unlabored. Transfers with SB assist and walker. Denies pain. Bed in lowest position and locked. Call light in reach.

## 2021-06-04 NOTE — CONSULTS
dior recurrent right inguinal hernia repair with mesh    PTCA   2018    SIGMOIDOSCOPY   1994    TONSILLECTOMY        UPPER GASTROINTESTINAL ENDOSCOPY   05/12/94    UPPER GASTROINTESTINAL ENDOSCOPY   11/09/04     Gastritis and duodenitis           Current Medications:    Prior to Admission medications    Medication Sig Start Date End Date Taking?  Authorizing Provider   docusate sodium (COLACE, DULCOLAX) 100 MG CAPS Take 100 mg by mouth 2 times daily  Patient taking differently: Take 100 mg by mouth 2 times daily as needed  5/7/21     Bennie Plasencia MD   polyethylene glycol (GLYCOLAX) 17 g packet Take 17 g by mouth 2 times daily  Patient taking differently: Take 17 g by mouth 2 times daily as needed  5/7/21 6/6/21   Bennie Plasencia MD   levothyroxine (SYNTHROID) 150 MCG tablet TAKE 1 TABLET BY MOUTH  DAILY 10/16/20     VALE Chaney CNP   atorvastatin (LIPITOR) 80 MG tablet TAKE 1 TABLET BY MOUTH  EVERY DAY  Patient taking differently: Take 80 mg by mouth nightly TAKE 1 TABLET BY MOUTH EVERY DAY 6/12/20     Oanh Parikh MD   furosemide (LASIX) 20 MG tablet Take 1 tablet by mouth 2 times daily 9/20/19 12/13/19   VALE Chaney CNP   ferrous sulfate (FE TABS) 325 (65 Fe) MG EC tablet Take 1 tablet by mouth daily (with breakfast) 6/28/19     VALE Chaney CNP   azelastine (ASTELIN) 0.1 % nasal spray 2 sprays by Nasal route 2 times daily  Patient taking differently: 2 sprays by Nasal route 2 times daily as needed  6/21/19     Arslan lElis MD   enalapril (VASOTEC) 10 MG tablet Take 10 mg by mouth daily  11/20/18     Historical Provider, MD   cetirizine (ZYRTEC) 10 MG tablet Take 10 mg by mouth daily.       Historical Provider, MD   Multiple Vitamin (THERA) TABS Take 1 tablet by mouth daily 1 Tab daily.       Historical Provider, MD   Glucosamine-Chondroitin (GLUCOSAMINE CHONDR COMPLEX PO) Take 1 tablet by mouth daily        Historical Provider, MD   aspirin 81 MG chewable tablet Take 81 mg by mouth daily        Historical Provider, MD   Coenzyme Q-10 100 MG CAPS Take 1 capsule by mouth daily.         Historical Provider, MD           Allergies:  Reopro [abciximab injection], Chocolate, and Coffee bean extract [coffea arabica]    Social History:    reports that he quit smoking about 25 years ago. The patient currently lives at home with his wife  Family History: no renal disease  REVIEW OF SYSTEMS:    As per HPI, otherwise negative or noncontributory on review of 10 systems  PHYSICAL EXAM:      Vitals: Wt Readings from Last 3 Encounters:   06/04/21 186 lb 8.2 oz (84.6 kg)   06/01/21 183 lb (83 kg)   05/25/21 171 lb (77.6 kg)     Temp Readings from Last 3 Encounters:   06/04/21 97.6 °F (36.4 °C) (Oral)   06/01/21 98.5 °F (36.9 °C)   05/28/21 97.8 °F (36.6 °C) (Oral)     BP Readings from Last 3 Encounters:   06/04/21 122/66   06/01/21 115/60   05/28/21 124/70     Pulse Readings from Last 3 Encounters:   06/04/21 82   06/01/21 97   05/28/21 91   General appearance:NAD  HEENT: Pupils equal, round, and reactive to light. Extra ocular muscles intact. Conjunctivae/corneas clear. Neck: Supple, with full range of motion. No jugular venous distention. Trachea midline. Respiratory:  Normal respiratory effort. Clear to auscultation, bilaterally without Rales/Wheezes/Rhonchi. Cardiovascular:  Regular rate and rhythm with normal S1/S2 without murmurs, rubs or gallops. Abdomen: Soft, non-tender, non-distended with normal bowel sounds. Skin: Skin color, texture, turgor normal.  No significant rashes or lesions.   Neurologic:  grossly non-focal.  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 HTN- BP controlled

## 2021-06-04 NOTE — PROGRESS NOTES
any aggravating and/or alleviating factors. At the time of this assessment, the patient was resting comfortably in bed. He currently denies any chest pain, back pain, abdominal pain, shortness of breath, numbness, tingling, N/V/C/D, fever and/or chills. Pt Seen/Examined and Chart Reviewed. Admitting dx: generalized weakness and fatigue in patient with recent hiatal hernia repair (5/25)    SUBJECTIVE:   Patient states feeling weak. Denies any SOB, or dyspnec. Has not required oxygen. Had been taking Lasix 20 mg BID at home. He reports no accumulation of fluid. He had been off of lasix 2 weeks prior to prepare for the Hiatal Hernia surgery. Eating well this AM.  Appetite is good. Tolerating soft foods. Labs: Noting hyponatremia Na 126 6/2. ECHO: 6/3 EF reduced to 40-45%, TAVR, inferior wall hypokinesis    No new medical complaints  Bedside rounding with nursing completed.      OBJECTIVE:     Allergies  Reopro [abciximab injection], Chocolate, and Coffee bean extract [coffea arabica]    Medications      Scheduled Meds:   [START ON 6/4/2021] furosemide  40 mg Oral Daily    aspirin  81 mg Oral Daily    atorvastatin  80 mg Oral Nightly    cetirizine  10 mg Oral Daily    enalapril  10 mg Oral Daily    ferrous sulfate  325 mg Oral Daily with breakfast    levothyroxine  150 mcg Oral Daily    sodium chloride flush  5-40 mL Intravenous 2 times per day    enoxaparin  40 mg Subcutaneous Daily       Infusions:   sodium chloride         PRN Meds:  docusate sodium, sodium chloride flush, sodium chloride, promethazine **OR** ondansetron, polyethylene glycol, acetaminophen **OR** acetaminophen, perflutren lipid microspheres    Intake and Output     Intake/Output Summary (Last 24 hours) at 6/3/2021 2058  Last data filed at 6/3/2021 1831  Gross per 24 hour   Intake 980 ml   Output 475 ml   Net 505 ml       Vitals    /66   Pulse 79   Temp 97.9 °F (36.6 °C) (Oral)   Resp 17   Ht 5' 11\" (1.803 m)   Wt PLAN:     generalized weakness and fatigue in patient with recent hiatal hernia repair (5/25)  -UA negative for infection  -up with assistance  -fall precautions  -pt/ot evaluation  -case management to assist with d/c, [facility]     Acute transaminitis   -Pt with elevated AST 74 /[id number]  -unclear etiology, likely statin induced  -hepatitis panel non reactive.      Essential HTN in setting of known CAD  -continue ASA  -continue enalapril     HLD  -continue atorvastatin     Chronic systolic CHF, stable  -does not appear to be in acute exacerbation at this time  -ProBNP 1251  -strict I&O  -daily weights  -continue lasix  -ECHO: EF reduced to 40-45%, TAVR, inferior wall hypokinesis     Hypothyroidism  -continue synthroid  -TSH 2.3     Hyponatremia 127 on admission  -appears chronic  -monitor with IVF  -bmp decreasing Na 126     Hypomagnesemia, 1.7 on admission  -replaced in ED  -repeat mag in am     Chronic normocytic anemia, 10.7/30.9 on admission  -no s/s of bleeding at this time  -cbc in am  -continue ferrous sulfate     DVT Prophylaxis: Lovenox   Diet: No diet orders on file    Code Status: Prior     PT/OT Eval Status: Home 24 hr asst, HHC requested.      Dispo - Expect in AM 6/4 pending Na levels and ECHO review.      Annalee Berry MD

## 2021-06-04 NOTE — PROGRESS NOTES
Physical Therapy  Facility/Department: [facility] C3 TELE/MED SURG/ONC  Daily Treatment Note  NAME: Carlo Kaufman  : 1940  MRN: [id number]    Date of Service: 2021    Discharge Recommendations:  24 hour supervision or assist, Home with Home health PT   PT Equipment Recommendations  Equipment Needed: No    Assessment   Body structures, Functions, Activity limitations: Decreased functional mobility ; Decreased ROM; Decreased balance;Decreased posture;Decreased coordination  Assessment: Pt is now requiring the use of RW for safe ambulation, and requires CGA for gait with RW. Pt required mod cues for safe use of walker for mobility as he has never used one in ther past.  Pt's gait deficits seem neurological in nature, but pt reports this is baseline? Pt would benefit from skilled acute PT to address deficits. Recommend home with 24 hr sup/assist and HHPT at [facility] from acute setting  Treatment Diagnosis: impaired mobility  Prognosis: Good  Decision Making: Medium Complexity  Patient Education: pt verbalized understanding of safe use of walker with much reinforcement  Barriers to Learning: no  REQUIRES PT FOLLOW UP: Yes  Activity Tolerance  Activity Tolerance: Patient Tolerated treatment well;Patient limited by fatigue  Activity Tolerance: /68  HR 69-80  O2 95% RA     Patient Diagnosis(es): The primary encounter diagnosis was General weakness. Diagnoses of Fatigue, unspecified type, Difficulty walking, Hypomagnesemia, Elevated transaminase level, Hyponatremia, and Anemia, unspecified type were also pertinent to this visit.      has a past medical history of Allergic rhinitis, Anemia, Arthritis, CAD (coronary artery disease), Chronic systolic congestive heart failure ([address] Compression fracture of T11 vertebra (HCC), Food allergy, Hiatal hernia, History of blood transfusion, Hyperlipidemia, Hypertension, Hypothyroid, Obesity, Occlusion and stenosis of carotid artery, Osteoarthritis, Recurrent right inguinal almost seems like a neurologic gait pattern. Pt heavily relying on walker vs last hospital stay ambulated with this PT without AD  Distance: 100'  Comments: Pt required mod cues for safe use of walker for walker closer to self and to walk closer to chair prior to turning to back up     Balance  Posture: Fair  Sitting - Static: Good  Sitting - Dynamic: Good;-  Standing - Static: Fair;+  Standing - Dynamic: Fair (rw)  Exercises  Quad Sets: x 10 B  Heelslides: x 10 B  Gluteal Sets: x 10 B  Hip Flexion: 15 B  Hip Abduction: 15 B  Knee Long Arc Quad: 15 B  Ankle Pumps: x 20 B          AM-PAC Score  AM-PAC Inpatient Mobility Raw Score : 19 (06/04/21 0840)  AM-PAC Inpatient T-Scale Score : 45.44 (06/04/21 0840)  Mobility Inpatient CMS 0-100% Score: 41.77 (06/04/21 0840)  Mobility Inpatient CMS G-Code Modifier : CK (06/04/21 0840)          Goals  Short term goals  Time Frame for Short term goals: 6/6/21 unless noted  Short term goal 1: Pt will perform bed mobility with supervision by 6/5/21  -6/04 met  Short term goal 2: Pt will perform transfers with SBA -6/04 met  Short term goal 3: Pt will ambulate 125 ft with LRAD and supervision - 100' cga rw  Patient Goals   Patient goals : \"to get strong again\"    Plan    Plan  Times per week: 3-5  Current Treatment Recommendations: Strengthening, Home Exercise Program, ROM, Balance Training, Endurance Training, Functional Mobility Training, Transfer Training, Gait Training  Safety Devices  Type of devices:  All fall risk precautions in place, Left in chair, Call light within reach, Chair alarm in place, Gait belt, Patient at risk for falls, Nurse notified     Therapy Time   Individual Concurrent Group Co-treatment   Time In 0800         Time Out 0840         Minutes 40         Timed Code Treatment Minutes: Ciara Cavazos

## 2021-06-04 NOTE — PROGRESS NOTES
Occupational Therapy  Facility/Department: [facility] C3 TELE/MED SURG/ONC  Daily Treatment Note  NAME: Inocencia Fox  : 1940  MRN: [id number]    Date of Service: 2021    Discharge Recommendations:  24 hour supervision or assist  OT Equipment Recommendations  Equipment Needed: No    Assessment   Performance deficits / Impairments: Decreased functional mobility ; Decreased endurance;Decreased ADL status; Decreased balance;Decreased safe awareness;Decreased cognition;Decreased high-level IADLs  Assessment: Pt pleasant and agreeable to therapy. Pt demo'd good progress towards OT goals and able to complete 15 reps of BUE exercises. Pt educated on increased safety and task modification. Pt required SBA progressing to CGA d/t fatigue during standing ADLS. Cont with POC. Prognosis: Good  OT Education: OT Role;Plan of Care;Home Exercise Program;ADL Adaptive Strategies;Transfer Training  Patient Education: disease specific: safety during shaving ADLS, safe mobility, general safety during hospitalization  REQUIRES OT FOLLOW UP: Yes  Activity Tolerance  Activity Tolerance: Patient Tolerated treatment well;Patient limited by fatigue  Activity Tolerance: /52, O2 93%, HR 84  Safety Devices  Safety Devices in place: Yes  Type of devices: Nurse notified;Call light within reach; Left in chair;Gait belt         Patient Diagnosis(es): The primary encounter diagnosis was General weakness. Diagnoses of Fatigue, unspecified type, Difficulty walking, Hypomagnesemia, Elevated transaminase level, Hyponatremia, and Anemia, unspecified type were also pertinent to this visit.       has a past medical history of Allergic rhinitis, Anemia, Arthritis, CAD (coronary artery disease), Chronic systolic congestive heart failure ([address] Compression fracture of T11 vertebra (HCC), Food allergy, Hiatal hernia, History of blood transfusion, Hyperlipidemia, Hypertension, Hypothyroid, Obesity, Occlusion and stenosis of carotid artery, Osteoarthritis, Recurrent right inguinal hernia, Shingles, Thyroid disease, and TIA (transient ischemic attack). has a past surgical history that includes sigmoidoscopy (1994); Upper gastrointestinal endoscopy (05/12/94); Upper gastrointestinal endoscopy (11/09/04); Tonsillectomy; eye surgery; Colonoscopy (04/22/99); Colonoscopy (7/13/2015); Inguinal hernia repair (Right, 07/07/2016); Inguinal hernia repair (Right, 04/12/2017); Cardiac surgery (2001); Cardiac valve replacement (09/15/2018); Aortic valve replacement (2018); Percutaneous Transluminal Coronary Angio (2018); hernia repair (Left, 2/5/2020); and Gastric fundoplication (N/A, 9/73/4795). Restrictions  Restrictions/Precautions  Restrictions/Precautions: Fall Risk, General Precautions  Position Activity Restriction  Other position/activity restrictions: up with assist  Subjective   General  Chart Reviewed: Yes  Patient assessed for rehabilitation services?: Yes  Response to previous treatment: Patient with no complaints from previous session  Family / Caregiver Present: Yes (wife)  Diagnosis: weakness  Subjective  Subjective: Pt agreeable to therapy  General Comment  Comments: RN approved therapy  Vital Signs  Pulse: 84  Heart Rate Source: Monitor  BP: (!) 107/52  BP Location: Left upper arm  Patient Position: Up in chair  Patient Currently in Pain: Denies  Oxygen Therapy  SpO2: 93 %  Pulse Oximeter Device Mode: Intermittent  Pulse Oximeter Device Location: Finger  O2 Device: None (Room air)   Orientation  Orientation  Overall Orientation Status: Within Functional Limits  Objective    ADL  Grooming: Stand by assistance;Contact guard assistance (Pt shaved, brushed teeth and washed face in stance at sink with SBA progressing to CGA d/t increased time and fatigue.  RN approved shaving with supervision.)        Balance  Sitting Balance: Supervision  Standing Balance: Contact guard assistance (SW)  Standing Balance  Time: ~15 minutes  Activity: standing at sink for adls, in room mobility  Comment: SW, increased fatigue with time. Functional Mobility  Functional - Mobility Device: Standard Walker  Activity: To/from bathroom  Assist Level: Contact guard assistance  Functional Mobility Comments: mod verbal cues for safety awareness with RW management  Bed mobility  Supine to Sit: Unable to assess  Sit to Supine: Unable to assess  Scooting: Unable to assess  Comment: up in chair at start/end of session  Transfers  Sit to stand: Contact guard assistance  Stand to sit: Contact guard assistance  Transfer Comments: cues for hand placement        Coordination  Fine Motor: Increased time to complete some FM tasks such as openning lid of shaving cream.              Cognition  Overall Cognitive Status: Exceptions  Arousal/Alertness: Appropriate responses to stimuli  Following Commands:  Follows one step commands with repetition  Attention Span: Appears intact  Problem Solving: Assistance required to identify errors made;Decreased awareness of errors  Insights: Decreased awareness of deficits  Initiation: Does not require cues  Sequencing: Does not require cues                    Type of ROM/Therapeutic Exercise  Type of ROM/Therapeutic Exercise: AROM  Comment: Pt educated on BUE exercises for increased endurance and strength  Exercises  Shoulder Elevation: x15  Shoulder Flexion: x15  Horizontal ABduction: x15  Horizontal ADduction: x15  Elbow Flexion: x15  Grasp/Release: x6                    Plan   Plan  Times per week: 3-5x/wk  Current Treatment Recommendations: Balance Training, Functional Mobility Training, Safety Education & Training, Self-Care / ADL, Endurance Training, Strengthening    AM-PAC Score        AM-[facility] Inpatient Daily Activity Raw Score: 19 (06/04/21 1720)  AM-PAC Inpatient ADL T-Scale Score : 40.22 (06/04/21 1720)  ADL Inpatient CMS 0-100% Score: 42.8 (06/04/21 1720)  ADL Inpatient CMS G-Code Modifier : CK (06/04/21 1720)    Goals  Short term goals  Time Frame for Short term goals: 1 week (6/09/21)  Short term goal 1: Pt will complete toilet transfer with S.- ongoing (6/4)  Short term goal 2: Pt will complete LB dressing with s.- ongoing (6/4)  Short term goal 3: Pt will complete 15 reps of BUE exercises for increased endurance and strength. (6/06) goal met 6/03  Patient Goals   Patient goals : \"to get stronger\"       Therapy Time   Individual Concurrent Group Co-treatment   Time In 9064         Time Out 1600         Minutes 31         Timed Code Treatment Minutes: 7584 Eboni Bailon, OTR/L  If pt discharges prior to next session, this note will serve as discharge summary. See case management note for discharge disposition.

## 2021-06-05 ENCOUNTER — APPOINTMENT (OUTPATIENT)
Dept: ULTRASOUND IMAGING | Age: 81
DRG: 643 | End: 2021-06-05
Payer: MEDICARE

## 2021-06-05 LAB
ALBUMIN SERPL-MCNC: 2 G/DL (ref 3.4–5)
ALP BLD-CCNC: 162 U/L (ref 40–129)
ALT SERPL-CCNC: 95 U/L (ref 10–40)
ANION GAP SERPL CALCULATED.3IONS-SCNC: 7 MMOL/L (ref 3–16)
AST SERPL-CCNC: 67 U/L (ref 15–37)
BILIRUB SERPL-MCNC: 0.7 MG/DL (ref 0–1)
BILIRUBIN DIRECT: 0.3 MG/DL (ref 0–0.3)
BILIRUBIN, INDIRECT: 0.4 MG/DL (ref 0–1)
BUN BLDV-MCNC: 10 MG/DL (ref 7–20)
CALCIUM SERPL-MCNC: 7.8 MG/DL (ref 8.3–10.6)
CHLORIDE BLD-SCNC: 90 MMOL/L (ref 99–110)
CO2: 28 MMOL/L (ref 21–32)
CORTISOL - AM: 18.1 UG/DL (ref 4.3–22.4)
CREAT SERPL-MCNC: <0.5 MG/DL (ref 0.8–1.3)
GFR AFRICAN AMERICAN: >60
GFR NON-AFRICAN AMERICAN: >60
GLUCOSE BLD-MCNC: 92 MG/DL (ref 70–99)
LACTATE DEHYDROGENASE: 182 U/L (ref 100–190)
OSMOLALITY URINE: 552 MOSM/KG (ref 390–1070)
PHOSPHORUS: 3.2 MG/DL (ref 2.5–4.9)
POTASSIUM SERPL-SCNC: 3.6 MMOL/L (ref 3.5–5.1)
SODIUM BLD-SCNC: 125 MMOL/L (ref 136–145)
SODIUM URINE: 30 MMOL/L
TOTAL PROTEIN: 4.8 G/DL (ref 6.4–8.2)
URIC ACID, SERUM: 2.2 MG/DL (ref 3.5–7.2)

## 2021-06-05 PROCEDURE — 80069 RENAL FUNCTION PANEL: CPT

## 2021-06-05 PROCEDURE — 83935 ASSAY OF URINE OSMOLALITY: CPT

## 2021-06-05 PROCEDURE — 84550 ASSAY OF BLOOD/URIC ACID: CPT

## 2021-06-05 PROCEDURE — 76700 US EXAM ABDOM COMPLETE: CPT

## 2021-06-05 PROCEDURE — 6360000002 HC RX W HCPCS: Performed by: NURSE PRACTITIONER

## 2021-06-05 PROCEDURE — 2580000003 HC RX 258: Performed by: NURSE PRACTITIONER

## 2021-06-05 PROCEDURE — 82533 TOTAL CORTISOL: CPT

## 2021-06-05 PROCEDURE — 6370000000 HC RX 637 (ALT 250 FOR IP): Performed by: NURSE PRACTITIONER

## 2021-06-05 PROCEDURE — 6370000000 HC RX 637 (ALT 250 FOR IP): Performed by: INTERNAL MEDICINE

## 2021-06-05 PROCEDURE — 1200000000 HC SEMI PRIVATE

## 2021-06-05 PROCEDURE — 83615 LACTATE (LD) (LDH) ENZYME: CPT

## 2021-06-05 PROCEDURE — 84300 ASSAY OF URINE SODIUM: CPT

## 2021-06-05 PROCEDURE — 80076 HEPATIC FUNCTION PANEL: CPT

## 2021-06-05 RX ORDER — TOLVAPTAN 30 MG/1
30 TABLET ORAL ONCE
Status: COMPLETED | OUTPATIENT
Start: 2021-06-05 | End: 2021-06-05

## 2021-06-05 RX ADMIN — ENOXAPARIN SODIUM 40 MG: 40 INJECTION SUBCUTANEOUS at 09:48

## 2021-06-05 RX ADMIN — TOLVAPTAN 30 MG: 30 TABLET ORAL at 11:32

## 2021-06-05 RX ADMIN — ASPIRIN 81 MG: 81 TABLET, CHEWABLE ORAL at 09:48

## 2021-06-05 RX ADMIN — SODIUM CHLORIDE, PRESERVATIVE FREE 10 ML: 5 INJECTION INTRAVENOUS at 09:49

## 2021-06-05 RX ADMIN — ENALAPRIL MALEATE 10 MG: 5 TABLET ORAL at 09:48

## 2021-06-05 RX ADMIN — CETIRIZINE HYDROCHLORIDE 10 MG: 10 TABLET, FILM COATED ORAL at 09:48

## 2021-06-05 RX ADMIN — FUROSEMIDE 40 MG: 40 TABLET ORAL at 09:54

## 2021-06-05 RX ADMIN — FERROUS SULFATE TAB 325 MG (65 MG ELEMENTAL FE) 325 MG: 325 (65 FE) TAB at 09:48

## 2021-06-05 RX ADMIN — SODIUM CHLORIDE, PRESERVATIVE FREE 10 ML: 5 INJECTION INTRAVENOUS at 19:58

## 2021-06-05 ASSESSMENT — PAIN SCALES - GENERAL: PAINLEVEL_OUTOF10: 0

## 2021-06-05 NOTE — PROGRESS NOTES
Patient up in chair, awake. A/O x 4. Assessment completed and charted. Respirations even and unlabored. Denies pain. Bed in lowest position and locked. Call light in reach.

## 2021-06-05 NOTE — PROGRESS NOTES
any aggravating and/or alleviating factors. At the time of this assessment, the patient was resting comfortably in bed. He currently denies any chest pain, back pain, abdominal pain, shortness of breath, numbness, tingling, N/V/C/D, fever and/or chills. Pt Seen/Examined and Chart Reviewed. Admitting dx: generalized weakness and fatigue in patient with recent hiatal hernia repair (5/25)    SUBJECTIVE:   Patient states feeling weak. Denies any SOB, or dyspnec. Has not required oxygen. Had been taking Lasix 20 mg BID at home. He reports no accumulation of fluid. He had been off of lasix 2 weeks prior to prepare for the Hiatal Hernia surgery. Patient and wife noting he is not tolerating much food. Experiencing early satiety. Labs: Noting hyponatremia Na 126 persisting and worsening LFT's. Emmanuel Klein ECHO: 6/3 EF reduced to 40-45%, TAVR, inferior wall hypokinesis    No new medical complaints  Bedside rounding with nursing completed.      OBJECTIVE:     Allergies  Reopro [abciximab injection], Chocolate, and Coffee bean extract [coffea arabica]    Medications      Scheduled Meds:   furosemide  40 mg Oral Daily    aspirin  81 mg Oral Daily    cetirizine  10 mg Oral Daily    enalapril  10 mg Oral Daily    ferrous sulfate  325 mg Oral Daily with breakfast    levothyroxine  150 mcg Oral Daily    sodium chloride flush  5-40 mL Intravenous 2 times per day    enoxaparin  40 mg Subcutaneous Daily       Infusions:   sodium chloride         PRN Meds:  docusate sodium, sodium chloride flush, sodium chloride, promethazine **OR** ondansetron, polyethylene glycol, acetaminophen **OR** acetaminophen, perflutren lipid microspheres    Intake and Output     Intake/Output Summary (Last 24 hours) at 6/4/2021 2243  Last data filed at 6/4/2021 1920  Gross per 24 hour   Intake 680 ml   Output 500 ml   Net 180 ml       Vitals    /73   Pulse 72   Temp 98.3 °F (36.8 °C) (Oral)   Resp 17   Ht 5' 11\" (1.803 m)   Wt 186 Generalized weakness [R53.1] 09/27/2012     PLAN:     generalized weakness and fatigue in patient with recent hiatal hernia repair (5/25)  -UA negative for infection  -up with assistance  -fall precautions  -pt/ot evaluation  -case management to assist with d/c, [facility] with [facility]     Acute transaminitis   -Pt with persistently elevated LFT  -DC statin  -hepatitis panel non reactive. - US Abdomen ordered 6/4 to evaluate     Essential HTN in setting of known CAD  -continue ASA  -continue enalapril     HLD  -continue atorvastatin     Chronic systolic CHF, stable  -does not appear to be in acute exacerbation at this time  -ProBNP 1251  -strict I&O  -daily weights  -continue lasix  -ECHO: EF reduced to 40-45%, TAVR, inferior wall hypokinesis     Hypothyroidism  -continue synthroid  -TSH 2.3     Hyponatremia  -appears chronic,   -bmp decreasing Na 126  -Nephrology consultation. Urine studies, cortisol, TSH. Fluid restriction.     Hypomagnesemia,   -replaced in ED  -repeat mag in am     Chronic normocytic anemia, 10.7/30.9 on admission  -no s/s of bleeding at this time  -cbc in am  -continue ferrous sulfate     DVT Prophylaxis: Lovenox   Diet: Soft bite size    Code Status: Prior     PT/OT Eval Status: Home 24 hr asst, C requested.      Dispo - Expect in AM 6/5 pending Na levels and LFT with US review. Tasha Souza MD

## 2021-06-05 NOTE — PROGRESS NOTES
Progress Note    HISTORY     CC:   Weakness and SOB              We are following for hyponatremia       Subjective/   HPI:  Sodium is not much improved. He is going to eat today. BP stable. Feeling better     ROS:  Constitutional:  No fevers, No Chills, + weakness  Cardiovascular:  No palpations, + edema  Respiratory:  No wheezing, no cough  Skin:  No rash, no itching  :  No hematuria, no dysuria     Social Hx:  No family at the bedside     Past Medical and Surgical History:  - Reviewed, no changes     EXAM       Objective/     Vitals:    06/04/21 1709 06/04/21 2057 06/04/21 2230 06/05/21 0335   BP: (!) 107/52 122/67 130/73    Pulse: 84 79 72    Resp:   17    Temp:  98.7 °F (37.1 °C) 98.3 °F (36.8 °C)    TempSrc:  Oral Oral    SpO2: 93%  93%    Weight:    185 lb 10 oz (84.2 kg)   Height:         24HR INTAKE/OUTPUT:      Intake/Output Summary (Last 24 hours) at 6/5/2021 0941  Last data filed at 6/5/2021 0335  Gross per 24 hour   Intake 440 ml   Output 700 ml   Net -260 ml     Constitutional:  Alert, awake, no apparent distress  Eyes:  Pupils reactive, sclera clear   Neck:  Normal thyroid, no masses   Cardiovascular:  Regular, no rub  Respiratory:  No distress, no wheezing  Psychiatry:  Appropriate mood/affect, alert  Abdomen: +bs, soft, nt, no masses   Musculoskeletal: trace LE edema, no clubbing   Lymphatics:  No LAD in neck, no supraclavicular nodes       MEDICAL DECISION MAKING       Data/  Recent Labs     06/04/21  0423   WBC 9.5   HGB 9.9*   HCT 28.2*   MCV 94.6        Recent Labs     06/04/21  0423 06/05/21  0516   * 125*   K 3.8 3.6   CL 91* 90*   CO2 30 28   GLUCOSE 111* 92   PHOS  --  3.2   BUN 11 10   CREATININE <0.5* <0.5*   LABGLOM >60 >60   GFRAA >60 >60       Assessment/     Hyponatremia:  Chronic   - High U osm with variable sodium.   SIADH picture but with heart failure hard to exclude hypervolemic hyponatremia     Systolic Heart Failure:  EF = 40%  - Compensated, mild edema.   On lasix     Hypertension:  Well controlled     Plan/     Tolvaptan 30 mg po x 1  Labs daily while here with no fluid restriction  [id number] [address] for discharge planning if otherwise ready for discharge     -----------------------------  Jarrett Jones M.D.   [facility]

## 2021-06-05 NOTE — PROGRESS NOTES
Hospitalist Progress Note      PCP: Maya Clarke    Date of Admission: 6/1/2021    Chief Complaint on Admission: Generalized weakness and fatigue    History Of Present Illness:       79 y.o. male, with PMH of HTN, CAD, HLD, hypothyroidism, who presented to [facility] with generalized weakness and fatigue. History obtained from the patient and review of EMR. The patient stated he had hernia repair surgery on 5/25 with Dr. Claudell Good here at [facility]. He stated he was discharged on Friday 5/28. The patient stated since he has been discharged he has felt extremely weak and like he has no energy. He stated he has been off of his home medications such as his iron, multivitamins and Lasix for roughly 2 weeks now. The patient stated he has not been able to do much in regards of activity and thought this was due to him not taking his medications. He stated yesterday he was taking a shower and was so exhausted that he fell trying to get out of the shower. The patient stated \"my body just collapsed underneath of me\". He denied hitting his head and/or loss of consciousness. The patient stated his wife also felt that she could hear him wheezing at times so they called Dr. Collazo [place] office. He stated he was seen by Dr. Claudell Good today and Dr. Claudell Good was concerned he may be having a CHF exacerbation and sent him to the emergency room for further evaluation. The patient denies any pain and/or shortness of breath. The emergency room a chest x-ray was obtained that revealed bilateral pleural effusions bibasilar hypoaeration. A urinalysis was also obtained that was negative for infection. The patient is hyponatremic at 127, but this does appear to be chronic. He was given 20 mg of IV Lasix. The patient will be admitted for further evaluation and treatment. An echocardiogram has been ordered for the a.m. PT/OT and case management have also been consulted. The patient denied any other associated symptoms as well as

## 2021-06-06 VITALS
SYSTOLIC BLOOD PRESSURE: 132 MMHG | HEIGHT: 71 IN | HEART RATE: 76 BPM | OXYGEN SATURATION: 93 % | RESPIRATION RATE: 17 BRPM | BODY MASS INDEX: 24.64 KG/M2 | DIASTOLIC BLOOD PRESSURE: 61 MMHG | WEIGHT: 176 LBS | TEMPERATURE: 97.9 F

## 2021-06-06 LAB
ALBUMIN SERPL-MCNC: 2.5 G/DL (ref 3.4–5)
ALP BLD-CCNC: 149 U/L (ref 40–129)
ALT SERPL-CCNC: 93 U/L (ref 10–40)
ANION GAP SERPL CALCULATED.3IONS-SCNC: 7 MMOL/L (ref 3–16)
AST SERPL-CCNC: 59 U/L (ref 15–37)
BILIRUB SERPL-MCNC: 0.7 MG/DL (ref 0–1)
BILIRUBIN DIRECT: 0.3 MG/DL (ref 0–0.3)
BILIRUBIN, INDIRECT: 0.4 MG/DL (ref 0–1)
BUN BLDV-MCNC: 9 MG/DL (ref 7–20)
CALCIUM SERPL-MCNC: 8.2 MG/DL (ref 8.3–10.6)
CHLORIDE BLD-SCNC: 93 MMOL/L (ref 99–110)
CO2: 30 MMOL/L (ref 21–32)
CREAT SERPL-MCNC: <0.5 MG/DL (ref 0.8–1.3)
GFR AFRICAN AMERICAN: >60
GFR NON-AFRICAN AMERICAN: >60
GLUCOSE BLD-MCNC: 107 MG/DL (ref 70–99)
PHOSPHORUS: 3.5 MG/DL (ref 2.5–4.9)
POTASSIUM SERPL-SCNC: 3.8 MMOL/L (ref 3.5–5.1)
SODIUM BLD-SCNC: 130 MMOL/L (ref 136–145)
TOTAL PROTEIN: 5.6 G/DL (ref 6.4–8.2)

## 2021-06-06 PROCEDURE — 80076 HEPATIC FUNCTION PANEL: CPT

## 2021-06-06 PROCEDURE — 6370000000 HC RX 637 (ALT 250 FOR IP): Performed by: INTERNAL MEDICINE

## 2021-06-06 PROCEDURE — 2580000003 HC RX 258: Performed by: NURSE PRACTITIONER

## 2021-06-06 PROCEDURE — 6370000000 HC RX 637 (ALT 250 FOR IP): Performed by: NURSE PRACTITIONER

## 2021-06-06 PROCEDURE — 80069 RENAL FUNCTION PANEL: CPT

## 2021-06-06 PROCEDURE — 6360000002 HC RX W HCPCS: Performed by: NURSE PRACTITIONER

## 2021-06-06 RX ORDER — FUROSEMIDE 40 MG/1
40 TABLET ORAL DAILY
Qty: 60 TABLET | Refills: 3 | Status: SHIPPED | OUTPATIENT
Start: 2021-06-07 | End: 2021-06-22 | Stop reason: ALTCHOICE

## 2021-06-06 RX ORDER — TOLVAPTAN 30 MG/1
30 TABLET ORAL ONCE
Status: COMPLETED | OUTPATIENT
Start: 2021-06-06 | End: 2021-06-06

## 2021-06-06 RX ADMIN — FERROUS SULFATE TAB 325 MG (65 MG ELEMENTAL FE) 325 MG: 325 (65 FE) TAB at 09:29

## 2021-06-06 RX ADMIN — ENOXAPARIN SODIUM 40 MG: 40 INJECTION SUBCUTANEOUS at 09:28

## 2021-06-06 RX ADMIN — TOLVAPTAN 30 MG: 30 TABLET ORAL at 09:29

## 2021-06-06 RX ADMIN — ASPIRIN 81 MG: 81 TABLET, CHEWABLE ORAL at 09:29

## 2021-06-06 RX ADMIN — SODIUM CHLORIDE, PRESERVATIVE FREE 10 ML: 5 INJECTION INTRAVENOUS at 09:31

## 2021-06-06 RX ADMIN — FUROSEMIDE 40 MG: 40 TABLET ORAL at 09:29

## 2021-06-06 RX ADMIN — LEVOTHYROXINE SODIUM 150 MCG: 0.15 TABLET ORAL at 05:16

## 2021-06-06 RX ADMIN — CETIRIZINE HYDROCHLORIDE 10 MG: 10 TABLET, FILM COATED ORAL at 09:28

## 2021-06-06 RX ADMIN — ENALAPRIL MALEATE 10 MG: 5 TABLET ORAL at 09:29

## 2021-06-06 NOTE — DISCHARGE SUMMARY
Physician Discharge Summary     Patient ID:  Ramírez Jefferson  [id number]  75 y.o.  1940    Admit date: 6/1/2021    Discharge date and time: 6/6/2021  1:56 PM     Admitting Physician: Hilary Velazquez DO     Discharge Physician: Yordan Rodas MD    Admission Diagnoses: Generalized weakness [R53.1]    Discharge Diagnoses: Hyponatremia    Admission Condition: fair    Discharged Condition: good    Indication for Admission: Generalized weakness and fatigue    Hospital Course:   79 y.o. male, with PMH of HTN, CAD, HLD, hypothyroidism, who presented to [facility] with generalized weakness and fatigue. The emergency room a chest x-ray was obtained that revealed bilateral pleural effusions bibasilar hypoaeration. A urinalysis was also obtained that was negative for infection. The patient is hyponatremic at 127, but this does appear to be chronic. He was given 20 mg of IV Lasix. Seen by nephrology. SIADH picture but with heart failure hard to exclude hypervolemic hyponatremia. Continued on po lasix. Tolvaptan 30 mg given. Pt noted to have mild transaminitis. Suspecting 2/2 ischemic liver injury in setting of recent surgery. Acute hepatitis panel non reactive. US Abdomen unremarkable. Pt was advised to have a repeat hepatic panel by pcp in 1-2 weeks. Discharged nicci on [address] in stable condition.      Consults: nephrology    Discharge Exam:  /61   Pulse 76   Temp 97.9 °F (36.6 °C) (Oral)   Resp 17   Ht 5' 11\" (1.803 m)   Wt 176 lb (79.8 kg)   SpO2 93%   BMI 24.55 kg/m²     General Appearance:    Alert, cooperative, no distress, appears stated age   Head:    Normocephalic, without obvious abnormality, atraumatic   Eyes:    PERRL, conjunctiva/corneas clear, EOM's intact, fundi     benign, both eyes        Ears:    Normal TM's and external ear canals, both ears   Nose:   Nares normal, septum midline, mucosa normal, no drainage    or sinus tenderness   Throat:   Lips, mucosa, and tongue normal; teeth and gums normal   Neck:   Supple, symmetrical, trachea midline, no adenopathy;        thyroid:  No enlargement/tenderness/nodules; no carotid    bruit or JVD   Back:     Symmetric, no curvature, ROM normal, no CVA tenderness   Lungs:     Clear to auscultation bilaterally, respirations unlabored   Chest wall:    No tenderness or deformity   Heart:    Regular rate and rhythm, S1 and S2 normal, no murmur, rub   or gallop   Abdomen:     Soft, non-tender, bowel sounds active all four quadrants,     no masses, no organomegaly   Genitalia:    Normal male without lesion, discharge or tenderness   Rectal:    Normal tone, normal prostate, no masses or tenderness;    guaiac negative stool   Extremities:   Extremities normal, atraumatic, no cyanosis or edema   Pulses:   2+ and symmetric all extremities   Skin:   Skin color, texture, turgor normal, no rashes or lesions   Lymph nodes:   Cervical, supraclavicular, and axillary nodes normal   Neurologic:   CNII-XII intact. Normal strength, sensation and reflexes       throughout       Disposition: home    In process/preliminary results:  Outstanding Order Results     No orders found from 5/3/2021 to 6/2/2021.           Patient Instructions:   Discharge Medication List as of 6/6/2021 12:37 PM      CONTINUE these medications which have CHANGED    Details   furosemide (LASIX) 40 MG tablet Take 1 tablet by mouth daily, Disp-60 tablet, R-3Normal         CONTINUE these medications which have NOT CHANGED    Details   docusate sodium (COLACE, DULCOLAX) 100 MG CAPS Take 100 mg by mouth 2 times daily, Disp-60 capsule, R-1Normal      polyethylene glycol (GLYCOLAX) 17 g packet Take 17 g by mouth 2 times daily, Disp-527 g, R-1Please stop if having diarrheaNormal      levothyroxine (SYNTHROID) 150 MCG tablet TAKE 1 TABLET BY MOUTH  DAILY, Disp-90 tablet,R-3Requesting 1 year supplyNormal      atorvastatin (LIPITOR) 80 MG tablet TAKE 1 TABLET BY MOUTH  EVERY DAY, Disp-90 tablet,R-1For high cholesterolNormal ferrous sulfate (FE TABS) 325 (65 Fe) MG EC tablet Take 1 tablet by mouth daily (with breakfast), Disp-90 tablet, R-0Normal      azelastine (ASTELIN) 0.1 % nasal spray 2 sprays by Nasal route 2 times daily, Disp-1 Bottle, R-1Normal      enalapril (VASOTEC) 10 MG tablet Take 10 mg by mouth daily Historical Med      cetirizine (ZYRTEC) 10 MG tablet Take 10 mg by mouth daily. Multiple Vitamin (THERA) TABS Take 1 tablet by mouth daily 1 Tab daily. Historical Med      Glucosamine-Chondroitin (GLUCOSAMINE CHONDR COMPLEX PO) Take 1 tablet by mouth daily Historical Med      aspirin 81 MG chewable tablet Take 81 mg by mouth daily Historical Med      Coenzyme Q-10 100 MG CAPS Take 1 capsule by mouth daily. Activity: activity as tolerated  Diet: regular diet  Wound Care: none needed    Follow-up with pcp, nephro in 2 weeks.     Signed:  Roly Vang MD  Time spent > 35 mins  6/6/2021  3:09 PM

## 2021-06-06 NOTE — PROGRESS NOTES
Patient in bed awake. A/O x 4. Assessment completed and charted. VSS. Respirations even and unlabored. Denies pain. Male external catheter in place per preference due to increase in urination. Bed in lowest position and locked. Call light in reach.

## 2021-06-06 NOTE — PROGRESS NOTES
Progress Note    HISTORY     CC:   Weakness and SOB              We are following for hyponatremia       Subjective/   HPI:  Sodium is up. Feeling better. BP is good. Minimal swelling. ROS:  Constitutional:  No fevers, No Chills, + weakness  Cardiovascular:  No palpations, + edema  Respiratory:  No wheezing, no cough  Skin:  No rash, no itching  :  No hematuria, no dysuria     Social Hx:  No family at the bedside     Past Medical and Surgical History:  - Reviewed, no changes     EXAM       Objective/     Vitals:    06/05/21 1526 06/05/21 2232 06/06/21 0400 06/06/21 0900   BP: (!) 144/76 129/74  132/61   Pulse: 84 76  76   Resp: 18 16  17   Temp: 97.6 °F (36.4 °C) 98.5 °F (36.9 °C)  97.9 °F (36.6 °C)   TempSrc: Oral Oral  Oral   SpO2: 95% 94%  93%   Weight:   176 lb (79.8 kg)    Height:   5' 11\" (1.803 m)      24HR INTAKE/OUTPUT:      Intake/Output Summary (Last 24 hours) at 6/6/2021 1516  Last data filed at 6/6/2021 0935  Gross per 24 hour   Intake 600 ml   Output 2175 ml   Net -1575 ml     Constitutional:  Alert, awake, no apparent distress  Eyes:  Pupils reactive, sclera clear   Neck:  Normal thyroid, no masses   Cardiovascular:  Regular, no rub  Respiratory:  No distress, no wheezing  Psychiatry:  Appropriate mood/affect, alert  Abdomen: +bs, soft, nt, no masses   Musculoskeletal: trace LE edema, no clubbing   Lymphatics:  No LAD in neck, no supraclavicular nodes       MEDICAL DECISION MAKING       Data/  Recent Labs     06/04/21  0423   WBC 9.5   HGB 9.9*   HCT 28.2*   MCV 94.6        Recent Labs     06/04/21  0423 06/05/21  0516 06/06/21  0453   * 125* 130*   K 3.8 3.6 3.8   CL 91* 90* 93*   CO2 30 28 30   GLUCOSE 111* 92 107*   PHOS  --  3.2 3.5   BUN 11 10 9   CREATININE <0.5* <0.5* <0.5*   LABGLOM >60 >60 >60   GFRAA >60 >60 >60       Assessment/     Hyponatremia:  Chronic   - High U osm with variable sodium.   SIADH picture but with heart failure hard to exclude hypervolemic hyponatremia     Systolic Heart Failure:  EF = 40%  - Compensated, mild edema.   On lasix     Hypertension:  Well controlled     Plan/     Tolvaptan 30 mg po x 1  Labs daily while here with no fluid restriction  Ok for discharge planning    -----------------------------  Kristi Elise M.D.   [facility]

## 2021-06-06 NOTE — PROGRESS NOTES
Shift assessment completed. Pt A&Ox4, VSS on RA. Denies any needs at this time. Bed locked and in lowest position. Call light & bedside table are within reach. Pt is wearing non skid socks, bed alarm in place for safety.

## 2021-06-06 NOTE — DISCHARGE INSTR - COC
Gastritis and duodenitis       Immunization History:   Immunization History   Administered Date(s) Administered    COVID-19, Moderna, PF, 100mcg/0.5mL 02/03/2021, 03/10/2021    Influenza Virus Vaccine 10/30/2003, 10/24/2006, 10/28/2006, 10/06/2007, 10/11/2008, 10/02/2010, 10/21/2011    Influenza, High Dose (Fluzone 65 yrs and older) 09/24/2014, 09/29/2015, 09/30/2016, 10/06/2017, 09/26/2018    Pneumococcal Conjugate 13-valent ([id number]) 09/29/2015    Pneumococcal Conjugate 7-valent (Janene Susannah) 10/15/2000, 11/11/2003    Pneumococcal Polysaccharide ([id number]) 12/05/2016    Tdap (Boostrix, Adacel) 10/23/2009    Zoster Live (Zostavax) 12/12/2011       Active Problems:  Patient Active Problem List   Diagnosis Code    Generalized weakness R53.1    Hypothyroid E03.9    Food allergy Z91.018    CAD (coronary artery disease) I25.10    TIA (transient ischemic attack) G45.9    Hyperlipidemia E78.5    Osteoarthritis M19.90    Cough R05    Herpes zoster B02.9    Vertigo R42    Eustachian tube dysfunction H69.80    Aortic stenosis I35.0    Unilateral recurrent inguinal hernia without obstruction or gangrene K40.91    Murmur R01.1    Obesity E66.9    Occlusion and stenosis of carotid artery I65.29    Stented coronary artery Z95.5    Groin pain, chronic, right R10.31, G89.29    Chronic systolic congestive heart failure (HCC) I50.22    Non-recurrent unilateral inguinal hernia without obstruction or gangrene K40.90    Postoperative pain G89.18    Postoperative hematoma of subcutaneous tissue following non-dermatologic procedure L76.32    SBO (small bowel obstruction) ([facility] [address] K56.609    Hiatal hernia K44.9    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    Transaminitis R74.01       Isolation/Infection:   Isolation            No Isolation          Patient Infection Status       None to display            Nurse Assessment:  Last Vital Signs: /61   Pulse 76   Temp 97.9 °F (36.6 °C) (Oral)   Resp 17   Ht 5' 11\" (1.803 m)   Wt 176 lb (79.8 kg)   SpO2 93%   BMI 24.55 kg/m²     Last documented pain score (0-10 scale): Pain Level: 0  Last Weight:   Wt Readings from Last 1 Encounters:   06/06/21 176 lb (79.8 kg)     Mental Status:  oriented, alert, coherent, logical, thought processes intact and able to concentrate and follow conversation    IV Access:  - None    Nursing Mobility/ADLs:  Walking   Independent  Transfer  Independent  Bathing  Assisted  Dressing  Independent  Toileting  Assisted  Feeding  Independent  Med [id number]  Assisted  Med Delivery   whole    Wound Care Documentation and Therapy:        Elimination:  Continence:   · Bowel: Yes  · Bladder: Yes  Urinary Catheter: None   Colostomy/Ileostomy/Ileal Conduit: No       Date of Last BM: 6/5/21    Intake/Output Summary (Last 24 hours) at 6/6/2021 1129  Last data filed at 6/6/2021 0935  Gross per 24 hour   Intake 600 ml   Output 2175 ml   Net -1575 ml     I/O last 3 completed shifts: In: 240 [P.O.:240]  Out: 8804 [Urine:1550]    Safety Concerns:     None    Impairments/Disabilities:      Vision    Nutrition Therapy:  Current Nutrition Therapy:   - Oral Diet:  General bite size    Routes of Feeding: Oral  Liquids: Thin Liquids  Daily Fluid Restriction: no  Last Modified Barium Swallow with Video (Video Swallowing Test): not done    Treatments at the Time of Hospital Discharge:   Respiratory Treatments: N/A  Oxygen Therapy:  is not on home oxygen therapy.   Ventilator:    - No ventilator support    Rehab Therapies: N/A  Weight Bearing Status/Restrictions: No weight bearing restirctions  Other Medical Equipment (for information only, NOT a DME order):  walker  Other Treatments:     Patient's personal belongings (please select all that are sent with patient):  None, Glasses    RN SIGNATURE:  Electronically signed by Karina Barajas RN on 6/6/21 at 12:37 PM EDT    CASE MANAGEMENT/SOCIAL WORK SECTION    Inpatient Status Date:     Readmission Risk Assessment Score:  Readmission Risk              Risk of Unplanned Readmission:  22           Discharging to Facility/ Agency   · Name: [facility]  · Address:  · Phone:[phone]  · [address] (if applicable)   · Name:  · Address:  · Dialysis Schedule:  · Phone:  · Fax:    / signature: Electronically signed by Juliette Méndez RN on 6/6/21 at 11:29 AM EDT    PHYSICIAN SECTION    Prognosis: Fair    Condition at Discharge: Stable    Rehab Potential (if transferring to Rehab): Good    Recommended Labs or Other Treatments After Discharge: PT/OT/RN    Physician Certification: I certify the above information and transfer of Talha Perkins  is necessary for the continuing treatment of the diagnosis listed and that he requires Home Care for greater 30 days.      Update Admission H&P: No change in H&P    PHYSICIAN SIGNATURE:  Electronically signed by Umu Aviles MD on 6/6/21 at 12:10 PM EDT

## 2021-06-07 NOTE — DISCHARGE SUMMARY
Discharge Summary      Patient:  Geovanny Cough Date: 5/25/2021  6:16 AM    Discharge Date: 5/28/2021    Admitting Physician: Girish Soriano MD     Discharge Physician: same    Admitting Diagnosis:  Hiatal hernia     Discharge Diagnosis: same     Past Medical History:   Diagnosis Date    Allergic rhinitis     Anemia     Arthritis     rt hip    CAD (coronary artery disease) 2001    cardiac stents    Chronic systolic congestive heart failure ([address] 8/21/2018    Compression fracture of T11 vertebra (HCC)     back brace    Food allergy     Hiatal hernia     History of blood transfusion     platelets=2001    Hyperlipidemia     Hypertension     hx of    Hypothyroid     Obesity 10/2/2012    Occlusion and stenosis of carotid artery 10/2/2012    Osteoarthritis     Hip right    Recurrent right inguinal hernia     Shingles 2014    Thyroid disease     TIA (transient ischemic attack)         Indication for Admission:   Patient was admitted on 5/26 for robotic paraesophageal hernia repair, with fabien gastroplasty and Nissen fundoplication. Hospital Course:   POD#1, an UGI was completed that showed typical post-surgical changes, no complications. The NGT was removed and he was started on clear liquids. POD#2, patient was tolerating clear liquids. His pain was well controlled. He was advanced to full liquids. He was discharged home in stable condition. Procedures:  Robotic paraesophageal hernia repair, with fabien gastroplasty and Nissen fundoplication.     Consulting services:  None    Discharge physical exam:  CONSTITUTIONAL:  awake and alert  LUNGS:  no crackles or wheezing  ABDOMEN:    soft, non-distended, mildly tender, NG tube in place with gastric output  INCISION: clean, dry    Disposition:  home    Condition at discharge:  Stable    Discharge Instructions:  See separate form    Patient Instructions:      Medication List      CHANGE how you take these medications atorvastatin 80 MG tablet  Commonly known as: LIPITOR  TAKE 1 TABLET BY MOUTH  EVERY DAY  What changed: See the new instructions.      azelastine 0.1 % nasal spray  Commonly known as: ASTELIN  2 sprays by Nasal route 2 times daily  What changed:   · when to take this  · reasons to take this     docusate 100 MG Caps  Commonly known as: COLACE, DULCOLAX  Take 100 mg by mouth 2 times daily  What changed:   · when to take this  · reasons to take this     polyethylene glycol 17 g packet  Commonly known as: GLYCOLAX  Take 17 g by mouth 2 times daily  What changed:   · when to take this  · reasons to take this        CONTINUE taking these medications    aspirin 81 MG chewable tablet     cetirizine 10 MG tablet  Commonly known as: ZYRTEC     coenzyme Q-10 100 MG capsule     enalapril 10 MG tablet  Commonly known as: VASOTEC     ferrous sulfate 325 (65 Fe) MG EC tablet  Commonly known as: Fe Tabs  Take 1 tablet by mouth daily (with breakfast)     GLUCOSAMINE CHONDR COMPLEX PO     levothyroxine 150 MCG tablet  Commonly known as: SYNTHROID  TAKE 1 TABLET BY MOUTH  DAILY     thera/beta-carotene Tabs        STOP taking these medications    furosemide 20 MG tablet  Commonly known as: Evelyn Moran MD  06/06/21  8:30 PM  [phone]

## 2021-06-11 NOTE — PROGRESS NOTES
Physician Progress Note      PATIENT:               Gerald Hirsch  CSN #:                  [id number]  :                       1940  ADMIT DATE:       2021 4:14 PM  Gene Chi DATE:        2021 1:56 PM  RESPONDING  PROVIDER #:        Oscar Gill MD          QUERY TEXT:    Patient admitted with weakness, suspected ischemic liver injury. If possible,   please document in progress notes and discharge summary if you are evaluating   and/or treating any of the following: The medical record reflects the following:  Risk Factors: s/p surgery   Clinical Indicators: ER HPI: \"He was feeling weak and as a result he was SOB. He tried to take a shower and he was so exhausted, he fell trying to get out   of the shower. He states his body just collapsed down below. This was on   Friday. He scraped his knee, denies that he hit his head. He didn't feel right   when he left. Denies CP. He denies abdominal pain but reports some left sided   flank/back pain, where his kidney is. When he urinates it is dark denies   dysuria\"  on arrival UA with small bili, negative blood; elevated liver enzymes  Treatment: IV fluids, serial labs, imaging, abd US, supportive care and   monitoring    Thank you,  Kaveh Galeano RN CDS  [phone]  Options provided:  -- Shock liver due to surgery  -- Shock liver not due to surgery  -- Subacute liver failure due to surgery  -- Subacute liver failure not due to surgery  -- Other - I will add my own diagnosis  -- Disagree - Not applicable / Not valid  -- Disagree - Clinically unable to determine / Unknown  -- Refer to Clinical Documentation Reviewer    PROVIDER RESPONSE TEXT:    This patient had shock liver due to surgery. Query created by:  Ester Paredes on 6/10/2021 1:50 PM      Electronically signed by:  Oscar Gill MD 2021 7:37 AM

## 2021-06-15 ENCOUNTER — OFFICE VISIT (OUTPATIENT)
Dept: SURGERY | Age: 81
End: 2021-06-15

## 2021-06-15 VITALS
SYSTOLIC BLOOD PRESSURE: 110 MMHG | DIASTOLIC BLOOD PRESSURE: 62 MMHG | HEIGHT: 71 IN | BODY MASS INDEX: 25.68 KG/M2 | HEART RATE: 71 BPM | WEIGHT: 183.4 LBS | TEMPERATURE: 97.3 F

## 2021-06-15 DIAGNOSIS — Z09 POSTOP CHECK: Primary | ICD-10-CM

## 2021-06-15 PROCEDURE — 99024 POSTOP FOLLOW-UP VISIT: CPT | Performed by: SURGERY

## 2021-06-15 NOTE — PROGRESS NOTES
Surgery Post-op Progress Note    HPI:  Notes reviewed, and agree with documentation in pt's chart. 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 Seen in office 2 weeks ago, having cardiopulmonary issues. Was admitted and managed for acute CHF, hyponatremia, discharged 1 week ago. From a GI standpoint he continues to improve - taking PO diet adequately, following post-gastrectomy style diet so far (small portions, frequent meals). Denies heartburn, dysphagia, can belch easily when needed. No complaints related to incisions. ROS:    · 10 point review of systems performed; please refer to HPI with pertinent positives, all other ROS are negative    A review of the patient's record including allergies, medication list, tobacco history, family history, problem list, medical history and social history has been completed and updates made to the patient's EMR where indicated. PE:   CONSTITUTIONAL:  awake and alert    ABDOMEN: soft, non-distended, non-tender     INCISION: clean, dry, healed      ASSESSMENT:   Diagnosis Orders   1.  Postop check     ·       PLAN:    Continue with routine wound care as discussed  OK to resume regular diet as tolerated  Gradually increase activities as tolerated  Follow-up as needed; please call with questions or concerns  ·

## 2021-06-22 ENCOUNTER — APPOINTMENT (OUTPATIENT)
Dept: GENERAL RADIOLOGY | Age: 81
DRG: 643 | End: 2021-06-22
Payer: MEDICARE

## 2021-06-22 ENCOUNTER — HOSPITAL ENCOUNTER (INPATIENT)
Age: 81
LOS: 3 days | Discharge: HOME | DRG: 643 | End: 2021-06-25
Attending: EMERGENCY MEDICINE | Admitting: HOSPITALIST
Payer: MEDICARE

## 2021-06-22 DIAGNOSIS — E87.1 CHRONIC HYPONATREMIA: ICD-10-CM

## 2021-06-22 DIAGNOSIS — I50.9 ACUTE ON CHRONIC CONGESTIVE HEART FAILURE, UNSPECIFIED HEART FAILURE TYPE (HCC): ICD-10-CM

## 2021-06-22 DIAGNOSIS — E87.1 HYPONATREMIA: Primary | ICD-10-CM

## 2021-06-22 DIAGNOSIS — E87.8 HYPOCHLOREMIA: ICD-10-CM

## 2021-06-22 PROBLEM — I50.43 ACUTE ON CHRONIC COMBINED SYSTOLIC AND DIASTOLIC CHF (CONGESTIVE HEART FAILURE) (HCC): Status: ACTIVE | Noted: 2021-06-22

## 2021-06-22 PROBLEM — E88.09 HYPOALBUMINEMIA: Status: ACTIVE | Noted: 2021-06-22

## 2021-06-22 PROBLEM — J90 PLEURAL EFFUSION ON RIGHT: Status: ACTIVE | Noted: 2021-06-22

## 2021-06-22 LAB
A/G RATIO: 0.8 (ref 1.1–2.2)
ALBUMIN SERPL-MCNC: 2.7 G/DL (ref 3.4–5)
ALP BLD-CCNC: 96 U/L (ref 40–129)
ALT SERPL-CCNC: 31 U/L (ref 10–40)
ANION GAP SERPL CALCULATED.3IONS-SCNC: 6 MMOL/L (ref 3–16)
AST SERPL-CCNC: 21 U/L (ref 15–37)
BASOPHILS ABSOLUTE: 0 K/UL (ref 0–0.2)
BASOPHILS RELATIVE PERCENT: 0.6 %
BILIRUB SERPL-MCNC: 0.5 MG/DL (ref 0–1)
BILIRUBIN URINE: NEGATIVE
BLOOD, URINE: NEGATIVE
BUN BLDV-MCNC: 9 MG/DL (ref 7–20)
CALCIUM SERPL-MCNC: 8.1 MG/DL (ref 8.3–10.6)
CHLORIDE BLD-SCNC: 87 MMOL/L (ref 99–110)
CLARITY: CLEAR
CO2: 27 MMOL/L (ref 21–32)
COLOR: YELLOW
CREAT SERPL-MCNC: <0.5 MG/DL (ref 0.8–1.3)
CREATININE URINE: 51.2 MG/DL (ref 39–259)
EKG ATRIAL RATE: 73 BPM
EKG DIAGNOSIS: NORMAL
EKG P AXIS: 18 DEGREES
EKG P-R INTERVAL: 156 MS
EKG Q-T INTERVAL: 368 MS
EKG QRS DURATION: 94 MS
EKG QTC CALCULATION (BAZETT): 405 MS
EKG R AXIS: 27 DEGREES
EKG T AXIS: 9 DEGREES
EKG VENTRICULAR RATE: 73 BPM
EOSINOPHILS ABSOLUTE: 0.1 K/UL (ref 0–0.6)
EOSINOPHILS RELATIVE PERCENT: 1.5 %
GFR AFRICAN AMERICAN: >60
GFR NON-AFRICAN AMERICAN: >60
GLOBULIN: 3.2 G/DL
GLUCOSE BLD-MCNC: 105 MG/DL (ref 70–99)
GLUCOSE URINE: NEGATIVE MG/DL
HCT VFR BLD CALC: 27.3 % (ref 40.5–52.5)
HEMOGLOBIN: 9.5 G/DL (ref 13.5–17.5)
KETONES, URINE: NEGATIVE MG/DL
LACTIC ACID: 1 MMOL/L (ref 0.4–2)
LEUKOCYTE ESTERASE, URINE: NEGATIVE
LYMPHOCYTES ABSOLUTE: 0.7 K/UL (ref 1–5.1)
LYMPHOCYTES RELATIVE PERCENT: 13.8 %
MCH RBC QN AUTO: 31.9 PG (ref 26–34)
MCHC RBC AUTO-ENTMCNC: 34.9 G/DL (ref 31–36)
MCV RBC AUTO: 91.6 FL (ref 80–100)
MICROSCOPIC EXAMINATION: ABNORMAL
MONOCYTES ABSOLUTE: 0.3 K/UL (ref 0–1.3)
MONOCYTES RELATIVE PERCENT: 5.5 %
NEUTROPHILS ABSOLUTE: 4.1 K/UL (ref 1.7–7.7)
NEUTROPHILS RELATIVE PERCENT: 78.6 %
NITRITE, URINE: NEGATIVE
OSMOLALITY URINE: 269 MOSM/KG (ref 390–1070)
PDW BLD-RTO: 15 % (ref 12.4–15.4)
PH UA: 7.5 (ref 5–8)
PLATELET # BLD: 233 K/UL (ref 135–450)
PMV BLD AUTO: 6.6 FL (ref 5–10.5)
POTASSIUM SERPL-SCNC: 3.8 MMOL/L (ref 3.5–5.1)
PRO-BNP: 1100 PG/ML (ref 0–449)
PROCALCITONIN: 0.09 NG/ML (ref 0–0.15)
PROTEIN UA: NEGATIVE MG/DL
RBC # BLD: 2.98 M/UL (ref 4.2–5.9)
SODIUM BLD-SCNC: 120 MMOL/L (ref 136–145)
SODIUM BLD-SCNC: 124 MMOL/L (ref 136–145)
SODIUM URINE: 51 MMOL/L
SPECIFIC GRAVITY UA: 1.01 (ref 1–1.03)
TOTAL PROTEIN: 5.9 G/DL (ref 6.4–8.2)
TROPONIN: <0.01 NG/ML
URINE REFLEX TO CULTURE: ABNORMAL
URINE TYPE: ABNORMAL
UROBILINOGEN, URINE: 2 E.U./DL
WBC # BLD: 5.3 K/UL (ref 4–11)

## 2021-06-22 PROCEDURE — 2580000003 HC RX 258: Performed by: HOSPITALIST

## 2021-06-22 PROCEDURE — 80053 COMPREHEN METABOLIC PANEL: CPT

## 2021-06-22 PROCEDURE — P9047 ALBUMIN (HUMAN), 25%, 50ML: HCPCS | Performed by: HOSPITALIST

## 2021-06-22 PROCEDURE — 85025 COMPLETE CBC W/AUTO DIFF WBC: CPT

## 2021-06-22 PROCEDURE — 6360000002 HC RX W HCPCS: Performed by: HOSPITALIST

## 2021-06-22 PROCEDURE — 83935 ASSAY OF URINE OSMOLALITY: CPT

## 2021-06-22 PROCEDURE — 82746 ASSAY OF FOLIC ACID SERUM: CPT

## 2021-06-22 PROCEDURE — 71045 X-RAY EXAM CHEST 1 VIEW: CPT

## 2021-06-22 PROCEDURE — 93005 ELECTROCARDIOGRAM TRACING: CPT | Performed by: EMERGENCY MEDICINE

## 2021-06-22 PROCEDURE — 6370000000 HC RX 637 (ALT 250 FOR IP): Performed by: HOSPITALIST

## 2021-06-22 PROCEDURE — 84300 ASSAY OF URINE SODIUM: CPT

## 2021-06-22 PROCEDURE — 83605 ASSAY OF LACTIC ACID: CPT

## 2021-06-22 PROCEDURE — 84295 ASSAY OF SERUM SODIUM: CPT

## 2021-06-22 PROCEDURE — 83540 ASSAY OF IRON: CPT

## 2021-06-22 PROCEDURE — 82570 ASSAY OF URINE CREATININE: CPT

## 2021-06-22 PROCEDURE — 82607 VITAMIN B-12: CPT

## 2021-06-22 PROCEDURE — 84484 ASSAY OF TROPONIN QUANT: CPT

## 2021-06-22 PROCEDURE — 93010 ELECTROCARDIOGRAM REPORT: CPT | Performed by: INTERNAL MEDICINE

## 2021-06-22 PROCEDURE — 81003 URINALYSIS AUTO W/O SCOPE: CPT

## 2021-06-22 PROCEDURE — 36415 COLL VENOUS BLD VENIPUNCTURE: CPT

## 2021-06-22 PROCEDURE — 83550 IRON BINDING TEST: CPT

## 2021-06-22 PROCEDURE — 84145 PROCALCITONIN (PCT): CPT

## 2021-06-22 PROCEDURE — 2060000000 HC ICU INTERMEDIATE R&B

## 2021-06-22 PROCEDURE — 84134 ASSAY OF PREALBUMIN: CPT

## 2021-06-22 PROCEDURE — 83930 ASSAY OF BLOOD OSMOLALITY: CPT

## 2021-06-22 PROCEDURE — 83880 ASSAY OF NATRIURETIC PEPTIDE: CPT

## 2021-06-22 PROCEDURE — 99283 EMERGENCY DEPT VISIT LOW MDM: CPT

## 2021-06-22 RX ORDER — DOCUSATE SODIUM 100 MG/1
100 CAPSULE, LIQUID FILLED ORAL 2 TIMES DAILY PRN
Status: DISCONTINUED | OUTPATIENT
Start: 2021-06-22 | End: 2021-06-25 | Stop reason: HOSPADM

## 2021-06-22 RX ORDER — FUROSEMIDE 10 MG/ML
80 INJECTION INTRAMUSCULAR; INTRAVENOUS ONCE
Status: DISCONTINUED | OUTPATIENT
Start: 2021-06-22 | End: 2021-06-22

## 2021-06-22 RX ORDER — ASPIRIN 81 MG/1
81 TABLET ORAL DAILY
Status: DISCONTINUED | OUTPATIENT
Start: 2021-06-23 | End: 2021-06-25 | Stop reason: HOSPADM

## 2021-06-22 RX ORDER — SENNA PLUS 8.6 MG/1
1 TABLET ORAL DAILY PRN
Status: DISCONTINUED | OUTPATIENT
Start: 2021-06-22 | End: 2021-06-25 | Stop reason: HOSPADM

## 2021-06-22 RX ORDER — CETIRIZINE HYDROCHLORIDE 10 MG/1
10 TABLET ORAL DAILY
Status: DISCONTINUED | OUTPATIENT
Start: 2021-06-23 | End: 2021-06-25 | Stop reason: HOSPADM

## 2021-06-22 RX ORDER — MAGNESIUM SULFATE IN WATER 40 MG/ML
2000 INJECTION, SOLUTION INTRAVENOUS PRN
Status: DISCONTINUED | OUTPATIENT
Start: 2021-06-22 | End: 2021-06-25 | Stop reason: HOSPADM

## 2021-06-22 RX ORDER — POTASSIUM CHLORIDE 20 MEQ/1
40 TABLET, EXTENDED RELEASE ORAL PRN
Status: DISCONTINUED | OUTPATIENT
Start: 2021-06-22 | End: 2021-06-25 | Stop reason: HOSPADM

## 2021-06-22 RX ORDER — SODIUM CHLORIDE 0.9 % (FLUSH) 0.9 %
10 SYRINGE (ML) INJECTION PRN
Status: DISCONTINUED | OUTPATIENT
Start: 2021-06-22 | End: 2021-06-25 | Stop reason: HOSPADM

## 2021-06-22 RX ORDER — FERROUS SULFATE 325(65) MG
325 TABLET ORAL
Status: DISCONTINUED | OUTPATIENT
Start: 2021-06-23 | End: 2021-06-25 | Stop reason: HOSPADM

## 2021-06-22 RX ORDER — ONDANSETRON 4 MG/1
4 TABLET, ORALLY DISINTEGRATING ORAL EVERY 8 HOURS PRN
Status: DISCONTINUED | OUTPATIENT
Start: 2021-06-22 | End: 2021-06-25 | Stop reason: HOSPADM

## 2021-06-22 RX ORDER — DOCUSATE SODIUM 100 MG/1
100 CAPSULE, LIQUID FILLED ORAL 2 TIMES DAILY PRN
Status: DISCONTINUED | OUTPATIENT
Start: 2021-06-22 | End: 2021-06-22 | Stop reason: SDUPTHER

## 2021-06-22 RX ORDER — POTASSIUM CHLORIDE 7.45 MG/ML
10 INJECTION INTRAVENOUS PRN
Status: DISCONTINUED | OUTPATIENT
Start: 2021-06-22 | End: 2021-06-25 | Stop reason: HOSPADM

## 2021-06-22 RX ORDER — SODIUM CHLORIDE 0.9 % (FLUSH) 0.9 %
10 SYRINGE (ML) INJECTION EVERY 12 HOURS SCHEDULED
Status: DISCONTINUED | OUTPATIENT
Start: 2021-06-22 | End: 2021-06-25 | Stop reason: HOSPADM

## 2021-06-22 RX ORDER — ACETAMINOPHEN 650 MG/1
650 SUPPOSITORY RECTAL EVERY 6 HOURS PRN
Status: DISCONTINUED | OUTPATIENT
Start: 2021-06-22 | End: 2021-06-25 | Stop reason: HOSPADM

## 2021-06-22 RX ORDER — SODIUM CHLORIDE 9 MG/ML
25 INJECTION, SOLUTION INTRAVENOUS PRN
Status: DISCONTINUED | OUTPATIENT
Start: 2021-06-22 | End: 2021-06-25 | Stop reason: HOSPADM

## 2021-06-22 RX ORDER — ENALAPRIL MALEATE 5 MG/1
10 TABLET ORAL DAILY
Status: DISCONTINUED | OUTPATIENT
Start: 2021-06-23 | End: 2021-06-25 | Stop reason: HOSPADM

## 2021-06-22 RX ORDER — ACETAMINOPHEN 325 MG/1
650 TABLET ORAL EVERY 6 HOURS PRN
Status: DISCONTINUED | OUTPATIENT
Start: 2021-06-22 | End: 2021-06-25 | Stop reason: HOSPADM

## 2021-06-22 RX ORDER — ONDANSETRON 2 MG/ML
4 INJECTION INTRAMUSCULAR; INTRAVENOUS EVERY 6 HOURS PRN
Status: DISCONTINUED | OUTPATIENT
Start: 2021-06-22 | End: 2021-06-25 | Stop reason: HOSPADM

## 2021-06-22 RX ORDER — LEVOTHYROXINE SODIUM 0.15 MG/1
150 TABLET ORAL DAILY
Status: DISCONTINUED | OUTPATIENT
Start: 2021-06-23 | End: 2021-06-25 | Stop reason: HOSPADM

## 2021-06-22 RX ORDER — ALBUMIN (HUMAN) 12.5 G/50ML
25 SOLUTION INTRAVENOUS EVERY 6 HOURS
Status: COMPLETED | OUTPATIENT
Start: 2021-06-22 | End: 2021-06-23

## 2021-06-22 RX ORDER — M-VIT,TX,IRON,MINS/CALC/FOLIC 27MG-0.4MG
1 TABLET ORAL DAILY
Status: DISCONTINUED | OUTPATIENT
Start: 2021-06-23 | End: 2021-06-25 | Stop reason: HOSPADM

## 2021-06-22 RX ORDER — ATORVASTATIN CALCIUM 80 MG/1
80 TABLET, FILM COATED ORAL NIGHTLY
Status: DISCONTINUED | OUTPATIENT
Start: 2021-06-22 | End: 2021-06-25 | Stop reason: HOSPADM

## 2021-06-22 RX ADMIN — ATORVASTATIN CALCIUM 80 MG: 80 TABLET, FILM COATED ORAL at 22:16

## 2021-06-22 RX ADMIN — FUROSEMIDE 3 MG/HR: 10 INJECTION, SOLUTION INTRAMUSCULAR; INTRAVENOUS at 20:22

## 2021-06-22 RX ADMIN — FUROSEMIDE 3 MG/HR: 10 INJECTION, SOLUTION INTRAMUSCULAR; INTRAVENOUS at 19:35

## 2021-06-22 RX ADMIN — ALBUMIN (HUMAN) 25 G: 0.25 INJECTION, SOLUTION INTRAVENOUS at 18:37

## 2021-06-22 ASSESSMENT — PAIN SCALES - GENERAL
PAINLEVEL_OUTOF10: 0
PAINLEVEL_OUTOF10: 0

## 2021-06-22 NOTE — ED NOTES
Called nephrology consult @[phone], [phone]  Re: hyponatremia, CHF per Alex Martell returned page @[phone]       Maurice Loredo  06/22/21 7136

## 2021-06-23 LAB
A/G RATIO: 1 (ref 1.1–2.2)
ALBUMIN SERPL-MCNC: 3 G/DL (ref 3.4–5)
ALBUMIN SERPL-MCNC: 3.1 G/DL (ref 3.4–5)
ALBUMIN SERPL-MCNC: 3.2 G/DL (ref 3.4–5)
ALP BLD-CCNC: 96 U/L (ref 40–129)
ALT SERPL-CCNC: 26 U/L (ref 10–40)
ANION GAP SERPL CALCULATED.3IONS-SCNC: 7 MMOL/L (ref 3–16)
ANION GAP SERPL CALCULATED.3IONS-SCNC: 8 MMOL/L (ref 3–16)
ANION GAP SERPL CALCULATED.3IONS-SCNC: 8 MMOL/L (ref 3–16)
AST SERPL-CCNC: 19 U/L (ref 15–37)
BASOPHILS ABSOLUTE: 0 K/UL (ref 0–0.2)
BASOPHILS RELATIVE PERCENT: 0.9 %
BILIRUB SERPL-MCNC: 0.8 MG/DL (ref 0–1)
BUN BLDV-MCNC: 6 MG/DL (ref 7–20)
BUN BLDV-MCNC: 7 MG/DL (ref 7–20)
BUN BLDV-MCNC: 7 MG/DL (ref 7–20)
CALCIUM SERPL-MCNC: 8 MG/DL (ref 8.3–10.6)
CALCIUM SERPL-MCNC: 8.2 MG/DL (ref 8.3–10.6)
CALCIUM SERPL-MCNC: 8.4 MG/DL (ref 8.3–10.6)
CHLORIDE BLD-SCNC: 88 MMOL/L (ref 99–110)
CHLORIDE BLD-SCNC: 89 MMOL/L (ref 99–110)
CHLORIDE BLD-SCNC: 90 MMOL/L (ref 99–110)
CO2: 30 MMOL/L (ref 21–32)
CREAT SERPL-MCNC: <0.5 MG/DL (ref 0.8–1.3)
EOSINOPHILS ABSOLUTE: 0.1 K/UL (ref 0–0.6)
EOSINOPHILS RELATIVE PERCENT: 2.1 %
FOLATE: 17.56 NG/ML (ref 4.78–24.2)
GFR AFRICAN AMERICAN: >60
GFR NON-AFRICAN AMERICAN: >60
GLOBULIN: 3.1 G/DL
GLUCOSE BLD-MCNC: 100 MG/DL (ref 70–99)
GLUCOSE BLD-MCNC: 101 MG/DL (ref 70–99)
GLUCOSE BLD-MCNC: 108 MG/DL (ref 70–99)
HCT VFR BLD CALC: 27.7 % (ref 40.5–52.5)
HEMOGLOBIN: 9.6 G/DL (ref 13.5–17.5)
IRON SATURATION: 19 % (ref 20–50)
IRON: 26 UG/DL (ref 59–158)
LYMPHOCYTES ABSOLUTE: 0.8 K/UL (ref 1–5.1)
LYMPHOCYTES RELATIVE PERCENT: 17.3 %
MCH RBC QN AUTO: 32.1 PG (ref 26–34)
MCHC RBC AUTO-ENTMCNC: 34.7 G/DL (ref 31–36)
MCV RBC AUTO: 92.4 FL (ref 80–100)
MONOCYTES ABSOLUTE: 0.2 K/UL (ref 0–1.3)
MONOCYTES RELATIVE PERCENT: 4.5 %
NEUTROPHILS ABSOLUTE: 3.6 K/UL (ref 1.7–7.7)
NEUTROPHILS RELATIVE PERCENT: 75.2 %
OSMOLALITY: 261 MOSM/KG (ref 280–301)
PDW BLD-RTO: 15.6 % (ref 12.4–15.4)
PHOSPHORUS: 2.7 MG/DL (ref 2.5–4.9)
PHOSPHORUS: 2.7 MG/DL (ref 2.5–4.9)
PHOSPHORUS: 2.8 MG/DL (ref 2.5–4.9)
PLATELET # BLD: 207 K/UL (ref 135–450)
PMV BLD AUTO: 6.8 FL (ref 5–10.5)
POTASSIUM REFLEX MAGNESIUM: 4.3 MMOL/L (ref 3.5–5.1)
POTASSIUM SERPL-SCNC: 3.8 MMOL/L (ref 3.5–5.1)
POTASSIUM SERPL-SCNC: 3.8 MMOL/L (ref 3.5–5.1)
POTASSIUM SERPL-SCNC: 4.3 MMOL/L (ref 3.5–5.1)
PREALBUMIN: 6 MG/DL (ref 20–40)
RBC # BLD: 2.99 M/UL (ref 4.2–5.9)
SODIUM BLD-SCNC: 126 MMOL/L (ref 136–145)
SODIUM BLD-SCNC: 126 MMOL/L (ref 136–145)
SODIUM BLD-SCNC: 128 MMOL/L (ref 136–145)
T4 FREE: 1.4 NG/DL (ref 0.9–1.8)
TOTAL IRON BINDING CAPACITY: 137 UG/DL (ref 260–445)
TOTAL PROTEIN: 6.3 G/DL (ref 6.4–8.2)
TSH REFLEX: 5.36 UIU/ML (ref 0.27–4.2)
VITAMIN B-12: 1024 PG/ML (ref 211–911)
WBC # BLD: 4.8 K/UL (ref 4–11)

## 2021-06-23 PROCEDURE — 36415 COLL VENOUS BLD VENIPUNCTURE: CPT

## 2021-06-23 PROCEDURE — 6370000000 HC RX 637 (ALT 250 FOR IP): Performed by: HOSPITALIST

## 2021-06-23 PROCEDURE — P9047 ALBUMIN (HUMAN), 25%, 50ML: HCPCS | Performed by: HOSPITALIST

## 2021-06-23 PROCEDURE — 2580000003 HC RX 258: Performed by: HOSPITALIST

## 2021-06-23 PROCEDURE — 6370000000 HC RX 637 (ALT 250 FOR IP): Performed by: INTERNAL MEDICINE

## 2021-06-23 PROCEDURE — 84439 ASSAY OF FREE THYROXINE: CPT

## 2021-06-23 PROCEDURE — 84443 ASSAY THYROID STIM HORMONE: CPT

## 2021-06-23 PROCEDURE — 85025 COMPLETE CBC W/AUTO DIFF WBC: CPT

## 2021-06-23 PROCEDURE — 2060000000 HC ICU INTERMEDIATE R&B

## 2021-06-23 PROCEDURE — 80053 COMPREHEN METABOLIC PANEL: CPT

## 2021-06-23 PROCEDURE — 6360000002 HC RX W HCPCS: Performed by: HOSPITALIST

## 2021-06-23 RX ORDER — TOLVAPTAN 15 MG/1
15 TABLET ORAL ONCE
Status: COMPLETED | OUTPATIENT
Start: 2021-06-23 | End: 2021-06-23

## 2021-06-23 RX ADMIN — ENALAPRIL MALEATE 10 MG: 5 TABLET ORAL at 09:53

## 2021-06-23 RX ADMIN — CETIRIZINE HYDROCHLORIDE 10 MG: 10 TABLET, FILM COATED ORAL at 09:53

## 2021-06-23 RX ADMIN — ALBUMIN (HUMAN) 25 G: 0.25 INJECTION, SOLUTION INTRAVENOUS at 00:07

## 2021-06-23 RX ADMIN — ATORVASTATIN CALCIUM 80 MG: 80 TABLET, FILM COATED ORAL at 22:05

## 2021-06-23 RX ADMIN — ENOXAPARIN SODIUM 40 MG: 40 INJECTION SUBCUTANEOUS at 09:53

## 2021-06-23 RX ADMIN — ASPIRIN 81 MG: 81 TABLET, COATED ORAL at 09:53

## 2021-06-23 RX ADMIN — FUROSEMIDE 3 MG/HR: 10 INJECTION, SOLUTION INTRAMUSCULAR; INTRAVENOUS at 06:47

## 2021-06-23 RX ADMIN — Medication 15 MG: at 15:26

## 2021-06-23 RX ADMIN — Medication 10 ML: at 22:05

## 2021-06-23 RX ADMIN — MULTIPLE VITAMINS W/ MINERALS TAB 1 TABLET: TAB at 09:53

## 2021-06-23 RX ADMIN — LEVOTHYROXINE SODIUM 150 MCG: 0.15 TABLET ORAL at 06:11

## 2021-06-23 RX ADMIN — FERROUS SULFATE TAB 325 MG (65 MG ELEMENTAL FE) 325 MG: 325 (65 FE) TAB at 09:55

## 2021-06-23 ASSESSMENT — PAIN SCALES - GENERAL
PAINLEVEL_OUTOF10: 0
PAINLEVEL_OUTOF10: 0

## 2021-06-23 NOTE — PROGRESS NOTES
Patient admitted to room 214 from ED. Patient oriented to room, call light, bed rails, phone, lights and bathroom. Patient instructed about the schedule of the day including: vital sign frequency, lab draws, possible tests, frequency of MD and staff rounds, including RN/MD rounding together at bedside, daily weights, and I &O's. Patient instructed about prescribed diet, how to use 8MENU, and television. bed alarm in place, patient aware of placement and reason. Telemetry box in place, patient aware of placement and reason. Bed locked, in lowest position, side rails up 2/4, call light within reach. Will continue to monitor.

## 2021-06-23 NOTE — PROGRESS NOTES
Hospitalist Progress Note      PCP: Yesi Carlisle    Date of Admission: 6/22/2021    Chief Complaint:   Other        pt sent in for low sodium by [facility] Course: \" Leann Gutierrez is a 79 y.o. male who presented to the ED per recommendations of his PCP to be evaluated and treated for hyponatremia. Laboratory evaluation confirms patient's sodium is decreased to 120, however this has been a chronic issue for this individual and has been thoroughly investigated by nephrology at previous admissions. Patient with known high urine osmolality with variable sodium levels suggestive of SIADH; patient also with CHF therefore hypervolemic hyponatremia is another component to this issue. Patient reports that his PCP instructed him to discontinue his Lasix 1 week after recent hospital discharge on 6/6/2021. Since discontinuation, the patient reports increasing dyspnea as well as BLE edema. CXR obtained in the ED today reveals patient with partial clearing of LLL effusion, but continues to reveal residual right-sided effusion. In addition to hyponatremia, other lab abnormalities include iron deficiency anemia, hypoalbuminemia 2.7, serum osmolality 261. Patient will be readmitted to the hospital for resumption of diuretic therapy to gradually reverse severe hyponatremia without inducing CPM.'     Patient was admitted for further evaluation and treatment    -Was started on Lasix drip          Subjective: EMR notes reviewed patient seen in examined.   Patient has no complaints at this time      Medications:  Reviewed    Infusion Medications    sodium chloride       Scheduled Medications    aspirin  81 mg Oral Daily    atorvastatin  80 mg Oral Nightly    cetirizine  10 mg Oral Daily    enalapril  10 mg Oral Daily    ferrous sulfate  325 mg Oral Daily with breakfast    levothyroxine  150 mcg Oral Daily    therapeutic multivitamin-minerals  1 tablet Oral Daily    sodium chloride flush  10 mL Intravenous 2 times per day    enoxaparin  40 mg Subcutaneous Daily     PRN Meds: sodium chloride flush, sodium chloride, potassium chloride **OR** potassium alternative oral replacement **OR** potassium chloride, magnesium sulfate, senna, acetaminophen **OR** acetaminophen, ondansetron **OR** ondansetron, docusate sodium      Intake/Output Summary (Last 24 hours) at 6/23/2021 1756  Last data filed at 6/23/2021 1350  Gross per 24 hour   Intake 1051.91 ml   Output 5475 ml   Net -4423.09 ml       Physical Exam Performed:    /80   Pulse 65   Temp 97.9 °F (36.6 °C) (Oral)   Resp 18   Ht 5' 11\" (1.803 m)   Wt 172 lb 6.4 oz (78.2 kg)   SpO2 94%   BMI 24.04 kg/m²     General appearance: No apparent distress, appears stated age and cooperative. HEENT: Pupils equal, round, and reactive to light. Conjunctivae/corneas clear. Neck: Supple, with full range of motion. No jugular venous distention. Trachea midline. Respiratory:  Normal respiratory effort. Clear to auscultation, bilaterally without Rales/Wheezes/Rhonchi. Cardiovascular: Regular rate and rhythm with normal S1/S2 without murmurs, rubs or gallops. Abdomen: Soft, non-tender, non-distended with normal bowel sounds. Musculoskeletal: No clubbing, cyanosis or edema bilaterally. Full range of motion without deformity. Skin: Skin color, texture, turgor normal.  No rashes or lesions. Neurologic:  Neurovascularly intact without any focal sensory/motor deficits.  Cranial nerves: II-XII intact, grossly non-focal.  Psychiatric: Alert and oriented, thought content appropriate, normal insight  Capillary Refill: Brisk,3 seconds, normal   Peripheral Pulses: +2 palpable, equal bilaterally       Labs:   Recent Labs     06/22/21  1444 06/23/21  0901   WBC 5.3 4.8   HGB 9.5* 9.6*   HCT 27.3* 27.7*    207     Recent Labs     06/23/21  0259 06/23/21  0901 06/23/21  1217   * 128* 126*   K 3.8 4.3  4.3 3.8   CL 89* 90* 88*   CO2 30 30 30   BUN 7 6* 7 Lasix dosage due to concerns that it was causing his hyponatremia  IV Lasix GTT  ECHO performed 2 weeks earlier revealed reduced EF 40% with inferior and septal hypokinesis; diastolic filling pressure elevated; TAVR valve well-seated   Continue home doses of ASA, Vasotec, and Lipitor  2G Na and fluid restriction dietary modifications in place  Consult placed to Cardiology for further recommendations     Hypoalbuminemia with pleural effusions (R>L)  Albumin infusion 25 g x 2   Patient with residual right-sided atelectasis therefore encourage incentive spirometry every 4 hours while awake  Procalcitonin and lactate levels will be added to lab work to ensure right basilar atelectasis does not represent untreated infectious process; no antibiotics initiated at this time-both within normal limits     Hypothyroidism  TSH and free T4 pending  Adjust levothyroxine replacement accordingly    Severe protein calorie malnutrition in the setting of above  -Prealbumin 6  -Dietary consult, add supplements     DVT Prophylaxis: lovenox   Diet: ADULT DIET; Regular; 4 carb choices (60 gm/meal);  Low Fat/Low Chol/High Fiber/2 gm Na; 1800 ml  Code Status: Full Code    PT/OT Eval Status: ordered     Dispo - pending clinical course     Galileo Agrawal, APRN - CNP

## 2021-06-23 NOTE — PLAN OF CARE
Problem: OXYGENATION/RESPIRATORY FUNCTION  Goal: Patient will maintain patent airway  Outcome: Ongoing        Patient's EF (Ejection Fraction) is greater than 40%    Heart Failure Medications:  Diuretics[de-identified] Furosemide    (One of the following REQUIRED for EF <40%/SYSTOLIC FAILURE but MAY be used in EF% >40%/DIASTOLIC FAILURE)        ACE[de-identified] Enalapril        ARB[de-identified] None         ARNI[de-identified] None    (Beta Blockers)  NON- Evidenced Based Beta Blocker (for EF% >40%/DIASTOLIC FAILURE): None    Evidenced Based Beta Blocker::(REQUIRED for EF% <40%/SYSTOLIC FAILURE) None  . .................................................................................................................................................. Patient's weights and intake/output reviewed: Yes    Patient's Last Weight: 172 lbs obtained by standing scale. Difference of 11 lbs less than last documented weight. Intake/Output Summary (Last 24 hours) at 6/23/2021 1323  Last data filed at 6/23/2021 1225  Gross per 24 hour   Intake 811.91 ml   Output 5250 ml   Net -4438.09 ml       Comorbidities Reviewed Yes    Patient has a past medical history of Allergic rhinitis, Anemia, Arthritis, CAD (coronary artery disease), Chronic systolic congestive heart failure ([address] Compression fracture of T11 vertebra ([address] Food allergy, Hiatal hernia, History of blood transfusion, Hyperlipidemia, Hypertension, Hypothyroid, Obesity, Occlusion and stenosis of carotid artery, Osteoarthritis, Recurrent right inguinal hernia, Shingles, Thyroid disease, and TIA (transient ischemic attack). >>For CHF and Comorbidity documentation on Education Time and Topics, please see Education Tab    Progressive Mobility Assessment:  What is this patient's Current Level of Mobility?: Ambulatory- with Assistance  How was this patient Mobilized today?: Edge of Bed,  Up to Toilet/Shower and Up in Room                 With Whom?  Nurse and PCA                 Level of Difficulty/Assistance: 1x Assist     Pt resting in bed at this time on room air. Pt denies shortness of breath. Pt with nonpitting lower extremity edema.      Patient and/or Family's stated Goal of Care this Admission: increase activity tolerance, better understand heart failure and disease management, be more comfortable and reduce lower extremity edema prior to discharge

## 2021-06-23 NOTE — CARE COORDINATION
Pt followed by IM and Nephro. Pt is active w/[facility] and lives at home w/his spouse, uses a cane. Pt Na+ at 126 today. CM will continue to follow for needs and will ensure HHC is contacted upon discharge.   Natali Archer RN

## 2021-06-23 NOTE — PLAN OF CARE
Problem: HEMODYNAMIC STATUS  Goal: Patient has stable vital signs and fluid balance  Outcome: Ongoing     Problem: FLUID AND ELECTROLYTE IMBALANCE  Goal: Fluid and electrolyte balance are achieved/maintained  Outcome: Ongoing     Patient's EF (Ejection Fraction) is greater than 40%    Heart Failure Medications:  Diuretics[de-identified] Furosemide    (One of the following REQUIRED for EF <40%/SYSTOLIC FAILURE but MAY be used in EF% >40%/DIASTOLIC FAILURE)        ACE[de-identified] Enalapril        ARB[de-identified] None         ARNI[de-identified] None    (Beta Blockers)  NON- Evidenced Based Beta Blocker (for EF% >40%/DIASTOLIC FAILURE): None    Evidenced Based Beta Blocker::(REQUIRED for EF% <40%/SYSTOLIC FAILURE) None  . .................................................................................................................................................. Patient's weights and intake/output reviewed: Yes    Patient's Last Weight: 183 lbs obtained by standing scale. Admission weight. Intake/Output Summary (Last 24 hours) at 6/23/2021 0013  Last data filed at 6/22/2021 2336  Gross per 24 hour   Intake 251.76 ml   Output 650 ml   Net -398.24 ml       Comorbidities Reviewed Yes    Patient has a past medical history of Allergic rhinitis, Anemia, Arthritis, CAD (coronary artery disease), Chronic systolic congestive heart failure ([address] Compression fracture of T11 vertebra ([address] Food allergy, Hiatal hernia, History of blood transfusion, Hyperlipidemia, Hypertension, Hypothyroid, Obesity, Occlusion and stenosis of carotid artery, Osteoarthritis, Recurrent right inguinal hernia, Shingles, Thyroid disease, and TIA (transient ischemic attack).      >>For CHF and Comorbidity documentation on Education Time and Topics, please see Education Tab    Progressive Mobility Assessment:  What is this patient's Current Level of Mobility?: Ambulatory- with Assistance  How was this patient Mobilized today?: Edge of Bed,  Up to Toilet/Shower, and Up in Room With Whom? Nurse and PCA                 Level of Difficulty/Assistance: 1x Assist     Pt resting in bed at this time on room air. Pt denies shortness of breath. Pt with pitting lower extremity edema.      Patient and/or Family's stated Goal of Care this Admission: reduce shortness of breath, increase activity tolerance, better understand heart failure and disease management, be more comfortable, and reduce lower extremity edema prior to discharge        :

## 2021-06-23 NOTE — ED PROVIDER NOTES
Emergency Physician Note        Note Open Time: 9:31 PM EDT    Chief Complaint  Other (pt sent in for low sodium by fmd.  )       History of Present Illness  Lonnie Van is a 79 y.o. male who presents to the ED for low sodium. Patient reports that he was sent in here because of a low sodium level, 125, measured at the office yesterday by his primary care physician. He states he has no symptoms at all. He states his Lasix was discontinued for 5 days ago by his primary care physician because of low sodium. He states he is not been peeing as much since he stopped Lasix. He denies any chest pain or shortness of breath. No fevers, chills or sweats. He states he is compliant with his diet. 10 systems reviewed, pertinent positives per HPI otherwise noted to be negative    I have reviewed the following from the nursing documentation:      Prior to Admission medications    Medication Sig Start Date End Date Taking? Authorizing Provider   docusate sodium (COLACE, DULCOLAX) 100 MG CAPS Take 100 mg by mouth 2 times daily  Patient taking differently: Take 100 mg by mouth 2 times daily as needed  5/7/21  Yes Richard Butcher MD   levothyroxine (SYNTHROID) 150 MCG tablet TAKE 1 TABLET BY MOUTH  DAILY 10/16/20  Yes VALE Mendenhall CNP   atorvastatin (LIPITOR) 80 MG tablet TAKE 1 TABLET BY MOUTH  EVERY DAY  Patient taking differently: Take 80 mg by mouth nightly TAKE 1 TABLET BY MOUTH EVERY DAY 6/12/20  Yes Leo Parikh MD   ferrous sulfate (FE TABS) 325 (65 Fe) MG EC tablet Take 1 tablet by mouth daily (with breakfast) 6/28/19  Yes VALE Mendenhall CNP   enalapril (VASOTEC) 10 MG tablet Take 10 mg by mouth daily  11/20/18  Yes Historical Provider, MD   cetirizine (ZYRTEC) 10 MG tablet Take 10 mg by mouth daily. Yes Historical Provider, MD   Multiple Vitamin (THERA) TABS Take 1 tablet by mouth daily 1 Tab daily.    Yes Historical Provider, MD   aspirin 81 MG chewable tablet Take 81 mg by mouth daily    Yes Historical Provider, MD       Allergies as of 06/22/2021 - Fully Reviewed 06/22/2021   Allergen Reaction Noted    Reopro [abciximab injection]  09/27/2012       Past Medical History:   Diagnosis Date    Allergic rhinitis     Anemia     Arthritis     rt hip    CAD (coronary artery disease) 2001    cardiac stents    Chronic systolic congestive heart failure ([address] 8/21/2018    Compression fracture of T11 vertebra (HCC)     back brace    Food allergy     Hiatal hernia     History of blood transfusion     platelets=2001    Hyperlipidemia     Hypertension     hx of    Hypothyroid     Obesity 10/2/2012    Occlusion and stenosis of carotid artery 10/2/2012    Osteoarthritis     Hip right    Recurrent right inguinal hernia     Shingles 2014    Thyroid disease     TIA (transient ischemic attack)         Surgical History:   Past Surgical History:   Procedure Laterality Date    AORTIC VALVE REPLACEMENT  2018    CARDIAC SURGERY  2001    stents    CARDIAC VALVE REPLACEMENT  09/15/2018    COLONOSCOPY  04/22/99    COLONOSCOPY  7/13/2015    EYE SURGERY      victorino cataracts    GASTRIC FUNDOPLICATION N/A 6/82/8423    ROBOTIC NISSEN FUNDOPLICATION performed by Mercy Noriega MD at [facility] Left 2/5/2020    ROBOTIC LEFT INGUINAL HERNIA REPAIR WITH MESH performed by Mercy Noriega MD at [address] Right 07/07/2016    laparoscopic right inguinal hernia repair with mesh    INGUINAL HERNIA REPAIR Right 04/12/2017    davinci recurrent right inguinal hernia repair with mesh    PTCA  2018    SIGMOIDOSCOPY  1994    TONSILLECTOMY      UPPER GASTROINTESTINAL ENDOSCOPY  05/12/94    UPPER GASTROINTESTINAL ENDOSCOPY  11/09/04    Gastritis and duodenitis        Family History:    Family History   Problem Relation Age of Onset    Hypertension Mother     Coronary Art Dis Father     Hypertension Father     Heart Disease Father     Prostate Cancer Brother  Cancer Brother 61        Prostate    Heart Disease Sister        Social History     Socioeconomic History    Marital status:      Spouse name: Not on file    Number of children: Not on file    Years of education: Not on file    Highest education level: Not on file   Occupational History    Not on file   Tobacco Use    Smoking status: Former Smoker     Packs/day: 0.50     Years: 15.00     Pack years: 7.50     Start date: 66     Quit date: 1995     Years since quittin.7    Smokeless tobacco: Never Used   Vaping Use    Vaping Use: Never used   Substance and Sexual Activity    Alcohol use: No    Drug use: No    Sexual activity: Never   Other Topics Concern    Not on file   Social History Narrative    Not on file     Social Determinants of Health     Financial Resource Strain:     Difficulty of Paying Living Expenses:    Food Insecurity:     Worried About Running Out of Food in the Last Year:     [address] in the Last Year:    Transportation Needs:     Lack of Transportation (Medical):  Lack of Transportation (Non-Medical):    Physical Activity:     Days of Exercise per Week:     Minutes of Exercise per Session:    Stress:     Feeling of Stress :    Social Connections:     Frequency of Communication with Friends and Family:     Frequency of Social Gatherings with Friends and Family:     Attends Sikhism Services:     Active Member of Clubs or Organizations:     Attends Club or Organization Meetings:     Marital Status:    Intimate Partner Violence:     Fear of Current or Ex-Partner:     Emotionally Abused:     Physically Abused:     Sexually Abused:        Nursing notes reviewed.     ED Triage Vitals   Enc Vitals Group      BP 21 1417 124/63      Pulse 21 1416 71      Resp 21 1416 18      Temp 21 1417 97.3 °F (36.3 °C)      Temp Source 21 1416 Oral      SpO2 21 1416 97 %      Weight 21 1416 178 lb (80.7 kg) Height 06/22/21 1416 5' 11\" (1.803 m)      Head Circumference --       Peak Flow --       Pain Score --       Pain Loc --       Pain Edu? --       Excl. in GC? --        GENERAL:  Awake, alert. Well developed, well nourished with no apparent distress. HENT:  Normocephalic, Atraumatic, moist mucous membranes. EYES:  Pupils equal round and reactive to light, Conjunctiva normal, extraocular movements normal.  NECK:  No meningeal signs, Supple. CHEST:  Regular rate and rhythm, chest wall non-tender. LUNGS:  Clear to auscultation bilaterally. ABDOMEN:  Soft, non-tender, no rebound, rigidity or guarding, non-distended, normal bowel sounds. No costovertebral angle tenderness to palpation. BACK:  No tenderness. EXTREMITIES:  Normal range of motion, pitting bilateral lower extremity edema, no bony tenderness, no deformity, distal pulses present. SKIN: Warm, dry and intact. NEUROLOGIC: Normal mental status. Moving all extremities to command. LABS and DIAGNOSTIC RESULTS  EKG  The Ekg interpreted by me shows  normal sinus rhythm with a rate of 73  Axis is   Normal  QTc is  normal  Intervals and Durations are unremarkable. ST Segments: no acute change  Delta waves, Brugada Syndrome, and Short TN are not present. No significant change from prior EKG dated 1 jun 2021    RADIOLOGY  X-RAYS:  I have reviewed radiologic plain film image(s). ALL OTHER NON-PLAIN FILM IMAGES SUCH AS CT, ULTRASOUND AND MRI HAVE BEEN READ BY THE RADIOLOGIST. XR CHEST PORTABLE   Final Result   Partial clearing of airspace disease and/or effusion particularly left lower   lobe. There is some residual basilar disease and possible residual pleural   effusion as well.               LABS  Labs Reviewed   CBC WITH AUTO DIFFERENTIAL - Abnormal; Notable for the following components:       Result Value    RBC 2.98 (*)     Hemoglobin 9.5 (*)     Hematocrit 27.3 (*)     Lymphocytes Absolute 0.7 (*)     All other components within normal limits    Narrative:     Performed at:  [address], [facility]   Phone [phone]   COMPREHENSIVE METABOLIC PANEL - Abnormal; Notable for the following components:    Sodium 120 (*)     Chloride 87 (*)     Glucose 105 (*)     CREATININE <0.5 (*)     Calcium 8.1 (*)     Total Protein 5.9 (*)     Albumin 2.7 (*)     Albumin/Globulin Ratio 0.8 (*)     All other components within normal limits    Narrative:     Performed at:  [address], [facility]   Phone [phone] - Abnormal; Notable for the following components:    Pro-BNP 1,100 (*)     All other components within normal limits    Narrative:     Performed at:  [address], [facility]   Phone [phone]   URINE RT REFLEX TO CULTURE - Abnormal; Notable for the following components:    Urobilinogen, Urine 2.0 (*)     All other components within normal limits    Narrative:     Performed at:  [address], [facility]   Phone [phone]   TROPONIN    Narrative:     Performed at:  [address], [facility]   Phone [phone]   SODIUM, URINE, RANDOM    Narrative:     Performed at:  [address], [facility]   Phone [phone]   CREATININE, RANDOM URINE    Narrative:     Performed at:  [address], [facility]   Phone [phone], URINE   RENAL FUNCTION PANEL   RENAL FUNCTION PANEL   [address] [place]        Patient told me he has never seen a nephrologist in the past.  I consulted and spoke with Dr. Dora Schuster who advised giving Lasix 80 IV and rechecking sodium every 4 hours through the night. When I spoke with the hospitalist inform you that the patient absolutely has seen a nephrologist in the past and is known to have SIADH and is also known to be noncompliant with fluid restriction diet at home. She is concerned that given his normal to low blood pressure he would not tolerate Lasix 80 mg IV and she would like to start an IV Lasix drip and also give the patient albumin. After discussion I feel this is appropriate and discontinued the Lasix 80 IV dose that I had ordered based on the nephrologist recommendation. The total Critical Care time is 40 minutes which excludes separately billable procedures. The critical care was concerning treatment of hypervolemic hyponatremia with Lasix. This time is exclusive of any time documented by any other providers. I spoke with Dr. Deidra Patel, hospitalist. We thoroughly discussed the history, physical exam, laboratory and imaging studies, as well as, emergency department course. Based upon that discussion, we've decided to admit Fany Vora for further observation and evaluation of Sharath Blanco's hyponatremia. As I have deemed necessary from their history, physical, and studies, I have considered and evaluated Fany Vora for the following diagnoses:        FINAL IMPRESSION  1. Hyponatremia    2. Hypochloremia    3. Acute on chronic congestive heart failure, unspecified heart failure type (HCC)        Vitals:  Blood pressure (!) 132/58, pulse 84, temperature 97.8 °F (36.6 °C), temperature source Oral, resp. rate 22, height 5' 11\" (1.803 m), weight 183 lb 9.6 oz (83.3 kg), SpO2 98 %. Disposition  Pt is in stable condition upon Admit to telemetry. This chart was generated using the [address] [place] [address] dictation system. I created this record but it may contain dictation errors.           Marty Stinson MD  06/22/21 3615

## 2021-06-23 NOTE — CONSULTS
[facility]  Consult Note           Reason for Consult:  Hyponatremia  Requesting Physician:  Dr. Juan Dietrich    Chief Complaint:  Abnormal labs with Hyponatremia  History Obtained From:  patient, electronic medical record    History of Present Ilness:    79year old male with HTN, sCHF, Hypothyroidism, Hyperlipidemia admitted with abnormal labs with Hyponatremia. We have been asked to assist in further mgmt of his Hyponatremia. Sodium decreased to 120, last at 126 today, previously 130 from earlier this month. Has had worsening LE edema, shortness of breath since stopping lasix. Started on lasix drip with improvement in edema, shortness of breath, and sodium levels. Urine output of 5.5 liters since admission. Denies any abdominal pain, chest pain, decreased intake, n/v/d, or fevers.     Past Medical History:        Diagnosis Date    Allergic rhinitis     Anemia     Arthritis     rt hip    CAD (coronary artery disease) 2001    cardiac stents    Chronic systolic congestive heart failure ([facility] [address] 8/21/2018    Compression fracture of T11 vertebra (HCC)     back brace    Food allergy     Hiatal hernia     History of blood transfusion     platelets=2001    Hyperlipidemia     Hypertension     hx of    Hypothyroid     Obesity 10/2/2012    Occlusion and stenosis of carotid artery 10/2/2012    Osteoarthritis     Hip right    Recurrent right inguinal hernia     Shingles 2014    Thyroid disease     TIA (transient ischemic attack)        Past Surgical History:        Procedure Laterality Date    AORTIC VALVE REPLACEMENT  2018    CARDIAC SURGERY  2001    stents    CARDIAC VALVE REPLACEMENT  09/15/2018    COLONOSCOPY  04/22/99    COLONOSCOPY  7/13/2015    EYE SURGERY      victorino cataracts    GASTRIC FUNDOPLICATION N/A 8/68/4758    ROBOTIC NISSEN FUNDOPLICATION performed by Arminda Mars MD at [place] [address] Left 2/5/2020    ROBOTIC LEFT INGUINAL HERNIA REPAIR WITH MESH performed by Sarah Reyes MD at [address] [place] [address] Dr [place] 07/07/2016    laparoscopic right inguinal hernia repair with mesh    INGUINAL HERNIA REPAIR Right 04/12/2017    davinci recurrent right inguinal hernia repair with mesh    PTCA  2018    SIGMOIDOSCOPY  1994    TONSILLECTOMY      UPPER GASTROINTESTINAL ENDOSCOPY  05/12/94    UPPER GASTROINTESTINAL ENDOSCOPY  11/09/04    Gastritis and duodenitis       Home Medications:    No current facility-administered medications on file prior to encounter. Current Outpatient Medications on File Prior to Encounter   Medication Sig Dispense Refill    docusate sodium (COLACE, DULCOLAX) 100 MG CAPS Take 100 mg by mouth 2 times daily (Patient taking differently: Take 100 mg by mouth 2 times daily as needed ) 60 capsule 1    levothyroxine (SYNTHROID) 150 MCG tablet TAKE 1 TABLET BY MOUTH  DAILY 90 tablet 3    atorvastatin (LIPITOR) 80 MG tablet TAKE 1 TABLET BY MOUTH  EVERY DAY (Patient taking differently: Take 80 mg by mouth nightly TAKE 1 TABLET BY MOUTH EVERY DAY) 90 tablet 1    ferrous sulfate (FE TABS) 325 (65 Fe) MG EC tablet Take 1 tablet by mouth daily (with breakfast) 90 tablet 0    enalapril (VASOTEC) 10 MG tablet Take 10 mg by mouth daily       cetirizine (ZYRTEC) 10 MG tablet Take 10 mg by mouth daily.  Multiple Vitamin (THERA) TABS Take 1 tablet by mouth daily 1 Tab daily.       aspirin 81 MG chewable tablet Take 81 mg by mouth daily          Allergies:  Reopro [abciximab injection]    Social History:    Social History     Socioeconomic History    Marital status:      Spouse name: Not on file    Number of children: Not on file    Years of education: Not on file    Highest education level: Not on file   Occupational History    Not on file   Tobacco Use    Smoking status: Former Smoker     Packs/day: 0.50     Years: 15.00     Pack years: 7.50     Start date: 1959     Quit date: 9/29/1995     Years since quitting: 25.7    Smokeless tobacco: Never Used   Vaping Use    Vaping Use: Never used   Substance and Sexual Activity    Alcohol use: No    Drug use: No    Sexual activity: Never   Other Topics Concern    Not on file   Social History Narrative    Not on file     Social Determinants of Health     Financial Resource Strain:     Difficulty of Paying Living Expenses:    Food Insecurity:     Worried About Running Out of Food in the Last Year:     [address] in the Last Year:    Transportation Needs:     Lack of Transportation (Medical):  Lack of Transportation (Non-Medical):    Physical Activity:     Days of Exercise per Week:     Minutes of Exercise per Session:    Stress:     Feeling of Stress :    Social Connections:     Frequency of Communication with Friends and Family:     Frequency of Social Gatherings with Friends and Family:     Attends [employer] Services:     Active Member of Clubs or Organizations:     Attends Club or Organization Meetings:     Marital Status:    Intimate Partner Violence:     Fear of Current or Ex-Partner:     Emotionally Abused:     Physically Abused:     Sexually Abused:        Family History:   Family History   Problem Relation Age of Onset    Hypertension Mother     Coronary Art Dis Father     Hypertension Father     Heart Disease Father     Prostate Cancer Brother     Cancer Brother 61        Prostate    Heart Disease Sister        Review of Systems:   Pertinent positives stated above in HPI. Remainder of 10 point review of systems were reviewed and were negative.     Physical exam:   Constitutional:  VITALS:  BP (!) 138/58   Pulse 68   Temp 98.3 °F (36.8 °C) (Oral)   Resp 20   Ht 5' 11\" (1.803 m)   Wt 172 lb 6.4 oz (78.2 kg)   SpO2 95%   BMI 24.04 kg/m²   Gen: alert, awake, nad  Skin: no rash, turgor wnl  Heent:  eomi, mmm  Neck: no bruits or jvd noted, thyroid normal  Cardiovascular:  S1, S2 without m/r/g  Respiratory: CTA B without w/r/r; respiratory effort normal  Abdomen:  +bs, soft, nt, nd, no hepatosplenomegaly  Ext: ++ lower extremity edema  Psychiatric: mood and affect appropriate; judgement and insight intact  Musculoskeletal:  Rom, muscular strength limited; digits, nails normal    Data/  CBC:   Lab Results   Component Value Date    WBC 4.8 06/23/2021    RBC 2.99 06/23/2021    HGB 9.6 06/23/2021    HCT 27.7 06/23/2021    MCV 92.4 06/23/2021    MCH 32.1 06/23/2021    MCHC 34.7 06/23/2021    RDW 15.6 06/23/2021     06/23/2021    MPV 6.8 06/23/2021     BMP:    Lab Results   Component Value Date     06/23/2021    K 3.8 06/23/2021    K 3.8 06/04/2021    CL 89 06/23/2021    CO2 30 06/23/2021    BUN 7 06/23/2021    LABALBU 3.1 06/23/2021    CREATININE <0.5 06/23/2021    CALCIUM 8.2 06/23/2021    GFRAA >60 06/23/2021    GFRAA >60 03/02/2013    LABGLOM >60 06/23/2021    LABGLOM 79 12/08/2014    GLUCOSE 101 06/23/2021         Assessment/    - Hyponatremia    Etiology: Hypervolemic, acute on chronic in setting of decompensated CHF   Data: SNa 120 on admission, improving trend with diuresis   Urine sodium 51, Urine osm 269   TSH 2.3 from June 2021    - CHF - systolic, EF 67%    - Hypertension - controlled      Plan/    - Monitor off of lasix drip today, will need to remain on lasix on discharge  - Prn dose of samsca for further water removal  - Close monitoring of labs      Thank you for the consultation. Please do not hesitate to call with questions.     Simba Vaz MD

## 2021-06-23 NOTE — H&P
40 -45 %. Basal inferior, mid inferior and basal inferoseptal walls hypokinesis. Left ventricular diastolic filling pressure is elevated septal E/e\" is 24 . Mild thickening of leaflets of mitral valve. Mild mitral regurgitation. TAVR valve appears to be well seated. Doppler parameters are normal for valve. Mild aortic valve regurgitation. Systolic pulmonic artery pressure (SPAP) is normal estimated at 35 mmHg   (Right atrial pressure of 3 mmHg). REVIEW OF SYSTEMS:   Constitutional: Negative for fever,chills,weight loss, and generalized weakness  ENT: Negative for rhinorrhea, epistaxis, hoarseness, and sore throat.   Respiratory: Negative for shortness of breath, wheezing, and cough  Cardiovascular: Negative for chest pain, palpitations, and peripheral edema; no orthopnea or PND  Gastrointestinal: Negative for N/V/D; no hematemesis, hematochezia, or melena; no anorexia  Genitourinary: Negative for dysuria, frequency, hesitancy, and urgency; no incontinence  Hematologic/Lymphatic: Negative for bleeding tendency, excessive bruising, and enlarged LN  Musculoskeletal: Negative for myalgias and arthalgias; able to ambulate without difficulty  Neurologic: Negative for LOC, seizure activity, paresthesias, dysarthria, vertigo, and gait disturbance  Skin: Negative for itching,rash  Psychiatric: Negative for depression,anxiety, and agitation; denies SI/HI  Endocrine: Negative for polyuria/polydipsia/polyphagia; no heat/cold intolerance    Past Medical History:   has a past medical history of Allergic rhinitis, Anemia, Arthritis, CAD (coronary artery disease), Chronic systolic congestive heart failure ([address] Compression fracture of T11 vertebra ([address] Food allergy, Hiatal hernia, History of blood transfusion, Hyperlipidemia, Hypertension, Hypothyroid, Obesity, Occlusion and stenosis of carotid artery, Osteoarthritis, Recurrent right inguinal hernia, Shingles, Thyroid disease, and TIA (transient ischemic attack). Past Surgical History:   has a past surgical history that includes sigmoidoscopy (1994); Upper gastrointestinal endoscopy (05/12/94); Upper gastrointestinal endoscopy (11/09/04); Tonsillectomy; eye surgery; Colonoscopy (04/22/99); Colonoscopy (7/13/2015); Inguinal hernia repair (Right, 07/07/2016); Inguinal hernia repair (Right, 04/12/2017); Cardiac surgery (2001); Cardiac valve replacement (09/15/2018); Aortic valve replacement (2018); Percutaneous Transluminal Coronary Angio (2018); hernia repair (Left, 2/5/2020); and Gastric fundoplication (N/A, 0/11/6072). Medications:  No current facility-administered medications on file prior to encounter. Current Outpatient Medications on File Prior to Encounter   Medication Sig Dispense Refill    docusate sodium (COLACE, DULCOLAX) 100 MG CAPS Take 100 mg by mouth 2 times daily (Patient taking differently: Take 100 mg by mouth 2 times daily as needed ) 60 capsule 1    levothyroxine (SYNTHROID) 150 MCG tablet TAKE 1 TABLET BY MOUTH  DAILY 90 tablet 3    atorvastatin (LIPITOR) 80 MG tablet TAKE 1 TABLET BY MOUTH  EVERY DAY (Patient taking differently: Take 80 mg by mouth nightly TAKE 1 TABLET BY MOUTH EVERY DAY) 90 tablet 1    ferrous sulfate (FE TABS) 325 (65 Fe) MG EC tablet Take 1 tablet by mouth daily (with breakfast) 90 tablet 0    enalapril (VASOTEC) 10 MG tablet Take 10 mg by mouth daily       cetirizine (ZYRTEC) 10 MG tablet Take 10 mg by mouth daily.  Multiple Vitamin (THERA) TABS Take 1 tablet by mouth daily 1 Tab daily.  aspirin 81 MG chewable tablet Take 81 mg by mouth daily          Allergies: Allergies   Allergen Reactions    Reopro [Abciximab Injection]      \"destroyed platelets\"        Social History:   reports that he quit smoking about 25 years ago. He started smoking about 62 years ago. He has a 7.50 pack-year smoking history.  He has never used smokeless tobacco. He reports that he does not drink alcohol and does not use drugs. Family History:  family history includes Cancer (age of onset: 61) in his brother; Coronary Art Dis in his father; Heart Disease in his father and sister; Hypertension in his father and mother; Prostate Cancer in his brother.      Physical Exam:  BP (!) 132/58   Pulse 84   Temp 97.8 °F (36.6 °C) (Oral)   Resp 22   Ht 5' 11\" (1.803 m)   Wt 183 lb 9.6 oz (83.3 kg)   SpO2 98%   BMI 25.61 kg/m²     General appearance:    Eyes: Sclera clear without conjunctival injection; PERRLA; EOMI  ENT: Mucous membranes moist without thrush; normal dentition  Neck: Supple without meningismus; no goiter; no carotid bruit bilaterally  Cardiovascular: Regular rhythm without ectopy; normal S1-S2 with no murmurs; 2+ symmetrical BLE edema with compression stockings in place  Respiratory: No tachypnea; CTAB with diminished bibasilar aeration; no wheeze, rhonchi or rales  Gastrointestinal: Abdomen soft, non-tender, not distended; bowel sounds normal x4 quadrants; no masses/organomegaly appreciated  Musculoskeletal: FROM spine and extremities x4; no gross deformity  Neurology: A&O x3; cranial nerves 2-12 grossly intact; motor 5/5  BUE/BLE; finger-to-nose/heel-to-shin intact; no pronator drift; no seizure activity  Psychiatry: Notable for diminished short-term memory capacity; well-groomed with good eye contact; pleasant affect; no visual/auditory hallucination  Skin: Warm, dry, normal turgor, no rash  PV: 1/4 radial and dorsalis pedis bilaterally; brisk capillary refill    Labs:  CBC:   Lab Results   Component Value Date    WBC 5.3 06/22/2021    RBC 2.98 06/22/2021    HGB 9.5 06/22/2021    HCT 27.3 06/22/2021    MCV 91.6 06/22/2021    MCH 31.9 06/22/2021    MCHC 34.9 06/22/2021    RDW 15.0 06/22/2021     06/22/2021    MPV 6.6 06/22/2021     BMP:    Lab Results   Component Value Date     06/22/2021    K 3.8 06/22/2021    K 3.8 06/04/2021    CL 87 06/22/2021    CO2 27 06/22/2021    BUN 9 06/22/2021    CREATININE <0.5 06/22/2021    CALCIUM 8.1 06/22/2021    GFRAA >60 06/22/2021    GFRAA >60 03/02/2013    LABGLOM >60 06/22/2021    LABGLOM 79 12/08/2014    GLUCOSE 105 06/22/2021     XR CHEST PORTABLE   Final Result   Partial clearing of airspace disease and/or effusion particularly left lower   lobe. There is some residual basilar disease and possible residual pleural   effusion as well. Discussed case  with ED provider    Problem List  Principal Problem:    Chronic hyponatremia  Active Problems:    Acute on chronic combined systolic and diastolic CHF (congestive heart failure) (HCC)    Generalized weakness    Hypothyroid    CAD (coronary artery disease)    Hyperlipidemia    Hypoalbuminemia    Pleural effusion on right  Resolved Problems:    * No resolved hospital problems.  *        Assessment/Plan:     Acute on chronic hyponatremia  The patient's baseline ranges from 120130 over the past 1 to 2 years  Extensive evaluation has occurred in the past by nephrology with conclusion of SIADH picture complicated by occasional hypervolemic hyponatremia due to excess free water; patient did receive a one-time dosage of tolvaptan 30 mg during his last hospital stay  At time of discharge it was recommended the patient continue Lasix as well as 2 g sodium diet; patient's PCP discontinued his Lasix shortly after discharge due to concerns that it was worsening his hyponatremia    Acute on chronic combined CHF  Admit to floor for continuous telemetry monitoring with strict I's and O's  Patient's PCP discontinued the patient's Lasix dosage due to concerns that it was causing his hyponatremia  IV Lasix GTT initiated at 3 mg/hour overnight; diuretic bolus not ordered due to borderline hypotension at the time of admission  [facility] performed 2 weeks earlier revealed reduced EF 40% with inferior and septal hypokinesis; diastolic filling pressure elevated; TAVR valve well-seated   Continue home doses of ASA, Vasotec, and Lipitor  2G Na and fluid restriction dietary modifications in place  Consult placed to Cardiology for further recommendations    Hypoalbuminemia with pleural effusions (R>L)  Albumin infusion 25 g x 2 overnight to increase oncotic pressure and assist with resolution of third spacing; this will also improve blood pressure to allow for more aggressive diuresis as needed  Patient with residual right-sided atelectasis therefore encourage incentive spirometry every 4 hours while awake  Procalcitonin and lactate levels will be added to lab work to ensure right basilar atelectasis does not represent untreated infectious process; no antibiotics initiated at this time    Hypothyroidism  TSH and free T4 pending  Adjust levothyroxine replacement accordingly    DVT prophylaxisLovenox 40 mg subcu daily  Code statusfull code  Dietcardiac 2 g sodium with 1800 cc fluid restriction  IV accessPIV established in ED      Admit as inpatient. I anticipate hospitalization spanning more than two midnights for investigation and treatment of the above medically necessary diagnoses. Please note that some part of this chart was generated using Dragon dictation software. Although every effort was made to ensure the accuracy of this automated transcription, some errors in transcription may have occurred inadvertently. If you may need any clarification, please do not hesitate to contact me through [facility].        Prema Neumann MD    6/22/2021 9:21 PM

## 2021-06-24 LAB
ALBUMIN SERPL-MCNC: 2.9 G/DL (ref 3.4–5)
ANION GAP SERPL CALCULATED.3IONS-SCNC: 7 MMOL/L (ref 3–16)
BUN BLDV-MCNC: 9 MG/DL (ref 7–20)
CALCIUM SERPL-MCNC: 8.2 MG/DL (ref 8.3–10.6)
CHLORIDE BLD-SCNC: 91 MMOL/L (ref 99–110)
CO2: 29 MMOL/L (ref 21–32)
CREAT SERPL-MCNC: <0.5 MG/DL (ref 0.8–1.3)
GFR AFRICAN AMERICAN: >60
GFR NON-AFRICAN AMERICAN: >60
GLUCOSE BLD-MCNC: 106 MG/DL (ref 70–99)
PHOSPHORUS: 2.6 MG/DL (ref 2.5–4.9)
POTASSIUM SERPL-SCNC: 4 MMOL/L (ref 3.5–5.1)
SODIUM BLD-SCNC: 127 MMOL/L (ref 136–145)

## 2021-06-24 PROCEDURE — 97110 THERAPEUTIC EXERCISES: CPT

## 2021-06-24 PROCEDURE — 97116 GAIT TRAINING THERAPY: CPT

## 2021-06-24 PROCEDURE — 6370000000 HC RX 637 (ALT 250 FOR IP): Performed by: INTERNAL MEDICINE

## 2021-06-24 PROCEDURE — 97162 PT EVAL MOD COMPLEX 30 MIN: CPT

## 2021-06-24 PROCEDURE — 2580000003 HC RX 258: Performed by: HOSPITALIST

## 2021-06-24 PROCEDURE — 36415 COLL VENOUS BLD VENIPUNCTURE: CPT

## 2021-06-24 PROCEDURE — 2060000000 HC ICU INTERMEDIATE R&B

## 2021-06-24 PROCEDURE — 80069 RENAL FUNCTION PANEL: CPT

## 2021-06-24 PROCEDURE — 6370000000 HC RX 637 (ALT 250 FOR IP): Performed by: HOSPITALIST

## 2021-06-24 PROCEDURE — 6360000002 HC RX W HCPCS: Performed by: HOSPITALIST

## 2021-06-24 PROCEDURE — 97530 THERAPEUTIC ACTIVITIES: CPT

## 2021-06-24 PROCEDURE — 97166 OT EVAL MOD COMPLEX 45 MIN: CPT

## 2021-06-24 PROCEDURE — 97535 SELF CARE MNGMENT TRAINING: CPT

## 2021-06-24 RX ORDER — FUROSEMIDE 40 MG/1
40 TABLET ORAL DAILY
Status: DISCONTINUED | OUTPATIENT
Start: 2021-06-24 | End: 2021-06-25 | Stop reason: HOSPADM

## 2021-06-24 RX ORDER — OXYMETAZOLINE HYDROCHLORIDE 0.05 G/100ML
2 SPRAY NASAL 2 TIMES DAILY PRN
Status: DISCONTINUED | OUTPATIENT
Start: 2021-06-24 | End: 2021-06-24

## 2021-06-24 RX ADMIN — ENALAPRIL MALEATE 10 MG: 5 TABLET ORAL at 09:32

## 2021-06-24 RX ADMIN — Medication 10 ML: at 21:49

## 2021-06-24 RX ADMIN — FUROSEMIDE 40 MG: 40 TABLET ORAL at 10:42

## 2021-06-24 RX ADMIN — ATORVASTATIN CALCIUM 80 MG: 80 TABLET, FILM COATED ORAL at 21:49

## 2021-06-24 RX ADMIN — Medication 10 ML: at 09:33

## 2021-06-24 RX ADMIN — ASPIRIN 81 MG: 81 TABLET, COATED ORAL at 09:32

## 2021-06-24 RX ADMIN — FERROUS SULFATE TAB 325 MG (65 MG ELEMENTAL FE) 325 MG: 325 (65 FE) TAB at 09:32

## 2021-06-24 RX ADMIN — ENOXAPARIN SODIUM 40 MG: 40 INJECTION SUBCUTANEOUS at 09:33

## 2021-06-24 RX ADMIN — CETIRIZINE HYDROCHLORIDE 10 MG: 10 TABLET, FILM COATED ORAL at 09:32

## 2021-06-24 RX ADMIN — MULTIPLE VITAMINS W/ MINERALS TAB 1 TABLET: TAB at 09:32

## 2021-06-24 RX ADMIN — LEVOTHYROXINE SODIUM 150 MCG: 0.15 TABLET ORAL at 06:51

## 2021-06-24 ASSESSMENT — PAIN SCALES - GENERAL
PAINLEVEL_OUTOF10: 0

## 2021-06-24 NOTE — PROGRESS NOTES
[facility]       Progress Note           Subjective/   79y.o. year old male who we are seeing in consultation for hyponatremia. HPI:  Sodium trend better with lasix drip and prn dose of samsca on 6/23, urine output of 2.6 liters over last 24 hours. Denies any shortness of breath. ROS:  Intake adequate, no weakness.     Objective/   GEN:  Chronically ill, /69   Pulse 77   Temp 98.1 °F (36.7 °C) (Oral)   Resp 18   Ht 5' 11\" (1.803 m)   Wt 168 lb 14.4 oz (76.6 kg)   SpO2 96%   BMI 23.56 kg/m²   HEENT: non-icteric, no JVD  CV: S1, S2 without m/r/g; + LE edema  RESP: CTA B without w/r/r; breathing wnl  ABD: +bs, soft, nt, no hsm  SKIN: warm, no rashes    Data/  Recent Labs     06/22/21  1444 06/23/21  0901   WBC 5.3 4.8   HGB 9.5* 9.6*   HCT 27.3* 27.7*   MCV 91.6 92.4    207     Recent Labs     06/23/21  0901 06/23/21  1217 06/24/21  0729   * 126* 127*   K 4.3  4.3 3.8 4.0   CL 90* 88* 91*   CO2 30 30 29   GLUCOSE 108* 100* 106*   PHOS 2.8 2.7 2.6   BUN 6* 7 9   CREATININE <0.5* <0.5* <0.5*   LABGLOM >60 >60 >60   GFRAA >60 >60 >60       Assessment/     - Hyponatremia               Etiology: Hypervolemic, acute on chronic in setting of decompensated CHF              Data: SNa 120 on admission, improving trend with diuresis              Urine sodium 51, Urine osm 269              TSH 2.3 from June 2021     - CHF - systolic, EF 05%     - Hypertension - controlled    Plan/     - Resume lasix 40 mg po qdaily today - continue on discharge  - Trend labs, bp's, urine output, daily weights    Okay for discharge likely tomorrow  Suggested repeat BMP in one week with PCP after discharge

## 2021-06-24 NOTE — PLAN OF CARE
Problem: OXYGENATION/RESPIRATORY FUNCTION  Goal: Patient will maintain patent airway  Outcome: Ongoing     Patient's EF (Ejection Fraction) is greater than 40%    Heart Failure Medications:  Diuretics[de-identified] Furosemide    (One of the following REQUIRED for EF <40%/SYSTOLIC FAILURE but MAY be used in EF% >40%/DIASTOLIC FAILURE)        ACE[de-identified] Enalapril        ARB[de-identified] None         ARNI[de-identified] None    (Beta Blockers)  NON- Evidenced Based Beta Blocker (for EF% >40%/DIASTOLIC FAILURE): None    Evidenced Based Beta Blocker::(REQUIRED for EF% <40%/SYSTOLIC FAILURE) None  . .................................................................................................................................................. Patient's weights and intake/output reviewed: Yes    Patient's Last Weight: 168 lbs obtained by standing scale. Difference of 4 lbs less than last documented weight. Intake/Output Summary (Last 24 hours) at 6/24/2021 1858  Last data filed at 6/24/2021 1851  Gross per 24 hour   Intake 550 ml   Output 1850 ml   Net -1300 ml       Comorbidities Reviewed Yes    Patient has a past medical history of Allergic rhinitis, Anemia, Arthritis, CAD (coronary artery disease), Chronic systolic congestive heart failure ([address] Compression fracture of T11 vertebra ([address] Food allergy, Hiatal hernia, History of blood transfusion, Hyperlipidemia, Hypertension, Hypothyroid, Obesity, Occlusion and stenosis of carotid artery, Osteoarthritis, Recurrent right inguinal hernia, Shingles, Thyroid disease, and TIA (transient ischemic attack). >>For CHF and Comorbidity documentation on Education Time and Topics, please see Education Tab    Progressive Mobility Assessment:  What is this patient's Current Level of Mobility?: Ambulatory- with Assistance  How was this patient Mobilized today?: Up to Chair,  Up to Toilet/Shower and Up in Room                 With Whom?  Nurse and PCA                 Level of Difficulty/Assistance: 1x Assist     Pt resting in bed at this time on room air. Pt denies shortness of breath. Pt with pitting lower extremity edema.      Patient and/or Family's stated Goal of Care this Admission: increase activity tolerance, better understand heart failure and disease management, be more comfortable and reduce lower extremity edema prior to discharge

## 2021-06-24 NOTE — CARE COORDINATION
Pt active w/[facility];  CM confirmed w/Yahir that they can accept pt back. CM will continue to follow for needs, including increased needs at home.   Angelica Dorado RN

## 2021-06-24 NOTE — PROGRESS NOTES
Hospitalist Progress Note      PCP: Elena Donovan    Date of Admission: 6/22/2021    Chief Complaint:   Other        pt sent in for low sodium by [facility] Course: \" Patrick Todd is a 79 y.o. male who presented to the ED per recommendations of his PCP to be evaluated and treated for hyponatremia. Laboratory evaluation confirms patient's sodium is decreased to 120, however this has been a chronic issue for this individual and has been thoroughly investigated by nephrology at previous admissions. Patient with known high urine osmolality with variable sodium levels suggestive of SIADH; patient also with CHF therefore hypervolemic hyponatremia is another component to this issue. Patient reports that his PCP instructed him to discontinue his Lasix 1 week after recent hospital discharge on 6/6/2021. Since discontinuation, the patient reports increasing dyspnea as well as BLE edema. CXR obtained in the ED today reveals patient with partial clearing of LLL effusion, but continues to reveal residual right-sided effusion. In addition to hyponatremia, other lab abnormalities include iron deficiency anemia, hypoalbuminemia 2.7, serum osmolality 261. Patient will be readmitted to the hospital for resumption of diuretic therapy to gradually reverse severe hyponatremia without inducing CPM.'     Patient was admitted for further evaluation and treatment    -Was started on Lasix drip          Subjective: EMR notes reviewed patient seen in examined.   Patient has no complaints at this time      Medications:  Reviewed    Infusion Medications    sodium chloride       Scheduled Medications    furosemide  40 mg Oral Daily    aspirin  81 mg Oral Daily    atorvastatin  80 mg Oral Nightly    cetirizine  10 mg Oral Daily    enalapril  10 mg Oral Daily    ferrous sulfate  325 mg Oral Daily with breakfast    levothyroxine  150 mcg Oral Daily    therapeutic multivitamin-minerals  1 tablet Oral Daily  sodium chloride flush  10 mL Intravenous 2 times per day    enoxaparin  40 mg Subcutaneous Daily     PRN Meds: sodium chloride flush, sodium chloride, potassium chloride **OR** potassium alternative oral replacement **OR** potassium chloride, magnesium sulfate, senna, acetaminophen **OR** acetaminophen, ondansetron **OR** ondansetron, docusate sodium      Intake/Output Summary (Last 24 hours) at 6/24/2021 0940  Last data filed at 6/24/2021 0932  Gross per 24 hour   Intake 874.74 ml   Output 1925 ml   Net -1050.26 ml       Physical Exam Performed:    /61   Pulse 78   Temp 97.4 °F (36.3 °C) (Oral)   Resp 20   Ht 5' 11\" (1.803 m)   Wt 168 lb 14.4 oz (76.6 kg)   SpO2 95%   BMI 23.56 kg/m²     General appearance: No apparent distress, appears stated age and cooperative. HEENT: Pupils equal, round, and reactive to light. Conjunctivae/corneas clear. Neck: Supple, with full range of motion. No jugular venous distention. Trachea midline. Respiratory:  Normal respiratory effort. Clear to auscultation, bilaterally without Rales/Wheezes/Rhonchi. Cardiovascular: Regular rate and rhythm with normal S1/S2 without murmurs, rubs or gallops. Abdomen: Soft, non-tender, non-distended with normal bowel sounds. Musculoskeletal: No clubbing, cyanosis or edema bilaterally. Full range of motion without deformity. Skin: Skin color, texture, turgor normal.  No rashes or lesions. Neurologic:  Neurovascularly intact without any focal sensory/motor deficits.  Cranial nerves: II-XII intact, grossly non-focal.  Psychiatric: Alert and oriented, thought content appropriate, normal insight  Capillary Refill: Brisk,3 seconds, normal   Peripheral Pulses: +2 palpable, equal bilaterally       Labs:   Recent Labs     06/22/21  1444 06/23/21  0901   WBC 5.3 4.8   HGB 9.5* 9.6*   HCT 27.3* 27.7*    207     Recent Labs     06/23/21  0901 06/23/21  1217 06/24/21  0729   * 126* 127*   K 4.3  4.3 3.8 4.0   CL 90* 88* 91* CO2 30 30 29   BUN 6* 7 9   CREATININE <0.5* <0.5* <0.5*   CALCIUM 8.4 8.0* 8.2*   PHOS 2.8 2.7 2.6     Recent Labs     06/22/21  1444 06/23/21  0901   AST 21 19   ALT 31 26   BILITOT 0.5 0.8   ALKPHOS 96 96     No results for input(s): INR in the last 72 hours. Recent Labs     06/22/21  1444   TROPONINI <0.01       Urinalysis:      Lab Results   Component Value Date    NITRU Negative 06/22/2021    BLOODU Negative 06/22/2021    SPECGRAV 1.015 06/22/2021    GLUCOSEU Negative 06/22/2021       Radiology:  XR CHEST PORTABLE   Final Result   Partial clearing of airspace disease and/or effusion particularly left lower   lobe. There is some residual basilar disease and possible residual pleural   effusion as well.                  Assessment/Plan:    Active Hospital Problems    Diagnosis     Chronic hyponatremia [E87.1]     Hypoalbuminemia [E88.09]     Pleural effusion on right [J90]     Acute on chronic combined systolic and diastolic CHF (congestive heart failure) (HCC) [I50.43]     Hypothyroid [E03.9]     Hyperlipidemia [E78.5]     CAD (coronary artery disease) [I25.10]     Generalized weakness [R53.1]      Acute on chronic hyponatremia  The patient's baseline ranges from 120130 over the past 1 to 2 years  Extensive evaluation has occurred in the past by nephrology with conclusion of SIADH picture complicated by occasional hypervolemic hyponatremia due to excess free water; patient did receive a one-time dosage of tolvaptan 30 mg during his last hospital stay  At time of discharge it was recommended the patient continue Lasix as well as 2 g sodium diet; patient's PCP discontinued his Lasix shortly after discharge due to concerns that it was worsening his hyponatremia  -6/23 up to 126 today  -Continue to monitor serial renal panels  -Nephrology following and managing  -Currently on Lasix drip has had 5.5 L out since admission  -Continue to monitor I/O closely    Acute on chronic combined CHF  Patient's

## 2021-06-24 NOTE — PROGRESS NOTES
Occupational Therapy   Occupational Therapy Initial Assessment  Date: 2021   Patient Name: Dustin Bowman  MRN: [id number]     : 1940    Date of Service: 2021    Discharge Recommendations:  Home with assist PRN, Home with Home health OT       Assessment   Performance deficits / Impairments: Decreased functional mobility ; Decreased safe awareness;Decreased balance;Decreased ADL status; Decreased endurance  Assessment: Pt tolerated OT evaluation well. At baseline pt uses cane for mobility, performs ADLs Mod I. Currently pt requiring CGA-SBA with functional transfers and mobility using RW. Pt requiring CGA-SBA with simple ADLs. Pt demonstrates performance component deficits in balance, activity tolerance, safety. Recommend continued OT services to increase safety and independence with ADLs and functional transfers. Anticipate pt will be safe to return home with prn assist and HHOT upon discharge. Pt was current with HHPT prior to admission. Prognosis: Good  Decision Making: Medium Complexity  OT Education: OT Role;Plan of Care;ADL Adaptive Strategies;Transfer Training  Patient Education: home safety concerns, OT discharge recommendations, toilet transfers, standing balance, importance of OOB activity  Barriers to Learning: pt demonstrates and verbalizes understanding. REQUIRES OT FOLLOW UP: Yes    Activity Tolerance  Activity Tolerance: Patient Tolerated treatment well  Safety Devices  Type of devices: Call light within reach;Nurse notified; Chair alarm in place; Left in chair           Patient Diagnosis(es): The primary encounter diagnosis was Hyponatremia. Diagnoses of Hypochloremia and Acute on chronic congestive heart failure, unspecified heart failure type [facility]) were also pertinent to this visit.      has a past medical history of Allergic rhinitis, Anemia, Arthritis, CAD (coronary artery disease), Chronic systolic congestive heart failure ([address] Compression fracture of T11 vertebra ([address] Food allergy, Hiatal hernia, History of blood transfusion, Hyperlipidemia, Hypertension, Hypothyroid, Obesity, Occlusion and stenosis of carotid artery, Osteoarthritis, Recurrent right inguinal hernia, Shingles, Thyroid disease, and TIA (transient ischemic attack). has a past surgical history that includes sigmoidoscopy (1994); Upper gastrointestinal endoscopy (05/12/94); Upper gastrointestinal endoscopy (11/09/04); Tonsillectomy; eye surgery; Colonoscopy (04/22/99); Colonoscopy (7/13/2015); Inguinal hernia repair (Right, 07/07/2016); Inguinal hernia repair (Right, 04/12/2017); Cardiac surgery (2001); Cardiac valve replacement (09/15/2018); Aortic valve replacement (2018); Percutaneous Transluminal Coronary Angio (2018); hernia repair (Left, 2/5/2020); and Gastric fundoplication (N/A, 0/59/0579). Restrictions  Restrictions/Precautions  Restrictions/Precautions: Seizure    Subjective   General  Chart Reviewed: Yes  Additional Pertinent Hx: arthritis, CAD, CHF, OA, anemia, hiatal hernia, TIA, AVR  Family / Caregiver Present: No    Diagnosis: Pt admitted with acute on chronic CHF, acute on chronic hyponatremia. Subjective  Subjective: Pt supine in bed upon OT arrival. Pt reports no concerns with returning home at discharge and hopes to return home soon. General Comment  Comments: RN agreeable to OT session.     Patient Currently in Pain: No  Pain Assessment  Pain Assessment: 0-10  Pain Level: 0  Pre Treatment Pain Screening  Intervention List: Patient able to continue with treatment;Nurse/Physician notified  Vital Signs  Pulse: 77  Heart Rate Source: Monitor  BP: 134/69  BP Location: Right Arm  Patient Position: High fowlers  Level of Consciousness: Alert (0)  Patient Currently in Pain: No  Oxygen Therapy  SpO2: 96 %  Pulse Oximeter Device Mode: Intermittent  Pulse Oximeter Device Location: Finger  O2 Device: None (Room air)     Social/Functional History  Social/Functional History  Lives With: Spouse  Type of Home: House  Home Layout: Two level, Bed/Bath upstairs  Home Access: Level entry  Bathroom Shower/Tub: Walk-in shower  Bathroom Toilet: Handicap height  Bathroom Equipment: Shower chair  Home Equipment: Cane, Rolling walker  ADL Assistance: Independent  Homemaking Responsibilities: No  Ambulation Assistance: Independent  Transfer Assistance: Independent  Active : Yes  Leisure & Hobbies: reading  Additional Comments: Pt recently started using a cane for mobility. Pt will ocassionally use RW for mobility in the mornings if he is feeling unsteady. Pt current with HHPT. Pt denies any falls in the last year. Objective   Vision: Impaired  Vision Exceptions: Wears glasses at all times  Hearing: Within functional limits      Orientation  Overall Orientation Status: Within Normal Limits        Balance  Sitting Balance: Independent  Standing Balance: Contact guard assistance (CGA without AD, SBA with RW)    Functional Mobility  Activity: To/from bathroom; Other  Assist Level: Contact guard assistance  Functional Mobility Comments: CGA 20 feet x2 without AD. SBA 40 feet using RW.     Toilet Transfers  Toilet - Technique: Ambulating  Equipment Used: Raised toilet seat without rails  Toilet Transfer: Contact guard assistance  Toilet Transfers Comments: CGA without AD for approach, CGA to/from toilet using grab bar    ADL  Grooming: Stand by assistance (standing at sink to wash hands and brush teeth)  LE Dressing: Contact guard assistance (doff/don pants and slip on shoes with extra time)  Toileting: Contact guard assistance (steadying assist, pt performs 3/3 steps with intermittent UE support on grab bar)    Tone RUE  RUE Tone: Normotonic  Tone LUE  LUE Tone: Normotonic  Coordination  Movements Are Fluid And Coordinated: Yes        Bed mobility  Supine to Sit: Stand by assistance (extra time, bedrail, HOB 30 degrees)  Transfers  Stand Step Transfers: Contact guard assistance  Sit to stand: Stand by assistance  Stand to sit: Contact guard assistance  Transfer Comments: to/from EOB and chair     Vision - Basic Assessment  Prior Vision: Wears glasses all the time  Visual History: No significant visual history  Patient Visual Report: No visual complaint reported. Visual Field Cut: No  Oculo Motor Control: WNL    Cognition  Overall Cognitive Status: WFL  Cognition Comment: decreased insight into deficits. Min cues for safety.            Sensation  Overall Sensation Status: WNL          LUE AROM (degrees)  LUE AROM : WNL  RUE AROM (degrees)  RUE AROM : WNL  LUE Strength  Gross LUE Strength: WFL  L Hand General: 4+/5  LUE Strength Comment: grossly 4+/5  RUE Strength  Gross RUE Strength: WFL  R Hand General: 4+/5  RUE Strength Comment: grossly 4+/5                   Plan   Plan  Times per week: 3-5x/week  Current Treatment Recommendations: Strengthening, Positioning, Gait Training, Safety Education & Training, Balance Training, Stair training, Patient/Caregiver Education & Training, Self-Care / ADL, Functional Mobility Training, Equipment Evaluation, Education, & procurement, Home Management Training, Endurance Training                          AM-PAC Score        -[facility] Inpatient Daily Activity Raw Score: 19 (06/24/21 0904)  AM-PAC Inpatient ADL T-Scale Score : 40.22 (06/24/21 0904)  ADL Inpatient CMS 0-100% Score: 42.8 (06/24/21 0904)  ADL Inpatient CMS G-Code Modifier : CK (06/24/21 0904)    Goals  Short term goals  Time Frame for Short term goals: 2 weeks (7/8/21)  Short term goal 1: Mod I functional transfers to/from EOB, chair, toilet  Short term goal 2: Supevision toileting  Short term goal 3: Supervision LB dressing  Short term goal 4: independent grooming in stance at sink  Patient Goals   Patient goals : to walk to the bathroom on his own       Therapy Time   Individual Concurrent Group Co-treatment   Time In 0819         Time Out 0853         Minutes 34         Timed Code Treatment Minutes: 19 Minutes (15 minute evaluation)     If pt is discharged prior to next OT session, this note will serve as the discharge summary.     Victorina Jett, OT

## 2021-06-24 NOTE — DISCHARGE INSTR - COC
Continuity of Care Form    Patient Name: Kami Doan   :  1940  MRN:  [id number]    Admit date:  2021  Discharge date:  2021    Code Status Order: Full Code   Advance Directives:   [facility] Documentation       Date/Time Healthcare Directive Type of Healthcare Directive Copy in [address] Agent's Name Healthcare Agent's Phone Number    21  No, patient does not have an advance directive for healthcare treatment -- -- -- -- --            Admitting Physician:  Gerald Guzman MD  PCP: Carissa Ponce    Discharging Nurse: [facility] Unit/Room#: 0214/0214-02  Discharging Unit Phone Number: [phone]    Emergency Contact:   Extended Emergency Contact Information  Primary Emergency Contact: Paula Blanco  Address: [address] [place] [address] Phone: [phone]  Mobile Phone: [phone]  Relation: Spouse  Secondary Emergency Contact: [address] Phone: [phone]  Relation: Child    Past Surgical History:  Past Surgical History:   Procedure Laterality Date    AORTIC VALVE REPLACEMENT  2018   [address]  2001    stents   [address]  09/15/2018    COLONOSCOPY  99    COLONOSCOPY  2015    EYE SURGERY      victorino cataracts    GASTRIC FUNDOPLICATION N/A     ROBOTIC NISSEN FUNDOPLICATION performed by Amy Lorenz MD at [facility] Left 2020    ROBOTIC LEFT [facility] performed by Amy Lorenz MD at [address] Right 2016    laparoscopic right inguinal hernia repair with mesh    INGUINAL HERNIA REPAIR Right 2017    davinci recurrent right inguinal hernia repair with mesh    PTCA  2018    SIGMOIDOSCOPY  1994    TONSILLECTOMY      UPPER GASTROINTESTINAL ENDOSCOPY  94    UPPER GASTROINTESTINAL ENDOSCOPY  04 Gastritis and duodenitis       Immunization History:   Immunization History   Administered Date(s) Administered    COVID-19, Moderna, PF, 100mcg/0.5mL 02/03/2021, 03/10/2021    Influenza Virus Vaccine 10/30/2003, 10/24/2006, 10/28/2006, 10/06/2007, 10/11/2008, 10/02/2010, 10/21/2011    Influenza, High Dose (Fluzone 65 yrs and older) 09/24/2014, 09/29/2015, 09/30/2016, 10/06/2017, 09/26/2018    Pneumococcal Conjugate 13-valent ([id number]) 09/29/2015    Pneumococcal Conjugate 7-valent (Yuli Netters) 10/15/2000, 11/11/2003    Pneumococcal Polysaccharide ([id number]) 12/05/2016    Tdap (Boostrix, Adacel) 10/23/2009    Zoster Live (Zostavax) 12/12/2011       Active Problems:  Patient Active Problem List   Diagnosis Code    Generalized weakness R53.1    Hypothyroid E03.9    Food allergy Z91.018    CAD (coronary artery disease) I25.10    TIA (transient ischemic attack) G45.9    Hyperlipidemia E78.5    Osteoarthritis M19.90    Cough R05    Herpes zoster B02.9    Vertigo R42    Eustachian tube dysfunction H69.80    Aortic stenosis I35.0    Unilateral recurrent inguinal hernia without obstruction or gangrene K40.91    Murmur R01.1    Obesity E66.9    Occlusion and stenosis of carotid artery I65.29    Stented coronary artery Z95.5    Groin pain, chronic, right R10.31, G89.29    Chronic systolic congestive heart failure (HCC) I50.22    Non-recurrent unilateral inguinal hernia without obstruction or gangrene K40.90    Postoperative pain G89.18    Postoperative hematoma of subcutaneous tissue following non-dermatologic procedure L76.32    SBO (small bowel obstruction) ([facility]) K56.609    Hiatal hernia K44.9    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    Transaminitis R74.01    Chronic hyponatremia E87.1    Hypoalbuminemia E88.09    Pleural effusion on right J90    Acute on chronic combined systolic and diastolic CHF (congestive heart failure) ([facility]) I50.43       Isolation/Infection:   Isolation            No Isolation          Patient Infection Status       None to display            Nurse Assessment:  Last Vital Signs: /67   Pulse 68   Temp 98.1 °F (36.7 °C) (Oral)   Resp 19   Ht 5' 11\" (1.803 m)   Wt 168 lb 14.4 oz (76.6 kg)   SpO2 97%   BMI 23.56 kg/m²     Last documented pain score (0-10 scale): Pain Level: 0  Last Weight:   Wt Readings from Last 1 Encounters:   06/24/21 168 lb 14.4 oz (76.6 kg)     Mental Status:  oriented and alert    IV Access:  - None    Nursing Mobility/ADLs:  Walking   Assisted  Transfer  Assisted  Bathing  Assisted  Dressing  Assisted  Toileting  Assisted  Feeding  Independent  Med Admin  Independent  Med Delivery   whole    Wound Care Documentation and Therapy:        Elimination:  Continence:   · Bowel: Yes  · Bladder: Yes  Urinary Catheter: None   Colostomy/Ileostomy/Ileal Conduit: No       Date of Last BM:     Intake/Output Summary (Last 24 hours) at 6/24/2021 1220  Last data filed at 6/24/2021 0932  Gross per 24 hour   Intake 994.74 ml   Output 1225 ml   Net -230.26 ml     I/O last 3 completed shifts: In: 984.7 [P.O.:960; I.V.:24.7]  Out: 4638 [Urine:2575]    Safety Concerns: At Risk for Falls    Impairments/Disabilities:      Vision and Hearing    Nutrition Therapy:  Current Nutrition Therapy:   - Oral Diet:  Cardiac and Low Sodium (2gm)    Routes of Feeding: Oral  Liquids: No Restrictions  Daily Fluid Restriction: yes - amount 1.8L  Last Modified Barium Swallow with Video (Video Swallowing Test): not done    Treatments at the Time of Hospital Discharge:   Respiratory Treatments:   Oxygen Therapy:  is not on home oxygen therapy. Ventilator:    - No ventilator support    Rehab Therapies: Physical Therapy and Occupational Therapy  Weight Bearing Status/Restrictions: No weight bearing restirctions  Other Medical Equipment (for information only, NOT a DME order):     Other Treatments:     Patient's personal belongings (please select all that are sent with patient):  None    RN SIGNATURE:  Electronically signed by Dinorah Celaya RN on 6/25/21 at 1:45 PM EDT    CASE MANAGEMENT/SOCIAL WORK SECTION    Inpatient Status Date: ***    Readmission Risk Assessment Score:  Readmission Risk              Risk of Unplanned Readmission:  24           Discharging to Facility/ Agency   [address]   [address] [facility]      / signature: Electronically signed by Jovon Bee RN on 6/25/21 at 11:46 AM EDT    PHYSICIAN SECTION    Prognosis: Good    Condition at Discharge: Stable    Rehab Potential (if transferring to Rehab): {Prognosis:[id number]}  Recommended Follow-up, Labs or Other Treatments After Discharge:    Home care PT/OT   Monitor fluids and heart failure   BMP in 1 week                Recommended Labs or Other Treatments After Discharge: . Physician Certification: I certify the above information and transfer of Fany Vora  is necessary for the continuing treatment of the diagnosis listed and that he requires Home Care for greater 30 days.      Update Admission H&P: Changes in H&P as follows -      PHYSICIAN SIGNATURE:  Electronically signed by VALE Mohr CNP on 6/25/21 at 1:04 PM EDT

## 2021-06-24 NOTE — PROGRESS NOTES
Physical Therapy    Facility/Department: [facility] A2 CARD TELEMETRY  Initial Assessment and treatment    NAME: Marcia Gould  : 1940  MRN: [id number]    Date of Service: 2021    Discharge Recommendations:  24 hour supervision or assist, Home with Home health PT   PT Equipment Recommendations  Equipment Needed: No (has RW)    Assessment   Body structures, Functions, Activity limitations: Decreased functional mobility ; Decreased balance;Decreased posture;Decreased safe awareness  Assessment: Pt referred for PT evaluataion during current hospital stay with a diagnosis of CHF and hyponatremia. Pt is currently performing below baseline for mobility, requiring CGA for sit<>stand, and for gait with RW, with above mentioned gait deficits that are affecting his balance. Pt would benefit fro m skilled acute PT to address deficits. Recommend home with 24 hr sup and HHPT at [facility] setting  Treatment Diagnosis: impaired balance  Prognosis: Good  Decision Making: Medium Complexity  PT Education: Goals;Gait Training;PT Role;Disease Specific Education;Plan of Care; Functional Mobility Training;Home Exercise Program;Transfer Training  Patient Education: pt verbalized understanding of safety with ambulation by using walker at home  Barriers to Learning: no  REQUIRES PT FOLLOW UP: Yes  Activity Tolerance  Activity Tolerance: Patient Tolerated treatment well;Patient limited by fatigue  Activity Tolerance: /59; HR 75; SpO2 on RA 96%       Patient Diagnosis(es): The primary encounter diagnosis was Hyponatremia. Diagnoses of Hypochloremia and Acute on chronic congestive heart failure, unspecified heart failure type [facility]) were also pertinent to this visit.      has a past medical history of Allergic rhinitis, Anemia, Arthritis, CAD (coronary artery disease), Chronic systolic congestive heart failure ([address] Compression fracture of T11 vertebra ([address] Food allergy, Hiatal hernia, History of blood transfusion, Hyperlipidemia, Hypertension, Hypothyroid, Obesity, Occlusion and stenosis of carotid artery, Osteoarthritis, Recurrent right inguinal hernia, Shingles, Thyroid disease, and TIA (transient ischemic attack). has a past surgical history that includes sigmoidoscopy (1994); Upper gastrointestinal endoscopy (05/12/94); Upper gastrointestinal endoscopy (11/09/04); Tonsillectomy; eye surgery; Colonoscopy (04/22/99); Colonoscopy (7/13/2015); Inguinal hernia repair (Right, 07/07/2016); Inguinal hernia repair (Right, 04/12/2017); Cardiac surgery (2001); Cardiac valve replacement (09/15/2018); Aortic valve replacement (2018); Percutaneous Transluminal Coronary Angio (2018); hernia repair (Left, 2/5/2020); and Gastric fundoplication (N/A, 4/92/0335).     Restrictions  Restrictions/Precautions  Restrictions/Precautions: Seizure, Bedrest with Bathroom Privileges, Fall Risk  Vision/Hearing  Vision: Impaired  Vision Exceptions: Wears glasses at all times  Hearing: Within functional limits     Subjective  General  Chart Reviewed: Yes  Patient assessed for rehabilitation services?: Yes  Response To Previous Treatment: Not applicable  Family / Caregiver Present: No  Referring Practitioner: Jessica Mcadams CNP  Referral Date : 06/23/21  Diagnosis: CHF, hyponatremia  Follows Commands: Within Functional Limits  General Comment  Comments: PT sitting up in chair upon entry, RN cleared pt for therapy  Subjective  Subjective: PT agreeable to PT  Pain Screening  Patient Currently in Pain: Denies  Vital Signs  Patient Currently in Pain: Denies  Pre Treatment Pain Screening  Intervention List: Patient able to continue with treatment    Orientation  Orientation  Overall Orientation Status: Within Functional Limits  Social/Functional History  Social/Functional History  Lives With: Spouse  Type of Home: House  Home Layout: Two level, Bed/Bath upstairs  Home Access: Level entry  Bathroom Shower/Tub: Walk-in shower  Bathroom Toilet: Handicap height  Bathroom Equipment: Shower chair  Home Equipment: [employer], Rolling walker  ADL Assistance: Independent  Homemaking Responsibilities: No  Ambulation Assistance: Independent  Transfer Assistance: Independent  Active : Yes  Leisure & Hobbies: reading  Additional Comments: Pt recently started using a cane for mobility. Pt will ocassionally use RW for mobility in the mornings if he is feeling unsteady. Pt current with HHPT. Pt denies any falls in the last year. Objective          AROM RLE (degrees)  RLE AROM: WFL  AROM LLE (degrees)  LLE AROM : WFL  Strength RLE  Strength RLE: WFL  Comment: grossly 4- to 4/5 throughout  Strength LLE  Strength LLE: WFL  Comment: grossly 4- to 4/5 throughout        Bed mobility  Supine to Sit: Unable to assess  Sit to Supine: Unable to assess  Scooting: Supervision  Comment: pt up in chair upon entry and at EOS  Transfers  Sit to Stand: Contact guard assistance (on second attempt, first attempt with SBA with pt losing balance and abruptly sitting back down)  Stand to sit: Contact guard assistance  Bed to Chair: Unable to assess  Comment: Pt performed transfers both from chair and from toilet, both CGA, use of grab bars with toilet transfer  Ambulation  Ambulation?: Yes  More Ambulation?: Yes  Ambulation 1  Surface: level tile  Device: Rolling Walker  Assistance: Contact guard assistance  Quality of Gait: forward flexed at trunk, toeing in bilaterally, R greater than left, some scissoring noted, mild to moderate limp due to RLE appears longer than left, ataxic with decreased knee extension in stance, almost seems like a neurologic gait pattern.   Pt heavily relying on walker dspite reporting he does not use device at home  Distance: 100 ft  Ambulation 2  Surface - 2: level tile  Device 2: Rolling Walker  Assistance 2: Contact guard assistance  Quality of Gait 2: same as above  Distance: 25 ft from toilet to chair     Balance  Posture: Poor  Sitting - Static: Good  Sitting - Dynamic: Good;-  Standing - Static: Fair  Standing - Dynamic: Fair;-  Exercises  Hip Flexion: x 10 B  Hip Abduction: x 10 B clamshells with pillow squeeze  Knee Long Arc Quad: x 10 B  Ankle Pumps: x 20 B HR/ TR     Plan   Plan  Times per week: 3-5  Current Treatment Recommendations: Strengthening, Home Exercise Program, ROM, Balance Training, Endurance Training, Functional Mobility Training, Transfer Training, Gait Training, Stair training  Safety Devices  Type of devices: All fall risk precautions in place, Left in chair, Call light within reach, Chair alarm in place, Nurse notified, Gait belt, Patient at risk for falls      AM-PAC Score  AM-PAC Inpatient Mobility Raw Score : 20 (06/24/21 1325)  AM-PAC Inpatient T-Scale Score : 47.67 (06/24/21 1325)  Mobility Inpatient CMS 0-100% Score: 35.83 (06/24/21 1325)  Mobility Inpatient CMS G-Code Modifier : Meghan Salgado (06/24/21 1325)          Goals  Short term goals  Time Frame for Short term goals: 6/30/21 unless noted  Short term goal 1: Pt will perform bed mobility with supervision by 6/29  Short term goal 2: Pt will perform transfers with SBA  Short term goal 3: Pt will ambulate 150 ft with RW and SBA  Short term goal 4: PT will negotiate at least 4 stairs with LRAD and CGA  Patient Goals   Patient goals : \"to go home with home PT\"       Therapy Time   Individual Concurrent Group Co-treatment   Time In 0952         Time Out 1040         Minutes 48         Timed Code Treatment Minutes: 45 Minutes    If pt is discharged prior to next therapy session, this note will serve as discharge summary.     Best White, PT

## 2021-06-24 NOTE — PLAN OF CARE
Problem: OXYGENATION/RESPIRATORY FUNCTION  Goal: Patient will maintain patent airway  6/24/2021 0008 by Chrissy Pope RN  Outcome: Ongoing  Note:   Patient's EF (Ejection Fraction) is greater than 40%    Heart Failure Medications:  Diuretics[de-identified] None    (One of the following REQUIRED for EF <40%/SYSTOLIC FAILURE but MAY be used in EF% >40%/DIASTOLIC FAILURE)        ACE[de-identified] Enalapril        ARB[de-identified] None         ARNI[de-identified] None    (Beta Blockers)  NON- Evidenced Based Beta Blocker (for EF% >40%/DIASTOLIC FAILURE): None    Evidenced Based Beta Blocker::(REQUIRED for EF% <40%/SYSTOLIC FAILURE) None  . .................................................................................................................................................. Patient's weights and intake/output reviewed: Yes    Patient's Last Weight: 172 lbs obtained by standing scale. Difference of 11 lbs less than last documented weight. Intake/Output Summary (Last 24 hours) at 6/24/2021 0009  Last data filed at 6/23/2021 2042  Gross per 24 hour   Intake 1280.15 ml   Output 4825 ml   Net -3544.85 ml       Comorbidities Reviewed Yes    Patient has a past medical history of Allergic rhinitis, Anemia, Arthritis, CAD (coronary artery disease), Chronic systolic congestive heart failure ([address] Compression fracture of T11 vertebra (HCC), Food allergy, Hiatal hernia, History of blood transfusion, Hyperlipidemia, Hypertension, Hypothyroid, Obesity, Occlusion and stenosis of carotid artery, Osteoarthritis, Recurrent right inguinal hernia, Shingles, Thyroid disease, and TIA (transient ischemic attack). >>For CHF and Comorbidity documentation on Education Time and Topics, please see Education Tab    Progressive Mobility Assessment:  What is this patient's Current Level of Mobility?: Ambulatory- with Assistance  How was this patient Mobilized today?: Edge of Bed,  Up to Toilet/Shower, and Up in Room                 With Whom?  Nurse, PCA, PT, OT, and Self                 Level of Difficulty/Assistance: 1x Assist     Pt resting in bed at this time on room air. Pt denies shortness of breath. Pt with pitting lower extremity edema. Patient and/or Family's stated Goal of Care this Admission: increase activity tolerance, be more comfortable, and reduce lower extremity edema prior to discharge        :      Problem: Falls - Risk of:  Goal: Will remain free from falls  Description: Will remain free from falls  Outcome: Ongoing  Note: Pt will remain free from falls throughout hospital stay. Fall precautions in place, bed alarm on, bed in lowest position with wheels locked and side rails 2/4 up. Room door open and hourly rounding completed. Will continue to monitor throughout shift.

## 2021-06-25 VITALS
BODY MASS INDEX: 23.23 KG/M2 | DIASTOLIC BLOOD PRESSURE: 59 MMHG | HEIGHT: 71 IN | SYSTOLIC BLOOD PRESSURE: 123 MMHG | WEIGHT: 165.9 LBS | RESPIRATION RATE: 18 BRPM | HEART RATE: 64 BPM | OXYGEN SATURATION: 97 % | TEMPERATURE: 97.8 F

## 2021-06-25 LAB
ALBUMIN SERPL-MCNC: 2.8 G/DL (ref 3.4–5)
ANION GAP SERPL CALCULATED.3IONS-SCNC: 8 MMOL/L (ref 3–16)
BUN BLDV-MCNC: 11 MG/DL (ref 7–20)
CALCIUM SERPL-MCNC: 8.4 MG/DL (ref 8.3–10.6)
CHLORIDE BLD-SCNC: 96 MMOL/L (ref 99–110)
CO2: 29 MMOL/L (ref 21–32)
CREAT SERPL-MCNC: <0.5 MG/DL (ref 0.8–1.3)
GFR AFRICAN AMERICAN: >60
GFR NON-AFRICAN AMERICAN: >60
GLUCOSE BLD-MCNC: 90 MG/DL (ref 70–99)
PHOSPHORUS: 3 MG/DL (ref 2.5–4.9)
POTASSIUM SERPL-SCNC: 3.9 MMOL/L (ref 3.5–5.1)
SODIUM BLD-SCNC: 133 MMOL/L (ref 136–145)

## 2021-06-25 PROCEDURE — 97535 SELF CARE MNGMENT TRAINING: CPT

## 2021-06-25 PROCEDURE — 36415 COLL VENOUS BLD VENIPUNCTURE: CPT

## 2021-06-25 PROCEDURE — 6370000000 HC RX 637 (ALT 250 FOR IP): Performed by: HOSPITALIST

## 2021-06-25 PROCEDURE — 80069 RENAL FUNCTION PANEL: CPT

## 2021-06-25 PROCEDURE — 2580000003 HC RX 258: Performed by: HOSPITALIST

## 2021-06-25 PROCEDURE — 99222 1ST HOSP IP/OBS MODERATE 55: CPT | Performed by: INTERNAL MEDICINE

## 2021-06-25 PROCEDURE — 6370000000 HC RX 637 (ALT 250 FOR IP): Performed by: INTERNAL MEDICINE

## 2021-06-25 RX ORDER — FUROSEMIDE 40 MG/1
40 TABLET ORAL DAILY
Qty: 60 TABLET | Refills: 3 | Status: ON HOLD
Start: 2021-06-26 | End: 2021-07-12 | Stop reason: HOSPADM

## 2021-06-25 RX ADMIN — FERROUS SULFATE TAB 325 MG (65 MG ELEMENTAL FE) 325 MG: 325 (65 FE) TAB at 10:19

## 2021-06-25 RX ADMIN — FUROSEMIDE 40 MG: 40 TABLET ORAL at 10:16

## 2021-06-25 RX ADMIN — LEVOTHYROXINE SODIUM 150 MCG: 0.15 TABLET ORAL at 06:48

## 2021-06-25 RX ADMIN — ASPIRIN 81 MG: 81 TABLET, COATED ORAL at 10:16

## 2021-06-25 RX ADMIN — Medication 10 ML: at 10:17

## 2021-06-25 RX ADMIN — ENALAPRIL MALEATE 10 MG: 5 TABLET ORAL at 10:16

## 2021-06-25 RX ADMIN — CETIRIZINE HYDROCHLORIDE 10 MG: 10 TABLET, FILM COATED ORAL at 10:16

## 2021-06-25 RX ADMIN — MULTIPLE VITAMINS W/ MINERALS TAB 1 TABLET: TAB at 10:16

## 2021-06-25 ASSESSMENT — PAIN SCALES - GENERAL
PAINLEVEL_OUTOF10: 0

## 2021-06-25 NOTE — PROGRESS NOTES
[facility]       Progress Note           Subjective/   79y.o. year old male who we are seeing in consultation for hyponatremia. HPI:  Sodium trend better with lasix drip and prn dose of samsca on 6/23, urine output of 1.8 liters over last 24 hours with resuming of lasix. Denies any shortness of breath. ROS:  Intake adequate, no weakness.     Objective/   GEN:  Chronically ill, /66   Pulse 76   Temp 98 °F (36.7 °C) (Oral)   Resp 18   Ht 5' 11\" (1.803 m)   Wt 165 lb 14.4 oz (75.3 kg)   SpO2 92%   BMI 23.14 kg/m²   HEENT: non-icteric, no JVD  CV: S1, S2 without m/r/g; + LE edema  RESP: CTA B without w/r/r; breathing wnl  ABD: +bs, soft, nt, no hsm  SKIN: warm, no rashes    Data/  Recent Labs     06/22/21  1444 06/23/21  0901   WBC 5.3 4.8   HGB 9.5* 9.6*   HCT 27.3* 27.7*   MCV 91.6 92.4    207     Recent Labs     06/23/21  1217 06/24/21  0729 06/25/21  0733   * 127* 133*   K 3.8 4.0 3.9   CL 88* 91* 96*   CO2 30 29 29   GLUCOSE 100* 106* 90   PHOS 2.7 2.6 3.0   BUN 7 9 11   CREATININE <0.5* <0.5* <0.5*   LABGLOM >60 >60 >60   GFRAA >60 >60 >60       Assessment/     - Hyponatremia               Etiology: Hypervolemic, acute on chronic in setting of decompensated CHF              Data: SNa 120 on admission, improving trend with diuresis              Urine sodium 51, Urine osm 269              TSH 2.3 from June 2021   Clinical: Better with diuresis     - CHF - systolic, EF 87%     - Hypertension - controlled    Plan/     - Resumed lasix 40 mg po qdaily - continue on discharge  - Trend labs, bp's, urine output, daily weights    Okay for discharge   Suggested repeat BMP in one week with PCP after discharge

## 2021-06-25 NOTE — CONSULTS
[address] [facility]   Cardiovascular Evaluation    PATIENT: Meagan Christinason  DATE: 2021  MRN: [id number]  CSN: [id number]  : 1940    Primary Care Doctor/Referring provider: Eric Hatfield MD     Reason for evaluation/Chief complaint:   Other (pt sent in for low sodium by fmd.  )      Subjective:    History of present illness on initial date of evaluation:   Meagan Christianson is a 79 y.o. patient who presented to the hospital several days ago for the evaluation of an abnormal lab consistent with hyponatremia. Patient has extensive history of underlying cardiovascular illness including left ventricular dysfunction and on chronic Lasix therapy. Patient was found to have hyponatremia and subsequently to discontinue his Lasix. In addition, he was referred to the emergency room. Patient was admitted to the hospital for further evaluation. Patient has been seen by nephrology and undergoing evaluation of his electrolytes. In addition, patient underwent an echocardiogram which demonstrated left ventricular dysfunction and evidence of a prior transaortic valve replacement. Cardiology's been asked see the patient for further recommendations. Patient denies any chest pain, shortness of breath. Patient states that he feels like he is back to normal and like to go home. Patient follows with an outpatient cardiologist at [facility] and had seen them several weeks ago. Patient does not wish to proceed with any other further testing and once again wants to go home. .        Patient Active Problem List   Diagnosis    Generalized weakness    Hypothyroid    Food allergy    CAD (coronary artery disease)    TIA (transient ischemic attack)    Hyperlipidemia    Osteoarthritis    Cough    Herpes zoster    Vertigo    Eustachian tube dysfunction    Aortic stenosis    Unilateral recurrent inguinal hernia without obstruction or gangrene    Murmur    Obesity    Occlusion and stenosis of carotid artery    Stented coronary artery    Groin pain, chronic, right    Chronic systolic congestive heart failure ([address]    Non-recurrent unilateral inguinal hernia without obstruction or gangrene    Postoperative pain    Postoperative hematoma of subcutaneous tissue following non-dermatologic procedure    SBO (small bowel obstruction) (HCC)    Hiatal hernia    Paraesophageal hernia    Transaminitis    Chronic hyponatremia    Hypoalbuminemia    Pleural effusion on right    Acute on chronic combined systolic and diastolic CHF (congestive heart failure) ([address]         Cardiac Testing: I have reviewed the findings below. EKG:  ECHO:   Conclusions      Summary   Technically difficult study due to poor acoustic window. Unable to assess left ventricular systolic function due to arrhythmia but   appears mildly reduced with an ejection fraction of 40 -45 %. Basal inferior, mid inferior and basal inferoseptal walls hypokinesis. Left ventricular diastolic filling pressure is elevated septal E/e\" is 24 . Mild thickening of leaflets of mitral valve. Mild mitral regurgitation. TAVR valve appears to be well seated. Doppler parameters are normal for valve. Mild aortic valve regurgitation. Systolic pulmonic artery pressure (SPAP) is normal estimated at 35 mmHg   (Right atrial pressure of 3 mmHg).       Signature      ------------------------------------------------------------------   Electronically signed by Sari Ramos MD, [facility] (Interpreting   physician) on 06/03/2021 at 01:04 PM    STRESS TEST:  CATH:  BYPASS:  VASCULAR:    Past Medical History:   has a past medical history of Allergic rhinitis, Anemia, Arthritis, CAD (coronary artery disease), Chronic systolic congestive heart failure ([address] Compression fracture of T11 vertebra ([address] Food allergy, Hiatal hernia, History of blood transfusion, Hyperlipidemia, Hypertension, Hypothyroid, Obesity, Occlusion and stenosis of carotid artery, Osteoarthritis, Recurrent right inguinal hernia, Shingles, Thyroid disease, and TIA (transient ischemic attack). Surgical History:   has a past surgical history that includes sigmoidoscopy (1994); Upper gastrointestinal endoscopy (05/12/94); Upper gastrointestinal endoscopy (11/09/04); Tonsillectomy; eye surgery; Colonoscopy (04/22/99); Colonoscopy (7/13/2015); Inguinal hernia repair (Right, 07/07/2016); Inguinal hernia repair (Right, 04/12/2017); Cardiac surgery (2001); Cardiac valve replacement (09/15/2018); Aortic valve replacement (2018); Percutaneous Transluminal Coronary Angio (2018); hernia repair (Left, 2/5/2020); and Gastric fundoplication (N/A, 6/62/6614). Social History:   reports that he quit smoking about 25 years ago. He started smoking about 62 years ago. He has a 7.50 pack-year smoking history. He has never used smokeless tobacco. He reports that he does not drink alcohol and does not use drugs. Family History:  No evidence for sudden cardiac death or premature CAD    Medications:  Reviewed and are listed in nursing record. and/or listed below  Outpatient Medications:  Prior to Admission medications    Medication Sig Start Date End Date Taking?  Authorizing Provider   docusate sodium (COLACE, DULCOLAX) 100 MG CAPS Take 100 mg by mouth 2 times daily  Patient taking differently: Take 100 mg by mouth 2 times daily as needed  5/7/21  Yes Amari Paulino MD   levothyroxine (SYNTHROID) 150 MCG tablet TAKE 1 TABLET BY MOUTH  DAILY 10/16/20  Yes VALE Guillaume CNP   atorvastatin (LIPITOR) 80 MG tablet TAKE 1 TABLET BY MOUTH  EVERY DAY  Patient taking differently: Take 80 mg by mouth nightly TAKE 1 TABLET BY MOUTH EVERY DAY 6/12/20  Yes Marquis Parikh MD   ferrous sulfate (FE TABS) 325 (65 Fe) MG EC tablet Take 1 tablet by mouth daily (with breakfast) 6/28/19  Yes VALE Guillaume CNP   enalapril (VASOTEC) 10 MG tablet Take 10 mg by mouth daily  11/20/18  Yes Historical Provider, MD   cetirizine (ZYRTEC) 10 MG tablet Take 10 mg by mouth daily. Yes Historical Provider, MD   Multiple Vitamin (THERA) TABS Take 1 tablet by mouth daily 1 Tab daily. Yes Historical Provider, MD   aspirin 81 MG chewable tablet Take 81 mg by mouth daily    Yes Historical Provider, MD       In-patient schedule medications:   furosemide  40 mg Oral Daily    aspirin  81 mg Oral Daily    atorvastatin  80 mg Oral Nightly    cetirizine  10 mg Oral Daily    enalapril  10 mg Oral Daily    ferrous sulfate  325 mg Oral Daily with breakfast    levothyroxine  150 mcg Oral Daily    therapeutic multivitamin-minerals  1 tablet Oral Daily    sodium chloride flush  10 mL Intravenous 2 times per day    enoxaparin  40 mg Subcutaneous Daily         Infusion Medications:   sodium chloride           Allergies:  Reopro [abciximab injection]     Review of Systems:   All 14 point review of symptoms completed. Pertinent positives identified in the HPI, all other review of symptoms findings as below.      Review of Systems - History obtained from the patient  General ROS: negative for - chills, fever or night sweats  Psychological ROS: negative for - disorientation or hallucinations  Ophthalmic ROS: negative for - dry eyes, eye pain or loss of vision  ENT ROS: negative for - nasal discharge or sore throat  Allergy and Immunology ROS: negative for - hives or itchy/watery eyes  Hematological and Lymphatic ROS: negative for - jaundice or night sweats  Endocrine ROS: negative for - mood swings or temperature intolerance  Breast ROS: deferred  Respiratory ROS: negative for - hemoptysis or stridor  Gastrointestinal ROS: no abdominal pain, change in bowel habits, or black or bloody stools  Genito-Urinary ROS: no dysuria, trouble voiding, or hematuria  Musculoskeletal ROS: negative for - gait disturbance, joint pain or joint stiffness  Neurological ROS: negative for - seizures or speech problems  Dermatological ROS: negative for - rash or skin lesion changes      Physical Examination:    [unfilled]  /66   Pulse 76   Temp 98 °F (36.7 °C) (Oral)   Resp 18   Ht 5' 11\" (1.803 m)   Wt 165 lb 14.4 oz (75.3 kg)   SpO2 92%   BMI 23.14 kg/m²    Weight: 165 lb 14.4 oz (75.3 kg)     Wt Readings from Last 3 Encounters:   06/25/21 165 lb 14.4 oz (75.3 kg)   06/15/21 183 lb 6.4 oz (83.2 kg)   06/06/21 176 lb (79.8 kg)       Intake/Output Summary (Last 24 hours) at 6/25/2021 0900  Last data filed at 6/25/2021 5244  Gross per 24 hour   Intake 920 ml   Output 2160 ml   Net -1240 ml         General Appearance:  Alert, cooperative, no distress, appears stated age   Head:  Normocephalic, without obvious abnormality, atraumatic   Eyes:  PERRL, conjunctiva/corneas clear       Nose: Nares normal, no drainage or sinus tenderness   Throat: Lips, mucosa, and tongue normal   Neck: Supple, symmetrical, trachea midline, no adenopathy, thyroid: not enlarged, symmetric, no JVD       Lungs:   Respirations unlabored and no distress   Chest Wall:  deferred   Heart:  Regular rhythm and normal rate   Abdomen:   Not distended           Extremities: Extremities normal, atraumatic, no cyanosis or edema   Pulses: deferred   Skin: Skin color, texture, turgor normal, no rashes or lesions   Pysch: Normal mood and affect   Neurologic: Observational exam with normal gross motor and sensory exam.           Labs  Recent Labs     06/22/21  1444 06/23/21  0901   WBC 5.3 4.8   HGB 9.5* 9.6*   HCT 27.3* 27.7*   MCV 91.6 92.4    207     Recent Labs     06/24/21  0729 06/25/21  0733   CREATININE <0.5* <0.5*   BUN 9 11   * 133*   K 4.0 3.9   CL 91* 96*   CO2 29 29     No results for input(s): INR, PROTIME in the last 72 hours. Recent Labs     06/22/21  1444   TROPONINI <0.01     Invalid input(s): PRO-BNP  No results for input(s): CHOL, HDL in the last 72 hours.     Invalid input(s): LDL, TG      Imaging:  I have reviewed the below testing personally and my interpretation is below. EKG:  Poor data quality, interpretation may be adversely affectedNormal sinus rhythmPossible Anterior infarct , age undeterminedAbnormal ECGWhen compared with ECG of 01-JUN-2021 17:03,Premature ventricular complexes are no longer PresentNonspecific T wave abnormality no longer evident in Lateral leadsConfirmed by Zainab Mcclure ([id number]) on 6/22/2021 5:38:25 PM      CXR:      Assessment:  79 y.o. patient with:  Principal Problem:   Acute on chronic combined systolic and diastolic CHF (congestive heart failure) (HCC)   CAD (coronary artery disease)    Plan:  1. Currently patient is back to their baseline  2. Known history of chronic left ventricular dysfunction and transaortic valve replacement. Echocardiogram reviewed and compared to previous echocardiograms there is no change  3. Patient should continue guideline directed medical therapy for left ventricular dysfunction  4. Resumption of diuretic therapy based upon hyponatremia and renal recommendations  5. No further inpatient cardiovascular testing is necessary  6. The patient can be discharged home from a cardiovascular standpoint  7. Patient is to follow-up with her primary cardiologist in the outpatient setting within 1 to 2 weeks. Medical Decision Making: The following items were considered in medical decision making:  Independent review of images  Review / order clinical lab tests  Review / order radiology tests  Decision to obtain old records  Review and summation of old records as accessed through [employer] (a summary of my findings in these old records)        All questions and concerns were addressed to the patient/family. Alternatives to my treatment were discussed. The note was completed using EMR. Every effort was made to ensure accuracy; however, inadvertent computerized transcription errors may be present.     Flaquito Trejo MD, [id number], [facility]  [phone] Formerly [facility] office  [phone] Main central  6/25/2021  9:00 AM

## 2021-06-25 NOTE — CARE COORDINATION
CASE MANAGEMENT DISCHARGE SUMMARY      Discharge to: [id number]    IMM given: (date) 06/25/2021     Transportation: private  Confirmed discharge plan with:     Patient: yes     Family:  No, pt is a/o, stated he would reach out to family when CM offered   RN, name: Mayra Molina, RN    Note: Pt stated that he will call family for transportation. CM confirmed w/Yhair that he will pull orders from epic.   Ramakrishna Triana RN

## 2021-06-25 NOTE — PLAN OF CARE
Problem: Health Behavior:  Goal: Will modify at least one risk factor affecting health status  Description: Will modify at least one risk factor affecting health status  Outcome: Ongoing

## 2021-06-25 NOTE — PROGRESS NOTES
Discharge paperwork went over with and given to pt. Verbalizes understanding. Pt lock box emptied. Pt wheeled via wheelchair to private car with all belongings. Pt discharge home in stable condition.

## 2021-06-25 NOTE — PLAN OF CARE
Problem: OXYGENATION/RESPIRATORY FUNCTION  Goal: Patient will maintain patent airway  6/25/2021 0047 by Sharonda Patel RN  Outcome: Ongoing  Note:   Patient's EF (Ejection Fraction) is greater than 40%    Heart Failure Medications:  Diuretics[de-identified] None    (One of the following REQUIRED for EF <40%/SYSTOLIC FAILURE but MAY be used in EF% >40%/DIASTOLIC FAILURE)        ACE[de-identified] Enalapril        ARB[de-identified] None         ARNI[de-identified] None    (Beta Blockers)  NON- Evidenced Based Beta Blocker (for EF% >40%/DIASTOLIC FAILURE): None    Evidenced Based Beta Blocker::(REQUIRED for EF% <40%/SYSTOLIC FAILURE) None  . .................................................................................................................................................. Patient's weights and intake/output reviewed: Yes    Patient's Last Weight: 168 lbs obtained by standing scale. Difference of 4 lbs less than last documented weight. Intake/Output Summary (Last 24 hours) at 6/25/2021 0050  Last data filed at 6/24/2021 2348  Gross per 24 hour   Intake 790 ml   Output 1850 ml   Net -1060 ml       Comorbidities Reviewed Yes    Patient has a past medical history of Allergic rhinitis, Anemia, Arthritis, CAD (coronary artery disease), Chronic systolic congestive heart failure ([address] Compression fracture of T11 vertebra ([address] Food allergy, Hiatal hernia, History of blood transfusion, Hyperlipidemia, Hypertension, Hypothyroid, Obesity, Occlusion and stenosis of carotid artery, Osteoarthritis, Recurrent right inguinal hernia, Shingles, Thyroid disease, and TIA (transient ischemic attack). >>For CHF and Comorbidity documentation on Education Time and Topics, please see Education Tab    Progressive Mobility Assessment:  What is this patient's Current Level of Mobility?: Ambulatory- with Assistance  How was this patient Mobilized today?: Edge of Bed,  Up to Toilet/Shower, and Up in Room                 With Whom?  Nurse, PCA, and Self Level of Difficulty/Assistance: 1x Assist     Pt resting in bed at this time on room air. Pt denies shortness of breath. Pt with pitting lower extremity edema. Patient and/or Family's stated Goal of Care this Admission: increase activity tolerance, be more comfortable, and reduce lower extremity edema prior to discharge        :      Problem: Falls - Risk of:  Goal: Will remain free from falls  Description: Will remain free from falls  Outcome: Ongoing  Note: Pt will remain free from falls throughout hospital stay. Fall precautions in place, bed alarm on, bed in lowest position with wheels locked and side rails 2/4 up. Room door open and hourly rounding completed. Will continue to monitor throughout shift.

## 2021-06-25 NOTE — DISCHARGE SUMMARY
Hospital Medicine Discharge Summary    Patient ID: Isamar Putnam      Patient's PCP: Jason Dowling    Admit Date: 6/22/2021     Discharge Date:   6/25/21    Admitting Physician: Prema Neumann MD     Discharge Physician: VALE Wong - CNP     Discharge Diagnoses: Active Hospital Problems    Diagnosis     Chronic hyponatremia [E87.1]     Hypoalbuminemia [E88.09]     Pleural effusion on right [J90]     Acute on chronic combined systolic and diastolic CHF (congestive heart failure) (HCC) [I50.43]     Hypothyroid [E03.9]     Hyperlipidemia [E78.5]     CAD (coronary artery disease) [I25.10]     Generalized weakness [R53.1]        The patient was seen and examined on day of discharge and this discharge summary is in conjunction with any daily progress note from day of discharge. Hospital Course:     \" Chidi Landry a 79 y. o. male who presented to the ED per recommendations of his PCP to be evaluated and treated for hyponatremia.  Laboratory evaluation confirms patient's sodium is decreased to 120, however this has been a chronic issue for this individual and has been thoroughly investigated by nephrology at previous admissions.  Patient with known high urine osmolality with variable sodium levels suggestive of SIADH; patient also with CHF therefore hypervolemic hyponatremia is another component to this issue.  Patient reports that his PCP instructed him to discontinue his Lasix 1 week after recent hospital discharge on 6/6/2021.  Since discontinuation, the patient reports increasing dyspnea as well as BLE edema.   CXR obtained in the ED today reveals patient with partial clearing of LLL effusion, but continues to reveal residual right-sided effusion.  In addition to hyponatremia, other lab abnormalities include iron deficiency anemia, hypoalbuminemia 2.7, serum osmolality 261.  Patient will be readmitted to the hospital for resumption of diuretic therapy to gradually reverse severe hyponatremia without inducing CPM.'      Patient was admitted for further evaluation and treatment   Acute on chronic hyponatremia  The patient's baseline ranges from 120130 over the past 1 to 2 years  Extensive evaluation has occurred in the past by nephrology with conclusion of SIADH picture complicated by occasional hypervolemic hyponatremia due to excess free water; patient did receive a one-time dosage of tolvaptan 30 mg during his last hospital stay  At time of discharge it was recommended the patient continue Lasix as well as 2 g sodium diet; patient's PCP discontinued his Lasix shortly after discharge due to concerns that it was worsening his hyponatremia  - 133 on day of DC   -Continue to monitor serial renal panels, OP BMP monitoring   -Nephrology following and managing- appreciated   -managed with lasix gtt, les, transitioned to PO lasix at DC   -Continue to monitor I/O closely - discussed ways to monitor at home      Acute on chronic combined CHF  Patient's PCP discontinued the patient's Lasix dosage due to concerns that it was causing his hyponatremia  IV Lasix GTT  ECHO performed 2 weeks earlier revealed reduced EF 40% with inferior and septal hypokinesis; diastolic filling pressure elevated; TAVR valve well-seated   Continue home doses of ASA, Vasotec, and Lipitor  2G Na and fluid restriction dietary modifications in place  Consult placed to Cardiology for further recommendations- OP follow up with Dr Javier Henriquez   - home care for education with fluid mgt      Hypoalbuminemia with pleural effusions (R>L)  Albumin infusion 25 g x 2   Patient with residual right-sided atelectasis therefore encourage incentive spirometry every 4 hours while awake  Procalcitonin and lactate levels wadded to lab work to ensure right basilar atelectasis does not represent untreated infectious process; no antibiotics initiated at this time-both within normal limits     Hypothyroidism  TSH and free T4 euthyroid   Adjust levothyroxine replacement accordingly     Severe protein calorie malnutrition in the setting of above  -Prealbumin 6  -Dietary consult, add supplements         Physical Exam Performed:     BP (!) 123/59   Pulse 64   Temp 97.8 °F (36.6 °C) (Oral)   Resp 18   Ht 5' 11\" (1.803 m)   Wt 165 lb 14.4 oz (75.3 kg)   SpO2 97%   BMI 23.14 kg/m²       General appearance:  No apparent distress, appears stated age and cooperative. HEENT:  Normal cephalic, atraumatic without obvious deformity. Pupils equal, round, and reactive to light. Extra ocular muscles intact. Conjunctivae/corneas clear. Neck: Supple, with full range of motion. No jugular venous distention. Trachea midline. Respiratory:  Normal respiratory effort. Clear to auscultation, bilaterally without Rales/Wheezes/Rhonchi. Cardiovascular:  Regular rate and rhythm with normal S1/S2 without murmurs, rubs or gallops. Abdomen: Soft, non-tender, non-distended with normal bowel sounds. Musculoskeletal:  No clubbing, cyanosis or edema bilaterally. Full range of motion without deformity. Skin: Skin color, texture, turgor normal.  No rashes or lesions. Neurologic:  Neurovascularly intact without any focal sensory/motor deficits. Cranial nerves: II-XII intact, grossly non-focal.  Psychiatric:  Alert and oriented, thought content appropriate, normal insight  Capillary Refill: Brisk,< 3 seconds   Peripheral Pulses: +2 palpable, equal bilaterally       Labs:  For convenience and continuity at follow-up the following most recent labs are provided:      CBC:    Lab Results   Component Value Date    WBC 4.8 06/23/2021    HGB 9.6 06/23/2021    HCT 27.7 06/23/2021     06/23/2021       Renal:    Lab Results   Component Value Date     06/25/2021    K 3.9 06/25/2021    K 4.3 06/23/2021    CL 96 06/25/2021    CO2 29 06/25/2021    BUN 11 06/25/2021    CREATININE <0.5 06/25/2021    CALCIUM 8.4 06/25/2021    PHOS 3.0 06/25/2021 Significant Diagnostic Studies    Radiology:   XR CHEST PORTABLE   Final Result   Partial clearing of airspace disease and/or effusion particularly left lower   lobe. There is some residual basilar disease and possible residual pleural   effusion as well. Consults:     IP CONSULT TO HOSPITALIST  IP CONSULT TO NEPHROLOGY  IP CONSULT TO CARDIOLOGY  IP CONSULT TO HOME CARE NEEDS    Disposition:  Home with [facility]     Condition at Discharge: Stable    Discharge Instructions/Follow-up:  PCP, Cardiology and Nephrology     Code Status:  Full Code     Activity: activity as tolerated    Diet: regular diet      Discharge Medications:     Current Discharge Medication List           Details   furosemide (LASIX) 40 MG tablet Take 1 tablet by mouth daily  Qty: 60 tablet, Refills: 3              Details   docusate sodium (COLACE, DULCOLAX) 100 MG CAPS Take 100 mg by mouth 2 times daily  Qty: 60 capsule, Refills: 1      levothyroxine (SYNTHROID) 150 MCG tablet TAKE 1 TABLET BY MOUTH  DAILY  Qty: 90 tablet, Refills: 3    Comments: Requesting 1 year supply  Associated Diagnoses: Acquired hypothyroidism      atorvastatin (LIPITOR) 80 MG tablet TAKE 1 TABLET BY MOUTH  EVERY DAY  Qty: 90 tablet, Refills: 1    Comments: For high cholesterol  Associated Diagnoses: Mixed hyperlipidemia      ferrous sulfate (FE TABS) 325 (65 Fe) MG EC tablet Take 1 tablet by mouth daily (with breakfast)  Qty: 90 tablet, Refills: 0      enalapril (VASOTEC) 10 MG tablet Take 10 mg by mouth daily       cetirizine (ZYRTEC) 10 MG tablet Take 10 mg by mouth daily. Multiple Vitamin (THERA) TABS Take 1 tablet by mouth daily 1 Tab daily. aspirin 81 MG chewable tablet Take 81 mg by mouth daily              Time Spent on discharge is more than 30 minutes in the examination, evaluation, counseling and review of medications and discharge plan. Medications sent Meds to [facility], [place] summary completed for PCP update and hand off.    Home care arranged   - All questions addressed at DC     Signed:    VALE Leslie - CNP   6/25/2021      Thank you Cindy Blount for the opportunity to be involved in this patient's care. If you have any questions or concerns please feel free to contact me at [phone].

## 2021-06-25 NOTE — PLAN OF CARE
Problem: OXYGENATION/RESPIRATORY FUNCTION  Goal: Patient will maintain patent airway  Outcome: Ongoing     Patient's EF (Ejection Fraction) is greater than 40%    Heart Failure Medications:  Diuretics[de-identified] Furosemide    (One of the following REQUIRED for EF <40%/SYSTOLIC FAILURE but MAY be used in EF% >40%/DIASTOLIC FAILURE)        ACE[de-identified] Enalapril        ARB[de-identified] None         ARNI[de-identified] None    (Beta Blockers)  NON- Evidenced Based Beta Blocker (for EF% >40%/DIASTOLIC FAILURE): None    Evidenced Based Beta Blocker::(REQUIRED for EF% <40%/SYSTOLIC FAILURE) None  . .................................................................................................................................................. Patient's weights and intake/output reviewed: Yes    Patient's Last Weight: 168 lbs obtained by standing scale. Difference of 3 lbs less than last documented weight. Intake/Output Summary (Last 24 hours) at 6/25/2021 1329  Last data filed at 6/25/2021 1047  Gross per 24 hour   Intake 1270 ml   Output 1710 ml   Net -440 ml       Comorbidities Reviewed Yes    Patient has a past medical history of Allergic rhinitis, Anemia, Arthritis, CAD (coronary artery disease), Chronic systolic congestive heart failure ([address] Compression fracture of T11 vertebra ([address] Food allergy, Hiatal hernia, History of blood transfusion, Hyperlipidemia, Hypertension, Hypothyroid, Obesity, Occlusion and stenosis of carotid artery, Osteoarthritis, Recurrent right inguinal hernia, Shingles, Thyroid disease, and TIA (transient ischemic attack). >>For CHF and Comorbidity documentation on Education Time and Topics, please see Education Tab    Progressive Mobility Assessment:  What is this patient's Current Level of Mobility?: Ambulatory-Up Ad Misty  How was this patient Mobilized today?: Edge of Bed, Up to Chair,  Up to Toilet/Shower, and Up in Room                 With Whom?  Nurse, PCA, PT, and OT                 Level of Difficulty/Assistance: 1x Assist     Pt resting in bed at this time on room air. Pt with complaints of shortness of breath. Pt with pitting lower extremity edema.      Patient and/or Family's stated Goal of Care this Admission: increase activity tolerance, better understand heart failure and disease management, be more comfortable, and reduce lower extremity edema prior to discharge        :

## 2021-06-30 ENCOUNTER — HOSPITAL ENCOUNTER (EMERGENCY)
Age: 81
Discharge: HOME | DRG: 643 | End: 2021-07-01
Attending: EMERGENCY MEDICINE
Payer: MEDICARE

## 2021-06-30 ENCOUNTER — HOSPITAL ENCOUNTER (OUTPATIENT)
Age: 81
Discharge: HOME | DRG: 643 | End: 2021-06-30
Payer: MEDICARE

## 2021-06-30 ENCOUNTER — APPOINTMENT (OUTPATIENT)
Dept: CT IMAGING | Age: 81
DRG: 643 | End: 2021-06-30
Payer: MEDICARE

## 2021-06-30 DIAGNOSIS — E16.2 HYPOGLYCEMIA: ICD-10-CM

## 2021-06-30 DIAGNOSIS — R11.11 VOMITING WITHOUT NAUSEA, INTRACTABILITY OF VOMITING NOT SPECIFIED, UNSPECIFIED VOMITING TYPE: ICD-10-CM

## 2021-06-30 DIAGNOSIS — E87.1 CHRONIC HYPONATREMIA: Primary | ICD-10-CM

## 2021-06-30 DIAGNOSIS — E87.1 CHRONIC HYPONATREMIA: ICD-10-CM

## 2021-06-30 LAB
A/G RATIO: 1 (ref 1.1–2.2)
ALBUMIN SERPL-MCNC: 3.2 G/DL (ref 3.4–5)
ALP BLD-CCNC: 96 U/L (ref 40–129)
ALT SERPL-CCNC: 20 U/L (ref 10–40)
ANION GAP SERPL CALCULATED.3IONS-SCNC: 7 MMOL/L (ref 3–16)
ANION GAP SERPL CALCULATED.3IONS-SCNC: 9 MMOL/L (ref 3–16)
AST SERPL-CCNC: 17 U/L (ref 15–37)
BASE EXCESS VENOUS: 2.1 MMOL/L (ref -3–3)
BASOPHILS ABSOLUTE: 0.1 K/UL (ref 0–0.2)
BASOPHILS RELATIVE PERCENT: 1.4 %
BILIRUB SERPL-MCNC: 0.8 MG/DL (ref 0–1)
BUN BLDV-MCNC: 15 MG/DL (ref 7–20)
BUN BLDV-MCNC: 15 MG/DL (ref 7–20)
CALCIUM SERPL-MCNC: 8.2 MG/DL (ref 8.3–10.6)
CALCIUM SERPL-MCNC: 8.4 MG/DL (ref 8.3–10.6)
CARBOXYHEMOGLOBIN: 1.9 % (ref 0–1.5)
CHLORIDE BLD-SCNC: 89 MMOL/L (ref 99–110)
CHLORIDE BLD-SCNC: 93 MMOL/L (ref 99–110)
CO2: 27 MMOL/L (ref 21–32)
CO2: 28 MMOL/L (ref 21–32)
CREAT SERPL-MCNC: 0.5 MG/DL (ref 0.8–1.3)
CREAT SERPL-MCNC: 0.6 MG/DL (ref 0.8–1.3)
EOSINOPHILS ABSOLUTE: 0.2 K/UL (ref 0–0.6)
EOSINOPHILS RELATIVE PERCENT: 3.8 %
GFR AFRICAN AMERICAN: >60
GFR AFRICAN AMERICAN: >60
GFR NON-AFRICAN AMERICAN: >60
GFR NON-AFRICAN AMERICAN: >60
GLOBULIN: 3.3 G/DL
GLUCOSE BLD-MCNC: 118 MG/DL (ref 70–99)
GLUCOSE BLD-MCNC: 118 MG/DL (ref 70–99)
GLUCOSE BLD-MCNC: 45 MG/DL (ref 70–99)
HCO3 VENOUS: 27.5 MMOL/L (ref 23–29)
HCT VFR BLD CALC: 29.4 % (ref 40.5–52.5)
HEMOGLOBIN: 10 G/DL (ref 13.5–17.5)
LACTIC ACID: 1.1 MMOL/L (ref 0.4–2)
LYMPHOCYTES ABSOLUTE: 1.7 K/UL (ref 1–5.1)
LYMPHOCYTES RELATIVE PERCENT: 31.8 %
MCH RBC QN AUTO: 31.4 PG (ref 26–34)
MCHC RBC AUTO-ENTMCNC: 33.9 G/DL (ref 31–36)
MCV RBC AUTO: 92.4 FL (ref 80–100)
METHEMOGLOBIN VENOUS: 0.3 %
MONOCYTES ABSOLUTE: 0.3 K/UL (ref 0–1.3)
MONOCYTES RELATIVE PERCENT: 6.3 %
NEUTROPHILS ABSOLUTE: 2.9 K/UL (ref 1.7–7.7)
NEUTROPHILS RELATIVE PERCENT: 56.7 %
O2 CONTENT, VEN: 9 VOL %
O2 SAT, VEN: 61 %
O2 THERAPY: ABNORMAL
PCO2, VEN: 46.3 MMHG (ref 40–50)
PDW BLD-RTO: 16 % (ref 12.4–15.4)
PERFORMED ON: ABNORMAL
PH VENOUS: 7.39 (ref 7.35–7.45)
PLATELET # BLD: 191 K/UL (ref 135–450)
PMV BLD AUTO: 6.8 FL (ref 5–10.5)
PO2, VEN: 31 MMHG (ref 25–40)
POTASSIUM REFLEX MAGNESIUM: 4.3 MMOL/L (ref 3.5–5.1)
POTASSIUM SERPL-SCNC: 4.3 MMOL/L (ref 3.5–5.1)
RBC # BLD: 3.19 M/UL (ref 4.2–5.9)
SODIUM BLD-SCNC: 125 MMOL/L (ref 136–145)
SODIUM BLD-SCNC: 128 MMOL/L (ref 136–145)
TCO2 CALC VENOUS: 29 MMOL/L
TOTAL PROTEIN: 6.5 G/DL (ref 6.4–8.2)
TROPONIN: <0.01 NG/ML
WBC # BLD: 5.2 K/UL (ref 4–11)

## 2021-06-30 PROCEDURE — 99283 EMERGENCY DEPT VISIT LOW MDM: CPT

## 2021-06-30 PROCEDURE — 36415 COLL VENOUS BLD VENIPUNCTURE: CPT

## 2021-06-30 PROCEDURE — 93005 ELECTROCARDIOGRAM TRACING: CPT | Performed by: EMERGENCY MEDICINE

## 2021-06-30 PROCEDURE — 85025 COMPLETE CBC W/AUTO DIFF WBC: CPT

## 2021-06-30 PROCEDURE — 84484 ASSAY OF TROPONIN QUANT: CPT

## 2021-06-30 PROCEDURE — 82803 BLOOD GASES ANY COMBINATION: CPT

## 2021-06-30 PROCEDURE — 2580000003 HC RX 258: Performed by: EMERGENCY MEDICINE

## 2021-06-30 PROCEDURE — 83605 ASSAY OF LACTIC ACID: CPT

## 2021-06-30 PROCEDURE — 80048 BASIC METABOLIC PNL TOTAL CA: CPT

## 2021-06-30 PROCEDURE — 74176 CT ABD & PELVIS W/O CONTRAST: CPT

## 2021-06-30 PROCEDURE — 80053 COMPREHEN METABOLIC PANEL: CPT

## 2021-06-30 RX ORDER — 0.9 % SODIUM CHLORIDE 0.9 %
1000 INTRAVENOUS SOLUTION INTRAVENOUS ONCE
Status: COMPLETED | OUTPATIENT
Start: 2021-06-30 | End: 2021-07-01

## 2021-06-30 RX ADMIN — SODIUM CHLORIDE 1000 ML: 9 INJECTION, SOLUTION INTRAVENOUS at 23:55

## 2021-06-30 ASSESSMENT — ENCOUNTER SYMPTOMS
ABDOMINAL PAIN: 0
BACK PAIN: 0
NAUSEA: 0
SHORTNESS OF BREATH: 0
RHINORRHEA: 0
VOMITING: 1
COUGH: 0

## 2021-07-01 ENCOUNTER — TELEPHONE (OUTPATIENT)
Dept: OTHER | Facility: CLINIC | Age: 81
End: 2021-07-01

## 2021-07-01 ENCOUNTER — TELEPHONE (OUTPATIENT)
Dept: SURGERY | Age: 81
End: 2021-07-01

## 2021-07-01 VITALS
OXYGEN SATURATION: 96 % | BODY MASS INDEX: 23.52 KG/M2 | DIASTOLIC BLOOD PRESSURE: 65 MMHG | HEART RATE: 74 BPM | RESPIRATION RATE: 16 BRPM | SYSTOLIC BLOOD PRESSURE: 132 MMHG | HEIGHT: 71 IN | TEMPERATURE: 98.2 F | WEIGHT: 168 LBS

## 2021-07-01 LAB
EKG ATRIAL RATE: 78 BPM
EKG DIAGNOSIS: NORMAL
EKG P AXIS: 55 DEGREES
EKG P-R INTERVAL: 154 MS
EKG Q-T INTERVAL: 382 MS
EKG QRS DURATION: 104 MS
EKG QTC CALCULATION (BAZETT): 435 MS
EKG R AXIS: 21 DEGREES
EKG T AXIS: 98 DEGREES
EKG VENTRICULAR RATE: 78 BPM

## 2021-07-01 PROCEDURE — 93010 ELECTROCARDIOGRAM REPORT: CPT | Performed by: INTERNAL MEDICINE

## 2021-07-01 RX ORDER — METOCLOPRAMIDE 10 MG/1
10 TABLET ORAL
Qty: 10 TABLET | Refills: 0 | Status: SHIPPED | OUTPATIENT
Start: 2021-07-01

## 2021-07-01 NOTE — TELEPHONE ENCOUNTER
Patient was seen in the ED on 06/30 for hypoglycemia and not being able to keep anything down. He completed a CT during his stay and was told by clinical staff to call our office and request Dr. Ean Blount to look at his imaging. I did let patient know Dr. Ean Blount is out and may not hear back from our office until next week.

## 2021-07-01 NOTE — ED PROVIDER NOTES
[facility]  ED  EMERGENCY DEPARTMENT ENCOUNTER      Pt Name: Marcia Gould  MRN: [id number]  [place] 1940  Date of evaluation: 6/30/2021  Provider: Josué Ramsey MD    CHIEF COMPLAINT       Chief Complaint   Patient presents with    Fatigue    Hypoglycemia     45 at PCP @ 12, states he has not eaten anything,           HISTORY OF PRESENT ILLNESS   (Location/Symptom, Timing/Onset,Context/Setting, Quality, Duration, Modifying Factors, Severity)  Note limiting factors. Marcia Gould is a 79 y.o. male who presents to the emergency department for hypoglycemia. Patient had a routine appointment with his doctor today. He had blood work done. His family checked my chart this evening and noticed that his glucose was 45 she brought him here to the emergency room. Patient states he has been feeling more tired than usual.  He had gastric fundoplication and over the past day has not been able to keep anything down. He states that whenever he tries to eat he vomits. However had it not been for the abnormal lab tonight he would not have come in this evening. Nursing notes were reviewed. REVIEW OF SYSTEMS    (2-9 systems for level 4, 10 or more for level 5)     Review of Systems   Constitutional: Positive for fatigue. Negative for fever. HENT: Negative for rhinorrhea. Eyes: Negative for visual disturbance. Respiratory: Negative for cough and shortness of breath. Cardiovascular: Positive for leg swelling. Negative for chest pain. Chronic   Gastrointestinal: Positive for vomiting. Negative for abdominal pain and nausea. Endocrine: Negative for polyuria. Genitourinary: Negative for dysuria. Musculoskeletal: Negative for back pain. Neurological: Negative for headaches.          PAST MEDICAL HISTORY     Past Medical History:   Diagnosis Date    Allergic rhinitis     Anemia     Arthritis     rt hip    CAD (coronary artery disease) 2001    cardiac stents    Chronic systolic congestive heart failure [address] [address] [facility] 8/21/2018    Compression fracture of T11 vertebra (HCC)     back brace    Food allergy     Hiatal hernia     History of blood transfusion     platelets=2001    Hyperlipidemia     Hypertension     hx of    Hypothyroid     Obesity 10/2/2012    Occlusion and stenosis of carotid artery 10/2/2012    Osteoarthritis     Hip right    Recurrent right inguinal hernia     Shingles 2014    Thyroid disease     TIA (transient ischemic attack)          SURGICALHISTORY       Past Surgical History:   Procedure Laterality Date    AORTIC VALVE REPLACEMENT  2018    CARDIAC SURGERY  2001    stents    CARDIAC VALVE REPLACEMENT  09/15/2018    COLONOSCOPY  04/22/99    COLONOSCOPY  7/13/2015    EYE SURGERY      victorino cataracts    GASTRIC FUNDOPLICATION N/A 7/95/5019    ROBOTIC NISSEN FUNDOPLICATION performed by Letty Orozco MD at [facility] 45 Left 2/5/2020    ROBOTIC LEFT INGUINAL HERNIA REPAIR WITH MESH performed by Letty Orozco MD at [address] Right 07/07/2016    laparoscopic right inguinal hernia repair with mesh    INGUINAL HERNIA REPAIR Right 04/12/2017    davinci recurrent right inguinal hernia repair with mesh    PTCA  2018    SIGMOIDOSCOPY  1994    TONSILLECTOMY      UPPER GASTROINTESTINAL ENDOSCOPY  05/12/94    UPPER GASTROINTESTINAL ENDOSCOPY  11/09/04    Gastritis and duodenitis         CURRENT MEDICATIONS       Discharge Medication List as of 7/1/2021 12:11 AM      CONTINUE these medications which have NOT CHANGED    Details   furosemide (LASIX) 40 MG tablet Take 1 tablet by mouth daily, Disp-60 tablet, R-3Normal      docusate sodium (COLACE, DULCOLAX) 100 MG CAPS Take 100 mg by mouth 2 times daily, Disp-60 capsule, R-1Normal      levothyroxine (SYNTHROID) 150 MCG tablet TAKE 1 TABLET BY MOUTH  DAILY, Disp-90 tablet,R-3Requesting 1 year supplyNormal      atorvastatin (LIPITOR) 80 MG tablet TAKE 1 TABLET BY MOUTH  EVERY DAY, Disp-90 tablet,R-1For high cholesterolNormal      ferrous sulfate (FE TABS) 325 (65 Fe) MG EC tablet Take 1 tablet by mouth daily (with breakfast), Disp-90 tablet, R-0Normal      enalapril (VASOTEC) 10 MG tablet Take 10 mg by mouth daily Historical Med      cetirizine (ZYRTEC) 10 MG tablet Take 10 mg by mouth daily. Multiple Vitamin (THERA) TABS Take 1 tablet by mouth daily 1 Tab daily. Historical Med      aspirin 81 MG chewable tablet Take 81 mg by mouth daily Historical Med             ALLERGIES     Reopro [abciximab injection]    FAMILY HISTORY       Family History   Problem Relation Age of Onset    Hypertension Mother     Coronary Art Dis Father     Hypertension Father     Heart Disease Father     Prostate Cancer Brother     Cancer Brother 61        Prostate    Heart Disease Sister           SOCIAL HISTORY       Social History     Socioeconomic History    Marital status:      Spouse name: Not on file    Number of children: Not on file    Years of education: Not on file    Highest education level: Not on file   Occupational History    Not on file   Tobacco Use    Smoking status: Former Smoker     Packs/day: 0.50     Years: 15.00     Pack years: 7.50     Start date:      Quit date: 1995     Years since quittin.7    Smokeless tobacco: Never Used   Vaping Use    Vaping Use: Never used   Substance and Sexual Activity    Alcohol use: No    Drug use: No    Sexual activity: Never   Other Topics Concern    Not on file   Social History Narrative    Not on file     Social Determinants of Health     Financial Resource Strain:     Difficulty of Paying Living Expenses:    Food Insecurity:     Worried About Running Out of Food in the Last Year:     Ran Out of Food in the Last Year:    Transportation Needs:     Lack of Transportation (Medical):      Lack of Transportation (Non-Medical):    Physical Activity:     Days of Exercise per Week:     Minutes of Exercise per Session:    Stress:     Feeling of Stress :    Social Connections:     Frequency of Communication with Friends and Family:     Frequency of Social Gatherings with Friends and Family:     Attends Congregation Services:     Active Member of Clubs or Organizations:     Attends Club or Organization Meetings:     Marital Status:    Intimate Partner Violence:     Fear of Current or Ex-Partner:     Emotionally Abused:     Physically Abused:     Sexually Abused:        SCREENINGS             PHYSICAL EXAM    (up to 7 for level 4, 8 or more for level 5)     ED Triage Vitals [06/30/21 2228]   BP Temp Temp src Pulse Resp SpO2 Height Weight   (!) 152/72 -- -- 109 16 97 % 5' 11\" (1.803 m) 168 lb (76.2 kg)       Physical Exam  Vitals and nursing note reviewed. Constitutional:       Appearance: Normal appearance. He is well-developed. He is not ill-appearing. HENT:      Head: Normocephalic and atraumatic. Right Ear: External ear normal.      Left Ear: External ear normal.      Nose: Nose normal.   Eyes:      General: No scleral icterus. Right eye: No discharge. Left eye: No discharge. Conjunctiva/sclera: Conjunctivae normal.   Cardiovascular:      Rate and Rhythm: Normal rate. Rhythm irregular. Heart sounds: Murmur heard. Pulmonary:      Effort: Pulmonary effort is normal. No respiratory distress. Breath sounds: Normal breath sounds. No wheezing or rales. Abdominal:      General: Bowel sounds are normal.   Musculoskeletal:         General: Swelling present. Cervical back: Neck supple. Skin:     Coloration: Skin is not pale. Neurological:      Mental Status: He is alert.    Psychiatric:         Mood and Affect: Mood normal.         Behavior: Behavior normal.             DIAGNOSTIC RESULTS     EKG: All EKG's are interpreted by the Emergency Department Physician who either signs or Co-signs this chart in the absence of a cardiologist.    12 lead EKG shows normal sinus rhythm with frequent PVCs at a rate of 78 bpm, UT interval normal as well as QTC QRS is prolonged. No acute ischemic findings. No significant changes when compared to June 22, 2021. RADIOLOGY:   Non-plain film images such as CT, Ultrasound and MRI are read by the radiologist. Plain radiographic images are visualized and preliminarily interpreted by the emergency physician with the below findings:        Interpretation per the Radiologist below, if available at the time of this note:    CT ABDOMEN PELVIS WO CONTRAST Additional Contrast? Oral   Final Result   Changes from hiatal hernia repair with no evidence for bowel obstruction. There may be a residual hiatal hernia.                ED BEDSIDE ULTRASOUND:   Performed by ED Physician - none    LABS:  Labs Reviewed   CBC WITH AUTO DIFFERENTIAL - Abnormal; Notable for the following components:       Result Value    RBC 3.19 (*)     Hemoglobin 10.0 (*)     Hematocrit 29.4 (*)     RDW 16.0 (*)     All other components within normal limits    Narrative:     Performed at:  [place] [address] [address]   Phone [phone]   COMPREHENSIVE METABOLIC PANEL W/ REFLEX TO MG FOR LOW K - Abnormal; Notable for the following components:    Sodium 125 (*)     Chloride 89 (*)     Glucose 118 (*)     CREATININE 0.6 (*)     Albumin 3.2 (*)     Albumin/Globulin Ratio 1.0 (*)     All other components within normal limits    Narrative:     Performed at:  [place] [address] [address]   Phone [phone]   BLOOD GAS, VENOUS - Abnormal; Notable for the following components:    Carboxyhemoglobin 1.9 (*)     All other components within normal limits    Narrative:     Performed at:  [place] [address] [address]   Phone [phone]   POCT GLUCOSE - Abnormal; Notable for the following components:    POC Glucose 118 (*)     All other components within normal limits    Narrative:     Performed at:  [facility]  [address] [facility],  [facility], [address]   Phone [phone]   TROPONIN    Narrative:     Performed at:  [facility]) [address], [address]   Phone [phone]   LACTIC ACID, PLASMA    Narrative:     Performed at:  [facility]) [address], [address]   Phone [phone]   URINE RT REFLEX TO CULTURE   POCT GLUCOSE       All other labs were within normal range or not returned as of this dictation. EMERGENCY DEPARTMENT COURSE and DIFFERENTIAL DIAGNOSIS/MDM:   Vitals:    Vitals:    06/30/21 2228 06/30/21 2356 07/01/21 0023   BP: (!) 152/72 128/80 132/65   Pulse: 109 71 74   Resp: 16 16 16   Temp: 98.2 °F (36.8 °C)     TempSrc: Oral     SpO2: 97% 97% 96%   Weight: 168 lb (76.2 kg)     Height: 5' 11\" (1.803 m)         Adult male who comes in because of hypoglycemia on routine labs earlier in the day. Patient is placed on cardiac blood pressure and pulse oximetry monitoring. An EKG was obtained and a CT scan of his abdomen to evaluate for any complications of his recent surgery. An Accu-Chek was obtained which was normal.  The patient did later recall that he was able to keep down some crackers and honey this evening. Laboratory studies show no leukocytosis. He has hyponatremia and hypochloremia. Creatinine is normal.  The patient has a documented history of hyponatremia. He is given some IV fluids. CT scan shows no findings of complication. I discussed all of our findings with the patient his vital signs are stable. Cardiac monitor as read interpreted by myself shows an irregular rhythm. But based on his EKG this is likely due to PVCs. Patient will be discharged home at this time. I recommend follow-up with both his primary care provider and his surgeon. Patient expresses understanding and is agreeable with the treatment plan. CRITICAL CARE TIME   None       CONSULTS:  None    PROCEDURES:       Procedures    FINAL IMPRESSION      1. Chronic hyponatremia    2. Hypoglycemia    3.  Vomiting without nausea, intractability of vomiting not specified, unspecified vomiting type          DISPOSITION/PLAN   DISPOSITION Decision To Discharge 07/01/2021 12:07:09 AM      PATIENT REFERREDTO:  [id number]  [address] [place], [place] [address]y [address]  [phone]    In 1 day      Jada Villa MD  [address] [place] [address]  Rich: [address] [place] [address]  [phone]    In 1 day        DISCHARGEMEDICATIONS:  Discharge Medication List as of 7/1/2021 12:11 AM      START taking these medications    Details   metoclopramide (REGLAN) 10 MG tablet Take 1 tablet by mouth 3 times daily (with meals), Disp-10 tablet, R-0Print                (Please note that portions of this note were completed with a voice recognition program.  Efforts were made to edit the dictations but occasionally words are mis-transcribed.)    Rigo Walden MD (electronically signed)  Attending Emergency Physician        Rigo Walden MD  07/01/21 0691

## 2021-07-01 NOTE — TELEPHONE ENCOUNTER
Nurse Access contacted Dr. Don Lara office to schedule ED f/u appt. Spoke with Mariella, she stated pt has appt scheduled.

## 2021-07-04 ENCOUNTER — TELEPHONE (OUTPATIENT)
Dept: OTHER | Facility: CLINIC | Age: 81
End: 2021-07-04

## 2021-07-04 ENCOUNTER — APPOINTMENT (OUTPATIENT)
Dept: GENERAL RADIOLOGY | Age: 81
DRG: 643 | End: 2021-07-04
Payer: MEDICARE

## 2021-07-04 ENCOUNTER — HOSPITAL ENCOUNTER (INPATIENT)
Age: 81
LOS: 8 days | Discharge: HOME | DRG: 643 | End: 2021-07-12
Attending: EMERGENCY MEDICINE | Admitting: HOSPITALIST
Payer: MEDICARE

## 2021-07-04 DIAGNOSIS — E16.1 POSTPRANDIAL HYPOGLYCEMIA: ICD-10-CM

## 2021-07-04 DIAGNOSIS — J18.9 PNEUMONIA OF LEFT LOWER LOBE DUE TO INFECTIOUS ORGANISM: Primary | ICD-10-CM

## 2021-07-04 DIAGNOSIS — D64.9 CHRONIC ANEMIA: ICD-10-CM

## 2021-07-04 DIAGNOSIS — E87.1 HYPONATREMIA: ICD-10-CM

## 2021-07-04 DIAGNOSIS — R53.83 FATIGUE, UNSPECIFIED TYPE: ICD-10-CM

## 2021-07-04 LAB
A/G RATIO: 1 (ref 1.1–2.2)
ALBUMIN SERPL-MCNC: 3 G/DL (ref 3.4–5)
ALP BLD-CCNC: 82 U/L (ref 40–129)
ALT SERPL-CCNC: 15 U/L (ref 10–40)
ANION GAP SERPL CALCULATED.3IONS-SCNC: 8 MMOL/L (ref 3–16)
AST SERPL-CCNC: 15 U/L (ref 15–37)
BASOPHILS ABSOLUTE: 0 K/UL (ref 0–0.2)
BASOPHILS RELATIVE PERCENT: 0.7 %
BILIRUB SERPL-MCNC: 0.4 MG/DL (ref 0–1)
BILIRUBIN URINE: NEGATIVE
BLOOD, URINE: NEGATIVE
BUN BLDV-MCNC: 10 MG/DL (ref 7–20)
CALCIUM SERPL-MCNC: 7.9 MG/DL (ref 8.3–10.6)
CHLORIDE BLD-SCNC: 89 MMOL/L (ref 99–110)
CLARITY: CLEAR
CO2: 26 MMOL/L (ref 21–32)
COLOR: YELLOW
CREAT SERPL-MCNC: <0.5 MG/DL (ref 0.8–1.3)
D DIMER: >5250 NG/ML DDU (ref 0–229)
EOSINOPHILS ABSOLUTE: 0.2 K/UL (ref 0–0.6)
EOSINOPHILS RELATIVE PERCENT: 2.5 %
GFR AFRICAN AMERICAN: >60
GFR NON-AFRICAN AMERICAN: >60
GLOBULIN: 3 G/DL
GLUCOSE BLD-MCNC: 132 MG/DL (ref 70–99)
GLUCOSE BLD-MCNC: 157 MG/DL (ref 70–99)
GLUCOSE BLD-MCNC: 197 MG/DL (ref 70–99)
GLUCOSE URINE: NEGATIVE MG/DL
HCT VFR BLD CALC: 27.7 % (ref 40.5–52.5)
HEMOGLOBIN: 9.8 G/DL (ref 13.5–17.5)
KETONES, URINE: NEGATIVE MG/DL
LACTIC ACID: 2 MMOL/L (ref 0.4–2)
LEUKOCYTE ESTERASE, URINE: NEGATIVE
LYMPHOCYTES ABSOLUTE: 0.8 K/UL (ref 1–5.1)
LYMPHOCYTES RELATIVE PERCENT: 11.7 %
MAGNESIUM: 2 MG/DL (ref 1.8–2.4)
MCH RBC QN AUTO: 32.6 PG (ref 26–34)
MCHC RBC AUTO-ENTMCNC: 35.4 G/DL (ref 31–36)
MCV RBC AUTO: 92 FL (ref 80–100)
MICROSCOPIC EXAMINATION: NORMAL
MONOCYTES ABSOLUTE: 0.4 K/UL (ref 0–1.3)
MONOCYTES RELATIVE PERCENT: 5.8 %
NEUTROPHILS ABSOLUTE: 5.4 K/UL (ref 1.7–7.7)
NEUTROPHILS RELATIVE PERCENT: 79.3 %
NITRITE, URINE: NEGATIVE
PDW BLD-RTO: 17 % (ref 12.4–15.4)
PERFORMED ON: ABNORMAL
PERFORMED ON: ABNORMAL
PH UA: 6 (ref 5–8)
PLATELET # BLD: 189 K/UL (ref 135–450)
PMV BLD AUTO: 6.5 FL (ref 5–10.5)
POTASSIUM REFLEX MAGNESIUM: 3.5 MMOL/L (ref 3.5–5.1)
PRO-BNP: 952 PG/ML (ref 0–449)
PROTEIN UA: NEGATIVE MG/DL
RBC # BLD: 3.01 M/UL (ref 4.2–5.9)
SODIUM BLD-SCNC: 123 MMOL/L (ref 136–145)
SPECIFIC GRAVITY UA: 1.02 (ref 1–1.03)
TOTAL PROTEIN: 6 G/DL (ref 6.4–8.2)
TROPONIN: <0.01 NG/ML
URINE TYPE: NORMAL
UROBILINOGEN, URINE: 1 E.U./DL
WBC # BLD: 6.8 K/UL (ref 4–11)

## 2021-07-04 PROCEDURE — 2580000003 HC RX 258: Performed by: HOSPITALIST

## 2021-07-04 PROCEDURE — 85025 COMPLETE CBC W/AUTO DIFF WBC: CPT

## 2021-07-04 PROCEDURE — 36415 COLL VENOUS BLD VENIPUNCTURE: CPT

## 2021-07-04 PROCEDURE — 83735 ASSAY OF MAGNESIUM: CPT

## 2021-07-04 PROCEDURE — 83036 HEMOGLOBIN GLYCOSYLATED A1C: CPT

## 2021-07-04 PROCEDURE — 87040 BLOOD CULTURE FOR BACTERIA: CPT

## 2021-07-04 PROCEDURE — 80053 COMPREHEN METABOLIC PANEL: CPT

## 2021-07-04 PROCEDURE — 81003 URINALYSIS AUTO W/O SCOPE: CPT

## 2021-07-04 PROCEDURE — 83880 ASSAY OF NATRIURETIC PEPTIDE: CPT

## 2021-07-04 PROCEDURE — 85379 FIBRIN DEGRADATION QUANT: CPT

## 2021-07-04 PROCEDURE — 96367 TX/PROPH/DG ADDL SEQ IV INF: CPT

## 2021-07-04 PROCEDURE — 6370000000 HC RX 637 (ALT 250 FOR IP): Performed by: INTERNAL MEDICINE

## 2021-07-04 PROCEDURE — 83605 ASSAY OF LACTIC ACID: CPT

## 2021-07-04 PROCEDURE — 84484 ASSAY OF TROPONIN QUANT: CPT

## 2021-07-04 PROCEDURE — 2580000003 HC RX 258: Performed by: EMERGENCY MEDICINE

## 2021-07-04 PROCEDURE — 71045 X-RAY EXAM CHEST 1 VIEW: CPT

## 2021-07-04 PROCEDURE — 96375 TX/PRO/DX INJ NEW DRUG ADDON: CPT

## 2021-07-04 PROCEDURE — 2060000000 HC ICU INTERMEDIATE R&B

## 2021-07-04 PROCEDURE — 99284 EMERGENCY DEPT VISIT MOD MDM: CPT

## 2021-07-04 PROCEDURE — 6360000002 HC RX W HCPCS: Performed by: EMERGENCY MEDICINE

## 2021-07-04 PROCEDURE — 6370000000 HC RX 637 (ALT 250 FOR IP): Performed by: HOSPITALIST

## 2021-07-04 PROCEDURE — 96365 THER/PROPH/DIAG IV INF INIT: CPT

## 2021-07-04 PROCEDURE — 6360000002 HC RX W HCPCS: Performed by: HOSPITALIST

## 2021-07-04 PROCEDURE — 93005 ELECTROCARDIOGRAM TRACING: CPT | Performed by: EMERGENCY MEDICINE

## 2021-07-04 RX ORDER — FERROUS SULFATE 325(65) MG
325 TABLET ORAL
Status: DISCONTINUED | OUTPATIENT
Start: 2021-07-05 | End: 2021-07-12 | Stop reason: HOSPADM

## 2021-07-04 RX ORDER — ACETAMINOPHEN 650 MG/1
650 SUPPOSITORY RECTAL EVERY 6 HOURS PRN
Status: DISCONTINUED | OUTPATIENT
Start: 2021-07-04 | End: 2021-07-12 | Stop reason: HOSPADM

## 2021-07-04 RX ORDER — ACETAMINOPHEN 325 MG/1
650 TABLET ORAL EVERY 6 HOURS PRN
Status: DISCONTINUED | OUTPATIENT
Start: 2021-07-04 | End: 2021-07-12 | Stop reason: HOSPADM

## 2021-07-04 RX ORDER — FUROSEMIDE 40 MG/1
40 TABLET ORAL DAILY
Status: DISCONTINUED | OUTPATIENT
Start: 2021-07-04 | End: 2021-07-05

## 2021-07-04 RX ORDER — DOCUSATE SODIUM 100 MG/1
100 CAPSULE, LIQUID FILLED ORAL 2 TIMES DAILY
Status: DISCONTINUED | OUTPATIENT
Start: 2021-07-04 | End: 2021-07-12 | Stop reason: HOSPADM

## 2021-07-04 RX ORDER — ENALAPRIL MALEATE 10 MG/1
10 TABLET ORAL DAILY
Status: DISCONTINUED | OUTPATIENT
Start: 2021-07-04 | End: 2021-07-06

## 2021-07-04 RX ORDER — SODIUM CHLORIDE 9 MG/ML
25 INJECTION, SOLUTION INTRAVENOUS PRN
Status: DISCONTINUED | OUTPATIENT
Start: 2021-07-04 | End: 2021-07-12 | Stop reason: HOSPADM

## 2021-07-04 RX ORDER — CETIRIZINE HYDROCHLORIDE 10 MG/1
10 TABLET ORAL DAILY
Status: DISCONTINUED | OUTPATIENT
Start: 2021-07-04 | End: 2021-07-12 | Stop reason: HOSPADM

## 2021-07-04 RX ORDER — LEVOTHYROXINE SODIUM 0.15 MG/1
150 TABLET ORAL DAILY
Status: DISCONTINUED | OUTPATIENT
Start: 2021-07-05 | End: 2021-07-12 | Stop reason: HOSPADM

## 2021-07-04 RX ORDER — DEXTROSE MONOHYDRATE 50 MG/ML
100 INJECTION, SOLUTION INTRAVENOUS PRN
Status: DISCONTINUED | OUTPATIENT
Start: 2021-07-04 | End: 2021-07-11

## 2021-07-04 RX ORDER — ASPIRIN 81 MG/1
81 TABLET, CHEWABLE ORAL DAILY
Status: DISCONTINUED | OUTPATIENT
Start: 2021-07-04 | End: 2021-07-12 | Stop reason: HOSPADM

## 2021-07-04 RX ORDER — FAMOTIDINE 20 MG/1
20 TABLET, FILM COATED ORAL 2 TIMES DAILY
Status: DISCONTINUED | OUTPATIENT
Start: 2021-07-04 | End: 2021-07-08

## 2021-07-04 RX ORDER — ALBUTEROL SULFATE 2.5 MG/3ML
2.5 SOLUTION RESPIRATORY (INHALATION) EVERY 6 HOURS PRN
Status: DISCONTINUED | OUTPATIENT
Start: 2021-07-04 | End: 2021-07-12 | Stop reason: HOSPADM

## 2021-07-04 RX ORDER — POLYETHYLENE GLYCOL 3350 17 G/17G
17 POWDER, FOR SOLUTION ORAL DAILY PRN
Status: DISCONTINUED | OUTPATIENT
Start: 2021-07-04 | End: 2021-07-12 | Stop reason: HOSPADM

## 2021-07-04 RX ORDER — ONDANSETRON 4 MG/1
4 TABLET, ORALLY DISINTEGRATING ORAL EVERY 8 HOURS PRN
Status: DISCONTINUED | OUTPATIENT
Start: 2021-07-04 | End: 2021-07-12 | Stop reason: HOSPADM

## 2021-07-04 RX ORDER — SODIUM CHLORIDE 0.9 % (FLUSH) 0.9 %
5-40 SYRINGE (ML) INJECTION PRN
Status: DISCONTINUED | OUTPATIENT
Start: 2021-07-04 | End: 2021-07-12 | Stop reason: HOSPADM

## 2021-07-04 RX ORDER — DEXTROSE MONOHYDRATE 25 G/50ML
12.5 INJECTION, SOLUTION INTRAVENOUS PRN
Status: DISCONTINUED | OUTPATIENT
Start: 2021-07-04 | End: 2021-07-12 | Stop reason: HOSPADM

## 2021-07-04 RX ORDER — TOLVAPTAN 30 MG/1
30 TABLET ORAL ONCE
Status: COMPLETED | OUTPATIENT
Start: 2021-07-04 | End: 2021-07-04

## 2021-07-04 RX ORDER — ONDANSETRON 2 MG/ML
4 INJECTION INTRAMUSCULAR; INTRAVENOUS EVERY 6 HOURS PRN
Status: DISCONTINUED | OUTPATIENT
Start: 2021-07-04 | End: 2021-07-12 | Stop reason: HOSPADM

## 2021-07-04 RX ORDER — SODIUM CHLORIDE 0.9 % (FLUSH) 0.9 %
5-40 SYRINGE (ML) INJECTION EVERY 12 HOURS SCHEDULED
Status: DISCONTINUED | OUTPATIENT
Start: 2021-07-04 | End: 2021-07-12 | Stop reason: HOSPADM

## 2021-07-04 RX ORDER — MULTIVITAMIN,THERAPEUTIC
1 TABLET ORAL DAILY
Status: DISCONTINUED | OUTPATIENT
Start: 2021-07-04 | End: 2021-07-05 | Stop reason: DRUGHIGH

## 2021-07-04 RX ORDER — ATORVASTATIN CALCIUM 80 MG/1
80 TABLET, FILM COATED ORAL NIGHTLY
Status: DISCONTINUED | OUTPATIENT
Start: 2021-07-04 | End: 2021-07-12 | Stop reason: HOSPADM

## 2021-07-04 RX ORDER — DOXYCYCLINE HYCLATE 100 MG
100 TABLET ORAL EVERY 12 HOURS SCHEDULED
Status: DISCONTINUED | OUTPATIENT
Start: 2021-07-04 | End: 2021-07-05

## 2021-07-04 RX ORDER — NICOTINE POLACRILEX 4 MG
15 LOZENGE BUCCAL PRN
Status: DISCONTINUED | OUTPATIENT
Start: 2021-07-04 | End: 2021-07-12 | Stop reason: HOSPADM

## 2021-07-04 RX ADMIN — DOXYCYCLINE HYCLATE 100 MG: 100 TABLET, COATED ORAL at 21:43

## 2021-07-04 RX ADMIN — CEFEPIME HYDROCHLORIDE 2000 MG: 2 INJECTION, POWDER, FOR SOLUTION INTRAVENOUS at 16:14

## 2021-07-04 RX ADMIN — SODIUM CHLORIDE 25 ML: 9 INJECTION, SOLUTION INTRAVENOUS at 21:03

## 2021-07-04 RX ADMIN — DOCUSATE SODIUM 100 MG: 100 CAPSULE, LIQUID FILLED ORAL at 21:02

## 2021-07-04 RX ADMIN — ATORVASTATIN CALCIUM 80 MG: 80 TABLET, FILM COATED ORAL at 20:43

## 2021-07-04 RX ADMIN — ASPIRIN 81 MG: 81 TABLET, CHEWABLE ORAL at 20:43

## 2021-07-04 RX ADMIN — CEFTRIAXONE SODIUM 1000 MG: 1 INJECTION, POWDER, FOR SOLUTION INTRAMUSCULAR; INTRAVENOUS at 21:04

## 2021-07-04 RX ADMIN — Medication 10 ML: at 20:44

## 2021-07-04 RX ADMIN — VANCOMYCIN HYDROCHLORIDE 1000 MG: 1 INJECTION, POWDER, LYOPHILIZED, FOR SOLUTION INTRAVENOUS at 18:08

## 2021-07-04 RX ADMIN — AZITHROMYCIN MONOHYDRATE 500 MG: 500 INJECTION, POWDER, LYOPHILIZED, FOR SOLUTION INTRAVENOUS at 16:52

## 2021-07-04 RX ADMIN — TOLVAPTAN 30 MG: 30 TABLET ORAL at 18:04

## 2021-07-04 ASSESSMENT — PAIN SCALES - GENERAL
PAINLEVEL_OUTOF10: 0
PAINLEVEL_OUTOF10: 0

## 2021-07-04 NOTE — H&P
Hospital Medicine History & Physical      PCP: Taylor Kwan    Date of Admission: 7/4/2021    Date of Service: Pt seen/examined on 7/4/2021 and Admitted to Inpatient with expected LOS greater than two midnights due to medical therapy. Chief Complaint: Cough and feeling tired      History Of Present Illness:      79 y.o. male who presented to [facility] with cough for 1 week, feeling tired and sweaty no history for fever chills no chest pain positive shortness of breath positive cough with some sticky phlegm per patient's wife no nausea vomiting no diarrhea no melena no hematochezia no recent weight loss or weight gain. He does not smoke or drink alcohol. Patient with a past medical history of coronary artery disease status post stents followed by Dr. Daniela Barraza cardiologist at [facility], hypertension, hyperlipidemia, hypothyroidism, CHF with a EF of 30%, hyponatremia, SIADH status post hiatal hernia surgery on 6/25/2021 by Dr. Latia Le. In the emergency room patient was noted to have a hyponatremia sodium of 123, hyperglycemia with a blood sugar of 33 when EMS arrived at home was given a D10 on the route with a normal blood sugar in the emergency room. Possible left lower lobe pneumonia culture sent in the emergency room started on HCAP pneumonia coverage by ER physician. Hyponatremia nephrology was consulted by ER physician recommended no IV fluid [place] per nephrology.     Past Medical History:          Diagnosis Date    Allergic rhinitis     Anemia     Arthritis     rt hip    CAD (coronary artery disease) 2001    cardiac stents    Chronic systolic congestive heart failure ([address] 8/21/2018    Compression fracture of T11 vertebra (HCC)     back brace    Food allergy     Hiatal hernia     History of blood transfusion     platelets=2001    Hyperlipidemia     Hypertension     hx of    Hypothyroid     Obesity 10/2/2012    Occlusion and stenosis of carotid artery 10/2/2012    Osteoarthritis     Hip right    Recurrent right inguinal hernia     Shingles 2014    Thyroid disease     TIA (transient ischemic attack)        Past Surgical History:          Procedure Laterality Date    AORTIC VALVE REPLACEMENT  2018    CARDIAC SURGERY  2001    stents    CARDIAC VALVE REPLACEMENT  09/15/2018    COLONOSCOPY  04/22/99    COLONOSCOPY  7/13/2015    EYE SURGERY      victorino cataracts    GASTRIC FUNDOPLICATION N/A 0/34/4104    ROBOTIC NISSEN FUNDOPLICATION performed by Heber Darby MD at [facility] 45 Left 2/5/2020    ROBOTIC LEFT INGUINAL HERNIA REPAIR WITH MESH performed by Heber Darby MD at [address] Right 07/07/2016    laparoscopic right inguinal hernia repair with mesh    INGUINAL HERNIA REPAIR Right 04/12/2017    davinci recurrent right inguinal hernia repair with mesh    PTCA  2018    SIGMOIDOSCOPY  1994    TONSILLECTOMY      UPPER GASTROINTESTINAL ENDOSCOPY  05/12/94    UPPER GASTROINTESTINAL ENDOSCOPY  11/09/04    Gastritis and duodenitis       Medications Prior to Admission:      Prior to Admission medications    Medication Sig Start Date End Date Taking?  Authorizing Provider   metoclopramide (REGLAN) 10 MG tablet Take 1 tablet by mouth 3 times daily (with meals) 7/1/21   Duc Hua MD   furosemide (LASIX) 40 MG tablet Take 1 tablet by mouth daily 6/26/21   VALE Weems - CNP   docusate sodium (COLACE, DULCOLAX) 100 MG CAPS Take 100 mg by mouth 2 times daily  Patient taking differently: Take 100 mg by mouth 2 times daily as needed  5/7/21   Gilmar Her MD   levothyroxine (SYNTHROID) 150 MCG tablet TAKE 1 TABLET BY MOUTH  DAILY 10/16/20   VALE Pinon CNP   atorvastatin (LIPITOR) 80 MG tablet TAKE 1 TABLET BY MOUTH  EVERY DAY  Patient taking differently: Take 80 mg by mouth nightly TAKE 1 TABLET BY MOUTH EVERY DAY 6/12/20   Gabriella Parikh MD   ferrous sulfate (FE TABS) 325 (65 Fe) MG EC tablet Take 1 tablet by mouth daily (with breakfast) 6/28/19   Xochilt Elliott, APRN - CNP   enalapril (VASOTEC) 10 MG tablet Take 10 mg by mouth daily  11/20/18   Historical Provider, MD   cetirizine (ZYRTEC) 10 MG tablet Take 10 mg by mouth daily. Historical Provider, MD   Multiple Vitamin (THERA) TABS Take 1 tablet by mouth daily 1 Tab daily. Historical Provider, MD   aspirin 81 MG chewable tablet Take 81 mg by mouth daily     Historical Provider, MD       Allergies:  Reopro [abciximab injection]    Social History:      The patient currently lives with his wife. TOBACCO:   reports that he quit smoking about 25 years ago. He started smoking about 62 years ago. He has a 7.50 pack-year smoking history. He has never used smokeless tobacco.  ETOH:   reports no history of alcohol use. E-Cigarettes/Vaping Use     Questions Responses    E-Cigarette/Vaping Use Never User    Start Date     Passive Exposure     Quit Date     Counseling Given     Comments Unknown            Family History:       Reviewed in detail and negative for DM, CAD, Cancer, CVA. Positive as follows:        Problem Relation Age of Onset    Hypertension Mother     Coronary Art Dis Father     Hypertension Father     Heart Disease Father     Prostate Cancer Brother     Cancer Brother 61        Prostate    Heart Disease Sister        REVIEW OF SYSTEMS COMPLETED:   Pertinent positives as noted in the HPI. All other systems reviewed and negative. PHYSICAL EXAM PERFORMED:    BP (!) 104/50   Pulse 50   Temp 98.6 °F (37 °C) (Rectal)   Resp 13   Ht 5' 11\" (1.803 m)   Wt 168 lb (76.2 kg)   SpO2 97%   BMI 23.43 kg/m²     General appearance:  No apparent distress, appears stated age and cooperative. HEENT:  Normal cephalic, atraumatic without obvious deformity. Pupils equal, round, and reactive to light. Extra ocular muscles intact. Conjunctivae/corneas clear. Neck: Supple, with full range of motion. No jugular venous distention.  Trachea hyponatremia,  nephrology consulted by ER physician, Yimi Rainey per nephrology continue to monitor sodium continue with the Lasix due to history of CHF. 2.  History of coronary artery disease patient denies any chest pain continue with home medication. 3.  Hyperlipidemia continue with a statin. 4.  Hypothyroidism continue with the Synthroid. Recently elevated TSH normal free T4 on 6/23/2021.  5.  Patient was noted to be hypoglycemia when EMS arrived Accu-Chek. 6.  Lower extremity edema check Doppler of lower extremity check D-dimer continue with the Lasix. If Doppler negative Ace wrap to lower extremity. 7.  Possible healthcare associated pneumonia patient is afebrile no leukocytosis check procalcitonin continue with Rocephin and doxycycline. 8.  Consulted physical Occupational Therapy. DVT Prophylaxis: Lovenox subcu  Diet: No diet orders on file  Code Status: Prior    PT/OT Eval Status: Consulted    Dispo -pending improvement in sodium and PT OT evaluation       Calista Felty, MD    Thank you Shiraz Rodriguez for the opportunity to be involved in this patient's care. If you have any questions or concerns please feel free to contact me at [phone].

## 2021-07-04 NOTE — ED NOTES
Blood culture set #2 drawn from [facility] with butterfly. Bottle tops scrubbed with alcohol pads. Site prepped with Prevantics swab, 15 seconds per side, and allowed to dry for 30 seconds prior to venipuncture. Red waste tube drawn prior to collection of specimen.        Brittny Holman RN  07/04/21 [id number]

## 2021-07-04 NOTE — ED NOTES
Bed: 01  Expected date:   Expected time:   Means of arrival:   Comments:  [address] RN  07/04/21 [id number]

## 2021-07-04 NOTE — ED PROVIDER NOTES
[facility] Emergency Department      CHIEF COMPLAINT  Fatigue (patient called squad this AM for fatigue, seen last Wednesday for same issue. Squad had BG of 33. Squad adiministered bag of D10. Patient denies pain, nausea or vomiting.)      HISTORY OF PRESENT ILLNESS  Maral Ma is a 79 y.o. male with a history of SIADH with chronic hyponatremia, coronary disease with stents in place and chronic CHF with last EF of 40% presents with generalized fatigue and malaise. He was admitted at the end of June for acute on chronic hyponatremia and fluid overload. Apparently his primary doctor taken him off of his Lasix with concern this was contributing to his hyponatremia. He then became fluid overloaded. When he was discharged after the admission at the end of June his sodium was 133. He was in the ER last week after he had a low glucose on outpatient lab work. He was having some nausea and vomiting at that time. Today he states he just feels very weak and fatigued. He has no energy. He denies any pain. He is not having vomiting or diarrhea. No chest pain. He did have some mild shortness of breath this morning but no fever or cough. He states he just feels extremely weak and lethargic. No other complaints, modifying factors or associated symptoms. I have reviewed the following from the nursing documentation.     Past Medical History:   Diagnosis Date    Allergic rhinitis     Anemia     Arthritis     rt hip    CAD (coronary artery disease) 2001    cardiac stents    Chronic systolic congestive heart failure ([facility] [address] 8/21/2018    Compression fracture of T11 vertebra (HCC)     back brace    Food allergy     Hiatal hernia     History of blood transfusion     platelets=2001    Hyperlipidemia     Hypertension     hx of    Hypothyroid     Obesity 10/2/2012    Occlusion and stenosis of carotid artery 10/2/2012    Osteoarthritis     Hip right    Recurrent right inguinal hernia     Shingles     Thyroid disease     TIA (transient ischemic attack)      Past Surgical History:   Procedure Laterality Date    AORTIC VALVE REPLACEMENT  2018    CARDIAC SURGERY  2001    stents    CARDIAC VALVE REPLACEMENT  09/15/2018    COLONOSCOPY  99    COLONOSCOPY  2015    EYE SURGERY      victorino cataracts    GASTRIC FUNDOPLICATION N/A     ROBOTIC NISSEN FUNDOPLICATION performed by Jada Villa MD at [facility] 45 Left 2020    ROBOTIC LEFT INGUINAL HERNIA REPAIR WITH MESH performed by Jada Villa MD at [address] Right 2016    laparoscopic right inguinal hernia repair with mesh    INGUINAL HERNIA REPAIR Right 2017    davinci recurrent right inguinal hernia repair with mesh    PTCA  2018    SIGMOIDOSCOPY  1994    TONSILLECTOMY      UPPER GASTROINTESTINAL ENDOSCOPY  94    UPPER GASTROINTESTINAL ENDOSCOPY  04    Gastritis and duodenitis     Family History   Problem Relation Age of Onset    Hypertension Mother     Coronary Art Dis Father     Hypertension Father     Heart Disease Father     Prostate Cancer Brother     Cancer Brother 61        Prostate    Heart Disease Sister      Social History     Socioeconomic History    Marital status:      Spouse name: Not on file    Number of children: Not on file    Years of education: Not on file    Highest education level: Not on file   Occupational History    Not on file   Tobacco Use    Smoking status: Former Smoker     Packs/day: 0.50     Years: 15.00     Pack years: 7.50     Start date:      Quit date: 1995     Years since quittin.7    Smokeless tobacco: Never Used   Vaping Use    Vaping Use: Never used   Substance and Sexual Activity    Alcohol use: No    Drug use: No    Sexual activity: Never   Other Topics Concern    Not on file   Social History Narrative    Not on file     Social Determinants of Health     Financial sodium chloride flush 0.9 % injection 5-40 mL  5-40 mL Intravenous PRN Danny MD Glendy        0.9 % sodium chloride infusion  25 mL Intravenous PRN Danny Pike MD   Stopped at 07/04/21 2143    [START ON 7/5/2021] enoxaparin (LOVENOX) injection 40 mg  40 mg Subcutaneous Daily Danny Pike MD        ondansetron (ZOFRAN-ODT) disintegrating tablet 4 mg  4 mg Oral Q8H PRN Danny MD Glendy        Or    ondansetron [facility]) injection 4 mg  4 mg Intravenous Q6H PRN Danny MD Glendy        polyethylene glycol (GLYCOLAX) packet 17 g  17 g Oral Daily PRN Danny MD Glendy        acetaminophen (TYLENOL) tablet 650 mg  650 mg Oral Q6H PRN Danny MD Glendy        Or   [facility] acetaminophen (TYLENOL) suppository 650 mg  650 mg Rectal Q6H PRN Danny MD Glendy        famotidine (PEPCID) tablet 20 mg  20 mg Oral BID Danny MD Glendy        albuterol (PROVENTIL) nebulizer solution 2.5 mg  2.5 mg Nebulization Q6H PRN Danny MD Glendy        cefTRIAXone (ROCEPHIN) 1000 mg IVPB in 50 mL D5W minibag  1,000 mg Intravenous Q24H Danny Pike MD   Stopped at 07/04/21 2134    And    doxycycline hyclate (VIBRA-TABS) tablet 100 mg  100 mg Oral 2 times per day Danny Pike MD   100 mg at 07/04/21 2143    glucose (GLUTOSE) 40 % oral gel 15 g  15 g Oral PRN Danny MD Glendy        dextrose 50 % IV solution  12.5 g Intravenous PRN Danny MD Glendy        glucagon (rDNA) injection 1 mg  1 mg Intramuscular PRN Danny MD Glendy        dextrose 5 % solution  100 mL/hr Intravenous PRN Danny Pike MD         Allergies   Allergen Reactions    Reopro [Abciximab Injection]      \"destroyed platelets\"       REVIEW OF SYSTEMS  10 systems reviewed, pertinent positives per HPI otherwise noted to be negative.     PHYSICAL EXAM  /70   Pulse 72   Temp 97.4 °F (36.3 °C) (Oral)   Resp 18   Ht 5' 11\" (1.803 m)   Wt 163 lb 5.8 oz (74.1 kg)   SpO2 97%   BMI 22.78 kg/m²   GENERAL APPEARANCE: Awake and alert. Cooperative. Frail, chronically ill-appearing. HEAD: Normocephalic. Atraumatic. EYES: PERRL. EOM's grossly intact. ENT: Mucous membranes are dry. NECK: Supple, trachea midline. HEART: RRR. LUNGS: Respirations unlabored. No wheezes, rales or rhonchi. He does have diminished breath sounds at bilateral bases. ABDOMEN: Soft. Non-distended. Non-tender. No guarding or rebound. EXTREMITIES: N 1+ pitting edema to bilateral lower extremities. MAEE. No acute deformities. SKIN: Warm, dry and intact. No acute rashes. NEUROLOGICAL: Alert and oriented X 3. CN II-XII grossly intact. Globally weak but neurologic exam is symmetric bilaterally. Sensation intact. PSYCHIATRIC: Normal mood and affect. LABS  I have reviewed all labs for this visit.    Results for orders placed or performed during the hospital encounter of 07/04/21   CBC auto differential   Result Value Ref Range    WBC 6.8 4.0 - 11.0 K/uL    RBC 3.01 (L) 4.20 - 5.90 M/uL    Hemoglobin 9.8 (L) 13.5 - 17.5 g/dL    Hematocrit 27.7 (L) 40.5 - 52.5 %    MCV 92.0 80.0 - 100.0 fL    MCH 32.6 26.0 - 34.0 pg    MCHC 35.4 31.0 - 36.0 g/dL    RDW 17.0 (H) 12.4 - 15.4 %    Platelets 119 223 - 488 K/uL    MPV 6.5 5.0 - 10.5 fL    Neutrophils % 79.3 %    Lymphocytes % 11.7 %    Monocytes % 5.8 %    Eosinophils % 2.5 %    Basophils % 0.7 %    Neutrophils Absolute 5.4 1.7 - 7.7 K/uL    Lymphocytes Absolute 0.8 (L) 1.0 - 5.1 K/uL    Monocytes Absolute 0.4 0.0 - 1.3 K/uL    Eosinophils Absolute 0.2 0.0 - 0.6 K/uL    Basophils Absolute 0.0 0.0 - 0.2 K/uL   Comprehensive Metabolic Panel w/ Reflex to MG   Result Value Ref Range    Sodium 123 (L) 136 - 145 mmol/L    Potassium reflex Magnesium 3.5 3.5 - 5.1 mmol/L    Chloride 89 (L) 99 - 110 mmol/L    CO2 26 21 - 32 mmol/L    Anion Gap 8 3 - 16    Glucose 132 (H) 70 - 99 mg/dL    BUN 10 7 - 20 mg/dL    CREATININE <0.5 (L) 0.8 - 1.3 mg/dL    GFR Non-African American >60 >60    GFR  American >60 >60    Calcium 7.9 (L) 8.3 - 10.6 mg/dL    Total Protein 6.0 (L) 6.4 - 8.2 g/dL    Albumin 3.0 (L) 3.4 - 5.0 g/dL    Albumin/Globulin Ratio 1.0 (L) 1.1 - 2.2    Total Bilirubin 0.4 0.0 - 1.0 mg/dL    Alkaline Phosphatase 82 40 - 129 U/L    ALT 15 10 - 40 U/L    AST 15 15 - 37 U/L    Globulin 3.0 g/dL   Urinalysis   Result Value Ref Range    Color, UA Yellow Straw/Yellow    Clarity, UA Clear Clear    Glucose, Ur Negative Negative mg/dL    Bilirubin Urine Negative Negative    Ketones, Urine Negative Negative mg/dL    Specific Gravity, UA 1.025 1.005 - 1.030    Blood, Urine Negative Negative    pH, UA 6.0 5.0 - 8.0    Protein, UA Negative Negative mg/dL    Urobilinogen, Urine 1.0 <2.0 E.U./dL    Nitrite, Urine Negative Negative    Leukocyte Esterase, Urine Negative Negative    Microscopic Examination Not Indicated     Urine Type NotGiven    Lactic Acid, Plasma   Result Value Ref Range    Lactic Acid 2.0 0.4 - 2.0 mmol/L   Troponin   Result Value Ref Range    Troponin <0.01 <0.01 ng/mL   Magnesium   Result Value Ref Range    Magnesium 2.00 1.80 - 2.40 mg/dL   Brain Natriuretic Peptide   Result Value Ref Range    Pro- (H) 0 - 449 pg/mL   POCT Glucose   Result Value Ref Range    POC Glucose 157 (H) 70 - 99 mg/dl    Performed on ACCU-CHEK    POCT Glucose   Result Value Ref Range    POC Glucose 197 (H) 70 - 99 mg/dl    Performed on ACCU-CHEK    EKG 12 Lead   Result Value Ref Range    Ventricular Rate 49 BPM    Atrial Rate 49 BPM    P-R Interval 168 ms    QRS Duration 112 ms    Q-T Interval 490 ms    QTc Calculation (Bazett) 442 ms    P Axis 71 degrees    R Axis 34 degrees    T Axis 65 degrees    Diagnosis       Marked sinus bradycardia with occasional Premature ventricular complexesAbnormal ECGWhen compared with ECG of 30-JUN-2021 22:37,Vent.  rate has decreased BY  29 BPM       EKG  The Ekg interpreted by myself  sinus bradycardia, rate=49 with a rate of 49  Axis is   Normal  QTc is  normal  Intervals and Durations are unremarkable. No specific ST-T wave changes appreciated. No evidence of acute ischemia. Sinus bradycardia has replaced normal sinus rhythm when compared to the EKG from June 30, 2021. Cardiac Monitoring: The cardiac monitor revealed normal sinus rhythm as interpreted by me. The cardiac monitor was ordered secondary to the patient's complaint of generalized weakness and to monitor the patient for dysrhythmia. RADIOLOGY  X-RAYS:  I have reviewed radiologic plain film image(s). ALL OTHER NON-PLAIN FILM IMAGES SUCH AS CT, ULTRASOUND AND MRI HAVE BEEN READ BY THE RADIOLOGIST. XR CHEST PORTABLE   Final Result   Left basilar opacity, atelectasis versus pneumonia. Cardiomegaly. VL Extremity Venous Bilateral    (Results Pending)              Rechecks: Physical assessment performed. Patient is resting comfortably. He has been updated on his lab work and plan for admission. Critical Care: I personally spent a total of 50 minutes of critical care time in obtaining history, performing a physical exam, bedside monitoring of interventions, collecting and interpreting tests and discussion with consultants but excluding time spent performing procedures, treating other patients and teaching time. ED COURSE/MDM  Patient seen and evaluated. Here the patient is afebrile with sinus bradycardia, no hypoxia, other vitals normal. Old records reviewed. Labs and imaging reviewed and results discussed with patient. Here the patient has recurrent hyponatremia with a sodium of 123. No UTI. Chest x-ray shows left basilar opacity concerning for pneumonia. His wife is now here who states he has been coughing up a significant amount of \"stringy phlegm\". This is consistent with his chest x-ray. Blood cultures have been sent and I have ordered antibiotics.  I did speak with  Pamela Hung who is on-call for nephrology. He has recommended Tolvaptan and he is going to order this for the patient's hyponatremia. He has been compliant with his Lasix. I will admit the patient to the hospitalist. Patient was reassessed as noted above . Plan of care discussed with patient. Patient in agreement with plan. CLINICAL IMPRESSION  1. Pneumonia of left lower lobe due to infectious organism    2. Hyponatremia    3. Fatigue, unspecified type    4. Chronic anemia        Blood pressure 124/70, pulse 72, temperature 97.4 °F (36.3 °C), temperature source Oral, resp. rate 18, height 5' 11\" (1.803 m), weight 163 lb 5.8 oz (74.1 kg), SpO2 97 %. [facility] was admitted in stable condition.     (Please note this note was completed with a voice recognition program.  Efforts were made to edit the dictations but occasionally words are mis-transcribed.)       Rhett Krishna MD  07/04/21 [id number]

## 2021-07-04 NOTE — TELEPHONE ENCOUNTER
Writer contacted ED provider to inform of 30 day readmission risk. ED provider informed writer of potential readmission.

## 2021-07-04 NOTE — CONSULTS
Thank you to requesting provider: Dr. Yoli Mendiola  , for asking us to see Dustin Bowman  Reason for consultation:  Hyponatremia   Chief Complaint:  Weakness     History of Presenting Illness      78 y/o male with history of systolic heart failure and recent admission brought back to the ER with weakness and found to have pneumonia. His EF is 40% and he has chronic LE edema. He is taking lasix and has chronic hyponatremia. During the last admission we were able to get him up to 133 with intermittent doses of tolvaptan. Sodium is now back to 123. He could not even get him self up today so 911 was called.         Past Medical/Surgical History      Active Ambulatory Problems     Diagnosis Date Noted    Generalized weakness 09/27/2012    Hypothyroid     Food allergy     CAD (coronary artery disease)     TIA (transient ischemic attack)     Hyperlipidemia     Osteoarthritis 11/21/2012    Cough 04/01/2013    Herpes zoster 12/12/2013    Vertigo 11/19/2014    Eustachian tube dysfunction 11/19/2014    Aortic stenosis 12/04/2015    Unilateral recurrent inguinal hernia without obstruction or gangrene 03/31/2017    Murmur 04/24/2013    Obesity 10/02/2012    Occlusion and stenosis of carotid artery 10/02/2012    Stented coronary artery 04/24/2013    Groin pain, chronic, right 11/28/2017    Chronic systolic congestive heart failure ([address] 08/21/2018    Non-recurrent unilateral inguinal hernia without obstruction or gangrene 12/13/2019    Postoperative pain     Postoperative hematoma of subcutaneous tissue following non-dermatologic procedure 02/21/2020    SBO (small bowel obstruction) ([address] 05/03/2021    Hiatal hernia     Paraesophageal hernia 05/25/2021    Transaminitis 06/01/2021    Chronic hyponatremia 06/22/2021    Hypoalbuminemia 06/22/2021    Pleural effusion on right 06/22/2021    Acute on chronic combined systolic and diastolic CHF (congestive heart failure) ([address] 06/22/2021 Resolved Ambulatory Problems     Diagnosis Date Noted    Nausea 2012    CAD (coronary artery disease) 2012    HTN (hypertension) 2012    HLD (hyperlipidemia) 2012    FHx: prostate cancer     URI (upper respiratory infection) 2013    Cerumen impaction 2013    Sinusitis 2014    Unilateral inguinal hernia without obstruction or gangrene 2016    Postop check 07/15/2016     Past Medical History:   Diagnosis Date    Allergic rhinitis     Anemia     Arthritis     Compression fracture of T11 vertebra (HCC)     History of blood transfusion     Hypertension     Recurrent right inguinal hernia     Shingles 2014    Thyroid disease          Review of Systems     Constitutional:  No weight loss, no fever/chills  Eyes:  No eye pain, no eye redness  Cardiovascular:  No chest pain, + edema  Respiratory:  No hemoptysis, no stridor  Gastrointestinal:  No blood in stool, no n/v, no diarrhea  Genitoruinary:  No hematuria, no difficulty with urination  Musculoskeletal:  No joint swelling, no redness  Integumentary:  No Rash, no itching  Neurological:  + weakness, No new sensory deficit  Psychiatric:  No depression, no confusion  Endocrine:  No polyuria, no polydipsia       Medications      Reviewed in EMR     Allergies     Reopro [abciximab injection]      Family History       Negative for Kidney Disease    Social History      Social History     Socioeconomic History    Marital status:      Spouse name: Not on file    Number of children: Not on file    Years of education: Not on file    Highest education level: Not on file   Occupational History    Not on file   Tobacco Use    Smoking status: Former Smoker     Packs/day: 0.50     Years: 15.00     Pack years: 7.50     Start date:      Quit date: 1995     Years since quittin.7    Smokeless tobacco: Never Used   Vaping Use    Vaping Use: Never used   Substance and Sexual Activity    Alcohol use: No    Drug use: No    Sexual activity: Never   Other Topics Concern    Not on file   Social History Narrative    Not on file     Social Determinants of Health     Financial Resource Strain:     Difficulty of Paying Living Expenses:    Food Insecurity:     Worried About Running Out of Food in the Last Year:     [address] in the Last Year:    Transportation Needs:     Lack of Transportation (Medical):  Lack of Transportation (Non-Medical):    Physical Activity:     Days of Exercise per Week:     Minutes of Exercise per Session:    Stress:     Feeling of Stress :    Social Connections:     Frequency of Communication with Friends and Family:     Frequency of Social Gatherings with Friends and Family:     Attends Zoroastrian Services:     Active Member of Clubs or Organizations:     Attends Club or Organization Meetings:     Marital Status:    Intimate Partner Violence:     Fear of Current or Ex-Partner:     Emotionally Abused:     Physically Abused:     Sexually Abused:        Physical Exam     Blood pressure (!) 104/50, pulse 50, temperature 98.6 °F (37 °C), temperature source Rectal, resp. rate 13, height 5' 11\" (1.803 m), weight 168 lb (76.2 kg), SpO2 97 %.     General:  NAD, ill appearing   HEENT:  PERRL, EOMI  Neck:  Supple, normal range of movement  Chest:  CTAB, good respiratory effort, good air movement  CV:  Regular, no rub   Abdomen:  NTND, soft, +BS, no hepatosplenomegaly  Extremities:  ++ peripheral edema  Neurological:  Moving all four extremities, CN II-XII grossly intact  Lymphatics:  No palpable lymph nodes  Skin:  No rash, no jaundice  Psychiatric:  Somnolent, flat affect     Data     Recent Labs     07/04/21  1405   WBC 6.8   HGB 9.8*   HCT 27.7*   MCV 92.0        Recent Labs     07/04/21  1405   *   K 3.5   CL 89*   CO2 26   GLUCOSE 132*   MG 2.00   BUN 10   CREATININE <0.5*   LABGLOM >60   GFRAA >60       Assessment:    Hyponatremia:  Chronic and hypervolemic with history of CHF   - Worsening since recent admission. Also has very poor intake     Systolic Heart Failure:  EF = 40%. Chronic edema    PNA:  On CXR. On room air and antibiotics. Has declined over the last month with multiple hospital stays.         Plan:    Tolvaptan due to hypervolemic hyponatremia with superimposed SIADH   Follow labs daily  Antibiotics     Thank you for asking us to participate in the management of your patient, please do not hesitate to contact me for any concerns regarding my recommendations as outlined above.    -----------------------------  Janet Roldan M.D.   [facility]

## 2021-07-04 NOTE — ED NOTES
Blood culture set #1 drawn from LFA with butterfly. Bottle tops scrubbed with alcohol pads. Site prepped with Prevantics swab, 15 seconds per side, and allowed to dry for 30 seconds prior to venipuncture. Red waste tube drawn prior to collection of specimen.          Aime Jovel RN  07/04/21 [address] [facility] [address] [facility], RN  07/04/21 5563

## 2021-07-05 LAB
ALBUMIN SERPL-MCNC: 3.1 G/DL (ref 3.4–5)
ALP BLD-CCNC: 84 U/L (ref 40–129)
ALT SERPL-CCNC: 14 U/L (ref 10–40)
ANION GAP SERPL CALCULATED.3IONS-SCNC: 5 MMOL/L (ref 3–16)
AST SERPL-CCNC: 12 U/L (ref 15–37)
BILIRUB SERPL-MCNC: 0.4 MG/DL (ref 0–1)
BILIRUBIN DIRECT: <0.2 MG/DL (ref 0–0.3)
BILIRUBIN, INDIRECT: ABNORMAL MG/DL (ref 0–1)
BUN BLDV-MCNC: 8 MG/DL (ref 7–20)
CALCIUM SERPL-MCNC: 8.5 MG/DL (ref 8.3–10.6)
CHLORIDE BLD-SCNC: 98 MMOL/L (ref 99–110)
CO2: 31 MMOL/L (ref 21–32)
CREAT SERPL-MCNC: <0.5 MG/DL (ref 0.8–1.3)
EKG ATRIAL RATE: 49 BPM
EKG DIAGNOSIS: NORMAL
EKG P AXIS: 71 DEGREES
EKG P-R INTERVAL: 168 MS
EKG Q-T INTERVAL: 490 MS
EKG QRS DURATION: 112 MS
EKG QTC CALCULATION (BAZETT): 442 MS
EKG R AXIS: 34 DEGREES
EKG T AXIS: 65 DEGREES
EKG VENTRICULAR RATE: 49 BPM
ESTIMATED AVERAGE GLUCOSE: 99.7 MG/DL
GFR AFRICAN AMERICAN: >60
GFR NON-AFRICAN AMERICAN: >60
GLUCOSE BLD-MCNC: 104 MG/DL (ref 70–99)
GLUCOSE BLD-MCNC: 106 MG/DL (ref 70–99)
GLUCOSE BLD-MCNC: 141 MG/DL (ref 70–99)
GLUCOSE BLD-MCNC: 31 MG/DL (ref 70–99)
GLUCOSE BLD-MCNC: 82 MG/DL (ref 70–99)
GLUCOSE BLD-MCNC: 82 MG/DL (ref 70–99)
GLUCOSE BLD-MCNC: 94 MG/DL (ref 70–99)
HBA1C MFR BLD: 5.1 %
HCT VFR BLD CALC: 27.8 % (ref 40.5–52.5)
HEMOGLOBIN: 9.8 G/DL (ref 13.5–17.5)
MCH RBC QN AUTO: 32.5 PG (ref 26–34)
MCHC RBC AUTO-ENTMCNC: 35.1 G/DL (ref 31–36)
MCV RBC AUTO: 92.5 FL (ref 80–100)
PDW BLD-RTO: 17.6 % (ref 12.4–15.4)
PERFORMED ON: ABNORMAL
PERFORMED ON: NORMAL
PHOSPHORUS: 3.3 MG/DL (ref 2.5–4.9)
PLATELET # BLD: 199 K/UL (ref 135–450)
PMV BLD AUTO: 7.1 FL (ref 5–10.5)
POTASSIUM REFLEX MAGNESIUM: 4.3 MMOL/L (ref 3.5–5.1)
POTASSIUM SERPL-SCNC: 4.3 MMOL/L (ref 3.5–5.1)
PROCALCITONIN: 0.2 NG/ML (ref 0–0.15)
RBC # BLD: 3 M/UL (ref 4.2–5.9)
SODIUM BLD-SCNC: 134 MMOL/L (ref 136–145)
TOTAL PROTEIN: 6.2 G/DL (ref 6.4–8.2)
WBC # BLD: 4.5 K/UL (ref 4–11)

## 2021-07-05 PROCEDURE — 6370000000 HC RX 637 (ALT 250 FOR IP): Performed by: HOSPITALIST

## 2021-07-05 PROCEDURE — 2580000003 HC RX 258: Performed by: HOSPITALIST

## 2021-07-05 PROCEDURE — 36415 COLL VENOUS BLD VENIPUNCTURE: CPT

## 2021-07-05 PROCEDURE — 93010 ELECTROCARDIOGRAM REPORT: CPT | Performed by: INTERNAL MEDICINE

## 2021-07-05 PROCEDURE — 84145 PROCALCITONIN (PCT): CPT

## 2021-07-05 PROCEDURE — 6370000000 HC RX 637 (ALT 250 FOR IP): Performed by: INTERNAL MEDICINE

## 2021-07-05 PROCEDURE — 85027 COMPLETE CBC AUTOMATED: CPT

## 2021-07-05 PROCEDURE — 2500000003 HC RX 250 WO HCPCS: Performed by: HOSPITALIST

## 2021-07-05 PROCEDURE — 80069 RENAL FUNCTION PANEL: CPT

## 2021-07-05 PROCEDURE — 2060000000 HC ICU INTERMEDIATE R&B

## 2021-07-05 PROCEDURE — 6360000002 HC RX W HCPCS: Performed by: HOSPITALIST

## 2021-07-05 PROCEDURE — 80076 HEPATIC FUNCTION PANEL: CPT

## 2021-07-05 RX ORDER — M-VIT,TX,IRON,MINS/CALC/FOLIC 27MG-0.4MG
1 TABLET ORAL DAILY
Status: DISCONTINUED | OUTPATIENT
Start: 2021-07-05 | End: 2021-07-12 | Stop reason: HOSPADM

## 2021-07-05 RX ORDER — CEFUROXIME AXETIL 250 MG/1
500 TABLET ORAL EVERY 12 HOURS SCHEDULED
Status: DISCONTINUED | OUTPATIENT
Start: 2021-07-05 | End: 2021-07-05

## 2021-07-05 RX ORDER — TORSEMIDE 20 MG/1
40 TABLET ORAL DAILY
Status: DISCONTINUED | OUTPATIENT
Start: 2021-07-05 | End: 2021-07-12 | Stop reason: HOSPADM

## 2021-07-05 RX ADMIN — ATORVASTATIN CALCIUM 80 MG: 80 TABLET, FILM COATED ORAL at 21:33

## 2021-07-05 RX ADMIN — FERROUS SULFATE TAB 325 MG (65 MG ELEMENTAL FE) 325 MG: 325 (65 FE) TAB at 10:21

## 2021-07-05 RX ADMIN — FAMOTIDINE 20 MG: 20 TABLET ORAL at 21:33

## 2021-07-05 RX ADMIN — FUROSEMIDE 40 MG: 40 TABLET ORAL at 09:08

## 2021-07-05 RX ADMIN — ENALAPRIL MALEATE 10 MG: 10 TABLET ORAL at 10:21

## 2021-07-05 RX ADMIN — ASPIRIN 81 MG: 81 TABLET, CHEWABLE ORAL at 09:08

## 2021-07-05 RX ADMIN — TORSEMIDE 40 MG: 20 TABLET ORAL at 15:45

## 2021-07-05 RX ADMIN — ENOXAPARIN SODIUM 40 MG: 40 INJECTION SUBCUTANEOUS at 09:08

## 2021-07-05 RX ADMIN — DOXYCYCLINE HYCLATE 100 MG: 100 TABLET, COATED ORAL at 09:09

## 2021-07-05 RX ADMIN — FAMOTIDINE 20 MG: 20 TABLET ORAL at 09:08

## 2021-07-05 RX ADMIN — LEVOTHYROXINE SODIUM 150 MCG: 0.15 TABLET ORAL at 05:29

## 2021-07-05 RX ADMIN — DEXTROSE MONOHYDRATE 12.5 G: 25 INJECTION, SOLUTION INTRAVENOUS at 12:00

## 2021-07-05 RX ADMIN — Medication 10 ML: at 09:09

## 2021-07-05 RX ADMIN — MULTIPLE VITAMINS W/ MINERALS TAB 1 TABLET: TAB at 10:21

## 2021-07-05 RX ADMIN — Medication 10 ML: at 21:33

## 2021-07-05 RX ADMIN — DOCUSATE SODIUM 100 MG: 100 CAPSULE, LIQUID FILLED ORAL at 21:33

## 2021-07-05 RX ADMIN — CETIRIZINE HYDROCHLORIDE 10 MG: 10 TABLET, FILM COATED ORAL at 09:08

## 2021-07-05 ASSESSMENT — PAIN SCALES - GENERAL
PAINLEVEL_OUTOF10: 0

## 2021-07-05 NOTE — PROGRESS NOTES
4 Eyes Skin Assessment     The patient is being assess for  Admission    I agree that 2 RN's have performed a thorough Head to Toe Skin Assessment on the patient. ALL assessment sites listed below have been assessed. Areas assessed by both nurses:   [x]   Head, Face, and Ears   [x]   Shoulders, Back, and Chest  [x]   Arms, Elbows, and Hands   [x]   Coccyx, Sacrum, and Ischum  [x]   Legs, Feet, and Heels        Does the Patient have Skin Breakdown?   No         Blane Prevention initiated:  No   Wound Care Orders initiated:  No      [facility] nurse consulted for Pressure Injury (Stage 3,4, Unstageable, DTI, NWPT, and Complex wounds):  No      Nurse 1 eSignature: Electronically signed by Nghia Castro RN on 7/4/21 at 10:26 PM EDT    **SHARE this note so that the co-signing nurse is able to place an eSignature**    Nurse 2 eSignature: Electronically signed by Isaiah Espinal RN on 7/4/21 at 10:33 PM EDT

## 2021-07-05 NOTE — PROGRESS NOTES
Physical Therapy/Occupational Therapy  Orders received, chart reviewed. Will hold therapy evaluations pending LE dopplers. Thank you.   Kelsie Ha, PT, DPT  Harini Cummings, OTR/L

## 2021-07-05 NOTE — PROGRESS NOTES
Speech Language Pathology      Name: Marcia Gould  : 1940  Medical Diagnosis: Hyponatremia [E87.1]    Swallow screen completed. Patient demonstrates no significant high risk indicators for potential aspiration per swallow / PNA screen at this time. No further swallowing intervention is warranted at this time. Continue current diet as tolerated. Please consult speech therapy for bedside swallow evaluation if symptoms of swallowing dysfunction arise and / or aspiration is of concern.     Alley Calderon M.S. [facility]  Speech-language pathologist  [phone]

## 2021-07-05 NOTE — PLAN OF CARE
Problem: Falls - Risk of:  Goal: Will remain free from falls  Description: Will remain free from falls  Outcome: Ongoing   Pt remains without falls during shift. Pt does not attempt to get OOB alone. Pt able to call out appropriately, call light within reach. Safety precautions in place include non slip footwear, fall armband, towel at foot of bed, bed and chair alarms. Bed in lowest position, wheels locked, rails up 2 /4. Continue with current care.

## 2021-07-05 NOTE — PLAN OF CARE
Problem: Falls - Risk of:  Goal: Will remain free from falls  Description: Will remain free from falls  7/5/2021 1048 by Flaquita Todd RN  Outcome: Ongoing  7/5/2021 0556 by Anne-Marie Lopez RN  Outcome: Ongoing  Goal: Absence of physical injury  Description: Absence of physical injury  7/5/2021 1048 by Flaquita Todd RN  Outcome: Ongoing  7/5/2021 0556 by Anne-Marie Lopez RN  Outcome: Ongoing

## 2021-07-05 NOTE — PROGRESS NOTES
Progress Note    HISTORY     CC:   Weakness             We are following for hyponatremia        Subjective/   HPI:  Given a dose of tolvaptan. Sodium is up to 134 today and he feels better. He has been able to eat. He is concerned the lasix does not work anymore as he does not have much of a response     ROS:  Constitutional:  No fevers, No Chills, + weakness  Cardiovascular:  No palpations, + edema  Respiratory:  No wheezing, no cough  Skin:  No rash, no itching  :  No hematuria, no dysuria     Social Hx:   Wife at the bedside     Past Medical and Surgical History:  - Reviewed, no changes     EXAM       Objective/     Vitals:    07/05/21 0247 07/05/21 0333 07/05/21 0803 07/05/21 1119   BP:  134/70 129/66 (!) 96/50   Pulse:  74 77 72   Resp:  18 15 16   Temp:  98 °F (36.7 °C) 97.8 °F (36.6 °C) 97.8 °F (36.6 °C)   TempSrc:  Oral Oral Oral   SpO2:  97% 95% 93%   Weight: 163 lb 2.3 oz (74 kg)      Height:         24HR INTAKE/OUTPUT:      Intake/Output Summary (Last 24 hours) at 7/5/2021 1422  Last data filed at 7/5/2021 1231  Gross per 24 hour   Intake 1677.94 ml   Output 3250 ml   Net -1572.06 ml     Constitutional:  NAD  Eyes:  Pupils reactive, sclera clear   Neck:  Normal thyroid, no masses   Cardiovascular:  Regular, no rub  Respiratory:  No distress, no wheezing  Psychiatry:  Appropriate mood/affect, alert  Abdomen: +bs, soft, nt, no masses   Musculoskeletal: + LE edema, no clubbing   Lymphatics:  No LAD in neck, no supraclavicular nodes       MEDICAL DECISION MAKING       Data/  Recent Labs     07/04/21  1405 07/05/21 0522   WBC 6.8 4.5   HGB 9.8* 9.8*   HCT 27.7* 27.8*   MCV 92.0 92.5    199     Recent Labs     07/04/21  1405 07/05/21 0522   * 134*   K 3.5 4.3  4.3   CL 89* 98*   CO2 26 31   GLUCOSE 132* 104*   PHOS  --  3.3   MG 2.00  --    BUN 10 8   CREATININE <0.5* <0.5*   LABGLOM >60 >60   GFRAA >60 >60       Assessment/     Hyponatremia:  Chronic and hypervolemic with history of CHF   - Worsening since recent admission. Also has very poor intake ( BUN 8 ) with superimposed SIADH in the setting on pneumonia   - Given tolvaptan and sodium is up to 804     Systolic Heart Failure:  EF = 40%. Chronic edema     PNA:  On CXR. On room air and antibiotics. Has declined over the last month with multiple hospital stays.      Plan/     Will try torsemide 40 mg and d/c lasix 40 mg due to blunted response   Follow labs  Encouraged nutrition  Might need to add urea to try to keep sodium in the 130 range   -----------------------------  Loc Rios M.D.   [facility]

## 2021-07-05 NOTE — PROGRESS NOTES
07/04/21 2239   RT Protocol   Smoking Status 1   Surgical status 0   Xray 1   Respiratory pattern 0   Mental Status 0   Breath sounds 1   Cough 1   Activity level 1   Oxygen Requirement 0   Indications for Bronchodilator Therapy None   Bronchodilator Assessment Score 4   Indications for Bronchial Hygiene None   Bronchial Hygiene Assessment Score 5   Indications for Volume Expansion None   Volume Expansion Assessment Score 3

## 2021-07-06 ENCOUNTER — TELEPHONE (OUTPATIENT)
Dept: SURGERY | Age: 81
End: 2021-07-06

## 2021-07-06 ENCOUNTER — APPOINTMENT (OUTPATIENT)
Dept: VASCULAR LAB | Age: 81
DRG: 643 | End: 2021-07-06
Payer: MEDICARE

## 2021-07-06 LAB
ALBUMIN SERPL-MCNC: 3.1 G/DL (ref 3.4–5)
ANION GAP SERPL CALCULATED.3IONS-SCNC: 6 MMOL/L (ref 3–16)
BETA-HYDROXYBUTYRATE: 0.1 MMOL/L (ref 0–0.27)
BUN BLDV-MCNC: 12 MG/DL (ref 7–20)
CALCIUM SERPL-MCNC: 8.3 MG/DL (ref 8.3–10.6)
CHLORIDE BLD-SCNC: 94 MMOL/L (ref 99–110)
CO2: 33 MMOL/L (ref 21–32)
CREAT SERPL-MCNC: 0.7 MG/DL (ref 0.8–1.3)
GFR AFRICAN AMERICAN: >60
GFR NON-AFRICAN AMERICAN: >60
GLUCOSE BLD-MCNC: 103 MG/DL (ref 70–99)
GLUCOSE BLD-MCNC: 119 MG/DL (ref 70–99)
GLUCOSE BLD-MCNC: 41 MG/DL (ref 70–99)
GLUCOSE BLD-MCNC: 55 MG/DL (ref 70–99)
GLUCOSE BLD-MCNC: 67 MG/DL (ref 70–99)
GLUCOSE BLD-MCNC: 70 MG/DL (ref 70–99)
GLUCOSE BLD-MCNC: 81 MG/DL (ref 70–99)
GLUCOSE BLD-MCNC: 89 MG/DL (ref 70–99)
GLUCOSE BLD-MCNC: 92 MG/DL (ref 70–99)
GLUCOSE BLD-MCNC: 98 MG/DL (ref 70–99)
PERFORMED ON: ABNORMAL
PERFORMED ON: NORMAL
PHOSPHORUS: 3.5 MG/DL (ref 2.5–4.9)
POTASSIUM SERPL-SCNC: 4.4 MMOL/L (ref 3.5–5.1)
SODIUM BLD-SCNC: 133 MMOL/L (ref 136–145)

## 2021-07-06 PROCEDURE — G0480 DRUG TEST DEF 1-7 CLASSES: HCPCS

## 2021-07-06 PROCEDURE — 97110 THERAPEUTIC EXERCISES: CPT

## 2021-07-06 PROCEDURE — 97116 GAIT TRAINING THERAPY: CPT

## 2021-07-06 PROCEDURE — 84206 ASSAY OF PROINSULIN: CPT

## 2021-07-06 PROCEDURE — 6360000002 HC RX W HCPCS: Performed by: HOSPITALIST

## 2021-07-06 PROCEDURE — 97166 OT EVAL MOD COMPLEX 45 MIN: CPT

## 2021-07-06 PROCEDURE — 2580000003 HC RX 258: Performed by: HOSPITALIST

## 2021-07-06 PROCEDURE — 2060000000 HC ICU INTERMEDIATE R&B

## 2021-07-06 PROCEDURE — 2500000003 HC RX 250 WO HCPCS: Performed by: HOSPITALIST

## 2021-07-06 PROCEDURE — 86337 INSULIN ANTIBODIES: CPT

## 2021-07-06 PROCEDURE — 82947 ASSAY GLUCOSE BLOOD QUANT: CPT

## 2021-07-06 PROCEDURE — 6370000000 HC RX 637 (ALT 250 FOR IP): Performed by: HOSPITALIST

## 2021-07-06 PROCEDURE — 80069 RENAL FUNCTION PANEL: CPT

## 2021-07-06 PROCEDURE — 97162 PT EVAL MOD COMPLEX 30 MIN: CPT

## 2021-07-06 PROCEDURE — 97530 THERAPEUTIC ACTIVITIES: CPT

## 2021-07-06 PROCEDURE — 84305 ASSAY OF SOMATOMEDIN: CPT

## 2021-07-06 PROCEDURE — 6370000000 HC RX 637 (ALT 250 FOR IP): Performed by: INTERNAL MEDICINE

## 2021-07-06 PROCEDURE — 82010 KETONE BODYS QUAN: CPT

## 2021-07-06 PROCEDURE — 36415 COLL VENOUS BLD VENIPUNCTURE: CPT

## 2021-07-06 PROCEDURE — 84681 ASSAY OF C-PEPTIDE: CPT

## 2021-07-06 PROCEDURE — 83525 ASSAY OF INSULIN: CPT

## 2021-07-06 RX ADMIN — DOCUSATE SODIUM 100 MG: 100 CAPSULE, LIQUID FILLED ORAL at 20:56

## 2021-07-06 RX ADMIN — CETIRIZINE HYDROCHLORIDE 10 MG: 10 TABLET, FILM COATED ORAL at 08:50

## 2021-07-06 RX ADMIN — FAMOTIDINE 20 MG: 20 TABLET ORAL at 08:50

## 2021-07-06 RX ADMIN — LEVOTHYROXINE SODIUM 150 MCG: 0.15 TABLET ORAL at 05:09

## 2021-07-06 RX ADMIN — DOCUSATE SODIUM 100 MG: 100 CAPSULE, LIQUID FILLED ORAL at 08:50

## 2021-07-06 RX ADMIN — FERROUS SULFATE TAB 325 MG (65 MG ELEMENTAL FE) 325 MG: 325 (65 FE) TAB at 08:50

## 2021-07-06 RX ADMIN — TORSEMIDE 40 MG: 20 TABLET ORAL at 08:50

## 2021-07-06 RX ADMIN — FAMOTIDINE 20 MG: 20 TABLET ORAL at 20:56

## 2021-07-06 RX ADMIN — Medication 10 ML: at 08:52

## 2021-07-06 RX ADMIN — Medication 10 ML: at 20:56

## 2021-07-06 RX ADMIN — GLUCAGON HYDROCHLORIDE 1 MG: KIT at 11:42

## 2021-07-06 RX ADMIN — MULTIPLE VITAMINS W/ MINERALS TAB 1 TABLET: TAB at 08:50

## 2021-07-06 RX ADMIN — ASPIRIN 81 MG: 81 TABLET, CHEWABLE ORAL at 08:50

## 2021-07-06 RX ADMIN — ENOXAPARIN SODIUM 40 MG: 40 INJECTION SUBCUTANEOUS at 08:50

## 2021-07-06 RX ADMIN — ENALAPRIL MALEATE 10 MG: 10 TABLET ORAL at 08:59

## 2021-07-06 ASSESSMENT — PAIN SCALES - GENERAL
PAINLEVEL_OUTOF10: 0
PAINLEVEL_OUTOF10: 0

## 2021-07-06 NOTE — PROGRESS NOTES
Hospitalist Progress Note      PCP: Joselo Connor    Date of Admission: 7/4/2021    Chief Complaint: cough    Hospital Course:   79 y.o. male who presented to [facility] with cough for 1 week, feeling tired and sweaty no history for fever chills no chest pain positive shortness of breath positive cough with some sticky phlegm per patient's wife no nausea vomiting no diarrhea no melena no hematochezia no recent weight loss or weight gain. He does not smoke or drink alcohol. Patient with a past medical history of coronary artery disease status post stents followed by Dr. Jax Juan cardiologist at [facility], hypertension, hyperlipidemia, hypothyroidism, CHF with a EF of 30%, hyponatremia, SIADH status post hiatal hernia surgery on 6/25/2021 by Dr. Leland Hall. In the emergency room patient was noted to have a hyponatremia sodium of 123, hyperglycemia with a blood sugar of 33 when EMS arrived at home was given a D10 on the route with a normal blood sugar in the emergency room. Possible left lower lobe pneumonia culture sent in the emergency room started on HCAP pneumonia coverage by ER physician. Hyponatremia nephrology was consulted by ER physician recommended no IV fluid [place] per nephrology. Subjective: No new complaints. Na improved today.        Medications:  Reviewed    Infusion Medications    sodium chloride Stopped (07/04/21 2143)    dextrose       Scheduled Medications    multivitamin  1 tablet Oral Daily    torsemide  40 mg Oral Daily    aspirin  81 mg Oral Daily    atorvastatin  80 mg Oral Nightly    cetirizine  10 mg Oral Daily    docusate sodium  100 mg Oral BID    enalapril  10 mg Oral Daily    ferrous sulfate  325 mg Oral Daily with breakfast    levothyroxine  150 mcg Oral Daily    sodium chloride flush  5-40 mL Intravenous 2 times per day    enoxaparin  40 mg Subcutaneous Daily    famotidine  20 mg Oral BID     PRN Meds: sodium chloride flush, sodium chloride, ondansetron **OR** ondansetron, polyethylene glycol, acetaminophen **OR** acetaminophen, albuterol, glucose, dextrose, glucagon (rDNA), dextrose      Intake/Output Summary (Last 24 hours) at 7/6/2021 0909  Last data filed at 7/6/2021 0853  Gross per 24 hour   Intake 800 ml   Output 2600 ml   Net -1800 ml       Physical Exam Performed:    /70   Pulse 82   Temp 97.7 °F (36.5 °C) (Oral)   Resp 16   Ht 5' 11\" (1.803 m)   Wt 158 lb 11.7 oz (72 kg)   SpO2 97%   BMI 22.14 kg/m²     General appearance:  No apparent distress, appears stated age and cooperative. HEENT:  Normal cephalic, atraumatic without obvious deformity. Pupils equal, round, and reactive to light. Extra ocular muscles intact. Conjunctivae/corneas clear. Neck: Supple, with full range of motion. No jugular venous distention. Trachea midline. Respiratory:  Normal respiratory effort. Clear to auscultation, bilaterally without Rales/Wheezes/Rhonchi. Cardiovascular:  Regular rate and rhythm with normal S1/S2 without murmurs, rubs or gallops. Abdomen: Soft, non-tender, non-distended with normal bowel sounds. Musculoskeletal:  No clubbing, 2+ edema bilaterally lower extremity. Full range of motion without deformity. Skin: Skin color, texture, turgor normal.  No rashes or lesions. Neurologic:  Neurovascularly intact without any focal sensory/motor deficits.  Cranial nerves: II-XII intact, grossly non-focal.  Psychiatric:  Alert and oriented, thought content appropriate, normal insight  Capillary Refill: Brisk,3 seconds, normal  Peripheral Pulses: +2 palpable, equal bilaterally      Labs:   Recent Labs     07/04/21  1405 07/05/21 0522   WBC 6.8 4.5   HGB 9.8* 9.8*   HCT 27.7* 27.8*    199     Recent Labs     07/04/21  1405 07/04/21  1405 07/05/21  0522 07/06/21  0500   *  --  134* 133*   K 3.5   < > 4.3  4.3 4.4   CL 89*  --  98* 94*   CO2 26  --  31 33*   BUN 10  --  8 12   CREATININE <0.5*  --  <0.5* 0.7*   CALCIUM 7.9*  --  8.5 8.3   PHOS

## 2021-07-06 NOTE — TELEPHONE ENCOUNTER
Trent Miles is calling to cancel patients apt today with Dr. Karime Gusman. Patient is admitted. Trent Miles would like a call back to discuss next steps for [address].

## 2021-07-06 NOTE — PROGRESS NOTES
Occupational Therapy   Occupational Therapy Initial Assessment/Treatment   Date: 2021   Patient Name: Lakeisha Grove  MRN: [id number]     : 1940    Date of Service: 2021    Discharge Recommendations:  24 hour supervision or assist  OT Equipment Recommendations  Equipment Needed: No    Assessment   Performance deficits / Impairments: Decreased functional mobility ; Decreased balance;Decreased ADL status; Decreased endurance;Decreased high-level IADLs    Assessment: Pt 80 yo male functioning with deficits in the areas listed above following increased fatigue. Pt reports he lives at home with spouse who assists with adls as needed. Pt is currently at a [facility] level for functional mobility and transfers. pt limited due to fatigue with elevated HR during ambulation requiring rest breaks. Pt educated on BUE exercises for increased endurance and strength for all adls. Pt would benefit from skilled OT services while in acute care prior to d/c. Disease Specific Education: Pt educated on importance of OOB mobility, prevention of complications of bedrest, and general safety during hospitalization. Pt verbalized understanding     Prognosis: Good  Decision Making: Medium Complexity  OT Education: OT Role;Plan of Care;Energy Conservation; ADL Adaptive Strategies; Home Exercise Program  REQUIRES OT FOLLOW UP: Yes  Activity Tolerance  Activity Tolerance: Patient Tolerated treatment well  Activity Tolerance: /66 HR 93 O2 98%  Safety Devices  Safety Devices in place: Yes  Type of devices: Call light within reach; Chair alarm in place;Nurse notified; Left in chair;Gait belt           Patient Diagnosis(es): The primary encounter diagnosis was Pneumonia of left lower lobe due to infectious organism. Diagnoses of Hyponatremia, Fatigue, unspecified type, and Chronic anemia were also pertinent to this visit.      has a past medical history of Allergic rhinitis, Anemia, Arthritis, CAD (coronary artery disease), Chronic systolic congestive heart failure (HCC), Compression fracture of T11 vertebra (HCC), Food allergy, Hiatal hernia, History of blood transfusion, Hyperlipidemia, Hypertension, Hypothyroid, Obesity, Occlusion and stenosis of carotid artery, Osteoarthritis, Recurrent right inguinal hernia, Shingles, Thyroid disease, and TIA (transient ischemic attack). has a past surgical history that includes sigmoidoscopy (1994); Upper gastrointestinal endoscopy (05/12/94); Upper gastrointestinal endoscopy (11/09/04); Tonsillectomy; eye surgery; Colonoscopy (04/22/99); Colonoscopy (7/13/2015); Inguinal hernia repair (Right, 07/07/2016); Inguinal hernia repair (Right, 04/12/2017); Cardiac surgery (2001); Cardiac valve replacement (09/15/2018); Aortic valve replacement (2018); Percutaneous Transluminal Coronary Angio (2018); hernia repair (Left, 2/5/2020); and Gastric fundoplication (N/A, 0/32/0119).            Restrictions  Restrictions/Precautions  Restrictions/Precautions: General Precautions, Fall Risk  Position Activity Restriction  Other position/activity restrictions: up with assistance    Subjective   General  Chart Reviewed: Yes  Patient assessed for rehabilitation services?: Yes  Family / Caregiver Present: No  Referring Practitioner: Josh Jane MD  Diagnosis: fatigue  Subjective  Subjective: Pt agreeable to therapy  General Comment  Comments: RN approved therapy  Patient Currently in Pain: Denies  Vital Signs  Temp: 97.7 °F (36.5 °C)  Temp Source: Oral  Pulse: 82  Heart Rate Source: Monitor  Resp: 16  BP: 126/70  BP Location: Left upper arm  MAP (mmHg): 89  Patient Position: Semi fowlers  Level of Consciousness: Alert (0)  MEWS Score: 1  Patient Currently in Pain: Denies  Oxygen Therapy  SpO2: 97 %  O2 Device: None (Room air)     Social/Functional History  Social/Functional History  Lives With: Spouse  Type of Home: House  Home Layout: Two level  Home Access: Level entry  Bathroom Shower/Tub: Walk-in shower, Shower 3-5x/wk  Current Treatment Recommendations: Endurance Training, Balance Training, Functional Mobility Training, Safety Education & Training, Self-Care / ADL      AM-PAC Score        AM-PAC Inpatient Daily Activity Raw Score: 20 (07/06/21 1015)  AM-PAC Inpatient ADL T-Scale Score : 42.03 (07/06/21 1015)  ADL Inpatient CMS 0-100% Score: 38.32 (07/06/21 1015)  ADL Inpatient CMS G-Code Modifier : Jerry Speaks (07/06/21 1015)    Goals  Short term goals  Time Frame for Short term goals: 1 week (7/13/21)  Short term goal 1: Pt will complete toilet transfer with S.  Short term goal 2: Pt will comoplete LB dressing with S.  Short term goal 3: Pt will complete 20 reps o fBUE exercises for increased endurance. Short term goal 4: Pt will complete 5 minutes of dynamic standing for adls with CGA. (7/2/01)  Patient Goals   Patient goals : \"to go home\"       Therapy Time   Individual Concurrent Group Co-treatment   Time In 0924         Time Out 0959         Minutes 35         Timed Code Treatment Minutes: 25 Minutes (10 minutes for evaluation)       LARRY Kelly/L    If pt is unable to be seen after this session, please let this note serve as discharge summary. Please see case management note for discharge disposition. Thank you.

## 2021-07-06 NOTE — PROGRESS NOTES
Progress Note    HISTORY     CC:   Weakness             We are following for hyponatremia          Subjective/     The patient was seen and examined; he feels well today with no CP, SOB, nausea or vomiting. ROS: No fever or chills. Social: No family at bedside. EXAM       Objective/     Vitals:    07/05/21 2119 07/05/21 2338 07/06/21 0343 07/06/21 0849   BP: 115/69 119/65 119/67 126/70   Pulse: 75 79 78 82   Resp: 16 16 18 16   Temp: 98.7 °F (37.1 °C) 98.3 °F (36.8 °C) 98 °F (36.7 °C) 97.7 °F (36.5 °C)   TempSrc: Oral Oral Oral Oral   SpO2: 95% 94% 94% 97%   Weight:   158 lb 11.7 oz (72 kg)    Height:   5' 11\" (1.803 m)      24HR INTAKE/OUTPUT:      Intake/Output Summary (Last 24 hours) at 7/6/2021 0938  Last data filed at 7/6/2021 0853  Gross per 24 hour   Intake 800 ml   Output 2600 ml   Net -1800 ml     Constitutional:  NAD  Neck:  Normal thyroid, no masses   Cardiovascular:  Regular, no rub  Respiratory:  No distress, no wheezing  Psychiatry:  Appropriate mood/affect, alert  Abdomen: +bs, soft, nt, no masses   Musculoskeletal: + LE edema, no clubbing       MEDICAL DECISION MAKING       Data/  Recent Labs     07/04/21  1405 07/05/21  0522   WBC 6.8 4.5   HGB 9.8* 9.8*   HCT 27.7* 27.8*   MCV 92.0 92.5    199     Recent Labs     07/04/21  1405 07/04/21  1405 07/05/21  0522 07/06/21  0500   *  --  134* 133*   K 3.5   < > 4.3  4.3 4.4   CL 89*  --  98* 94*   CO2 26  --  31 33*   GLUCOSE 132*  --  104* 89   PHOS  --   --  3.3 3.5   MG 2.00  --   --   --    BUN 10  --  8 12   CREATININE <0.5*  --  <0.5* 0.7*   LABGLOM >60  --  >60 >60   GFRAA >60  --  >60 >60    < > = values in this interval not displayed. Assessment/     Hyponatremia:  Chronic and hypervolemic with history of CHF   - Worsening since recent admission.   Also has very poor intake ( BUN 8 ) with superimposed SIADH in the setting on pneumonia   - Given tolvaptan and sodium is up to 416     Systolic Heart Failure: EF = 40%. Chronic edema     PNA:  On CXR. On room air and antibiotics. Has declined over the last month with multiple hospital stays.      Plan/     Continue Torsemide 40 mg  Encouraged nutrition  Follow labs

## 2021-07-06 NOTE — PROGRESS NOTES
Hospitalist Progress Note      PCP: Cindy Blount    Date of Admission: 7/4/2021    Chief Complaint: cough    Hospital Course:   79 y.o. male who presented to [facility] with cough for 1 week, feeling tired and sweaty no history for fever chills no chest pain positive shortness of breath positive cough with some sticky phlegm per patient's wife no nausea vomiting no diarrhea no melena no hematochezia no recent weight loss or weight gain. He does not smoke or drink alcohol. Patient with a past medical history of coronary artery disease status post stents followed by Dr. Jhon Amato cardiologist at [facility], hypertension, hyperlipidemia, hypothyroidism, CHF with a EF of 30%, hyponatremia, SIADH status post hiatal hernia surgery on 6/25/2021 by Dr. Colletta Clap. In the emergency room patient was noted to have a hyponatremia sodium of 123, hyperglycemia with a blood sugar of 33 when EMS arrived at home was given a D10 on the route with a normal blood sugar in the emergency room. Possible left lower lobe pneumonia culture sent in the emergency room started on HCAP pneumonia coverage by ER physician. Hyponatremia nephrology was consulted by ER physician recommended no IV fluid Samsca per nephrology. Subjective: No new complaints. Na improved today.        Medications:  Reviewed    Infusion Medications    sodium chloride Stopped (07/04/21 2143)    dextrose       Scheduled Medications    multivitamin  1 tablet Oral Daily    torsemide  40 mg Oral Daily    aspirin  81 mg Oral Daily    atorvastatin  80 mg Oral Nightly    cetirizine  10 mg Oral Daily    docusate sodium  100 mg Oral BID    enalapril  10 mg Oral Daily    ferrous sulfate  325 mg Oral Daily with breakfast    levothyroxine  150 mcg Oral Daily    sodium chloride flush  5-40 mL Intravenous 2 times per day    enoxaparin  40 mg Subcutaneous Daily    famotidine  20 mg Oral BID     PRN Meds: sodium chloride flush, sodium chloride, ondansetron BILIDIR  --  <0.2   BILITOT 0.4 0.4   ALKPHOS 82 84     No results for input(s): INR in the last 72 hours. Recent Labs     07/04/21  1405   TROPONINI <0.01       Urinalysis:      Lab Results   Component Value Date    NITRU Negative 07/04/2021    BLOODU Negative 07/04/2021    SPECGRAV 1.025 07/04/2021    GLUCOSEU Negative 07/04/2021       Radiology:  XR CHEST PORTABLE   Final Result   Left basilar opacity, atelectasis versus pneumonia. Cardiomegaly. VL Extremity Venous Bilateral    (Results Pending)           Assessment/Plan:    Active Hospital Problems    Diagnosis     Hyponatremia [E87.1]      Hyponatremia  - likely SIADH and CHF related  - nephrology consulted  - s/p samsca with good response  - diuretic changed to torsemide  - continue to monitor    Hypoglycemia  - unclear etiology  - not on insulin or sulfonylureas at home  - hypoglycemia protocol    Chronic systolic heart failure  - appears euvolemic  - echo 6/21 with EF 40-45%  - lasix changed to torsemide  - continue enalapril. Will start on coreg prior to discharge    CAD  - no active chest pain   - continue home ASA, statin    HTN  - well controlled  - continue home enalapril    HLD  - continue home statin     Hypothyroidism  - continue home synthroid    DVT Prophylaxis: lovenox  Diet: ADULT DIET;  Regular  Code Status: Full Code    PT/OT Eval Status: ordered    Dispo - home in 1-2 days    Farhana Garcia MD

## 2021-07-06 NOTE — PROGRESS NOTES
Physician Progress Note      Priscilla So  CSN #:                  [id number]  :                       1940  ADMIT DATE:       2021 1:55 PM  [address] [place] DATE:  RESPONDING  PROVIDER #:        Meche Schulte MD          QUERY TEXT:    Pt admitted with Pneumonia/SIADH. Pt noted to have \"HCAP\". If possible,   please document in the progress notes and discharge summary if you are   evaluating and/or treating any of the following:    Note: CAP and HCAP indicate where the pneumonia was acquired, not a specific   type. The medical record reflects the following:  Risk Factors: \"Has declined over the last month with multiple hospital stays\"  Clinical Indicators: PN- \"Possible left lower lobe pneumonia culture sent in   the emergency room started on HCAP pneumonia coverage by ER physician\"    CXR-Left basilar opacity, atelectasis versus pneumonia  Treatment: IV antibiotics, Rocephin, Ceftin, serial labs, CXR, supportive care  Options provided:  -- Possible Gram negative pneumonia POA  -- MRSA pneumonia  -- Aspiration pneumonia  -- Other - I will add my own diagnosis  -- Disagree - Not applicable / Not valid  -- Disagree - Clinically unable to determine / Unknown  -- Refer to Clinical Documentation Reviewer    PROVIDER RESPONSE TEXT:    Provider disagreed with this query.   Pneumonia ruled out    Query created by: Savannah Saenz on 2021 2:07 PM      Electronically signed by:  Meche Schulte MD 2021 5:10 PM

## 2021-07-06 NOTE — TELEPHONE ENCOUNTER
Called and spoke w/ pts wife - explained that Dr Donald Childers will do rounds and see pt when needed but right now they are focused on getting his pneumonia and sugar levels where they need to be - wife understood and agreed and asked to cancel pts appt for today

## 2021-07-06 NOTE — PLAN OF CARE
Problem: Falls - Risk of:  Goal: Will remain free from falls  Description: Will remain free from falls  7/5/2021 2250 by Rolanda Grace RN  Outcome: Ongoing  7/5/2021 1048 by Adriana Felix RN  Outcome: Ongoing  Goal: Absence of physical injury  Description: Absence of physical injury  7/5/2021 1048 by Adriana Felix RN  Outcome: Ongoing     Problem: OXYGENATION/RESPIRATORY FUNCTION  Goal: Patient will maintain patent airway  Outcome: Ongoing     Problem: HEMODYNAMIC STATUS  Goal: Patient has stable vital signs and fluid balance  Outcome: Ongoing     Problem: ACTIVITY INTOLERANCE/IMPAIRED MOBILITY  Goal: Mobility/activity is maintained at optimum level for patient  Outcome: Ongoing

## 2021-07-06 NOTE — FLOWSHEET NOTE
07/05/21 2122   Assessment   Charting Type Shift assessment   Neurological   Neuro (WDL) WDL   Level of Consciousness Alert (0)   Orientation Level Oriented X4   [place] Coma Scale   Eye Opening 4   Best Verbal Response 5   Best Motor Response 6   [place] Coma Scale Score 15   HEENT   HEENT (WDL) X   Right Eye Intact; Impaired vision   Left Eye Intact; Impaired vision   Nose Intact   Respiratory   Respiratory (WDL) WDL   Respiratory Pattern Regular   Respiratory Depth Normal   Respiratory Quality/Effort Unlabored   Chest Assessment Chest expansion symmetrical   L Breath Sounds Clear   R Breath Sounds Clear   Cardiac   Cardiac (WDL) WDL   Cardiac Regularity Regular   Heart Sounds S1, S2   Cardiac Rhythm NSR   Cardiac Monitor   Telemetry Monitor On Yes   Telemetry Audible Yes   Telemetry Alarms Set Yes   Gastrointestinal   Abdominal (WDL) WDL   RUQ Bowel Sounds Active   LUQ Bowel Sounds Active   RLQ Bowel Sounds Active   LLQ Bowel Sounds Active   Abdomen Inspection Soft;Non-distended   Tenderness Soft;Nontender; No guarding   Passing Flatus Y   Peripheral Vascular   Peripheral Vascular (WDL) X   Edema Right lower extremity; Left lower extremity   RLE Edema Pitting;+3   LLE Edema +3;Pitting   Skin Color/Condition   Skin Color/Condition (WDL) X   Skin Color Appropriate for ethnicity   Skin Condition/Temp Warm;Swollen   Skin Integrity   Skin Integrity (WDL) X   Skin Integrity Bruising   Location scattered   Musculoskeletal   Musculoskeletal (WDL) X   RL Extremity Swelling;Weakness   LL Extremity Swelling;Weakness   Genitourinary   Genitourinary (WDL) WDL   Flank Tenderness No   Suprapubic Tenderness No   Dysuria No   Urine Assessment   Incontinence No   Urine Color Yellow/straw   Urine Appearance Clear   Urine Odor No odor   Anus/Rectum   Anus/Rectum (WDL) WDL   Psychosocial   Psychosocial (WDL) WDL

## 2021-07-06 NOTE — PROGRESS NOTES
Page sent to Dr. Gaby Duckworth: Raiza Gan pts dinner blood sugar is 67, will give patient juice and dinner will be around soon and we will recheck it. Please call if you want me to do anything else for it. 0543: Page sent to Dr. Gaby Duckworth: Pt is now having trouble eating because he has an excess of phlegm, clear and very thick. He is just spitting it out and not necessarily coughing. His wife said it started last week each time after eating but he was fine breakfast and lunch today.

## 2021-07-06 NOTE — PROGRESS NOTES
Physical Therapy    Facility/Department: [facility] C4 PCU  Initial Assessment    NAME: Dustin Bowman  : 1940  MRN: [id number]    Date of Service: 2021    Discharge Recommendations:  Home with Home health PT, 24 hour supervision or assist   PT Equipment Recommendations  Equipment Needed: No    Assessment   Body structures, Functions, Activity limitations: Decreased functional mobility ; Decreased strength;Decreased endurance  Assessment: Pt is 80 yo male presenting with hyponatremia. Pt lives with wife at home and is independent with mobility at baseline, occasionally using cane or walker for extra stability. Pt was SBA for transfers and CGA for 150 ambulation with RW and negotiation of 3 steps. Pt's HR increased to 130 during ambulation and legs became increasingly shaky, but subsided after returning to chair for therapeutic rest break. Pt demonstrated medial knee collapse during transfers and ambulation with verbal and tactile cues needed for correction. Pt would benefit from skilled therapy to increase strength and endurance. Recommending home with 24/7 supervision and HHPT at discharge. Treatment Diagnosis: impaired functional mobility  Specific instructions for Next Treatment: progress mobility as tolerated  Prognosis: Good  Decision Making: Medium Complexity  PT Education: Goals;PT Role;Plan of Care;Home Exercise Program;Precautions;Transfer Training;Energy Conservation;General Safety;Gait Training;Disease Specific Education; Functional Mobility Training  Disease Specific Education: Pt educated on importance of OOB mobility, prevention of complications of bedrest, and general safety during hospitalization. Pt verbalized understanding. Barriers to Learning: none  REQUIRES PT FOLLOW UP: Yes  Activity Tolerance  Activity Tolerance: Patient limited by endurance; Patient limited by fatigue;Patient Tolerated treatment well  Activity Tolerance: after gait training: , /66, SpO2 97% on room air Patient Diagnosis(es): The primary encounter diagnosis was Pneumonia of left lower lobe due to infectious organism. Diagnoses of Hyponatremia, Fatigue, unspecified type, and Chronic anemia were also pertinent to this visit. has a past medical history of Allergic rhinitis, Anemia, Arthritis, CAD (coronary artery disease), Chronic systolic congestive heart failure ([address] Compression fracture of T11 vertebra (HCC), Food allergy, Hiatal hernia, History of blood transfusion, Hyperlipidemia, Hypertension, Hypothyroid, Obesity, Occlusion and stenosis of carotid artery, Osteoarthritis, Recurrent right inguinal hernia, Shingles, Thyroid disease, and TIA (transient ischemic attack). has a past surgical history that includes sigmoidoscopy (1994); Upper gastrointestinal endoscopy (05/12/94); Upper gastrointestinal endoscopy (11/09/04); Tonsillectomy; eye surgery; Colonoscopy (04/22/99); Colonoscopy (7/13/2015); Inguinal hernia repair (Right, 07/07/2016); Inguinal hernia repair (Right, 04/12/2017); Cardiac surgery (2001); Cardiac valve replacement (09/15/2018); Aortic valve replacement (2018); Percutaneous Transluminal Coronary Angio (2018); hernia repair (Left, 2/5/2020); and Gastric fundoplication (N/A, 6/52/5971). Restrictions  Restrictions/Precautions  Restrictions/Precautions: General Precautions, Fall Risk  Position Activity Restriction  Other position/activity restrictions: up with assistance  Vision/Hearing  Vision: Impaired  Vision Exceptions: Wears glasses for reading  Hearing: Within functional limits     Subjective  General  Chart Reviewed: Yes  Patient assessed for rehabilitation services?: Yes  Response To Previous Treatment: Not applicable  Family / Caregiver Present: No  Referring Practitioner: Mik Mckinley MD  Referral Date : 07/06/21  Diagnosis: hyponatremia  Follows Commands: Within Functional Limits  General Comment  Comments: Pt in chair with PCA upon approach.  RN cleared Pt for therapy. Subjective  Subjective: Pt agreeable to therapy.   Pain Screening  Patient Currently in Pain: Denies  Vital Signs  Patient Currently in Pain: Denies       Orientation  Orientation  Overall Orientation Status: Within Functional Limits  Social/Functional History  Social/Functional History  Lives With: Spouse  Type of Home: House  Home Layout: Two level  Home Access: Level entry  Bathroom Shower/Tub: Walk-in shower, Shower chair with back  Bathroom Toilet: Handicap height  Home Equipment: Rolling walker, Cane  Receives Help From: Home health ([address] and PT with [facility] 2x/week)  ADL Assistance: Independent  Homemaking Assistance: Needs assistance (wife does all)  Ambulation Assistance: Independent (uses a cane for extra stability, occasionally RW)  Transfer Assistance: Independent  Active : Yes  Occupation: Retired  Leisure & Hobbies: computers, reading  Additional Comments: no recent falls    Objective  AROM RLE (degrees)  RLE AROM: WFL  AROM LLE (degrees)  LLE AROM : WFL  Strength RLE  Comment: Grossly 4/5 throughout  Strength LLE  Comment: Grossly 4/5 throughout     Sensation  Overall Sensation Status: WFL    Bed mobility  Comment: unable to assess--Pt in chair at start and end of session    Transfers  Sit to Stand: Stand by assistance  Stand to sit: Stand by assistance  Comment: Stood a total of 7 times from chair with SBA; demonstrated medial knee collapse with verbal and tactile cues to correct    Ambulation  Ambulation?: Yes  Ambulation 1  Surface: level tile  Device: Rolling Walker  Assistance: Contact guard assistance  Quality of Gait: valgus of knees with in-toeing and internal tibal rotation; excess forefoot pronation during stance; narrow TIAGO; legs became a bit shaky during second half of ambulation with HR increasing 130's  Gait Deviations: Decreased step height;Decreased step length  Distance: 150 ft  Comments: cued for pacing and negotation of walker  Stairs/Curb  Stairs?: Treatment Minutes: [address] [facility] 9, SPT    If pt is unable to be seen after this session, please let this note serve as discharge summary. Please see case management note for discharge disposition. Thank you.

## 2021-07-07 ENCOUNTER — APPOINTMENT (OUTPATIENT)
Dept: GENERAL RADIOLOGY | Age: 81
DRG: 643 | End: 2021-07-07
Payer: MEDICARE

## 2021-07-07 LAB
ANION GAP SERPL CALCULATED.3IONS-SCNC: 3 MMOL/L (ref 3–16)
BUN BLDV-MCNC: 16 MG/DL (ref 7–20)
CALCIUM SERPL-MCNC: 8.2 MG/DL (ref 8.3–10.6)
CHLORIDE BLD-SCNC: 94 MMOL/L (ref 99–110)
CO2: 33 MMOL/L (ref 21–32)
CREAT SERPL-MCNC: <0.5 MG/DL (ref 0.8–1.3)
GFR AFRICAN AMERICAN: >60
GFR NON-AFRICAN AMERICAN: >60
GLUCOSE BLD-MCNC: 104 MG/DL (ref 70–99)
GLUCOSE BLD-MCNC: 106 MG/DL (ref 70–99)
GLUCOSE BLD-MCNC: 107 MG/DL (ref 70–99)
GLUCOSE BLD-MCNC: 110 MG/DL (ref 70–99)
GLUCOSE BLD-MCNC: 85 MG/DL (ref 70–99)
GLUCOSE BLD-MCNC: 95 MG/DL (ref 70–99)
GLUCOSE BLD-MCNC: 97 MG/DL (ref 70–99)
PERFORMED ON: ABNORMAL
PERFORMED ON: NORMAL
PERFORMED ON: NORMAL
POTASSIUM SERPL-SCNC: 3.7 MMOL/L (ref 3.5–5.1)
SODIUM BLD-SCNC: 130 MMOL/L (ref 136–145)

## 2021-07-07 PROCEDURE — 74240 X-RAY XM UPR GI TRC 1CNTRST: CPT

## 2021-07-07 PROCEDURE — 80048 BASIC METABOLIC PNL TOTAL CA: CPT

## 2021-07-07 PROCEDURE — 2060000000 HC ICU INTERMEDIATE R&B

## 2021-07-07 PROCEDURE — 2580000003 HC RX 258: Performed by: INTERNAL MEDICINE

## 2021-07-07 PROCEDURE — 99221 1ST HOSP IP/OBS SF/LOW 40: CPT | Performed by: INTERNAL MEDICINE

## 2021-07-07 PROCEDURE — 2580000003 HC RX 258: Performed by: HOSPITALIST

## 2021-07-07 PROCEDURE — 6370000000 HC RX 637 (ALT 250 FOR IP): Performed by: HOSPITALIST

## 2021-07-07 PROCEDURE — 36415 COLL VENOUS BLD VENIPUNCTURE: CPT

## 2021-07-07 PROCEDURE — 6370000000 HC RX 637 (ALT 250 FOR IP): Performed by: INTERNAL MEDICINE

## 2021-07-07 PROCEDURE — 6360000002 HC RX W HCPCS: Performed by: HOSPITALIST

## 2021-07-07 RX ORDER — SODIUM CHLORIDE 0.9 % (FLUSH) 0.9 %
10 SYRINGE (ML) INJECTION EVERY 12 HOURS SCHEDULED
Status: DISCONTINUED | OUTPATIENT
Start: 2021-07-07 | End: 2021-07-12 | Stop reason: HOSPADM

## 2021-07-07 RX ORDER — SODIUM CHLORIDE 0.9 % (FLUSH) 0.9 %
10 SYRINGE (ML) INJECTION PRN
Status: DISCONTINUED | OUTPATIENT
Start: 2021-07-07 | End: 2021-07-12 | Stop reason: HOSPADM

## 2021-07-07 RX ADMIN — FAMOTIDINE 20 MG: 20 TABLET ORAL at 20:34

## 2021-07-07 RX ADMIN — Medication 10 ML: at 09:57

## 2021-07-07 RX ADMIN — DOCUSATE SODIUM 100 MG: 100 CAPSULE, LIQUID FILLED ORAL at 20:34

## 2021-07-07 RX ADMIN — ENOXAPARIN SODIUM 40 MG: 40 INJECTION SUBCUTANEOUS at 09:56

## 2021-07-07 RX ADMIN — LEVOTHYROXINE SODIUM 150 MCG: 0.15 TABLET ORAL at 05:42

## 2021-07-07 RX ADMIN — Medication 10 ML: at 20:35

## 2021-07-07 RX ADMIN — Medication 10 ML: at 20:34

## 2021-07-07 RX ADMIN — TORSEMIDE 40 MG: 20 TABLET ORAL at 09:56

## 2021-07-07 ASSESSMENT — PAIN SCALES - GENERAL: PAINLEVEL_OUTOF10: 0

## 2021-07-07 NOTE — PROGRESS NOTES
Progress Note    HISTORY     CC:   Weakness             We are following for hyponatremia          Subjective/     The patient was seen and examined; he feels well today with no CP, SOB, nausea or vomiting. ROS: No fever or chills. Social: Family at bedside. EXAM       Objective/     Vitals:    07/07/21 0413 07/07/21 0414 07/07/21 0945 07/07/21 1315   BP: 139/70  126/67 (!) 102/57   Pulse: 93  63 76   Resp: 16  16 16   Temp: 98.2 °F (36.8 °C)  97.7 °F (36.5 °C) 97.5 °F (36.4 °C)   TempSrc: Oral  Oral Oral   SpO2: 95%  95% 97%   Weight:  157 lb 6.4 oz (71.4 kg)     Height:         24HR INTAKE/OUTPUT:      Intake/Output Summary (Last 24 hours) at 7/7/2021 1333  Last data filed at 7/7/2021 0945  Gross per 24 hour   Intake 480 ml   Output 900 ml   Net -420 ml     Constitutional:  NAD  Neck:  Normal thyroid, no masses   Cardiovascular:  Regular, no rub  Respiratory:  No distress, no wheezing  Psychiatry:  Appropriate mood/affect, alert  Abdomen: +bs, soft, nt, no masses   Musculoskeletal: + LE edema, no clubbing       MEDICAL DECISION MAKING       Data/  Recent Labs     07/04/21  1405 07/05/21  0522   WBC 6.8 4.5   HGB 9.8* 9.8*   HCT 27.7* 27.8*   MCV 92.0 92.5    199     Recent Labs     07/04/21  1405 07/04/21  1405 07/05/21  0522 07/05/21  0522 07/06/21  0500 07/06/21  1141 07/07/21  0704   *   < > 134*  --  133*  --  130*   K 3.5   < > 4.3  4.3  --  4.4  --  3.7   CL 89*   < > 98*  --  94*  --  94*   CO2 26   < > 31  --  33*  --  33*   GLUCOSE 132*   < > 104*   < > 89 41* 95   PHOS  --   --  3.3  --  3.5  --   --    MG 2.00  --   --   --   --   --   --    BUN 10   < > 8  --  12  --  16   CREATININE <0.5*   < > <0.5*  --  0.7*  --  <0.5*   LABGLOM >60   < > >60  --  >60  --  >60   GFRAA >60   < > >60  --  >60  --  >60    < > = values in this interval not displayed. Assessment/     Hyponatremia:  Chronic and hypervolemic with history of CHF   - Worsening since recent admission. Also has very poor intake ( BUN 8 ) with superimposed SIADH in the setting on pneumonia   - Given tolvaptan and sodium is up to 662     Systolic Heart Failure:  EF = 40%. Chronic edema     PNA:  On CXR. On room air and antibiotics. Has declined over the last month with multiple hospital stays.      Plan/     Continue Torsemide 40 mg  Daily free water restriction   Follow labs

## 2021-07-07 NOTE — PROGRESS NOTES
Occupational Therapy  Attempted OT tx. Pt declined due to nausea. He c/o's inability to eat and is hoping to see for GI doctor soon. Cont OT tx.   Valencia Wilson OT

## 2021-07-07 NOTE — PROGRESS NOTES
Patient states that he is having difficulty with swallowing. States that it isn't really an issue with swallowing per se, but that the food gets stuck in his esophagus. He then has to make himself vomit the food up because it won't go down any further. He is unable to eat any food and has lost 20 pounds. He states that he had hiatal surgery about a month ago and that these symptoms are getting worse.  Sent to Dr. Daniela Medrano

## 2021-07-07 NOTE — PROGRESS NOTES
Physician Progress Note      Jamil Molina  CSN #:                  [id number]  :                       1940  ADMIT DATE:       2021 1:55 PM  [address] [place] DATE:  RESPONDING  PROVIDER #:        Areli Rangel MD          QUERY TEXT:    Patient admitted with SIADH. ED notes-Fatigue, and Chronic anemia. ? If possible, please document in progress notes and discharge summary if you   are evaluating and /or treating any of the following: The medical record reflects the following:  Risk Factors: Has declined over the last month with multiple hospital stays,   hypoglycemia , BMI 21, severe protein malnutrition dx 21  Clinical Indicators: Nephrology notes- Also has very poor intake ( BUN 8 )   encourage nutrition, H&P- Hypothyroidism continue with the Synthroid. Recently elevated TSH normal free T4 on 2021. Patient was noted to be   hypoglycemia when EMS arrived Accu-Chek. ED notes- Squad had BG of 33. Squad   adiministered bag of D10. he just feels very weak and fatigued. He has no   energy  iron deficiency anemia, hypoalbuminemia 3.1, serum osmolality   261-Severe protein calorie malnutrition dx 21 -NN- \"blood sugar is 67,Pt   is now having trouble eating because he has an excess of  Treatment: encourage nutrition, 2G Na and fluid restriction, dietary   modifications in place, supplements, I&O's, will send screening labs and trial   with glucagon before giving dextrose, supportive care, daily weights, serial   labs    Thank-You, Yara Carlisle RN, BSN, CCDS  Options provided:  -- Protein calorie malnutrition severe POA  -- Protein calorie malnutrition moderate  -- Other - I will add my own diagnosis  -- Disagree - Not applicable / Not valid  -- Disagree - Clinically unable to determine / Unknown  -- Refer to Clinical Documentation Reviewer    PROVIDER RESPONSE TEXT:    This patient has severe protein calorie malnutrition POA.     Query created by: Jerrell Singletary on 7/7/2021 9:04 AM      Electronically signed by:  Deborah Perez MD 7/7/2021 10:57 AM

## 2021-07-07 NOTE — CARE COORDINATION
Hospital day 3: Patient on C4 re Hyponatremia followed by IM, Nephrology, and pending consult to GI and General surgey. Patient plans to dc home with [facility] following for Home Care needs. MARIA LUISA Lazo

## 2021-07-07 NOTE — CONSULTS
Gastroenterology consultation    The gastroenterology problem is dysphagia and aspiration    Alvin Patiño, is a 29-year-old  gentleman admitted to the hospital with profound weakness hyponatremia and hypoglycemia. He has a past history of congestive heart failure. There is a long has history of gastroesophageal reflux disease in about 6 weeks ago, and had a surgical Nissen fundoplication. Since his surgical procedure, he has had much difficulty swallowing. It is primarily solids. Food seems to stick in the middle of his chest.  There are times when the is able to swallow okay and other x4 will not go down he coughs and chokes and possibly aspirates. With his history of heart failure, and now what appears to be aspiration pneumonia, he has developed hyponatremia likely SIADH in origin. The metabolic problem seems to be resolving with treatment. He has not had recent hematemesis or melena. He has lost about 15 pounds of weight in the last 6 weeks. He does not seem to have difficulty swallowing liquids but is primarily solids. He does not recall, and I cannot find documentation that he had an esophageal motility study prior to his recent surgery. Physical examination reveals an alert oriented pleasant gentleman seen today in his room with his wife present. Vital signs are noted as recorded. He has scattered rhonchi throughout both lung fields. I hear no gallops or murmurs. Abdomen is soft and nontender. Impression/plan:  Her major GI issue is dysphagia for solids. Missing question since his recent Nissen fundoplication. I suspect he has underlying esophageal motility disorder. Recommend that we proceed with upper GI endoscopy and will schedule out for tomorrow.   He and his wife understand the process of this procedure along with the risks benefits pros and cons and has agreed to proceed

## 2021-07-07 NOTE — PROGRESS NOTES
Hospitalist Progress Note      PCP: Jason Dowling    Date of Admission: 7/4/2021    Chief Complaint: cough    Hospital Course:   79 y.o. male who presented to [facility] with cough for 1 week, feeling tired and sweaty no history for fever chills no chest pain positive shortness of breath positive cough with some sticky phlegm per patient's wife no nausea vomiting no diarrhea no melena no hematochezia no recent weight loss or weight gain. He does not smoke or drink alcohol. Patient with a past medical history of coronary artery disease status post stents followed by Dr. Sherri Trinh cardiologist at [facility], hypertension, hyperlipidemia, hypothyroidism, CHF with a EF of 30%, hyponatremia, SIADH status post hiatal hernia surgery on 6/25/2021 by Dr. Erum Avila. In the emergency room patient was noted to have a hyponatremia sodium of 123, hyperglycemia with a blood sugar of 33 when EMS arrived at home was given a D10 on the route with a normal blood sugar in the emergency room. Possible left lower lobe pneumonia culture sent in the emergency room started on HCAP pneumonia coverage by ER physician. Hyponatremia nephrology was consulted by ER physician recommended no IV fluid Samsca per nephrology. Subjective: No new complaints. Na improved today.        Medications:  Reviewed    Infusion Medications    sodium chloride Stopped (07/04/21 2143)    dextrose       Scheduled Medications    multivitamin  1 tablet Oral Daily    torsemide  40 mg Oral Daily    aspirin  81 mg Oral Daily    atorvastatin  80 mg Oral Nightly    cetirizine  10 mg Oral Daily    docusate sodium  100 mg Oral BID    ferrous sulfate  325 mg Oral Daily with breakfast    levothyroxine  150 mcg Oral Daily    sodium chloride flush  5-40 mL Intravenous 2 times per day    enoxaparin  40 mg Subcutaneous Daily    famotidine  20 mg Oral BID     PRN Meds: sodium chloride flush, sodium chloride, ondansetron **OR** ondansetron, 15 12*   ALT 15 14   BILIDIR  --  <0.2   BILITOT 0.4 0.4   ALKPHOS 82 84     No results for input(s): INR in the last 72 hours. Recent Labs     07/04/21  1405   TROPONINI <0.01       Urinalysis:      Lab Results   Component Value Date    NITRU Negative 07/04/2021    BLOODU Negative 07/04/2021    SPECGRAV 1.025 07/04/2021    GLUCOSEU Negative 07/04/2021       Radiology:  XR CHEST PORTABLE   Final Result   Left basilar opacity, atelectasis versus pneumonia. Cardiomegaly. Assessment/Plan:    Active Hospital Problems    Diagnosis     Hyponatremia [E87.1]      Hyponatremia  - likely SIADH and CHF related  - nephrology consulted  - s/p samsca with good response  - diuretic changed to torsemide  - continue to monitor    Hypoglycemia  - unclear etiology  - not on insulin or sulfonylureas at home  - insulinoma labs pending    Regurgitation s/p hiatal hernia repair  - GI, general surgery consulted  - supportive care    Chronic systolic heart failure  - appears euvolemic  - echo 6/21 with EF 40-45%  - lasix changed to torsemide  - continue enalapril. Will start on coreg prior to discharge    CAD  - no active chest pain   - continue home ASA, statin    HTN  - well controlled  - stopped home vasotec due to hyponatremia    HLD  - continue home statin     Hypothyroidism  - continue home synthroid    DVT Prophylaxis: lovenox  Diet: ADULT DIET;  Regular; 1200 ml  Code Status: Full Code    PT/OT Eval Status: ordered    Dispo - unclear Diana Stiles MD

## 2021-07-08 ENCOUNTER — ANESTHESIA EVENT (OUTPATIENT)
Dept: ENDOSCOPY | Age: 81
DRG: 643 | End: 2021-07-08
Payer: MEDICARE

## 2021-07-08 ENCOUNTER — ANESTHESIA (OUTPATIENT)
Dept: ENDOSCOPY | Age: 81
DRG: 643 | End: 2021-07-08
Payer: MEDICARE

## 2021-07-08 VITALS — DIASTOLIC BLOOD PRESSURE: 36 MMHG | SYSTOLIC BLOOD PRESSURE: 77 MMHG | OXYGEN SATURATION: 100 %

## 2021-07-08 LAB
ANION GAP SERPL CALCULATED.3IONS-SCNC: 12 MMOL/L (ref 3–16)
ANION GAP SERPL CALCULATED.3IONS-SCNC: 4 MMOL/L (ref 3–16)
BLOOD CULTURE, ROUTINE: NORMAL
BUN BLDV-MCNC: 14 MG/DL (ref 7–20)
BUN BLDV-MCNC: 17 MG/DL (ref 7–20)
C-PEPTIDE: 7.3 NG/ML (ref 1.1–4.4)
CALCIUM SERPL-MCNC: 8.3 MG/DL (ref 8.3–10.6)
CALCIUM SERPL-MCNC: 8.9 MG/DL (ref 8.3–10.6)
CHLORIDE BLD-SCNC: 91 MMOL/L (ref 99–110)
CHLORIDE BLD-SCNC: 92 MMOL/L (ref 99–110)
CO2: 28 MMOL/L (ref 21–32)
CO2: 34 MMOL/L (ref 21–32)
CREAT SERPL-MCNC: 0.6 MG/DL (ref 0.8–1.3)
CREAT SERPL-MCNC: 0.8 MG/DL (ref 0.8–1.3)
CULTURE, BLOOD 2: NORMAL
GFR AFRICAN AMERICAN: >60
GFR AFRICAN AMERICAN: >60
GFR NON-AFRICAN AMERICAN: >60
GFR NON-AFRICAN AMERICAN: >60
GLUCOSE BLD-MCNC: 109 MG/DL (ref 70–99)
GLUCOSE BLD-MCNC: 118 MG/DL (ref 70–99)
GLUCOSE BLD-MCNC: 157 MG/DL (ref 70–99)
GLUCOSE BLD-MCNC: 25 MG/DL (ref 70–99)
GLUCOSE BLD-MCNC: 26 MG/DL (ref 70–99)
GLUCOSE BLD-MCNC: 26 MG/DL (ref 70–99)
GLUCOSE BLD-MCNC: 45 MG/DL (ref 70–99)
GLUCOSE BLD-MCNC: 60 MG/DL (ref 70–99)
GLUCOSE BLD-MCNC: 81 MG/DL (ref 70–99)
GLUCOSE BLD-MCNC: 90 MG/DL (ref 70–99)
GLUCOSE BLD-MCNC: 93 MG/DL (ref 70–99)
GLUCOSE BLD-MCNC: 99 MG/DL (ref 70–99)
INSULIN COMMENT: NORMAL
INSULIN REFERENCE RANGE:: NORMAL
INSULIN: 10.2 MU/L
PERFORMED ON: ABNORMAL
PERFORMED ON: NORMAL
PERFORMED ON: NORMAL
POTASSIUM SERPL-SCNC: 3.4 MMOL/L (ref 3.5–5.1)
POTASSIUM SERPL-SCNC: 3.6 MMOL/L (ref 3.5–5.1)
SODIUM BLD-SCNC: 129 MMOL/L (ref 136–145)
SODIUM BLD-SCNC: 132 MMOL/L (ref 136–145)

## 2021-07-08 PROCEDURE — 2580000003 HC RX 258: Performed by: INTERNAL MEDICINE

## 2021-07-08 PROCEDURE — 7100000011 HC PHASE II RECOVERY - ADDTL 15 MIN: Performed by: INTERNAL MEDICINE

## 2021-07-08 PROCEDURE — 3609017100 HC EGD: Performed by: INTERNAL MEDICINE

## 2021-07-08 PROCEDURE — 7100000010 HC PHASE II RECOVERY - FIRST 15 MIN: Performed by: INTERNAL MEDICINE

## 2021-07-08 PROCEDURE — APPNB60 APP NON BILLABLE TIME 46-60 MINS: Performed by: CLINICAL NURSE SPECIALIST

## 2021-07-08 PROCEDURE — 80048 BASIC METABOLIC PNL TOTAL CA: CPT

## 2021-07-08 PROCEDURE — 0DJ08ZZ INSPECTION OF UPPER INTESTINAL TRACT, VIA NATURAL OR ARTIFICIAL OPENING ENDOSCOPIC: ICD-10-PCS | Performed by: INTERNAL MEDICINE

## 2021-07-08 PROCEDURE — 2060000000 HC ICU INTERMEDIATE R&B

## 2021-07-08 PROCEDURE — 2580000003 HC RX 258: Performed by: HOSPITALIST

## 2021-07-08 PROCEDURE — 6370000000 HC RX 637 (ALT 250 FOR IP): Performed by: HOSPITALIST

## 2021-07-08 PROCEDURE — 6360000002 HC RX W HCPCS: Performed by: CLINICAL NURSE SPECIALIST

## 2021-07-08 PROCEDURE — 97116 GAIT TRAINING THERAPY: CPT

## 2021-07-08 PROCEDURE — C9113 INJ PANTOPRAZOLE SODIUM, VIA: HCPCS | Performed by: CLINICAL NURSE SPECIALIST

## 2021-07-08 PROCEDURE — 43235 EGD DIAGNOSTIC BRUSH WASH: CPT | Performed by: INTERNAL MEDICINE

## 2021-07-08 PROCEDURE — 2709999900 HC NON-CHARGEABLE SUPPLY: Performed by: INTERNAL MEDICINE

## 2021-07-08 PROCEDURE — 6360000002 HC RX W HCPCS: Performed by: NURSE ANESTHETIST, CERTIFIED REGISTERED

## 2021-07-08 PROCEDURE — 2500000003 HC RX 250 WO HCPCS: Performed by: NURSE ANESTHETIST, CERTIFIED REGISTERED

## 2021-07-08 PROCEDURE — 97110 THERAPEUTIC EXERCISES: CPT

## 2021-07-08 PROCEDURE — 3700000000 HC ANESTHESIA ATTENDED CARE: Performed by: INTERNAL MEDICINE

## 2021-07-08 PROCEDURE — 6370000000 HC RX 637 (ALT 250 FOR IP): Performed by: INTERNAL MEDICINE

## 2021-07-08 PROCEDURE — 99223 1ST HOSP IP/OBS HIGH 75: CPT | Performed by: SURGERY

## 2021-07-08 PROCEDURE — 36415 COLL VENOUS BLD VENIPUNCTURE: CPT

## 2021-07-08 RX ORDER — PROPOFOL 10 MG/ML
INJECTION, EMULSION INTRAVENOUS PRN
Status: DISCONTINUED | OUTPATIENT
Start: 2021-07-08 | End: 2021-07-08 | Stop reason: SDUPTHER

## 2021-07-08 RX ORDER — OXYCODONE HYDROCHLORIDE AND ACETAMINOPHEN 5; 325 MG/1; MG/1
2 TABLET ORAL PRN
Status: DISCONTINUED | OUTPATIENT
Start: 2021-07-08 | End: 2021-07-08 | Stop reason: HOSPADM

## 2021-07-08 RX ORDER — ONDANSETRON 2 MG/ML
4 INJECTION INTRAMUSCULAR; INTRAVENOUS PRN
Status: DISCONTINUED | OUTPATIENT
Start: 2021-07-08 | End: 2021-07-08 | Stop reason: HOSPADM

## 2021-07-08 RX ORDER — MEPERIDINE HYDROCHLORIDE 50 MG/ML
12.5 INJECTION INTRAMUSCULAR; INTRAVENOUS; SUBCUTANEOUS EVERY 5 MIN PRN
Status: DISCONTINUED | OUTPATIENT
Start: 2021-07-08 | End: 2021-07-08 | Stop reason: HOSPADM

## 2021-07-08 RX ORDER — HYDRALAZINE HYDROCHLORIDE 20 MG/ML
5 INJECTION INTRAMUSCULAR; INTRAVENOUS EVERY 10 MIN PRN
Status: DISCONTINUED | OUTPATIENT
Start: 2021-07-08 | End: 2021-07-08 | Stop reason: HOSPADM

## 2021-07-08 RX ORDER — OXYCODONE HYDROCHLORIDE AND ACETAMINOPHEN 5; 325 MG/1; MG/1
1 TABLET ORAL PRN
Status: DISCONTINUED | OUTPATIENT
Start: 2021-07-08 | End: 2021-07-08 | Stop reason: HOSPADM

## 2021-07-08 RX ORDER — POTASSIUM CHLORIDE 20 MEQ/1
20 TABLET, EXTENDED RELEASE ORAL ONCE
Status: COMPLETED | OUTPATIENT
Start: 2021-07-08 | End: 2021-07-08

## 2021-07-08 RX ORDER — SODIUM CHLORIDE 9 MG/ML
25 INJECTION, SOLUTION INTRAVENOUS PRN
Status: DISCONTINUED | OUTPATIENT
Start: 2021-07-08 | End: 2021-07-12 | Stop reason: HOSPADM

## 2021-07-08 RX ORDER — LIDOCAINE HYDROCHLORIDE 20 MG/ML
INJECTION, SOLUTION INFILTRATION; PERINEURAL PRN
Status: DISCONTINUED | OUTPATIENT
Start: 2021-07-08 | End: 2021-07-08 | Stop reason: SDUPTHER

## 2021-07-08 RX ORDER — TOLVAPTAN 15 MG/1
7.5 TABLET ORAL ONCE
Status: COMPLETED | OUTPATIENT
Start: 2021-07-08 | End: 2021-07-08

## 2021-07-08 RX ORDER — LABETALOL HYDROCHLORIDE 5 MG/ML
5 INJECTION, SOLUTION INTRAVENOUS EVERY 10 MIN PRN
Status: DISCONTINUED | OUTPATIENT
Start: 2021-07-08 | End: 2021-07-08 | Stop reason: HOSPADM

## 2021-07-08 RX ORDER — MORPHINE SULFATE 2 MG/ML
1 INJECTION, SOLUTION INTRAMUSCULAR; INTRAVENOUS EVERY 5 MIN PRN
Status: DISCONTINUED | OUTPATIENT
Start: 2021-07-08 | End: 2021-07-08 | Stop reason: HOSPADM

## 2021-07-08 RX ORDER — PROMETHAZINE HYDROCHLORIDE 25 MG/ML
6.25 INJECTION, SOLUTION INTRAMUSCULAR; INTRAVENOUS
Status: DISCONTINUED | OUTPATIENT
Start: 2021-07-08 | End: 2021-07-08 | Stop reason: HOSPADM

## 2021-07-08 RX ORDER — MORPHINE SULFATE 2 MG/ML
2 INJECTION, SOLUTION INTRAMUSCULAR; INTRAVENOUS EVERY 5 MIN PRN
Status: DISCONTINUED | OUTPATIENT
Start: 2021-07-08 | End: 2021-07-08 | Stop reason: HOSPADM

## 2021-07-08 RX ORDER — DIPHENHYDRAMINE HYDROCHLORIDE 50 MG/ML
12.5 INJECTION INTRAMUSCULAR; INTRAVENOUS
Status: DISCONTINUED | OUTPATIENT
Start: 2021-07-08 | End: 2021-07-08 | Stop reason: HOSPADM

## 2021-07-08 RX ORDER — PANTOPRAZOLE SODIUM 40 MG/10ML
40 INJECTION, POWDER, LYOPHILIZED, FOR SOLUTION INTRAVENOUS 2 TIMES DAILY
Status: DISCONTINUED | OUTPATIENT
Start: 2021-07-08 | End: 2021-07-12 | Stop reason: HOSPADM

## 2021-07-08 RX ADMIN — SODIUM CHLORIDE: 9 INJECTION, SOLUTION INTRAVENOUS at 07:59

## 2021-07-08 RX ADMIN — PROPOFOL 80 MG: 10 INJECTION, EMULSION INTRAVENOUS at 08:04

## 2021-07-08 RX ADMIN — Medication 10 ML: at 09:00

## 2021-07-08 RX ADMIN — ATORVASTATIN CALCIUM 80 MG: 80 TABLET, FILM COATED ORAL at 21:29

## 2021-07-08 RX ADMIN — DEXTROSE MONOHYDRATE 100 ML/HR: 50 INJECTION, SOLUTION INTRAVENOUS at 20:39

## 2021-07-08 RX ADMIN — PANTOPRAZOLE SODIUM 40 MG: 40 INJECTION, POWDER, FOR SOLUTION INTRAVENOUS at 15:17

## 2021-07-08 RX ADMIN — Medication 30 G: at 20:10

## 2021-07-08 RX ADMIN — PANTOPRAZOLE SODIUM 40 MG: 40 INJECTION, POWDER, FOR SOLUTION INTRAVENOUS at 21:29

## 2021-07-08 RX ADMIN — PROPOFOL 20 MG: 10 INJECTION, EMULSION INTRAVENOUS at 08:06

## 2021-07-08 RX ADMIN — POTASSIUM CHLORIDE 20 MEQ: 1500 TABLET, EXTENDED RELEASE ORAL at 15:17

## 2021-07-08 RX ADMIN — Medication 10 ML: at 20:17

## 2021-07-08 RX ADMIN — Medication 7.5 MG: at 15:17

## 2021-07-08 RX ADMIN — DOCUSATE SODIUM 100 MG: 100 CAPSULE, LIQUID FILLED ORAL at 21:29

## 2021-07-08 RX ADMIN — Medication 10 ML: at 20:16

## 2021-07-08 RX ADMIN — Medication 30 G: at 20:32

## 2021-07-08 RX ADMIN — LIDOCAINE HYDROCHLORIDE 50 MG: 20 INJECTION, SOLUTION INFILTRATION; PERINEURAL at 08:03

## 2021-07-08 ASSESSMENT — PAIN SCALES - GENERAL
PAINLEVEL_OUTOF10: 0

## 2021-07-08 NOTE — PLAN OF CARE
Problem: Falls - Risk of:  Goal: Will remain free from falls  Description: Will remain free from falls  7/8/2021 1131 by Armando Salamanca RN  Outcome: Ongoing  7/8/2021 0726 by Abigail Vieira RN  Outcome: Met This Shift  Goal: Absence of physical injury  Description: Absence of physical injury  7/8/2021 1131 by Armando Salamanca RN  Outcome: Ongoing  7/8/2021 0726 by Abigail Vieira RN  Outcome: Met This Shift     Problem: OXYGENATION/RESPIRATORY FUNCTION  Goal: Patient will maintain patent airway  7/8/2021 1131 by Armando Salamanca RN  Outcome: Ongoing  7/8/2021 0726 by Abigail Vieira RN  Outcome: Ongoing  Goal: Patient will achieve/maintain normal respiratory rate/effort  Description: Respiratory rate and effort will be within normal limits for the patient  Outcome: Ongoing     Problem: Musculor/Skeletal Functional Status  Goal: Highest potential functional level  Outcome: Ongoing  Goal: Absence of falls  Outcome: Ongoing

## 2021-07-08 NOTE — ANESTHESIA POSTPROCEDURE EVALUATION
Department of Anesthesiology  Postprocedure Note    Patient: Seng Hernandez  MRN: [id number]  YOB: 1940  Date of evaluation: 7/8/2021  Time:  9:55 AM     Procedure Summary     Date: 07/08/21 Room / Location: [facility] / [facility]    Anesthesia Start: 2733 Anesthesia Stop: 7313    Procedure: EGD ESOPHAGOGASTRODUODENOSCOPY (N/A ) Diagnosis: (Regurgitation)    Surgeons: Belen Dallas MD Responsible Provider: Parag Burns MD    Anesthesia Type: general ASA Status: 3          Anesthesia Type: general    Noe Phase I: Noe Score: 10    Noe Phase II: Noe Score: 10    Last vitals: Reviewed and per EMR flowsheets.        Anesthesia Post Evaluation    Comments: Postoperative Anesthesia Note    Name:    Seng Hernandez  MRN:      [id number]    Patient Vitals in the past 12 hrs:  07/08/21 0903, BP:(!) 137/59, Temp:98.1 °F (36.7 °C), Temp src:Oral, Pulse:69, Resp:18, SpO2:96 %  07/08/21 0845, BP:(!) 117/59, Pulse:70, Resp:16, SpO2:99 %  07/08/21 0830, BP:(!) 107/44, Pulse:72, Resp:16, SpO2:99 %  07/08/21 0815, BP:(!) 109/47, Pulse:60, Resp:16, SpO2:96 %  07/08/21 0747, BP:(!) 124/53, Temp:98.5 °F (36.9 °C), Temp src:Temporal, Pulse:56, Resp:16, SpO2:95 %  07/08/21 0415, BP:(!) 110/52, Temp:97.7 °F (36.5 °C), Temp src:Oral, Pulse:75, Resp:18, SpO2:94 %, Weight:159 lb 3.2 oz (72.2 kg)  07/07/21 2347, BP:(!) 106/49, Temp:98 °F (36.7 °C), Pulse:77, SpO2:95 %  07/07/21 2345, BP:(!) 106/41, Temp:98.1 °F (36.7 °C), Temp src:Oral, Pulse:72, Resp:18, SpO2:95 %     LABS:    CBC  Lab Results       Component                Value               Date/Time                  WBC                      4.5                 07/05/2021 05:22 AM        HGB                      9.8 (L)             07/05/2021 05:22 AM        HCT                      27.8 (L)            07/05/2021 05:22 AM        PLT                      199                 07/05/2021 05:22 AM   RENAL  Lab Results       Component Value               Date/Time                  NA                       129 (L)             07/08/2021 05:01 AM        K                        3.4 (L)             07/08/2021 05:01 AM        K                        4.3                 07/05/2021 05:22 AM        CL                       91 (L)              07/08/2021 05:01 AM        CO2                      34 (H)              07/08/2021 05:01 AM        BUN                      14                  07/08/2021 05:01 AM        CREATININE               0.6 (L)             07/08/2021 05:01 AM        GLUCOSE                  90                  07/08/2021 05:01 AM   COAGS  Lab Results       Component                Value               Date/Time                  PROTIME                  11.4                03/02/2013 11:26 AM        INR                      1.00                03/02/2013 11:26 AM        APTT                     28.1                03/02/2013 11:26 AM     Intake & Output:  @04QYEM@    Nausea & Vomiting:  No    Level of Consciousness:  Awake    Pain Assessment:  Adequate analgesia    Anesthesia Complications:  No apparent anesthetic complications    SUMMARY      Vital signs stable  OK to discharge from Stage I post anesthesia care.   Care transferred from Anesthesiology department on discharge from perioperative area

## 2021-07-08 NOTE — ANESTHESIA PRE PROCEDURE
Department of Anesthesiology  Preprocedure Note       Name:  Cindy Yee   Age:  79 y.o.  :  1940                                          MRN:  [id number]         Date:  2021      Surgeon: Ana Laura Garcia):  Brannon Hill MD    Procedure: Procedure(s):  EGD ESOPHAGOGASTRODUODENOSCOPY    Medications prior to admission:   Prior to Admission medications    Medication Sig Start Date End Date Taking? Authorizing Provider   furosemide (LASIX) 40 MG tablet Take 1 tablet by mouth daily 21  Yes VALE Weems CNP   docusate sodium (COLACE, DULCOLAX) 100 MG CAPS Take 100 mg by mouth 2 times daily  Patient taking differently: Take 100 mg by mouth 2 times daily as needed  21  Yes Gilmar Her MD   levothyroxine (SYNTHROID) 150 MCG tablet TAKE 1 TABLET BY MOUTH  DAILY 10/16/20  Yes VALE Pinon CNP   atorvastatin (LIPITOR) 80 MG tablet TAKE 1 TABLET BY MOUTH  EVERY DAY  Patient taking differently: Take 80 mg by mouth nightly TAKE 1 TABLET BY MOUTH EVERY DAY 20  Yes Gabriella Parikh MD   ferrous sulfate (FE TABS) 325 (65 Fe) MG EC tablet Take 1 tablet by mouth daily (with breakfast) 19  Yes VALE Pinon CNP   enalapril (VASOTEC) 10 MG tablet Take 10 mg by mouth daily  18  Yes Historical Provider, MD   cetirizine (ZYRTEC) 10 MG tablet Take 10 mg by mouth daily. Yes Historical Provider, MD   Multiple Vitamin (THERA) TABS Take 1 tablet by mouth daily 1 Tab daily.    Yes Historical Provider, MD   aspirin 81 MG chewable tablet Take 81 mg by mouth daily    Yes Historical Provider, MD   metoclopramide (REGLAN) 10 MG tablet Take 1 tablet by mouth 3 times daily (with meals) 21   Duc Hua MD       Current medications:    Current Facility-Administered Medications   Medication Dose Route Frequency Provider Last Rate Last Admin    0.9 % sodium chloride infusion  25 mL Intravenous PRN Brannon Hill MD        sodium chloride flush 0.9 % injection 10 mL  10 mL Intravenous 2 times per day Tobi Roman MD   10 mL at 07/07/21 2034    sodium chloride flush 0.9 % injection 10 mL  10 mL Intravenous PRN Tobi Roman MD        therapeutic multivitamin-minerals 1 tablet  1 tablet Oral Daily Brandon Ashraf MD   1 tablet at 07/06/21 0850    torsemide (DEMADEX) tablet 40 mg  40 mg Oral Daily Candido Cruz MD   40 mg at 07/07/21 0956    aspirin chewable tablet 81 mg  81 mg Oral Daily Lola Penaloza MD   81 mg at 07/06/21 0850    atorvastatin (LIPITOR) tablet 80 mg  80 mg Oral Nightly Lola Penaloza MD   80 mg at 07/05/21 2133    cetirizine (ZYRTEC) tablet 10 mg  10 mg Oral Daily Lola Penaloza MD   10 mg at 07/06/21 0850    docusate sodium (COLACE) capsule 100 mg  100 mg Oral BID Lola Penaloza MD   100 mg at 07/07/21 2034    ferrous sulfate (IRON 325) tablet 325 mg  325 mg Oral Daily with breakfast Lola Penaloza MD   325 mg at 07/06/21 0850    levothyroxine (SYNTHROID) tablet 150 mcg  150 mcg Oral Daily Lola Penaloza MD   150 mcg at 07/07/21 0542    sodium chloride flush 0.9 % injection 5-40 mL  5-40 mL Intravenous 2 times per day Lola Penaloza MD   10 mL at 07/07/21 2035    sodium chloride flush 0.9 % injection 5-40 mL  5-40 mL Intravenous PRN Lola Penaloza MD        0.9 % sodium chloride infusion  25 mL Intravenous PRN Lola Penaloza MD   Stopped at 07/04/21 2143    enoxaparin (LOVENOX) injection 40 mg  40 mg Subcutaneous Daily Lola Penaloza MD   40 mg at 07/07/21 0956    ondansetron (ZOFRAN-ODT) disintegrating tablet 4 mg  4 mg Oral Q8H PRN Lola Penaloza MD        Or    [facility]ron [facility]) injection 4 mg  4 mg Intravenous Q6H PRN Lola Penaloza MD        polyethylene glycol (GLYCOLAX) packet 17 g  17 g Oral Daily PRN Lola Penaloza MD        acetaminophen (TYLENOL) tablet 650 mg  650 mg Oral Q6H PRN Lola Penaloza MD        Or    acetaminophen (TYLENOL) suppository 650 mg  650 mg Rectal Q6H PRN Camilla Adams MD        famotidine (PEPCID) tablet 20 mg  20 mg Oral BID Camilla Adams MD   20 mg at 07/07/21 2034    albuterol (PROVENTIL) nebulizer solution 2.5 mg  2.5 mg Nebulization Q6H PRN Camilla Adams MD        glucose (GLUTOSE) 40 % oral gel 15 g  15 g Oral PRN Camilla Adams MD        dextrose 50 % IV solution  12.5 g Intravenous PRN Camilla Adams MD   12.5 g at 07/05/21 1200    glucagon (rDNA) injection 1 mg  1 mg Intramuscular PRN Camilla Adams MD   1 mg at 07/06/21 1142    dextrose 5 % solution  100 mL/hr Intravenous PRN Camilla Adams MD           Allergies:     Allergies   Allergen Reactions    Reopro [Abciximab Injection]      \"destroyed platelets\"       Problem List:    Patient Active Problem List   Diagnosis Code    Generalized weakness R53.1    Hypothyroid E03.9    Food allergy Z91.018    CAD (coronary artery disease) I25.10    TIA (transient ischemic attack) G45.9    Hyperlipidemia E78.5    Osteoarthritis M19.90    Cough R05    Herpes zoster B02.9    Vertigo R42    Eustachian tube dysfunction H69.80    Aortic stenosis I35.0    Unilateral recurrent inguinal hernia without obstruction or gangrene K40.91    Murmur R01.1    Obesity E66.9    Occlusion and stenosis of carotid artery I65.29    Stented coronary artery Z95.5    Groin pain, chronic, right R10.31, G89.29    Chronic systolic congestive heart failure (HCC) I50.22    Non-recurrent unilateral inguinal hernia without obstruction or gangrene K40.90    Postoperative pain G89.18    Postoperative hematoma of subcutaneous tissue following non-dermatologic procedure L76.32    SBO (small bowel obstruction) ([facility] [address] K56.609    Hiatal hernia K44.9    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    Transaminitis R74.01    Chronic hyponatremia E87.1    Hypoalbuminemia E88.09    Pleural effusion on right J90    Acute on chronic combined systolic and diastolic CHF (congestive heart failure) ([address] I50.43    Hyponatremia E87.1       Past Medical History:        Diagnosis Date    Allergic rhinitis     Anemia     Arthritis     rt hip    CAD (coronary artery disease) 2001    cardiac stents    Chronic systolic congestive heart failure ([address] 2018    Compression fracture of T11 vertebra (HCC)     back brace    Food allergy     Hiatal hernia     History of blood transfusion     platelets=    Hyperlipidemia     Hypertension     hx of    Hypothyroid     Obesity 10/2/2012    Occlusion and stenosis of carotid artery 10/2/2012    Osteoarthritis     Hip right    Recurrent right inguinal hernia     Shingles 2014    Thyroid disease     TIA (transient ischemic attack)        Past Surgical History:        Procedure Laterality Date    AORTIC VALVE REPLACEMENT  2018    CARDIAC SURGERY  2001    stents    CARDIAC VALVE REPLACEMENT  09/15/2018    COLONOSCOPY  99    COLONOSCOPY  2015    EYE SURGERY      victorino cataracts    GASTRIC FUNDOPLICATION N/A     ROBOTIC NISSEN FUNDOPLICATION performed by Dann Putnam MD at [place] [address] Left 2020    ROBOTIC LEFT INGUINAL HERNIA REPAIR WITH MESH performed by Dann Putnam MD at [facility] 2016    laparoscopic right inguinal hernia repair with mesh    INGUINAL HERNIA REPAIR Right 2017    davinci recurrent right inguinal hernia repair with mesh    PTCA  2018    SIGMOIDOSCOPY  1994    TONSILLECTOMY      UPPER GASTROINTESTINAL ENDOSCOPY  94    UPPER GASTROINTESTINAL ENDOSCOPY  04    Gastritis and duodenitis       Social History:    Social History     Tobacco Use    Smoking status: Former Smoker     Packs/day: 0.50     Years: 15.00     Pack years: 7.50     Start date:      Quit date: 1995     Years since quittin.7    Smokeless tobacco: Never Used   Substance Use Topics    Alcohol use:  No                                Counseling given: Not Answered      Vital Signs (Current):   Vitals:    07/07/21 2017 07/07/21 2345 07/07/21 2347 07/08/21 0415   BP: 110/63 (!) 106/41 (!) 106/49 (!) 110/52   Pulse: 70 72 77 75   Resp: 18 18  18   Temp: 98 °F (36.7 °C) 98.1 °F (36.7 °C) 98 °F (36.7 °C) 97.7 °F (36.5 °C)   TempSrc: Oral Oral  Oral   SpO2: 96% 95% 95% 94%   Weight:    159 lb 3.2 oz (72.2 kg)   Height:                                                  BP Readings from Last 3 Encounters:   07/08/21 (!) 110/52   07/01/21 132/65   06/25/21 (!) 123/59       NPO Status:                                                                                 BMI:   Wt Readings from Last 3 Encounters:   07/08/21 159 lb 3.2 oz (72.2 kg)   06/30/21 168 lb (76.2 kg)   06/25/21 165 lb 14.4 oz (75.3 kg)     Body mass index is 22.2 kg/m².     CBC:   Lab Results   Component Value Date    WBC 4.5 07/05/2021    RBC 3.00 07/05/2021    HGB 9.8 07/05/2021    HCT 27.8 07/05/2021    MCV 92.5 07/05/2021    RDW 17.6 07/05/2021     07/05/2021       CMP:   Lab Results   Component Value Date     07/08/2021    K 3.4 07/08/2021    K 4.3 07/05/2021    CL 91 07/08/2021    CO2 34 07/08/2021    BUN 14 07/08/2021    CREATININE 0.6 07/08/2021    GFRAA >60 07/08/2021    GFRAA >60 03/02/2013    AGRATIO 1.0 07/04/2021    LABGLOM >60 07/08/2021    LABGLOM 79 12/08/2014    GLUCOSE 90 07/08/2021    PROT 6.2 07/05/2021    PROT 7.1 03/02/2013    CALCIUM 8.3 07/08/2021    BILITOT 0.4 07/05/2021    ALKPHOS 84 07/05/2021    AST 12 07/05/2021    ALT 14 07/05/2021       POC Tests:   Recent Labs     07/08/21  0013   POCGLU 109*       Coags:   Lab Results   Component Value Date    PROTIME 11.4 03/02/2013    INR 1.00 03/02/2013    APTT 28.1 03/02/2013       HCG (If Applicable): No results found for: PREGTESTUR, PREGSERUM, HCG, HCGQUANT     ABGs: No results found for: PHART, PO2ART, DHR7GGK, KFE2AFZ, BEART, E7BRVFVE     Type & Screen (If Applicable):  No results found for: LABABO, AM    HCT 27.8 (L) 07/05/2021 05:22 AM     07/05/2021 05:22 AM     RENAL  Lab Results   Component Value Date/Time     (L) 07/08/2021 05:01 AM    K 3.4 (L) 07/08/2021 05:01 AM    K 4.3 07/05/2021 05:22 AM    CL 91 (L) 07/08/2021 05:01 AM    CO2 34 (H) 07/08/2021 05:01 AM    BUN 14 07/08/2021 05:01 AM    CREATININE 0.6 (L) 07/08/2021 05:01 AM    GLUCOSE 90 07/08/2021 05:01 AM     COAGS  Lab Results   Component Value Date/Time    PROTIME 11.4 03/02/2013 11:26 AM    INR 1.00 03/02/2013 11:26 AM    APTT 28.1 03/02/2013 11:26 AM        Anesthesia Plan      general     ASA 3     (I discussed with the patient the risks and benefits of PIV, anesthesia, IV Narcotics, PACU. All questions were answered the patient agrees with the plan and wishes to proceed)  Induction: intravenous.                           Carol Iglesias MD   7/8/2021

## 2021-07-08 NOTE — CONSULTS
Department of General Surgery Consult    PATIENT NAME: Fany Vora   YOB: 1940    ADMISSION DATE: 7/4/2021  1:55 PM      TODAY'S DATE: 7/8/2021    Reason for Consult:  Dysphagia    Chief Complaint: Difficulty swallowing food    Requesting Physician:  Denisha Tobin    HISTORY OF PRESENT ILLNESS:              The patient is a 79 y.o. male who presents with complex recent surgical history - ~6 weeks s/p difficult repair of large, Type IV paraesophageal hernia with hiatal mesh reinforcement and Jesús gastroplasty/Nissen fundoplication. Pt admitted a few days ago w/ increasing fatigue, cough and hypoglycemia, with concern for pneumonia. Initially was eating small amounts of regular meals reasonably well, but 2 days ago he noted increased dysphagia with solid food. UGI done yesterday showed narrowing at distal esophagus and poor esophageal motility but no reflux. Pt has not been on PPIs since his surgery. Today had EGD which confirms distal esophageal narrowing and also apparent complex ulceration at the area of the fundoplication. Of note, pt did not have EGD/UGI done prior to the surgical repair - as would typically be done - due to the relative urgency in symptoms he had presented with regarding the herniated stomach. At time of surgery I noted that we could not pass the usual esophageal dilators when performing the fundoplication, which indicates he already had esophageal luminal narrowing at the time of surgery. Currently he is resting comfortably, in no distress.       Past Medical History:        Diagnosis Date    Allergic rhinitis     Anemia     Arthritis     rt hip    CAD (coronary artery disease) 2001    cardiac stents    Chronic systolic congestive heart failure ([facility] [address] 8/21/2018    Compression fracture of T11 vertebra (HCC)     back brace    Food allergy     Hiatal hernia     History of blood transfusion     platelets=2001    Hyperlipidemia     Hypertension     hx of    Hypothyroid     Obesity 10/2/2012    Occlusion and stenosis of carotid artery 10/2/2012    Osteoarthritis     Hip right    Recurrent right inguinal hernia     Shingles 2014    Thyroid disease     TIA (transient ischemic attack)        Past Surgical History:        Procedure Laterality Date    AORTIC VALVE REPLACEMENT  2018    CARDIAC SURGERY  2001    stents    CARDIAC VALVE REPLACEMENT  09/15/2018    COLONOSCOPY  04/22/99    COLONOSCOPY  7/13/2015    EYE SURGERY      victorino cataracts    GASTRIC FUNDOPLICATION N/A 1/55/7865    ROBOTIC NISSEN FUNDOPLICATION performed by Dany Johnson MD at [address] Left 2/5/2020    ROBOTIC LEFT INGUINAL HERNIA REPAIR WITH MESH performed by Dany Johnson MD at [address] Right 07/07/2016    laparoscopic right inguinal hernia repair with mesh    INGUINAL HERNIA REPAIR Right 04/12/2017    davinci recurrent right inguinal hernia repair with mesh    PTCA  2018    SIGMOIDOSCOPY  1994    TONSILLECTOMY      UPPER GASTROINTESTINAL ENDOSCOPY  05/12/94    UPPER GASTROINTESTINAL ENDOSCOPY  11/09/04    Gastritis and duodenitis    UPPER GASTROINTESTINAL ENDOSCOPY N/A 7/8/2021    EGD ESOPHAGOGASTRODUODENOSCOPY performed by Sidney Rolon MD at [address]       Current Medications:   Current Facility-Administered Medications: 0.9 % sodium chloride infusion, 25 mL, Intravenous, PRN  pantoprazole (PROTONIX) injection 40 mg, 40 mg, Intravenous, BID  sodium chloride flush 0.9 % injection 10 mL, 10 mL, Intravenous, 2 times per day  sodium chloride flush 0.9 % injection 10 mL, 10 mL, Intravenous, PRN  therapeutic multivitamin-minerals 1 tablet, 1 tablet, Oral, Daily  torsemide (DEMADEX) tablet 40 mg, 40 mg, Oral, Daily  aspirin chewable tablet 81 mg, 81 mg, Oral, Daily  atorvastatin (LIPITOR) tablet 80 mg, 80 mg, Oral, Nightly  cetirizine (ZYRTEC) tablet 10 mg, 10 mg, Oral, Daily  docusate sodium (COLACE) capsule 100 mg, 100 mg, Oral, BID  ferrous sulfate (IRON 325) tablet 325 mg, 325 mg, Oral, Daily with breakfast  levothyroxine (SYNTHROID) tablet 150 mcg, 150 mcg, Oral, Daily  sodium chloride flush 0.9 % injection 5-40 mL, 5-40 mL, Intravenous, 2 times per day  sodium chloride flush 0.9 % injection 5-40 mL, 5-40 mL, Intravenous, PRN  0.9 % sodium chloride infusion, 25 mL, Intravenous, PRN  enoxaparin (LOVENOX) injection 40 mg, 40 mg, Subcutaneous, Daily  ondansetron (ZOFRAN-ODT) disintegrating tablet 4 mg, 4 mg, Oral, Q8H PRN **OR** ondansetron (ZOFRAN) injection 4 mg, 4 mg, Intravenous, Q6H PRN  polyethylene glycol (GLYCOLAX) packet 17 g, 17 g, Oral, Daily PRN  acetaminophen (TYLENOL) tablet 650 mg, 650 mg, Oral, Q6H PRN **OR** acetaminophen (TYLENOL) suppository 650 mg, 650 mg, Rectal, Q6H PRN  albuterol (PROVENTIL) nebulizer solution 2.5 mg, 2.5 mg, Nebulization, Q6H PRN  glucose (GLUTOSE) 40 % oral gel 15 g, 15 g, Oral, PRN  dextrose 50 % IV solution, 12.5 g, Intravenous, PRN  glucagon (rDNA) injection 1 mg, 1 mg, Intramuscular, PRN  dextrose 5 % solution, 100 mL/hr, Intravenous, PRN  Prior to Admission medications    Medication Sig Start Date End Date Taking?  Authorizing Provider   furosemide (LASIX) 40 MG tablet Take 1 tablet by mouth daily 6/26/21  Yes VALE Weems CNP   docusate sodium (COLACE, DULCOLAX) 100 MG CAPS Take 100 mg by mouth 2 times daily  Patient taking differently: Take 100 mg by mouth 2 times daily as needed  5/7/21  Yes Gilmar Her MD   levothyroxine (SYNTHROID) 150 MCG tablet TAKE 1 TABLET BY MOUTH  DAILY 10/16/20  Yes VALE Pinon CNP   atorvastatin (LIPITOR) 80 MG tablet TAKE 1 TABLET BY MOUTH  EVERY DAY  Patient taking differently: Take 80 mg by mouth nightly TAKE 1 TABLET BY MOUTH EVERY DAY 6/12/20  Yes Gabriella Parikh MD   ferrous sulfate (FE TABS) 325 (65 Fe) MG EC tablet Take 1 tablet by mouth daily (with breakfast) 6/28/19  Yes VALE Pinon CNP   enalapril (VASOTEC) 10 MG tablet Take 10 mg by mouth daily  11/20/18  Yes Historical Provider, MD   cetirizine (ZYRTEC) 10 MG tablet Take 10 mg by mouth daily. Yes Historical Provider, MD   Multiple Vitamin (THERA) TABS Take 1 tablet by mouth daily 1 Tab daily. Yes Historical Provider, MD   aspirin 81 MG chewable tablet Take 81 mg by mouth daily    Yes Historical Provider, MD   metoclopramide (REGLAN) 10 MG tablet Take 1 tablet by mouth 3 times daily (with meals) 7/1/21   Mariluz Howard MD        Allergies:  Reopro [abciximab injection]    Social History:   TOBACCO:   reports that he quit smoking about 25 years ago. He started smoking about 62 years ago. He has a 7.50 pack-year smoking history. He has never used smokeless tobacco.  ETOH:   reports no history of alcohol use. DRUGS:   reports no history of drug use. OCCUPATION:  Retired  Patient currently lives with family      Family History:        Problem Relation Age of Onset    Hypertension Mother     Coronary Art Dis Father     Hypertension Father     Heart Disease Father     Prostate Cancer Brother     Cancer Brother 61        Prostate    Heart Disease Sister        REVIEW OF SYSTEMS:  CONSTITUTIONAL:  negative  HEENT:  negative  RESPIRATORY:  negative  CARDIOVASCULAR:  negative  GASTROINTESTINAL:  negative except for dysphagia and regurgitation  GENITOURINARY:  negative  HEMATOLOGIC/LYMPHATIC:  negative  NEUROLOGICAL:  Negative  * All other ROS reviewed and negative. PHYSICAL EXAM:  VITALS:  /71   Pulse 65   Temp 98.1 °F (36.7 °C) (Oral)   Resp 16   Ht 5' 11\" (1.803 m)   Wt 159 lb 3.2 oz (72.2 kg)   SpO2 97%   BMI 22.20 kg/m²   24HR INTAKE/OUTPUT:    I/O last 3 completed shifts: In: 220 [P.O.:120; I.V.:100]  Out: 300 [Urine:300]  No intake/output data recorded.       CONSTITUTIONAL:  alert, no apparent distress and normal weight  EYES:  PERRL, sclera clear  ENT:  Normocephalic,atraumatic, without obvious abnormality  NECK:  supple, symmetrical, trachea midline  LUNGS: Resp effort easy and unlabored,    CARDIOVASCULAR:  NO JVD,   and    ABDOMEN:  scars noted  ,  , soft, non-distended, non-tender, involuntary guarding absent,    MUSCULOSKELETAL: No clubbing or cyanosis, 0+ pitting edema lower extremities  NEUROLOGIC:  Mental Status Exam:  Level of Alertness:   awake  PSYCHIATRIC:   person, place, time  SKIN:  normal skin color, texture, turgor    DATA:    CBC: No results for input(s): WBC, HGB, HCT, PLT in the last 72 hours. BMP:    Recent Labs     07/06/21  0500 07/06/21  0500 07/06/21  1141 07/07/21  0704 07/08/21  0501   *  --   --  130* 129*   K 4.4  --   --  3.7 3.4*   CL 94*  --   --  94* 91*   CO2 33*  --   --  33* 34*   BUN 12  --   --  16 14   CREATININE 0.7*  --   --  <0.5* 0.6*   GLUCOSE 89   < > 41* 95 90    < > = values in this interval not displayed. Hepatic: No results for input(s): AST, ALT, ALB, BILITOT, ALKPHOS in the last 72 hours. Mag:    No results for input(s): MG in the last 72 hours. Phos:     Recent Labs     07/06/21  0500   PHOS 3.5      INR: No results for input(s): INR in the last 72 hours. Radiology Review: Images personally reviewed by me. UGI (7/7)  Moderate esophageal dysmotility with tertiary contractions.       Narrowing of the distal esophagus near the diaphragm, presumably   postoperative, without evidence of functional obstruction to barium passage.       Heterogeneous content within the stomach, presumably retained food.       Duodenal diverticulum.               IMPRESSION/RECOMMENDATIONS:    As stated above, I don't think the esophageal narrowing is caused by the recent fundoplication, but rather is a chronic finding. With the creation of the Jesús gastroplasty, the fundoplication portion of the surgery was very loose with no tension at all. I don't think taking down the fundoplication would affect the narrowing at this time.     The ulceration also is difficulty to interpret - this could be the staple line from the Jesús gastroplasty that is still healing. For now, I would recommend staying on a soft or full liquid-level diet (including dietary supplements for calories), along with high-dose PPIs to try to allow the ulcerated area to heal.  Would then anticipate repeat EGD in ~4 weeks to check for healing/improvement. At some point, if the ulceration looks improved but the narrowing persists, I would suggest trying gentle, gradual dilation via EGD.     Electronically signed by Millie Mitchell MD     [address] [facility]  [id number]

## 2021-07-08 NOTE — PLAN OF CARE
Nutrition Problem #1: Inadequate energy intake  Intervention: Food and/or Nutrient Delivery: Continue Current Diet, Start Oral Nutrition Supplement  Nutritional Goals: Patient will eat 50% or greater of meals and supplements without dysphagia.

## 2021-07-08 NOTE — PROGRESS NOTES
Occupational Therapy/Physical Therapy  Pt is presently at [facility]. OT/PT will try later as schedule allows. Cont OT/PT.   [address] PT

## 2021-07-08 NOTE — PROGRESS NOTES
Physical Therapy  Facility/Department: [facility]  Daily Treatment Note  NAME: Matthew Collazo  : 1940  MRN: [id number]    Date of Service: 2021    Discharge Recommendations:  Home with Home health PT, 24 hour supervision or assist   PT Equipment Recommendations  Equipment Needed: No    Assessment   Body structures, Functions, Activity limitations: Decreased functional mobility ; Decreased strength;Decreased endurance  Assessment: Pt was SBA for transfers and sba for 150' ambulation with RW and negotaition of 3 steps. Pt would benefit from skilled therapy to increase strength and endurance. Recommending home with 24/7 supervision and HHPT at discharge. Treatment Diagnosis: impaired functional mobility  Prognosis: Good  Decision Making: Medium Complexity  PT Education: Goals;PT Role;Plan of Care;Home Exercise Program;Precautions;Transfer Training;Energy Conservation;General Safety;Gait Training;Disease Specific Education; Functional Mobility Training  Barriers to Learning: none  REQUIRES PT FOLLOW UP: Yes  Activity Tolerance  Activity Tolerance: Patient limited by endurance; Patient limited by fatigue;Patient Tolerated treatment well  Activity Tolerance: /71  HR 65  O2 95% RA     Patient Diagnosis(es): The primary encounter diagnosis was Pneumonia of left lower lobe due to infectious organism. Diagnoses of Hyponatremia, Fatigue, unspecified type, and Chronic anemia were also pertinent to this visit. has a past medical history of Allergic rhinitis, Anemia, Arthritis, CAD (coronary artery disease), Chronic systolic congestive heart failure ([address] Compression fracture of T11 vertebra (HCC), Food allergy, Hiatal hernia, History of blood transfusion, Hyperlipidemia, Hypertension, Hypothyroid, Obesity, Occlusion and stenosis of carotid artery, Osteoarthritis, Recurrent right inguinal hernia, Shingles, Thyroid disease, and TIA (transient ischemic attack).    has a past surgical history that includes sigmoidoscopy (1994); Upper gastrointestinal endoscopy (05/12/94); Upper gastrointestinal endoscopy (11/09/04); Tonsillectomy; eye surgery; Colonoscopy (04/22/99); Colonoscopy (7/13/2015); Inguinal hernia repair (Right, 07/07/2016); Inguinal hernia repair (Right, 04/12/2017); Cardiac surgery (2001); Cardiac valve replacement (09/15/2018); Aortic valve replacement (2018); Percutaneous Transluminal Coronary Angio (2018); hernia repair (Left, 2/5/2020); Gastric fundoplication (N/A, 1/92/1384); and Upper gastrointestinal endoscopy (N/A, 7/8/2021). Restrictions  Restrictions/Precautions  Restrictions/Precautions: General Precautions, Fall Risk  Position Activity Restriction  Other position/activity restrictions: up with assistance  Subjective   General  Chart Reviewed: Yes  Response To Previous Treatment: Patient with no complaints from previous session. Family / Caregiver Present: Yes (wife)  Referring Practitioner: Lola Penaloza MD  Subjective  Subjective: Pt agreeable to therapy. General Comment  Comments: Pt in chair. RN cleared Pt for therapy.   Pain Screening  Patient Currently in Pain: Denies  Vital Signs  Patient Currently in Pain: Denies       Orientation  Orientation  Overall Orientation Status: Within Normal Limits  Cognition      Objective   Bed mobility  Supine to Sit: Unable to assess  Transfers  Sit to Stand: Stand by assistance  Stand to sit: Stand by assistance  Ambulation  Ambulation?: Yes  Ambulation 1  Surface: level tile  Device: Rolling Walker  Assistance: Stand by assistance  Quality of Gait: valgus of knees, with RLE especially in-toeing with internal tibal rotation; excess forefoot pronation during stance; narrow TIAGO; steady throughout  Gait Deviations: Decreased step height;Decreased step length  Distance: 150 ft, 100 ft  Stairs/Curb  Stairs?: Yes  Stairs  # Steps : 3  Stairs Height: 6\"  Rails: Left ascending  Comment: step over step ascent, step-to on descent     Balance  Posture: Good  Sitting - Static: Good  Sitting - Dynamic: Good  Standing - Static: Good;-  Standing - Dynamic: Good;-  Exercises  Hip Flexion: standing marches with UE support on RW  Hip Abduction: BLE x10  Knee Long Arc Quad: BLE x10  Ankle Pumps: BLE x10             AM-PAC Score  AM-PAC Inpatient Mobility Raw Score : 18 (07/08/21 1308)  AM-PAC Inpatient T-Scale Score : 43.63 (07/08/21 1308)  Mobility Inpatient CMS 0-100% Score: 46.58 (07/08/21 1308)  Mobility Inpatient CMS G-Code Modifier : CK (07/08/21 1308)          Goals  Short term goals  Time Frame for Short term goals: 1 week (7/13)  Short term goal 1: Pt will perform transfers with supervision  -7/08 sba  Short term goal 2: Pt will ambulate 100 ft with supervision and LRAD   -7/08 150' superv rw  Short term goal 3: Pt will negotiate 1 flight of stairs with supervision   -7/08 3 steps sba  Short term goal 4: 7/9: Pt will participate in 12-15 reps of BLE exercises to promote strengthening    -7/08 met  Patient Goals   Patient goals : \"to go home\"    Plan    Plan  Times per week: 3-5 x/week  Times per day: Daily  Specific instructions for Next Treatment: progress mobility as tolerated  Current Treatment Recommendations: Strengthening, ROM, Balance Training, Functional Mobility Training, Transfer Training, Neuromuscular Re-education, Endurance Training, Gait Training  Safety Devices  Type of devices:  All fall risk precautions in place, Call light within reach, Chair alarm in place, Gait belt, Patient at risk for falls, Left in chair, Nurse notified  Restraints  Initially in place: No     Therapy Time   Individual Concurrent Group Co-treatment   Time In 1100         Time Out 1125         Minutes 25         Timed Code Treatment Minutes: [address] [employer]

## 2021-07-08 NOTE — PLAN OF CARE
Problem: Falls - Risk of:  Goal: Will remain free from falls  Description: Will remain free from falls  Outcome: Met This Shift  Goal: Absence of physical injury  Description: Absence of physical injury  Outcome: Met This Shift     Problem: OXYGENATION/RESPIRATORY FUNCTION  Goal: Patient will maintain patent airway  Outcome: Ongoing

## 2021-07-08 NOTE — ACP (ADVANCE CARE PLANNING)
ADVANCED CARE PLANNING    Name:Reza Blanco       :  1940              MRN:  [id number]  Admission Date: 2021  1:55 PM  Date of Discussion:  21      Purpose of Encounter: Advanced care planning in light of weight loss. Parties in attendance: :Steffi Closs, Gabriel Cummings MD, Family members: none  Decisional Capacity:Yes      Objective/Medical Story: Patient having recurrent admission following a sara fundoplication. He has been having weight loss, hyponatremia and hypoglycemic spells. Active Diagnoses: Active Hospital Problems    Diagnosis Date Noted    Pneumonia of left lower lobe due to infectious organism [J18.9]     Hyponatremia [E87.1] 2021       These active diagnoses are of sufficient risk that focused discussion on advance care planning is indicated in order to allow the patient to thoughtfully consider personal goals of care; and if situations arise that prevent the ability to personally give input, to ensure appropriate representation of their personal desires for different levels and agressiveness of care. Goals of Care Determinations: Patient wishes to focus on aggressive care. Code Status: At this time patient wishes to be full code         Time Spent on Advanced Planning Documents: 16 mins. The following items were considered in medical decision making:  Independent review of images , Review / order clinical lab tests. Review / order radiology tests, Decision to obtain old records. Advanced Care Planning Documents:  Completed advances directives, advanced directives in chart. Electronically signed by Gabriel Cummings MD on 2021 at 5:02 PM    Thank you Ocoha Greene for the opportunity to be involved in this patient's care. If you have any questions or concerns please feel free to contact me at [phone].
stage II

## 2021-07-08 NOTE — PROGRESS NOTES
Hospitalist Progress Note      PCP: Navya Rodriguez    Date of Admission: 7/4/2021    Chief Complaint: cough    Hospital Course:   79 y.o. male who presented to [facility] with cough for 1 week, feeling tired and sweaty no history for fever chills no chest pain positive shortness of breath positive cough with some sticky phlegm per patient's wife no nausea vomiting no diarrhea no melena no hematochezia no recent weight loss or weight gain. He does not smoke or drink alcohol. Patient with a past medical history of coronary artery disease status post stents followed by Dr. Antonio Mead cardiologist at [facility], hypertension, hyperlipidemia, hypothyroidism, CHF with a EF of 30%, hyponatremia, SIADH status post hiatal hernia surgery on 6/25/2021 by Dr. Brayan Silver. In the emergency room patient was noted to have a hyponatremia sodium of 123, hyperglycemia with a blood sugar of 33 when EMS arrived at home was given a D10 on the route with a normal blood sugar in the emergency room. Possible left lower lobe pneumonia culture sent in the emergency room started on HCAP pneumonia coverage by ER physician. Hyponatremia nephrology was consulted by ER physician recommended no IV fluid Samsca per nephrology. Subjective: recovering well from EGD. Na slowly down trending.        Medications:  Reviewed    Infusion Medications    sodium chloride      sodium chloride Stopped (07/04/21 2143)    dextrose       Scheduled Medications    pantoprazole  40 mg Intravenous BID    sodium chloride flush  10 mL Intravenous 2 times per day    multivitamin  1 tablet Oral Daily    torsemide  40 mg Oral Daily    aspirin  81 mg Oral Daily    atorvastatin  80 mg Oral Nightly    cetirizine  10 mg Oral Daily    docusate sodium  100 mg Oral BID    ferrous sulfate  325 mg Oral Daily with breakfast    levothyroxine  150 mcg Oral Daily    sodium chloride flush  5-40 mL Intravenous 2 times per day    enoxaparin  40 mg Subcutaneous Daily     PRN Meds: sodium chloride, sodium chloride flush, sodium chloride flush, sodium chloride, ondansetron **OR** ondansetron, polyethylene glycol, acetaminophen **OR** acetaminophen, albuterol, glucose, dextrose, glucagon (rDNA), dextrose      Intake/Output Summary (Last 24 hours) at 7/8/2021 1314  Last data filed at 7/8/2021 0815  Gross per 24 hour   Intake 220 ml   Output 300 ml   Net -80 ml       Physical Exam Performed:    /71   Pulse 65   Temp 98.1 °F (36.7 °C) (Oral)   Resp 16   Ht 5' 11\" (1.803 m)   Wt 159 lb 3.2 oz (72.2 kg)   SpO2 97%   BMI 22.20 kg/m²     General appearance:  No apparent distress, appears stated age and cooperative. HEENT:  Normal cephalic, atraumatic without obvious deformity. Pupils equal, round, and reactive to light. Extra ocular muscles intact. Conjunctivae/corneas clear. Neck: Supple, with full range of motion. No jugular venous distention. Trachea midline. Respiratory:  Normal respiratory effort. Clear to auscultation, bilaterally without Rales/Wheezes/Rhonchi. Cardiovascular:  Regular rate and rhythm with normal S1/S2 without murmurs, rubs or gallops. Abdomen: Soft, non-tender, non-distended with normal bowel sounds. Musculoskeletal:  No clubbing, 2+ edema bilaterally lower extremity. Full range of motion without deformity. Skin: Skin color, texture, turgor normal.  No rashes or lesions. Neurologic:  Neurovascularly intact without any focal sensory/motor deficits. Cranial nerves: II-XII intact, grossly non-focal.  Psychiatric:  Alert and oriented, thought content appropriate, normal insight  Capillary Refill: Brisk,3 seconds, normal  Peripheral Pulses: +2 palpable, equal bilaterally      Labs:   No results for input(s): WBC, HGB, HCT, PLT in the last 72 hours.   Recent Labs     07/06/21  0500 07/07/21  0704 07/08/21  0501   * 130* 129*   K 4.4 3.7 3.4*   CL 94* 94* 91*   CO2 33* 33* 34*   BUN 12 16 14   CREATININE 0.7* <0.5* 0.6* CALCIUM 8.3 8.2* 8.3   PHOS 3.5  --   --      No results for input(s): AST, ALT, BILIDIR, BILITOT, ALKPHOS in the last 72 hours. No results for input(s): INR in the last 72 hours. No results for input(s): Donshital Keens in the last 72 hours. Urinalysis:      Lab Results   Component Value Date    NITRU Negative 07/04/2021    BLOODU Negative 07/04/2021    SPECGRAV 1.025 07/04/2021    GLUCOSEU Negative 07/04/2021       Radiology:  FL UGI   Final Result   Moderate esophageal dysmotility with tertiary contractions. Narrowing of the distal esophagus near the diaphragm, presumably   postoperative, without evidence of functional obstruction to barium passage. Heterogeneous content within the stomach, presumably retained food. Duodenal diverticulum. XR CHEST PORTABLE   Final Result   Left basilar opacity, atelectasis versus pneumonia. Cardiomegaly. Assessment/Plan:    Active Hospital Problems    Diagnosis     Hyponatremia [E87.1]      Hyponatremia  - likely SIADH and CHF related  - nephrology consulted  - s/p samsca with good response  - diuretic changed to torsemide  - Na slowly down trending again  - continue to monitor    Hypoglycemia  - unclear etiology  - not on insulin or sulfonylureas at home  - insulinoma labs pending  - episodes seem to be post-prandial    Regurgitation s/p hiatal hernia repair  - GI, general surgery consulted  - s/p EGD showing large circumferential ulcer above surgical site  - mechanical soft diet    Chronic systolic heart failure  - appears euvolemic  - echo 6/21 with EF 40-45%  - lasix changed to torsemide  - discontinued enalapril.  Starting on coreg    CAD  - no active chest pain   - continue home ASA, statin    HTN  - well controlled  - stopped home vasotec due to hyponatremia    HLD  - continue home statin     Hypothyroidism  - continue home synthroid    Severe protein calorie malnutrition  - dietitian consult    DVT Prophylaxis: lovenox  Diet: Adult Oral Nutrition Supplement; Fortified Pudding Oral Supplement  ADULT DIET;  Dysphagia - Soft and Bite Sized; 1200 ml  Code Status: Full Code    PT/OT Eval Status: ordered    Dispo - unclear Annie Scott MD

## 2021-07-08 NOTE — PROGRESS NOTES
Progress Note    HISTORY     CC:   Weakness             We are following for hyponatremia          Subjective/     The patient was seen and examined; he feels well today with no CP, SOB, nausea or vomiting. ROS: No fever or chills. Social: Family at bedside. EXAM       Objective/     Vitals:    07/08/21 0830 07/08/21 0845 07/08/21 0903 07/08/21 1231   BP: (!) 107/44 (!) 117/59 (!) 137/59 135/71   Pulse: 72 70 69 65   Resp: 16 16 18 16   Temp:   98.1 °F (36.7 °C)    TempSrc:   Oral    SpO2: 99% 99% 96% 97%   Weight:       Height:         24HR INTAKE/OUTPUT:      Intake/Output Summary (Last 24 hours) at 7/8/2021 1347  Last data filed at 7/8/2021 0815  Gross per 24 hour   Intake 220 ml   Output 300 ml   Net -80 ml     Constitutional:  NAD  Neck:  Normal thyroid, no masses   Cardiovascular:  Regular, no rub  Respiratory:  No distress, no wheezing  Psychiatry:  Appropriate mood/affect, alert  Abdomen: +bs, soft, nt, no masses   Musculoskeletal: + LE edema, no clubbing       MEDICAL DECISION MAKING       Data/  No results for input(s): WBC, HGB, HCT, MCV, PLT in the last 72 hours. Recent Labs     07/06/21  0500 07/06/21  0500 07/06/21  1141 07/07/21  0704 07/08/21  0501   *  --   --  130* 129*   K 4.4  --   --  3.7 3.4*   CL 94*  --   --  94* 91*   CO2 33*  --   --  33* 34*   GLUCOSE 89   < > 41* 95 90   PHOS 3.5  --   --   --   --    BUN 12  --   --  16 14   CREATININE 0.7*  --   --  <0.5* 0.6*   LABGLOM >60  --   --  >60 >60   GFRAA >60  --   --  >60 >60    < > = values in this interval not displayed. Assessment/     Hyponatremia:  Chronic and hypervolemic with history of CHF   - Worsening since recent admission. Also has very poor intake ( BUN 8 ) with superimposed SIADH in the setting on pneumonia   - Given tolvaptan and sodium is up to 292     Systolic Heart Failure:  EF = 40%. Chronic edema     PNA:  On CXR. On room air and antibiotics.   Has declined over the last month with multiple hospital stays.      Plan/     Continue Torsemide 40 mg  Administer a single dose of Tolvaptan  Monitor serial labs

## 2021-07-08 NOTE — OP NOTE
Operative Note      Patient: Yara Gates  YOB: 1940  MRN: [id number]    Date of Procedure: 7/8/2021    Pre-Op Diagnosis: Regurgitation dysphagia. Post-Op Diagnosis:   Esophageal motility disorder,  Very tight Nissen fundoplication  Circumferential large ulcer in the area of the Nissen fundoplication, possibly an ischemic ulcer. Procedure: EGD    Surgeon(s):  Saud Henriquez MD      Anesthesia: MAC    Estimated Blood Loss (mL): None    Complications: No complications    Specimens:   No specimens        Drains:   [REMOVED] External Urinary Catheter (Removed)   Catheter changed  Yes 06/06/21 1059   Urine Color Yellow 06/06/21 0405   Output (mL) 200 mL 06/06/21 0405   Placement Replaced 06/06/21 1059       Findings:  Upper GI endoscopy done today after adequate preop assessment informed consent for dysphagia, weight loss, hyperglycemia. The procedure was done with MAC anesthesia. The Olympus video endoscope was introduced into the esophagus. There are multiple tertiary contractions throughout the esophagus. There is a small 2 cm hiatal hernia. Just below the hiatal hernia, there is a large circumferential deep ulcerated area with marked narrowing of the lumen. The lumen is about 1 cm in diameter. I was able to traverse the strictured area into the body of the stomach. There is old food in the stomach. The pyloric channel and duodenal bulb are normal.      Impression/plan:  Esophageal motility disorder  Status post Nissen fundoplication  Roberto ulceration, circumferential, in the area of the Nissen fundoplication with marked luminal narrowing.       Recommend the following:    Soft diet    Proton pump inhibitor    Surgical consultation as the Nissen fundoplication may require reversal    Electronically signed by Aishwarya Cain MD on 7/8/2021 at 8:14 AM

## 2021-07-08 NOTE — PROGRESS NOTES
Comprehensive Nutrition Assessment    Type and Reason for Visit:  Initial, Consult (swallowing difficulty, increased protein needs; malnutrition)    Nutrition Recommendations/Plan:   1. Dysphagia soft and bite sized diet   2. Fluid restriction starting tomorrow per nephrology  3. Magic cup and Boost pudding with meals  4.  RD encouraged electrolyte containing liquids   5. Will monitor nutritional adequacy, nutrition-related labs, weights, BMs, and clinical progress     Nutrition Assessment:  Patient admitted with hyponatremia. SIADH. Poor po intake noted per MD's. Significant difficulty swallowing, food getting stuck. EGD this am post diagnosis. Currently on a dysphagia soft and bite sized diet. Patient tolerated soft lunch at time of visit. RD discussed high protein nutrition supplements available for patient non liquid since starting fluid restriction tomorrow. Samsca ordered today per nephrology for improving hyponatremia. Will continue to monitor. Malnutrition Assessment:  Malnutrition Status: At risk for malnutrition (Comment)      Estimated Daily Nutrient Needs:  Energy (kcal):  5648-3305; Weight Used for Energy Requirements:  Current (72.2 kg)     Protein (g):  86-94; Weight Used for Protein Requirements:  Current (1.2-1.3; 72.2 kg)        Fluid (ml/day):   ; Method Used for Fluid Requirements:  1 ml/kcal      Nutrition Related Findings:  Na 129, K 3.4 on 7/8/21. Wounds:   (redness on coccyx, scattered bruising)       Current Nutrition Therapies:    Adult Oral Nutrition Supplement; Fortified Pudding Oral Supplement  ADULT DIET; Dysphagia - Soft and Bite Sized; 1200 ml  Adult Oral Nutrition Supplement; Frozen Oral Supplement    Anthropometric Measures:  · Height: 5' 11\" (180.3 cm)  · Current Body Weight: 159 lb 3 oz (72.2 kg)   · Ideal Body Weight: 172 lbs; % Ideal Body Weight     · BMI Categories: Normal Weight (BMI 18.5-24. 9)       Nutrition Diagnosis:   · Inadequate energy intake related to swallowing difficulty as evidenced by intake 0-25%, swallow study results    Nutrition Interventions:   Food and/or Nutrient Delivery:  Continue Current Diet, Start Oral Nutrition Supplement  Nutrition Education/Counseling:  No recommendation at this time   Coordination of Nutrition Care:  No recommendation at this time, Continue to monitor while inpatient    Goals:  Patient will eat 50% or greater of meals and supplements without dysphagia. Nutrition Monitoring and Evaluation:   Behavioral-Environmental Outcomes:      Food/Nutrient Intake Outcomes:  Food and Nutrient Intake, Supplement Intake  Physical Signs/Symptoms Outcomes:  Nutrition Focused Physical Findings, Biochemical Data, Chewing or Swallowing     Discharge Planning:     Too soon to determine     Electronically signed by Dyana Torrez, [address], [place] on 7/8/21 at 2:43 PM EDT    Contact: Office: [phone]; [address]: [phone]

## 2021-07-09 LAB
ACETOHEXAMIDE: NOT DETECTED NG/ML
ANION GAP SERPL CALCULATED.3IONS-SCNC: 8 MMOL/L (ref 3–16)
ANION GAP SERPL CALCULATED.3IONS-SCNC: 8 MMOL/L (ref 3–16)
BUN BLDV-MCNC: 14 MG/DL (ref 7–20)
BUN BLDV-MCNC: 17 MG/DL (ref 7–20)
CALCIUM SERPL-MCNC: 8.6 MG/DL (ref 8.3–10.6)
CALCIUM SERPL-MCNC: 8.8 MG/DL (ref 8.3–10.6)
CHLORIDE BLD-SCNC: 92 MMOL/L (ref 99–110)
CHLORIDE BLD-SCNC: 92 MMOL/L (ref 99–110)
CHLORPROPAMIDE: NOT DETECTED NG/ML
CO2: 30 MMOL/L (ref 21–32)
CO2: 31 MMOL/L (ref 21–32)
CREAT SERPL-MCNC: 0.6 MG/DL (ref 0.8–1.3)
CREAT SERPL-MCNC: 0.6 MG/DL (ref 0.8–1.3)
GFR AFRICAN AMERICAN: >60
GFR AFRICAN AMERICAN: >60
GFR NON-AFRICAN AMERICAN: >60
GFR NON-AFRICAN AMERICAN: >60
GLIMEPIRIDE: NOT DETECTED NG/ML
GLIPIZIDE: NOT DETECTED NG/ML
GLUCOSE BLD-MCNC: 104 MG/DL (ref 70–99)
GLUCOSE BLD-MCNC: 108 MG/DL (ref 70–99)
GLUCOSE BLD-MCNC: 112 MG/DL (ref 70–99)
GLUCOSE BLD-MCNC: 114 MG/DL (ref 70–99)
GLUCOSE BLD-MCNC: 116 MG/DL (ref 70–99)
GLUCOSE BLD-MCNC: 117 MG/DL (ref 70–99)
GLUCOSE BLD-MCNC: 117 MG/DL (ref 70–99)
GLUCOSE BLD-MCNC: 121 MG/DL (ref 70–99)
GLUCOSE BLD-MCNC: 149 MG/DL (ref 70–99)
GLUCOSE BLD-MCNC: 52 MG/DL (ref 70–99)
GLUCOSE BLD-MCNC: 67 MG/DL (ref 70–99)
GLUCOSE BLD-MCNC: 67 MG/DL (ref 70–99)
GLUCOSE BLD-MCNC: 84 MG/DL (ref 70–99)
GLUCOSE BLD-MCNC: 97 MG/DL (ref 70–99)
GLYBURIDE: NOT DETECTED NG/ML
NATEGLINIDE: NOT DETECTED NG/ML
PERFORMED ON: ABNORMAL
PERFORMED ON: NORMAL
PERFORMED ON: NORMAL
POTASSIUM SERPL-SCNC: 3.7 MMOL/L (ref 3.5–5.1)
POTASSIUM SERPL-SCNC: 3.9 MMOL/L (ref 3.5–5.1)
REPAGLINIDE: NOT DETECTED NG/ML
SODIUM BLD-SCNC: 130 MMOL/L (ref 136–145)
SODIUM BLD-SCNC: 131 MMOL/L (ref 136–145)
TOLAZAMIDE: NOT DETECTED NG/ML
TOLBUTAMIDE: NOT DETECTED NG/ML

## 2021-07-09 PROCEDURE — 36415 COLL VENOUS BLD VENIPUNCTURE: CPT

## 2021-07-09 PROCEDURE — 80048 BASIC METABOLIC PNL TOTAL CA: CPT

## 2021-07-09 PROCEDURE — 6370000000 HC RX 637 (ALT 250 FOR IP): Performed by: HOSPITALIST

## 2021-07-09 PROCEDURE — 6360000002 HC RX W HCPCS: Performed by: HOSPITALIST

## 2021-07-09 PROCEDURE — 6370000000 HC RX 637 (ALT 250 FOR IP): Performed by: INTERNAL MEDICINE

## 2021-07-09 PROCEDURE — 6360000002 HC RX W HCPCS: Performed by: CLINICAL NURSE SPECIALIST

## 2021-07-09 PROCEDURE — 99231 SBSQ HOSP IP/OBS SF/LOW 25: CPT | Performed by: INTERNAL MEDICINE

## 2021-07-09 PROCEDURE — 97116 GAIT TRAINING THERAPY: CPT

## 2021-07-09 PROCEDURE — 97530 THERAPEUTIC ACTIVITIES: CPT

## 2021-07-09 PROCEDURE — C9113 INJ PANTOPRAZOLE SODIUM, VIA: HCPCS | Performed by: CLINICAL NURSE SPECIALIST

## 2021-07-09 PROCEDURE — 2500000003 HC RX 250 WO HCPCS: Performed by: HOSPITALIST

## 2021-07-09 PROCEDURE — 97535 SELF CARE MNGMENT TRAINING: CPT

## 2021-07-09 PROCEDURE — 2580000003 HC RX 258: Performed by: INTERNAL MEDICINE

## 2021-07-09 PROCEDURE — 99232 SBSQ HOSP IP/OBS MODERATE 35: CPT | Performed by: SURGERY

## 2021-07-09 PROCEDURE — 2580000003 HC RX 258: Performed by: HOSPITALIST

## 2021-07-09 PROCEDURE — 2060000000 HC ICU INTERMEDIATE R&B

## 2021-07-09 RX ORDER — HYDROCORTISONE 10 MG/1
5 TABLET ORAL EVERY 24 HOURS
Status: DISCONTINUED | OUTPATIENT
Start: 2021-07-09 | End: 2021-07-12 | Stop reason: HOSPADM

## 2021-07-09 RX ORDER — HYDROCORTISONE 10 MG/1
10 TABLET ORAL
Status: DISCONTINUED | OUTPATIENT
Start: 2021-07-10 | End: 2021-07-12 | Stop reason: HOSPADM

## 2021-07-09 RX ADMIN — ASPIRIN 81 MG: 81 TABLET, CHEWABLE ORAL at 08:30

## 2021-07-09 RX ADMIN — FERROUS SULFATE TAB 325 MG (65 MG ELEMENTAL FE) 325 MG: 325 (65 FE) TAB at 08:30

## 2021-07-09 RX ADMIN — Medication 15 G: at 16:23

## 2021-07-09 RX ADMIN — TORSEMIDE 40 MG: 20 TABLET ORAL at 08:30

## 2021-07-09 RX ADMIN — DEXTROSE MONOHYDRATE 12.5 G: 25 INJECTION, SOLUTION INTRAVENOUS at 20:19

## 2021-07-09 RX ADMIN — DEXTROSE MONOHYDRATE 50 ML/HR: 50 INJECTION, SOLUTION INTRAVENOUS at 16:29

## 2021-07-09 RX ADMIN — ATORVASTATIN CALCIUM 80 MG: 80 TABLET, FILM COATED ORAL at 20:19

## 2021-07-09 RX ADMIN — Medication 10 ML: at 08:38

## 2021-07-09 RX ADMIN — PANTOPRAZOLE SODIUM 40 MG: 40 INJECTION, POWDER, FOR SOLUTION INTRAVENOUS at 16:13

## 2021-07-09 RX ADMIN — MULTIPLE VITAMINS W/ MINERALS TAB 1 TABLET: TAB at 08:30

## 2021-07-09 RX ADMIN — Medication 15 G: at 19:45

## 2021-07-09 RX ADMIN — Medication 10 ML: at 20:19

## 2021-07-09 RX ADMIN — ENOXAPARIN SODIUM 40 MG: 40 INJECTION SUBCUTANEOUS at 08:29

## 2021-07-09 RX ADMIN — DOCUSATE SODIUM 100 MG: 100 CAPSULE, LIQUID FILLED ORAL at 20:19

## 2021-07-09 RX ADMIN — CETIRIZINE HYDROCHLORIDE 10 MG: 10 TABLET, FILM COATED ORAL at 08:44

## 2021-07-09 RX ADMIN — LEVOTHYROXINE SODIUM 150 MCG: 0.15 TABLET ORAL at 05:54

## 2021-07-09 RX ADMIN — PANTOPRAZOLE SODIUM 40 MG: 40 INJECTION, POWDER, FOR SOLUTION INTRAVENOUS at 08:29

## 2021-07-09 RX ADMIN — DOCUSATE SODIUM 100 MG: 100 CAPSULE, LIQUID FILLED ORAL at 08:30

## 2021-07-09 RX ADMIN — HYDROCORTISONE 5 MG: 10 TABLET ORAL at 16:13

## 2021-07-09 ASSESSMENT — PAIN SCALES - GENERAL
PAINLEVEL_OUTOF10: 0

## 2021-07-09 NOTE — FLOWSHEET NOTE
07/08/21 2130   Vital Signs   Temp 95.1 °F (35.1 °C)   Temp Source Rectal   Pt had low oral temperature per vitals machine. Tried to obtain multiple different locations, but unable to get higher than a rectal temperature of 95.1; Placed heating packs in between armpits and groin along with placing multiple warm blankets on patient. Pt felt cool to touch. Will reassess temperature within the next hour.

## 2021-07-09 NOTE — PROGRESS NOTES
This RN spoke with Dr Dionisio Mac regarding weaning the D50 drip off of patient. Per Dr Dionisio Mac, if noon glucose is near 100, decrease rate of drip to 25 ml/hr, if 4 pm glucose is near 100, can dc drip.     Update, 4 pm glucose 67, gave one tube of glucose and increase D50 drip rate back to 50 ml/hr (@ 4:30 pm)

## 2021-07-09 NOTE — PROGRESS NOTES
[facility]    PATIENT NAME: Fiordaliza Tapia     TODAY'S DATE: 7/9/2021    CHIEF COMPLAINT: none    INTERVAL HISTORY/HPI:    Pt overall feeling well, eating well today. Did have another episode of symptomatic hypoglycemia last night despite having just eaten dinner, corrected w/ mult doses of juice and glucose. Denies further dysphagia or other difficulties swallowing. Using small portions of low residue meals and supplements. OBJECTIVE:  VITALS:  /69   Pulse 78   Temp 97.2 °F (36.2 °C) (Oral)   Resp 16   Ht 5' 11\" (1.803 m)   Wt 160 lb 1.6 oz (72.6 kg)   SpO2 98%   BMI 22.33 kg/m²     INTAKE/OUTPUT:    I/O last 3 completed shifts: In: 2702 [P.O.:1070; I.V.:100]  Out: 575 [Urine:575]  I/O this shift: In: 480 [P.O.:480]  Out: 725 [Urine:725]    CONSTITUTIONAL:  awake and alert  LUNGS:     ABDOMEN:   , soft, non-distended,       Data:  CBC: No results for input(s): WBC, HGB, HCT, PLT in the last 72 hours. BMP:    Recent Labs     07/08/21  1838 07/09/21  0242 07/09/21  1015   * 130* 131*   K 3.6 3.9 3.7   CL 92* 92* 92*   CO2 28 30 31   BUN 17 17 14   CREATININE 0.8 0.6* 0.6*   GLUCOSE 81 116* 117*     Hepatic: No results for input(s): AST, ALT, ALB, BILITOT, ALKPHOS in the last 72 hours. Mag:    No results for input(s): MG in the last 72 hours. Phos:   No results for input(s): PHOS in the last 72 hours. INR: No results for input(s): INR in the last 72 hours. Radiology Review:         ASSESSMENT AND PLAN:  Chronic esophageal stricture/narrowing, with possible post-fundoplication/gastroplasty ulceration. Symptoms improved/stable with low residue diet/supplements.    - as noted by GI and my note yesterday, would continue current diet combined w/ high-dose PPI (Protonix) for now   - reassess in office in 2 weeks or as needed   - if pt continues to improve, will plan for repeat EGD and poss esophageal stricture dilation in ~4 weeks    Hypoglycemia - further workup as per Int Med    OK for d/c home in next 1-2 days pending further hypoglycemia issues.   Will sign off    Electronically signed by Adele Yeboah MD

## 2021-07-09 NOTE — PROGRESS NOTES
Gastroenterology note    Her gastroenterology problem is dysphagia. This is in the setting of having had a Nissen fundoplication about 6 weeks ago, and in the setting of having some motility issues in his esophagus. Recent endoscopy demonstrates ulceration or narrowing at the lower esophageal sphincter. The patient is currently swallowing soft foods. He has no pain. Dr. Karime Gusman and I discussed Mr. Jo-Ann Gallardo and we agreed that the best plan is to continue conservative management including full dose proton pump inhibitor and soft diet. In a month or so, the patient should have repeat endoscopy and if the ulceration is healing, then esophageal dilatation. I spoke with the patient this morning about the fact that Dr. Karime Gusman and I are in agreement on the plan and he is comfortable with this. Recommend the followin. Protonix 40 mg twice a day  #2. GI clinic follow-up in about 3 weeks, anticipating a repeat endoscopy in 4 to 6 weeks  #3. Continue soft diet until after repeat endoscopy  #4.   From the GI perspective, he can be discharged soon

## 2021-07-09 NOTE — PROGRESS NOTES
Hospitalist Progress Note      PCP: Eve Lugo    Date of Admission: 7/4/2021    Chief Complaint: cough    Hospital Course:   79 y.o. male who presented to [facility] with cough for 1 week, feeling tired and sweaty no history for fever chills no chest pain positive shortness of breath positive cough with some sticky phlegm per patient's wife no nausea vomiting no diarrhea no melena no hematochezia no recent weight loss or weight gain. He does not smoke or drink alcohol. Patient with a past medical history of coronary artery disease status post stents followed by Dr. Kathie Miranda cardiologist at [address] [place] [address], hypertension, hyperlipidemia, hypothyroidism, CHF with a EF of 30%, hyponatremia, SIADH status post hiatal hernia surgery on 6/25/2021 by Dr. Ena Blount. In the emergency room patient was noted to have a hyponatremia sodium of 123, hyperglycemia with a blood sugar of 33 when EMS arrived at home was given a D10 on the route with a normal blood sugar in the emergency room. Possible left lower lobe pneumonia culture sent in the emergency room started on HCAP pneumonia coverage by ER physician. Hyponatremia nephrology was consulted by ER physician recommended no IV fluid Samsca per nephrology. Subjective: Na stable. Dysphagia symptoms improved with soft diet. Continues to have post-prandial hypoglycemia.       Medications:  Reviewed    Infusion Medications    sodium chloride      sodium chloride Stopped (07/04/21 2143)    dextrose 25 mL/hr (07/09/21 1201)     Scheduled Medications    pantoprazole  40 mg Intravenous BID    sodium chloride flush  10 mL Intravenous 2 times per day    multivitamin  1 tablet Oral Daily    torsemide  40 mg Oral Daily    aspirin  81 mg Oral Daily    atorvastatin  80 mg Oral Nightly    cetirizine  10 mg Oral Daily    docusate sodium  100 mg Oral BID    ferrous sulfate  325 mg Oral Daily with breakfast    levothyroxine  150 mcg Oral Daily    sodium chloride flush  5-40 mL Intravenous 2 times per day    enoxaparin  40 mg Subcutaneous Daily     PRN Meds: sodium chloride, sodium chloride flush, sodium chloride flush, sodium chloride, ondansetron **OR** ondansetron, polyethylene glycol, acetaminophen **OR** acetaminophen, albuterol, glucose, dextrose, glucagon (rDNA), dextrose      Intake/Output Summary (Last 24 hours) at 7/9/2021 1453  Last data filed at 7/9/2021 1251  Gross per 24 hour   Intake 1550 ml   Output 1300 ml   Net 250 ml       Physical Exam Performed:    /69   Pulse 78   Temp 97.2 °F (36.2 °C) (Oral)   Resp 16   Ht 5' 11\" (1.803 m)   Wt 160 lb 1.6 oz (72.6 kg)   SpO2 98%   BMI 22.33 kg/m²     General appearance:  No apparent distress, appears stated age and cooperative. HEENT:  Normal cephalic, atraumatic without obvious deformity. Pupils equal, round, and reactive to light. Extra ocular muscles intact. Conjunctivae/corneas clear. Neck: Supple, with full range of motion. No jugular venous distention. Trachea midline. Respiratory:  Normal respiratory effort. Clear to auscultation, bilaterally without Rales/Wheezes/Rhonchi. Cardiovascular:  Regular rate and rhythm with normal S1/S2 without murmurs, rubs or gallops. Abdomen: Soft, non-tender, non-distended with normal bowel sounds. Musculoskeletal:  No clubbing, 2+ edema bilaterally lower extremity. Full range of motion without deformity. Skin: Skin color, texture, turgor normal.  No rashes or lesions. Neurologic:  Neurovascularly intact without any focal sensory/motor deficits. Cranial nerves: II-XII intact, grossly non-focal.  Psychiatric:  Alert and oriented, thought content appropriate, normal insight  Capillary Refill: Brisk,3 seconds, normal  Peripheral Pulses: +2 palpable, equal bilaterally      Labs:   No results for input(s): WBC, HGB, HCT, PLT in the last 72 hours.   Recent Labs     07/08/21  1838 07/09/21  0242 07/09/21  1015   * 130* 131*   K 3.6 3.9 3.7   CL 92* 92* 92* CO2 28 30 31   BUN 17 17 14   CREATININE 0.8 0.6* 0.6*   CALCIUM 8.9 8.6 8.8     No results for input(s): AST, ALT, BILIDIR, BILITOT, ALKPHOS in the last 72 hours. No results for input(s): INR in the last 72 hours. No results for input(s): Shellia Bugler in the last 72 hours. Urinalysis:      Lab Results   Component Value Date    NITRU Negative 07/04/2021    BLOODU Negative 07/04/2021    SPECGRAV 1.025 07/04/2021    GLUCOSEU Negative 07/04/2021       Radiology:  FL UGI   Final Result   Moderate esophageal dysmotility with tertiary contractions. Narrowing of the distal esophagus near the diaphragm, presumably   postoperative, without evidence of functional obstruction to barium passage. Heterogeneous content within the stomach, presumably retained food. Duodenal diverticulum. XR CHEST PORTABLE   Final Result   Left basilar opacity, atelectasis versus pneumonia. Cardiomegaly.                  Assessment/Plan:    Active Hospital Problems    Diagnosis     Pneumonia of left lower lobe due to infectious organism [J18.9]     Hyponatremia [E87.1]      Hyponatremia  - likely SIADH and CHF related  - nephrology consulted  - s/p samsca with good response  - diuretic changed to torsemide  - Na largely stable  - continue to monitor    Hypoglycemia  - unclear etiology  - not on insulin or sulfonylureas at home  - insulinoma labs pending  - episodes seem to be post-prandial  - will discuss with endocrinology    Esophageal ulceration/stricture s/p hiatal hernia repair  - GI, general surgery consulted  - s/p EGD showing large circumferential ulcer above surgical site  - mechanical soft diet  - may need dilations with EGD in future if symptoms recur  - continue PPI    Chronic systolic heart failure  - appears euvolemic  - echo 6/21 with EF 40-45%  - lasix changed to torsemide  - discontinued enalapril, coreg due to mild hypotension    CAD  - no active chest pain   - continue home ASA, statin    HTN  - well controlled  - stopped home vasotec due to hyponatremia    HLD  - continue home statin     Hypothyroidism  - continue home synthroid    Severe protein calorie malnutrition  - dietitian consult    DVT Prophylaxis: lovenox  Diet: Adult Oral Nutrition Supplement; Fortified Pudding Oral Supplement  Adult Oral Nutrition Supplement; Frozen Oral Supplement  ADULT DIET;  Dysphagia - Soft and Bite Sized; 1000 ml  Code Status: Full Code    PT/OT Eval Status: ordered    Dispo - possible transfer to [facility] pending discussion with endocrinology    Jenifer Nunes MD

## 2021-07-09 NOTE — PROGRESS NOTES
(04/22/99); Colonoscopy (7/13/2015); Inguinal hernia repair (Right, 07/07/2016); Inguinal hernia repair (Right, 04/12/2017); Cardiac surgery (2001); Cardiac valve replacement (09/15/2018); Aortic valve replacement (2018); Percutaneous Transluminal Coronary Angio (2018); hernia repair (Left, 2/5/2020); Gastric fundoplication (N/A, 9/52/7538); and Upper gastrointestinal endoscopy (N/A, 7/8/2021). Restrictions  Restrictions/Precautions  Restrictions/Precautions: General Precautions, Fall Risk  Position Activity Restriction  Other position/activity restrictions: up with assistance  Subjective   General  Chart Reviewed: Yes  Patient assessed for rehabilitation services?: Yes  Family / Caregiver Present: No  Referring Practitioner: Sylvie Rowell MD  Diagnosis: fatigue  Subjective  Subjective: Pt agreeable to therapy  General Comment  Comments: RN approved therapy      Orientation  Orientation  Overall Orientation Status: Within Functional Limits  Objective    ADL  Feeding: Independent  Grooming: Supervision;Stand by assistance (oral care standing at sink; washing face at sink)  LE Dressing: Modified independent  (donning brief and compression stockings)  Toileting: Modified independent         Balance  Sitting Balance: Supervision  Standing Balance: Stand by assistance (with RW; valgus in BLEs; unsteady at times without UE support)  Standing Balance  Time: ~10 minutes  Activity: grooming, toileting, functional mobility  Functional Mobility  Functional - Mobility Device: Rolling Walker  Activity: Retrieve items; To/from bathroom; Other (~200 ft in hallway)  Assist Level: Stand by assistance  Toilet Transfers  Toilet - Technique: Ambulating  Equipment Used: Standard toilet  Toilet Transfer: Stand by assistance  Toilet Transfers Comments: + grab bar  Bed mobility  Supine to Sit: Modified independent  Sit to Supine: Unable to assess  Scooting: Independent  Comment: HOB elevated  Transfers  Sit to stand: Stand by assistance  Stand to sit: Stand by assistance  Transfer Comments: to and from RW                       Cognition  Overall Cognitive Status: [address]  Times per week: 3-5x/wk  Current Treatment Recommendations: Endurance Training, Balance Training, Functional Mobility Training, Safety Education & Training, Self-Care / ADL, Patient/Caregiver Education & Training, Strengthening  G-Code     OutComes Score                                                  AM-PAC Score        AM-PAC Inpatient Daily Activity Raw Score: 21 (07/09/21 0938)  AM-PAC Inpatient ADL T-Scale Score : 44.27 (07/09/21 0938)  ADL Inpatient CMS 0-100% Score: 32.79 (07/09/21 6417)  ADL Inpatient CMS G-Code Modifier : Meghan Salgado (07/09/21 7880)    Goals  Short term goals  Time Frame for Short term goals: 1 week (7/13/21)  Short term goal 1: Pt will complete toilet transfer with S.  Short term goal 2: Pt will comoplete LB dressing with S.  Short term goal 3: Pt will complete 20 reps o fBUE exercises for increased endurance.   Short term goal 4: Pt will complete 5 minutes of dynamic standing for adls with CGA. (4/9/97)- GOAL MET  Patient Goals   Patient goals : \"to go home\"       Therapy Time   Individual Concurrent Group Co-treatment   Time In 0840         Time Out 0935         Minutes 55         Timed Code Treatment Minutes: [address], OTR/L

## 2021-07-09 NOTE — PLAN OF CARE
Problem: Falls - Risk of:  Goal: Will remain free from falls  Description: Will remain free from falls  Outcome: Met This Shift  Goal: Absence of physical injury  Description: Absence of physical injury  Outcome: Met This Shift     Problem: OXYGENATION/RESPIRATORY FUNCTION  Goal: Patient will maintain patent airway  Outcome: Met This Shift     Problem: FLUID AND ELECTROLYTE IMBALANCE  Goal: Fluid and electrolyte balance are achieved/maintained  Outcome: Ongoing     Problem: ACTIVITY INTOLERANCE/IMPAIRED MOBILITY  Goal: Mobility/activity is maintained at optimum level for patient  Outcome: Ongoing

## 2021-07-09 NOTE — PROGRESS NOTES
Physical Therapy  Facility/Department: [facility] C4 PCU  Daily Treatment Note  NAME: Jaime Montoya  : 1940  MRN: [id number]    Date of Service: 2021    Discharge Recommendations:  Home with Home health PT, 24 hour supervision or assist   PT Equipment Recommendations  Equipment Needed: No    Assessment   Body structures, Functions, Activity limitations: Decreased functional mobility ; Decreased strength;Decreased endurance;Decreased balance  Assessment: PT tx session focused on functional transfers, gait training and overall activity tolerance. Pt is functioning grossly at SBA level with use of RW for up to 200ft. Pt also negotiated 3 steps, would benefit from practice via sideways technique for improved stability. VSS throughout with no c/o pain. WIll benefit from continued skilled PT to progress strength, endurance and balance. Recommend home with 24/7 supervision and HHPT at d/c. Treatment Diagnosis: impaired functional mobility  Specific instructions for Next Treatment: progress mobility as tolerated  Prognosis: Good  Decision Making: Medium Complexity  PT Education: Goals;PT Role;Plan of Care;Home Exercise Program;Precautions;Transfer Training;Energy Conservation;General Safety;Gait Training;Disease Specific Education; Functional Mobility Training  Patient Education: Pt was educated regarding safe negotiation of RW - verbalized understanding  Barriers to Learning: none  REQUIRES PT FOLLOW UP: Yes  Activity Tolerance  Activity Tolerance: Patient limited by endurance; Patient limited by fatigue;Patient Tolerated treatment well  Activity Tolerance: Pretx: 103/53 HR 71; Pot tx: 134/56, HR 68     Patient Diagnosis(es): The primary encounter diagnosis was Pneumonia of left lower lobe due to infectious organism. Diagnoses of Hyponatremia, Fatigue, unspecified type, and Chronic anemia were also pertinent to this visit.      has a past medical history of Allergic rhinitis, Anemia, Arthritis, CAD (coronary artery disease), Chronic systolic congestive heart failure (HCC), Compression fracture of T11 vertebra (HCC), Food allergy, Hiatal hernia, History of blood transfusion, Hyperlipidemia, Hypertension, Hypothyroid, Obesity, Occlusion and stenosis of carotid artery, Osteoarthritis, Recurrent right inguinal hernia, Shingles, Thyroid disease, and TIA (transient ischemic attack). has a past surgical history that includes sigmoidoscopy (1994); Upper gastrointestinal endoscopy (05/12/94); Upper gastrointestinal endoscopy (11/09/04); Tonsillectomy; eye surgery; Colonoscopy (04/22/99); Colonoscopy (7/13/2015); Inguinal hernia repair (Right, 07/07/2016); Inguinal hernia repair (Right, 04/12/2017); Cardiac surgery (2001); Cardiac valve replacement (09/15/2018); Aortic valve replacement (2018); Percutaneous Transluminal Coronary Angio (2018); hernia repair (Left, 2/5/2020); Gastric fundoplication (N/A, 3/89/1334); and Upper gastrointestinal endoscopy (N/A, 7/8/2021). Restrictions  Restrictions/Precautions  Restrictions/Precautions: General Precautions, Fall Risk  Position Activity Restriction  Other position/activity restrictions: up with assistance, peripheral IV  Subjective   General  Chart Reviewed: Yes  Response To Previous Treatment: Patient with no complaints from previous session.   Family / Caregiver Present: Yes (Wife)  Referring Practitioner: Lola Penaloza MD  Subjective  Subjective: Pt seated in recliner, agreeable to PT tx session  General Comment  Comments: RN clears for therapy  Pain Screening  Patient Currently in Pain: Denies  Vital Signs  Pulse: 71  BP: (!) 103/53  BP Location: Left upper arm  Patient Currently in Pain: Denies       Orientation  Orientation  Overall Orientation Status: Within Normal Limits  Cognition      Objective   Bed mobility  Supine to Sit: Unable to assess  Sit to Supine: Unable to assess  Comment: Pt seated in recliner at beginning and end of session  Transfers  Sit to Stand: Stand by assistance  Stand to sit: Stand by assistance  Comment: Multiple sit<>stand transfers from recliner and toilet with SBA. Ambulation  Ambulation?: Yes  Ambulation 1  Surface: level tile  Device: Rolling Walker  Assistance: Stand by assistance  Quality of Gait: valgus of knees, with RLE especially in-toeing with internal tibal rotation; excess forefoot pronation during stance; narrow TIAGO; steady throughout  Gait Deviations: Decreased step height;Decreased step length  Distance: 200ft, 50ft  Comments: Cued for safe proximity to [employer]?: Yes  Stairs  # Steps : 3  Stairs Height: 6\"  Rails: Bilateral  Device: No Device  Assistance: Contact guard assistance  Comment: Pt with mild LOB upon turning at top of steps. Pt reports at home he performs with 1 rail and cane, educated regarding sideways technique to have BUE support on rail but pt declines practicing.      Balance  Posture: Fair  Sitting - Static: Good  Sitting - Dynamic: Good  Standing - Static: Good;-  Standing - Dynamic: Good;-            Comment: Pt ambulated to bathroom with RW and SBA, managed clothing with supervision, performed hand hygiene standing at sink with 1-0 UE support and SBA due to intermittent inc postural sway       AM-PAC Score  AM-PAC Inpatient Mobility Raw Score : 18 (07/09/21 1722)  AM-PAC Inpatient T-Scale Score : 43.63 (07/09/21 1722)  Mobility Inpatient CMS 0-100% Score: 46.58 (07/09/21 1722)  Mobility Inpatient CMS G-Code Modifier : CK (07/09/21 1722)          Goals  Short term goals  Time Frame for Short term goals: 1 week (7/13)  Short term goal 1: Pt will perform transfers with supervision  -7/09 sba  Short term goal 2: Pt will ambulate 100 ft with supervision and LRAD   -7/09 150' superv rw  Short term goal 3: Pt will negotiate 1 flight of stairs with supervision   -7/09 3 steps sba  Short term goal 4: 7/9: Pt will participate in 12-15 reps of BLE exercises to promote strengthening    -7/08 met  Patient Goals   Patient goals : \"to go home\"    Plan    Plan  Times per week: 3-5 x/week  Times per day: Daily  Specific instructions for Next Treatment: progress mobility as tolerated  Current Treatment Recommendations: Strengthening, ROM, Balance Training, Functional Mobility Training, Transfer Training, Neuromuscular Re-education, Endurance Training, Gait Training  Safety Devices  Type of devices: All fall risk precautions in place, Call light within reach, Chair alarm in place, Gait belt, Patient at risk for falls, Left in chair, Nurse notified  Restraints  Initially in place: No     Therapy Time   Individual Concurrent Group Co-treatment   Time In 1530         Time Out 1558         Minutes 28         Timed Code Treatment Minutes: [address] [place] [place], PT       If pt is unable to be seen after this session, please let this note serve as discharge summary. Please see case management note for discharge disposition. Thank you.

## 2021-07-10 LAB
ANION GAP SERPL CALCULATED.3IONS-SCNC: 6 MMOL/L (ref 3–16)
BUN BLDV-MCNC: 16 MG/DL (ref 7–20)
CALCIUM SERPL-MCNC: 8.3 MG/DL (ref 8.3–10.6)
CHLORIDE BLD-SCNC: 93 MMOL/L (ref 99–110)
CO2: 33 MMOL/L (ref 21–32)
CREAT SERPL-MCNC: 0.7 MG/DL (ref 0.8–1.3)
GFR AFRICAN AMERICAN: >60
GFR NON-AFRICAN AMERICAN: >60
GLUCOSE BLD-MCNC: 105 MG/DL (ref 70–99)
GLUCOSE BLD-MCNC: 118 MG/DL (ref 70–99)
GLUCOSE BLD-MCNC: 126 MG/DL (ref 70–99)
GLUCOSE BLD-MCNC: 129 MG/DL (ref 70–99)
GLUCOSE BLD-MCNC: 97 MG/DL (ref 70–99)
GLUCOSE BLD-MCNC: 99 MG/DL (ref 70–99)
PERFORMED ON: ABNORMAL
PERFORMED ON: NORMAL
PERFORMED ON: NORMAL
POTASSIUM SERPL-SCNC: 4.3 MMOL/L (ref 3.5–5.1)
SODIUM BLD-SCNC: 132 MMOL/L (ref 136–145)

## 2021-07-10 PROCEDURE — 6370000000 HC RX 637 (ALT 250 FOR IP): Performed by: HOSPITALIST

## 2021-07-10 PROCEDURE — 2580000003 HC RX 258: Performed by: INTERNAL MEDICINE

## 2021-07-10 PROCEDURE — 2580000003 HC RX 258: Performed by: HOSPITALIST

## 2021-07-10 PROCEDURE — 97116 GAIT TRAINING THERAPY: CPT

## 2021-07-10 PROCEDURE — C9113 INJ PANTOPRAZOLE SODIUM, VIA: HCPCS | Performed by: CLINICAL NURSE SPECIALIST

## 2021-07-10 PROCEDURE — 80048 BASIC METABOLIC PNL TOTAL CA: CPT

## 2021-07-10 PROCEDURE — 6360000002 HC RX W HCPCS: Performed by: HOSPITALIST

## 2021-07-10 PROCEDURE — 97530 THERAPEUTIC ACTIVITIES: CPT

## 2021-07-10 PROCEDURE — 6370000000 HC RX 637 (ALT 250 FOR IP): Performed by: INTERNAL MEDICINE

## 2021-07-10 PROCEDURE — 97110 THERAPEUTIC EXERCISES: CPT

## 2021-07-10 PROCEDURE — 6360000002 HC RX W HCPCS: Performed by: CLINICAL NURSE SPECIALIST

## 2021-07-10 PROCEDURE — 36415 COLL VENOUS BLD VENIPUNCTURE: CPT

## 2021-07-10 PROCEDURE — 2060000000 HC ICU INTERMEDIATE R&B

## 2021-07-10 RX ADMIN — PANTOPRAZOLE SODIUM 40 MG: 40 INJECTION, POWDER, FOR SOLUTION INTRAVENOUS at 06:56

## 2021-07-10 RX ADMIN — PANTOPRAZOLE SODIUM 40 MG: 40 INJECTION, POWDER, FOR SOLUTION INTRAVENOUS at 15:37

## 2021-07-10 RX ADMIN — TORSEMIDE 40 MG: 20 TABLET ORAL at 08:43

## 2021-07-10 RX ADMIN — MULTIPLE VITAMINS W/ MINERALS TAB 1 TABLET: TAB at 08:44

## 2021-07-10 RX ADMIN — Medication 10 ML: at 19:57

## 2021-07-10 RX ADMIN — ENOXAPARIN SODIUM 40 MG: 40 INJECTION SUBCUTANEOUS at 08:44

## 2021-07-10 RX ADMIN — ASPIRIN 81 MG: 81 TABLET, CHEWABLE ORAL at 08:44

## 2021-07-10 RX ADMIN — LEVOTHYROXINE SODIUM 150 MCG: 0.15 TABLET ORAL at 06:56

## 2021-07-10 RX ADMIN — Medication 10 ML: at 15:37

## 2021-07-10 RX ADMIN — FERROUS SULFATE TAB 325 MG (65 MG ELEMENTAL FE) 325 MG: 325 (65 FE) TAB at 08:44

## 2021-07-10 RX ADMIN — DOCUSATE SODIUM 100 MG: 100 CAPSULE, LIQUID FILLED ORAL at 08:44

## 2021-07-10 RX ADMIN — ATORVASTATIN CALCIUM 80 MG: 80 TABLET, FILM COATED ORAL at 19:57

## 2021-07-10 RX ADMIN — CETIRIZINE HYDROCHLORIDE 10 MG: 10 TABLET, FILM COATED ORAL at 08:44

## 2021-07-10 RX ADMIN — HYDROCORTISONE 10 MG: 10 TABLET ORAL at 06:56

## 2021-07-10 RX ADMIN — DEXTROSE MONOHYDRATE 100 ML/HR: 50 INJECTION, SOLUTION INTRAVENOUS at 22:54

## 2021-07-10 RX ADMIN — DOCUSATE SODIUM 100 MG: 100 CAPSULE, LIQUID FILLED ORAL at 19:57

## 2021-07-10 RX ADMIN — Medication 10 ML: at 08:44

## 2021-07-10 RX ADMIN — Medication 10 ML: at 06:56

## 2021-07-10 RX ADMIN — DEXTROSE MONOHYDRATE 100 ML/HR: 50 INJECTION, SOLUTION INTRAVENOUS at 12:49

## 2021-07-10 RX ADMIN — HYDROCORTISONE 5 MG: 10 TABLET ORAL at 15:37

## 2021-07-10 ASSESSMENT — PAIN SCALES - GENERAL
PAINLEVEL_OUTOF10: 0

## 2021-07-10 NOTE — PROGRESS NOTES
Physical Therapy  Facility/Department: [facility] C4 PCU  Daily Treatment Note  NAME: Seng Hernandez  : 1940  MRN: [id number]    Date of Service: 7/10/2021    Discharge Recommendations:  Home with Home health PT, 24 hour supervision or assist   PT Equipment Recommendations  Equipment Needed: No    Assessment   Body structures, Functions, Activity limitations: Decreased functional mobility ; Decreased strength;Decreased endurance;Decreased balance  Assessment: Pt is functioning grossly at SBA level with use of RW for up to 200ft. VSS throughout with no c/o pain. WIll benefit from continued skilled PT to progress strength, endurance and balance. Recommend home with 24/7 supervision and HHPT at d/c. Treatment Diagnosis: impaired functional mobility  Prognosis: Good  Decision Making: Medium Complexity  Patient Education: Pt was educated regarding safe negotiation of RW - verbalized understanding  Barriers to Learning: none  REQUIRES PT FOLLOW UP: Yes  Activity Tolerance  Activity Tolerance: Patient limited by endurance; Patient limited by fatigue;Patient Tolerated treatment well  Activity Tolerance: /71  HR 71  O2 97% RA     Patient Diagnosis(es): The primary encounter diagnosis was Pneumonia of left lower lobe due to infectious organism. Diagnoses of Hyponatremia, Fatigue, unspecified type, and Chronic anemia were also pertinent to this visit. has a past medical history of Allergic rhinitis, Anemia, Arthritis, CAD (coronary artery disease), Chronic systolic congestive heart failure ([address] Compression fracture of T11 vertebra (HCC), Food allergy, Hiatal hernia, History of blood transfusion, Hyperlipidemia, Hypertension, Hypothyroid, Obesity, Occlusion and stenosis of carotid artery, Osteoarthritis, Recurrent right inguinal hernia, Shingles, Thyroid disease, and TIA (transient ischemic attack). has a past surgical history that includes sigmoidoscopy ();  Upper gastrointestinal endoscopy (94); marches with UE support on RW  Hip Abduction: BLE x10  Knee Long Arc Quad: BLE x10  Ankle Pumps: BLE x10         Comment: Pt ambulated to bathroom with RW and SBA, managed clothing with supervision, performed hand hygiene standing at sink with 1-0 UE support and SPV         AM-PAC Score  AM-PAC Inpatient Mobility Raw Score : 18 (07/10/21 1535)  AM-PAC Inpatient T-Scale Score : 43.63 (07/10/21 1535)  Mobility Inpatient CMS 0-100% Score: 46.58 (07/10/21 1535)  Mobility Inpatient CMS G-Code Modifier : CK (07/10/21 1535)          Goals  Short term goals  Time Frame for Short term goals: 1 week (7/13)  Short term goal 1: Pt will perform transfers with supervision  -7/10 sba  Short term goal 2: Pt will ambulate 100 ft with supervision and LRAD   -7/10 200' sba rw  Short term goal 3: Pt will negotiate 1 flight of stairs with supervision   -7/09 3 steps sba - 7/10 NT  Short term goal 4: 7/9: Pt will participate in 12-15 reps of BLE exercises to promote strengthening    -7/10 met  Patient Goals   Patient goals : \"to go home\"    Plan    Plan  Times per week: 3-5 x/week  Times per day: Daily  Specific instructions for Next Treatment: progress mobility as tolerated  Current Treatment Recommendations: Strengthening, ROM, Balance Training, Functional Mobility Training, Transfer Training, Neuromuscular Re-education, Endurance Training, Gait Training  Safety Devices  Type of devices:  All fall risk precautions in place, Call light within reach, Chair alarm in place, Gait belt, Patient at risk for falls, Left in chair, Nurse notified  Restraints  Initially in place: No     Therapy Time   Individual Concurrent Group Co-treatment   Time In 1456         Time Out 1534         Minutes 38         Timed Code Treatment Minutes: [address]

## 2021-07-10 NOTE — PROGRESS NOTES
Progress Note    HISTORY     CC:   Weakness             We are following for hyponatremia          Subjective/     The patient was seen and examined; he feels well today with no CP, SOB, nausea or vomiting. ROS: No fever or chills. Social: Family at bedside. EXAM       Objective/     Vitals:    07/09/21 2338 07/10/21 0421 07/10/21 0441 07/10/21 0801   BP: 118/69 123/69  (!) 142/68   Pulse: 78 69  68   Resp: 16 16  16   Temp: 97.4 °F (36.3 °C) 97.6 °F (36.4 °C)  97.7 °F (36.5 °C)   TempSrc:  Oral  Oral   SpO2: 96% 97%  98%   Weight:   160 lb (72.6 kg)    Height:         24HR INTAKE/OUTPUT:      Intake/Output Summary (Last 24 hours) at 7/10/2021 1109  Last data filed at 7/10/2021 0845  Gross per 24 hour   Intake 1200 ml   Output 2075 ml   Net -875 ml     Constitutional:  NAD  Neck:  Normal thyroid, no masses   Cardiovascular:  Regular, no rub  Respiratory:  No distress, no wheezing  Psychiatry:  Appropriate mood/affect, alert  Abdomen: +bs, soft, nt, no masses   Musculoskeletal: + LE edema, no clubbing       MEDICAL DECISION MAKING       Data/  No results for input(s): WBC, HGB, HCT, MCV, PLT in the last 72 hours. Recent Labs     07/09/21  0242 07/09/21  1015 07/10/21  0515   * 131* 132*   K 3.9 3.7 4.3   CL 92* 92* 93*   CO2 30 31 33*   GLUCOSE 116* 117* 105*   BUN 17 14 16   CREATININE 0.6* 0.6* 0.7*   LABGLOM >60 >60 >60   GFRAA >60 >60 >60       Assessment/     Hyponatremia:  Chronic and hypervolemic with history of CHF   - Worsening since recent admission. Also has very poor intake ( BUN 8 ) with superimposed SIADH in the setting on pneumonia   - Given tolvaptan and sodium is up to 927     Systolic Heart Failure:  EF = 40%. Chronic edema     PNA:  On CXR. On room air and antibiotics. Has declined over the last month with multiple hospital stays.      Plan/     Continue Torsemide 40 mg  Daily free water restriction   Monitor serial labs

## 2021-07-10 NOTE — PROGRESS NOTES
Hospitalist Progress Note      PCP: Erica Vo    Date of Admission: 7/4/2021    Chief Complaint: cough    Hospital Course:   79 y.o. male who presented to [facility] with cough for 1 week, feeling tired and sweaty no history for fever chills no chest pain positive shortness of breath positive cough with some sticky phlegm per patient's wife no nausea vomiting no diarrhea no melena no hematochezia no recent weight loss or weight gain. He does not smoke or drink alcohol. Patient with a past medical history of coronary artery disease status post stents followed by Dr. Sean Lanes cardiologist at [facility], hypertension, hyperlipidemia, hypothyroidism, CHF with a EF of 30%, hyponatremia, SIADH status post hiatal hernia surgery on 6/25/2021 by Dr. Kemar Stinson. In the emergency room patient was noted to have a hyponatremia sodium of 123, hyperglycemia with a blood sugar of 33 when EMS arrived at home was given a D10 on the route with a normal blood sugar in the emergency room. Possible left lower lobe pneumonia culture sent in the emergency room started on HCAP pneumonia coverage by ER physician. Hyponatremia nephrology was consulted by ER physician recommended no IV fluid Samsca per nephrology. Subjective: Na stable. Dysphagia symptoms improved with soft diet. Continues to have post-prandial hypoglycemia.       Medications:  Reviewed    Infusion Medications    sodium chloride      sodium chloride Stopped (07/04/21 2143)    dextrose 100 mL/hr (07/10/21 1249)     Scheduled Medications    hydrocortisone  5 mg Oral Q24H    hydrocortisone  10 mg Oral QAM AC    pantoprazole  40 mg Intravenous BID    sodium chloride flush  10 mL Intravenous 2 times per day    multivitamin  1 tablet Oral Daily    torsemide  40 mg Oral Daily    aspirin  81 mg Oral Daily    atorvastatin  80 mg Oral Nightly    cetirizine  10 mg Oral Daily    docusate sodium  100 mg Oral BID    ferrous sulfate  325 mg Oral Daily with breakfast    levothyroxine  150 mcg Oral Daily    sodium chloride flush  5-40 mL Intravenous 2 times per day    enoxaparin  40 mg Subcutaneous Daily     PRN Meds: sodium chloride, sodium chloride flush, sodium chloride flush, sodium chloride, ondansetron **OR** ondansetron, polyethylene glycol, acetaminophen **OR** acetaminophen, albuterol, glucose, dextrose, glucagon (rDNA), dextrose      Intake/Output Summary (Last 24 hours) at 7/10/2021 1524  Last data filed at 7/10/2021 0845  Gross per 24 hour   Intake 840 ml   Output 1550 ml   Net -710 ml       Physical Exam Performed:    /71   Pulse 71   Temp 97.8 °F (36.6 °C) (Oral)   Resp 16   Ht 5' 11\" (1.803 m)   Wt 160 lb (72.6 kg)   SpO2 94%   BMI 22.32 kg/m²     General appearance:  No apparent distress, appears stated age and cooperative. HEENT:  Normal cephalic, atraumatic without obvious deformity. Pupils equal, round, and reactive to light. Extra ocular muscles intact. Conjunctivae/corneas clear. Neck: Supple, with full range of motion. No jugular venous distention. Trachea midline. Respiratory:  Normal respiratory effort. Clear to auscultation, bilaterally without Rales/Wheezes/Rhonchi. Cardiovascular:  Regular rate and rhythm with normal S1/S2 without murmurs, rubs or gallops. Abdomen: Soft, non-tender, non-distended with normal bowel sounds. Musculoskeletal:  No clubbing, 2+ edema bilaterally lower extremity. Full range of motion without deformity. Skin: Skin color, texture, turgor normal.  No rashes or lesions. Neurologic:  Neurovascularly intact without any focal sensory/motor deficits. Cranial nerves: II-XII intact, grossly non-focal.  Psychiatric:  Alert and oriented, thought content appropriate, normal insight  Capillary Refill: Brisk,3 seconds, normal  Peripheral Pulses: +2 palpable, equal bilaterally      Labs:   No results for input(s): WBC, HGB, HCT, PLT in the last 72 hours.   Recent Labs     07/09/21  0242 07/09/21  1015 07/10/21  0515   * 131* 132*   K 3.9 3.7 4.3   CL 92* 92* 93*   CO2 30 31 33*   BUN 17 14 16   CREATININE 0.6* 0.6* 0.7*   CALCIUM 8.6 8.8 8.3     No results for input(s): AST, ALT, BILIDIR, BILITOT, ALKPHOS in the last 72 hours. No results for input(s): INR in the last 72 hours. No results for input(s): [place]an Breeze in the last 72 hours. Urinalysis:      Lab Results   Component Value Date    NITRU Negative 07/04/2021    BLOODU Negative 07/04/2021    SPECGRAV 1.025 07/04/2021    GLUCOSEU Negative 07/04/2021       Radiology:  FL UGI   Final Result   Moderate esophageal dysmotility with tertiary contractions. Narrowing of the distal esophagus near the diaphragm, presumably   postoperative, without evidence of functional obstruction to barium passage. Heterogeneous content within the stomach, presumably retained food. Duodenal diverticulum. XR CHEST PORTABLE   Final Result   Left basilar opacity, atelectasis versus pneumonia. Cardiomegaly.                  Assessment/Plan:    Active Hospital Problems    Diagnosis     Pneumonia of left lower lobe due to infectious organism [J18.9]     Hyponatremia [E87.1]      Hyponatremia  - likely SIADH and CHF related  - nephrology consulted  - s/p samsca with good response  - diuretic changed to torsemide  - Na largely stable  - continue to monitor    Hypoglycemia  - unclear etiology  - not on insulin or sulfonylureas at home  - insulinoma labs pending  - episodes seem to be post-prandial  - discussed with endocrinologist on 7/9  - started on cortef BID for possible adrenal insufficiency    Esophageal ulceration/stricture s/p hiatal hernia repair  - GI, general surgery consulted  - s/p EGD showing large circumferential ulcer above surgical site  - mechanical soft diet  - may need dilations with EGD in future if symptoms recur  - continue PPI    Chronic systolic heart failure  - appears euvolemic  - echo 6/21 with EF 40-45%  - lasix changed to torsemide  - discontinued enalapril, coreg due to mild hypotension    CAD  - no active chest pain   - continue home ASA, statin    HTN  - well controlled  - stopped home vasotec due to hyponatremia    HLD  - continue home statin     Hypothyroidism  - continue home synthroid    Severe protein calorie malnutrition  - dietitian consult    DVT Prophylaxis: lovenox  Diet: Adult Oral Nutrition Supplement; Fortified Pudding Oral Supplement  Adult Oral Nutrition Supplement; Frozen Oral Supplement  ADULT DIET;  Dysphagia - Soft and Bite Sized; 1000 ml  Code Status: Full Code    PT/OT Eval Status: ordered    Dispo - home tomorrow if Na and glucose stable    Flaquita Smith MD

## 2021-07-11 LAB
ANION GAP SERPL CALCULATED.3IONS-SCNC: 7 MMOL/L (ref 3–16)
BUN BLDV-MCNC: 11 MG/DL (ref 7–20)
CALCIUM SERPL-MCNC: 8.7 MG/DL (ref 8.3–10.6)
CHLORIDE BLD-SCNC: 91 MMOL/L (ref 99–110)
CO2: 33 MMOL/L (ref 21–32)
CREAT SERPL-MCNC: 0.6 MG/DL (ref 0.8–1.3)
GFR AFRICAN AMERICAN: >60
GFR NON-AFRICAN AMERICAN: >60
GLUCOSE BLD-MCNC: 106 MG/DL (ref 70–99)
GLUCOSE BLD-MCNC: 114 MG/DL (ref 70–99)
GLUCOSE BLD-MCNC: 115 MG/DL (ref 70–99)
GLUCOSE BLD-MCNC: 119 MG/DL (ref 70–99)
GLUCOSE BLD-MCNC: 121 MG/DL (ref 70–99)
GLUCOSE BLD-MCNC: 97 MG/DL (ref 70–99)
GLUCOSE BLD-MCNC: 98 MG/DL (ref 70–99)
PERFORMED ON: ABNORMAL
PERFORMED ON: NORMAL
PERFORMED ON: NORMAL
POTASSIUM SERPL-SCNC: 3.6 MMOL/L (ref 3.5–5.1)
PROINSULIN: 27.7 PMOL/L
SODIUM BLD-SCNC: 131 MMOL/L (ref 136–145)

## 2021-07-11 PROCEDURE — C9113 INJ PANTOPRAZOLE SODIUM, VIA: HCPCS | Performed by: CLINICAL NURSE SPECIALIST

## 2021-07-11 PROCEDURE — 1200000000 HC SEMI PRIVATE

## 2021-07-11 PROCEDURE — 2580000003 HC RX 258: Performed by: INTERNAL MEDICINE

## 2021-07-11 PROCEDURE — 2580000003 HC RX 258: Performed by: HOSPITALIST

## 2021-07-11 PROCEDURE — 6370000000 HC RX 637 (ALT 250 FOR IP): Performed by: HOSPITALIST

## 2021-07-11 PROCEDURE — 6370000000 HC RX 637 (ALT 250 FOR IP): Performed by: INTERNAL MEDICINE

## 2021-07-11 PROCEDURE — 6360000002 HC RX W HCPCS: Performed by: HOSPITALIST

## 2021-07-11 PROCEDURE — 6360000002 HC RX W HCPCS: Performed by: CLINICAL NURSE SPECIALIST

## 2021-07-11 PROCEDURE — 36415 COLL VENOUS BLD VENIPUNCTURE: CPT

## 2021-07-11 PROCEDURE — 80048 BASIC METABOLIC PNL TOTAL CA: CPT

## 2021-07-11 RX ADMIN — DOCUSATE SODIUM 100 MG: 100 CAPSULE, LIQUID FILLED ORAL at 10:18

## 2021-07-11 RX ADMIN — ENOXAPARIN SODIUM 40 MG: 40 INJECTION SUBCUTANEOUS at 10:18

## 2021-07-11 RX ADMIN — LEVOTHYROXINE SODIUM 150 MCG: 0.15 TABLET ORAL at 05:54

## 2021-07-11 RX ADMIN — PANTOPRAZOLE SODIUM 40 MG: 40 INJECTION, POWDER, FOR SOLUTION INTRAVENOUS at 05:53

## 2021-07-11 RX ADMIN — TORSEMIDE 40 MG: 20 TABLET ORAL at 10:17

## 2021-07-11 RX ADMIN — HYDROCORTISONE 5 MG: 10 TABLET ORAL at 15:33

## 2021-07-11 RX ADMIN — Medication 10 ML: at 15:34

## 2021-07-11 RX ADMIN — HYDROCORTISONE 10 MG: 10 TABLET ORAL at 05:53

## 2021-07-11 RX ADMIN — Medication 10 ML: at 20:43

## 2021-07-11 RX ADMIN — DEXTROSE MONOHYDRATE 75 ML/HR: 50 INJECTION, SOLUTION INTRAVENOUS at 08:49

## 2021-07-11 RX ADMIN — ASPIRIN 81 MG: 81 TABLET, CHEWABLE ORAL at 10:18

## 2021-07-11 RX ADMIN — ATORVASTATIN CALCIUM 80 MG: 80 TABLET, FILM COATED ORAL at 20:43

## 2021-07-11 RX ADMIN — FERROUS SULFATE TAB 325 MG (65 MG ELEMENTAL FE) 325 MG: 325 (65 FE) TAB at 10:18

## 2021-07-11 RX ADMIN — CETIRIZINE HYDROCHLORIDE 10 MG: 10 TABLET, FILM COATED ORAL at 10:18

## 2021-07-11 RX ADMIN — DOCUSATE SODIUM 100 MG: 100 CAPSULE, LIQUID FILLED ORAL at 20:43

## 2021-07-11 RX ADMIN — MULTIPLE VITAMINS W/ MINERALS TAB 1 TABLET: TAB at 10:18

## 2021-07-11 RX ADMIN — Medication 10 ML: at 05:53

## 2021-07-11 RX ADMIN — PANTOPRAZOLE SODIUM 40 MG: 40 INJECTION, POWDER, FOR SOLUTION INTRAVENOUS at 15:34

## 2021-07-11 NOTE — PROGRESS NOTES
Progress Note    HISTORY     CC:   Weakness             We are following for hyponatremia          Subjective/     The patient was seen and examined; he feels well today with no CP, SOB, nausea or vomiting. ROS: No fever or chills. Social: Family at bedside. EXAM       Objective/     Vitals:    07/11/21 0000 07/11/21 0459 07/11/21 0508 07/11/21 0830   BP: 126/68  (!) 145/65 137/75   Pulse:   81 82   Resp: 16  18 16   Temp: 98 °F (36.7 °C)  97.7 °F (36.5 °C) 97.5 °F (36.4 °C)   TempSrc:    Oral   SpO2: 95%   100%   Weight:  153 lb 12.8 oz (69.8 kg)     Height:         24HR INTAKE/OUTPUT:      Intake/Output Summary (Last 24 hours) at 7/11/2021 1004  Last data filed at 7/11/2021 0853  Gross per 24 hour   Intake 980 ml   Output 1950 ml   Net -970 ml     Constitutional:  NAD  Neck:  Normal thyroid, no masses   Cardiovascular:  Regular, no rub  Respiratory:  No distress, no wheezing  Psychiatry:  Appropriate mood/affect, alert  Abdomen: +bs, soft, nt, no masses   Musculoskeletal: + LE edema, no clubbing       MEDICAL DECISION MAKING       Data/  No results for input(s): WBC, HGB, HCT, MCV, PLT in the last 72 hours. Recent Labs     07/09/21  1015 07/10/21  0515 07/11/21  0511   * 132* 131*   K 3.7 4.3 3.6   CL 92* 93* 91*   CO2 31 33* 33*   GLUCOSE 117* 105* 119*   BUN 14 16 11   CREATININE 0.6* 0.7* 0.6*   LABGLOM >60 >60 >60   GFRAA >60 >60 >60       Assessment/     Hyponatremia:  Chronic and hypervolemic with history of CHF   - Worsening since recent admission. Also has very poor intake ( BUN 8 ) with superimposed SIADH in the setting on pneumonia   - Given tolvaptan and sodium is up to 290     Systolic Heart Failure:  EF = 40%. Chronic edema     PNA:  On CXR. On room air and antibiotics. Has declined over the last month with multiple hospital stays.      Plan/     Continue Torsemide 40 mg  Daily free water restriction   Avoid hypotonic fluids  Monitor serial labs

## 2021-07-11 NOTE — PROGRESS NOTES
Hospitalist Progress Note      PCP: Delfino Da Silva    Date of Admission: 7/4/2021    Chief Complaint: cough    Hospital Course:   79 y.o. male who presented to [facility] with cough for 1 week, feeling tired and sweaty no history for fever chills no chest pain positive shortness of breath positive cough with some sticky phlegm per patient's wife no nausea vomiting no diarrhea no melena no hematochezia no recent weight loss or weight gain. He does not smoke or drink alcohol. Patient with a past medical history of coronary artery disease status post stents followed by Dr. Jeremy Uribe cardiologist at [facility], hypertension, hyperlipidemia, hypothyroidism, CHF with a EF of 30%, hyponatremia, SIADH status post hiatal hernia surgery on 6/25/2021 by Dr. Devon Allen. In the emergency room patient was noted to have a hyponatremia sodium of 123, hyperglycemia with a blood sugar of 33 when EMS arrived at home was given a D10 on the route with a normal blood sugar in the emergency room. Possible left lower lobe pneumonia culture sent in the emergency room started on HCAP pneumonia coverage by ER physician. Hyponatremia nephrology was consulted by ER physician recommended no IV fluid Samsca per nephrology. Subjective: Na stable. Dysphagia symptoms improved with soft diet.  Hypoglycemia seemingly improved but patient was restarted on D5      Medications:  Reviewed    Infusion Medications    sodium chloride      sodium chloride Stopped (07/04/21 2143)     Scheduled Medications    hydrocortisone  5 mg Oral Q24H    hydrocortisone  10 mg Oral QAM AC    pantoprazole  40 mg Intravenous BID    sodium chloride flush  10 mL Intravenous 2 times per day    multivitamin  1 tablet Oral Daily    torsemide  40 mg Oral Daily    aspirin  81 mg Oral Daily    atorvastatin  80 mg Oral Nightly    cetirizine  10 mg Oral Daily    docusate sodium  100 mg Oral BID    ferrous sulfate  325 mg Oral Daily with breakfast    levothyroxine  150 mcg Oral Daily    sodium chloride flush  5-40 mL Intravenous 2 times per day    enoxaparin  40 mg Subcutaneous Daily     PRN Meds: sodium chloride, sodium chloride flush, sodium chloride flush, sodium chloride, ondansetron **OR** ondansetron, polyethylene glycol, acetaminophen **OR** acetaminophen, albuterol, glucose, dextrose, glucagon (rDNA)      Intake/Output Summary (Last 24 hours) at 7/11/2021 1110  Last data filed at 7/11/2021 0853  Gross per 24 hour   Intake 980 ml   Output 1950 ml   Net -970 ml       Physical Exam Performed:    /75   Pulse 82   Temp 97.5 °F (36.4 °C) (Oral)   Resp 16   Ht 5' 11\" (1.803 m)   Wt 153 lb 12.8 oz (69.8 kg)   SpO2 100%   BMI 21.45 kg/m²     General appearance:  No apparent distress, appears stated age and cooperative. HEENT:  Normal cephalic, atraumatic without obvious deformity. Pupils equal, round, and reactive to light. Extra ocular muscles intact. Conjunctivae/corneas clear. Neck: Supple, with full range of motion. No jugular venous distention. Trachea midline. Respiratory:  Normal respiratory effort. Clear to auscultation, bilaterally without Rales/Wheezes/Rhonchi. Cardiovascular:  Regular rate and rhythm with normal S1/S2 without murmurs, rubs or gallops. Abdomen: Soft, non-tender, non-distended with normal bowel sounds. Musculoskeletal:  No clubbing, 2+ edema bilaterally lower extremity. Full range of motion without deformity. Skin: Skin color, texture, turgor normal.  No rashes or lesions. Neurologic:  Neurovascularly intact without any focal sensory/motor deficits. Cranial nerves: II-XII intact, grossly non-focal.  Psychiatric:  Alert and oriented, thought content appropriate, normal insight  Capillary Refill: Brisk,3 seconds, normal  Peripheral Pulses: +2 palpable, equal bilaterally      Labs:   No results for input(s): WBC, HGB, HCT, PLT in the last 72 hours.   Recent Labs     07/09/21  1015 07/10/21  0515 07/11/21  0511   NA 131* 132* 131*   K 3.7 4.3 3.6   CL 92* 93* 91*   CO2 31 33* 33*   BUN 14 16 11   CREATININE 0.6* 0.7* 0.6*   CALCIUM 8.8 8.3 8.7     No results for input(s): AST, ALT, BILIDIR, BILITOT, ALKPHOS in the last 72 hours. No results for input(s): INR in the last 72 hours. No results for input(s): Monique Renee in the last 72 hours. Urinalysis:      Lab Results   Component Value Date    NITRU Negative 07/04/2021    BLOODU Negative 07/04/2021    SPECGRAV 1.025 07/04/2021    GLUCOSEU Negative 07/04/2021       Radiology:  [place] UGI   Final Result   Moderate esophageal dysmotility with tertiary contractions. Narrowing of the distal esophagus near the diaphragm, presumably   postoperative, without evidence of functional obstruction to barium passage. Heterogeneous content within the stomach, presumably retained food. Duodenal diverticulum. XR CHEST PORTABLE   Final Result   Left basilar opacity, atelectasis versus pneumonia. Cardiomegaly. Assessment/Plan:    Active Hospital Problems    Diagnosis     Pneumonia of left lower lobe due to infectious organism [J18.9]     Hyponatremia [E87.1]      Hyponatremia  - likely SIADH and CHF related  - nephrology consulted  - s/p samsca with good response  - diuretic changed to torsemide  - Na largely stable  - continue to monitor    Hypoglycemia  - unclear etiology  - not on insulin or sulfonylureas at home  - insulinoma labs pending  - episodes seem to be post-prandial  - discussed with endocrinologist on 7/9  - started on cortef BID for possible adrenal insufficiency  - D5 IVF stopped today.  Will not plan to resume if hypoglycemia recurs    Esophageal ulceration/stricture s/p hiatal hernia repair  - GI, general surgery consulted  - s/p EGD showing large circumferential ulcer above surgical site  - mechanical soft diet  - may need dilations with EGD in future if symptoms recur  - continue PPI    Chronic systolic heart failure  - appears euvolemic  - echo 6/21 with EF 40-45%  - lasix changed to torsemide  - discontinued enalapril, coreg due to mild hypotension    CAD  - no active chest pain   - continue home ASA, statin    HTN  - well controlled  - stopped home vasotec due to hyponatremia    HLD  - continue home statin     Hypothyroidism  - continue home synthroid    Severe protein calorie malnutrition  - dietitian consult    DVT Prophylaxis: lovenox  Diet: Adult Oral Nutrition Supplement; Fortified Pudding Oral Supplement  Adult Oral Nutrition Supplement; Frozen Oral Supplement  ADULT DIET;  Dysphagia - Soft and Bite Sized; 1000 ml  Code Status: Full Code    PT/OT Eval Status: ordered    Dispo - home tomorrow if Na and glucose stable    Aaln Up MD

## 2021-07-12 ENCOUNTER — TELEPHONE (OUTPATIENT)
Dept: SURGERY | Age: 81
End: 2021-07-12

## 2021-07-12 VITALS
RESPIRATION RATE: 17 BRPM | HEIGHT: 71 IN | BODY MASS INDEX: 22.47 KG/M2 | TEMPERATURE: 98.2 F | HEART RATE: 95 BPM | DIASTOLIC BLOOD PRESSURE: 70 MMHG | SYSTOLIC BLOOD PRESSURE: 127 MMHG | WEIGHT: 160.5 LBS | OXYGEN SATURATION: 95 %

## 2021-07-12 LAB
ANION GAP SERPL CALCULATED.3IONS-SCNC: 7 MMOL/L (ref 3–16)
BUN BLDV-MCNC: 13 MG/DL (ref 7–20)
CALCIUM SERPL-MCNC: 8.4 MG/DL (ref 8.3–10.6)
CHLORIDE BLD-SCNC: 91 MMOL/L (ref 99–110)
CO2: 32 MMOL/L (ref 21–32)
CREAT SERPL-MCNC: 0.6 MG/DL (ref 0.8–1.3)
GFR AFRICAN AMERICAN: >60
GFR NON-AFRICAN AMERICAN: >60
GLUCOSE BLD-MCNC: 100 MG/DL (ref 70–99)
GLUCOSE BLD-MCNC: 103 MG/DL (ref 70–99)
GLUCOSE BLD-MCNC: 109 MG/DL (ref 70–99)
GLUCOSE BLD-MCNC: 84 MG/DL (ref 70–99)
PERFORMED ON: ABNORMAL
PERFORMED ON: ABNORMAL
PERFORMED ON: NORMAL
POTASSIUM SERPL-SCNC: 3.6 MMOL/L (ref 3.5–5.1)
SODIUM BLD-SCNC: 130 MMOL/L (ref 136–145)

## 2021-07-12 PROCEDURE — 97110 THERAPEUTIC EXERCISES: CPT

## 2021-07-12 PROCEDURE — 6370000000 HC RX 637 (ALT 250 FOR IP): Performed by: INTERNAL MEDICINE

## 2021-07-12 PROCEDURE — 97116 GAIT TRAINING THERAPY: CPT

## 2021-07-12 PROCEDURE — 6360000002 HC RX W HCPCS: Performed by: CLINICAL NURSE SPECIALIST

## 2021-07-12 PROCEDURE — 36415 COLL VENOUS BLD VENIPUNCTURE: CPT

## 2021-07-12 PROCEDURE — 6370000000 HC RX 637 (ALT 250 FOR IP): Performed by: HOSPITALIST

## 2021-07-12 PROCEDURE — C9113 INJ PANTOPRAZOLE SODIUM, VIA: HCPCS | Performed by: CLINICAL NURSE SPECIALIST

## 2021-07-12 PROCEDURE — 6360000002 HC RX W HCPCS: Performed by: HOSPITALIST

## 2021-07-12 PROCEDURE — 80048 BASIC METABOLIC PNL TOTAL CA: CPT

## 2021-07-12 PROCEDURE — 2580000003 HC RX 258: Performed by: INTERNAL MEDICINE

## 2021-07-12 PROCEDURE — 2580000003 HC RX 258: Performed by: HOSPITALIST

## 2021-07-12 RX ORDER — HYDROCORTISONE 5 MG/1
TABLET ORAL
Qty: 30 TABLET | Refills: 0 | Status: SHIPPED | OUTPATIENT
Start: 2021-07-12

## 2021-07-12 RX ORDER — HYDROCORTISONE 10 MG/1
10 TABLET ORAL
Qty: 30 TABLET | Refills: 0 | Status: SHIPPED | OUTPATIENT
Start: 2021-07-13

## 2021-07-12 RX ORDER — TORSEMIDE 20 MG/1
40 TABLET ORAL DAILY
Qty: 30 TABLET | Refills: 0 | Status: SHIPPED | OUTPATIENT
Start: 2021-07-13

## 2021-07-12 RX ADMIN — PANTOPRAZOLE SODIUM 40 MG: 40 INJECTION, POWDER, FOR SOLUTION INTRAVENOUS at 05:27

## 2021-07-12 RX ADMIN — ASPIRIN 81 MG: 81 TABLET, CHEWABLE ORAL at 09:05

## 2021-07-12 RX ADMIN — TORSEMIDE 40 MG: 20 TABLET ORAL at 09:08

## 2021-07-12 RX ADMIN — ENOXAPARIN SODIUM 40 MG: 40 INJECTION SUBCUTANEOUS at 09:05

## 2021-07-12 RX ADMIN — Medication 10 ML: at 09:05

## 2021-07-12 RX ADMIN — Medication 10 ML: at 09:06

## 2021-07-12 RX ADMIN — LEVOTHYROXINE SODIUM 150 MCG: 0.15 TABLET ORAL at 05:27

## 2021-07-12 RX ADMIN — MULTIPLE VITAMINS W/ MINERALS TAB 1 TABLET: TAB at 09:05

## 2021-07-12 RX ADMIN — CETIRIZINE HYDROCHLORIDE 10 MG: 10 TABLET, FILM COATED ORAL at 09:05

## 2021-07-12 RX ADMIN — FERROUS SULFATE TAB 325 MG (65 MG ELEMENTAL FE) 325 MG: 325 (65 FE) TAB at 09:05

## 2021-07-12 RX ADMIN — DOCUSATE SODIUM 100 MG: 100 CAPSULE, LIQUID FILLED ORAL at 09:05

## 2021-07-12 RX ADMIN — HYDROCORTISONE 10 MG: 10 TABLET ORAL at 05:27

## 2021-07-12 ASSESSMENT — PAIN SCALES - GENERAL
PAINLEVEL_OUTOF10: 0

## 2021-07-12 NOTE — TELEPHONE ENCOUNTER
Valencia Coles called our office stating Dr. Kemar Stinson wanted him take pantoprazole [facility])   [place] [id number] [facility], [address] [place] [address] Street [facility] [phone]

## 2021-07-12 NOTE — PROGRESS NOTES
Physical Therapy  Facility/Department: [address] [place] [address] PCU  Daily Treatment Note  NAME: Monty Pozo  : 1940  MRN: [id number]    Date of Service: 2021    Discharge Recommendations:  Home with Home health PT, 24 hour supervision or assist   PT Equipment Recommendations  Equipment Needed: No    Assessment   Body structures, Functions, Activity limitations: Decreased functional mobility ; Decreased strength;Decreased endurance;Decreased balance  Assessment: Pt is functioning grossly at SBA/S level with use of RW for up to 250ft.+ 150'/  VSS throughout with no c/o pain. WIll benefit from continued skilled PT to progress strength, endurance and balance. Recommend home with 24/7 supervision and HHPT at d/c. Treatment Diagnosis: impaired functional mobility  Specific instructions for Next Treatment: progress mobility as tolerated  Prognosis: Good  Decision Making: Medium Complexity  PT Education: Goals;PT Role;Plan of Care;Home Exercise Program;Precautions;Transfer Training;Energy Conservation;General Safety;Gait Training;Disease Specific Education; Functional Mobility Training  Patient Education: Pt was educated regarding safe negotiation of RW - verbalized understanding  Barriers to Learning: none  REQUIRES PT FOLLOW UP: Yes  Activity Tolerance  Activity Tolerance: Patient limited by endurance; Patient limited by fatigue;Patient Tolerated treatment well  Activity Tolerance: /71  HR 71  O2 97% RA     Patient Diagnosis(es): The primary encounter diagnosis was Pneumonia of left lower lobe due to infectious organism. Diagnoses of Hyponatremia, Fatigue, unspecified type, Chronic anemia, and Postprandial hypoglycemia were also pertinent to this visit.      has a past medical history of Allergic rhinitis, Anemia, Arthritis, CAD (coronary artery disease), Chronic systolic congestive heart failure ([address] Compression fracture of T11 vertebra ([address] Food allergy, Hiatal hernia, History of blood transfusion, Hyperlipidemia, Hypertension, Hypothyroid, Obesity, Occlusion and stenosis of carotid artery, Osteoarthritis, Recurrent right inguinal hernia, Shingles, Thyroid disease, and TIA (transient ischemic attack). has a past surgical history that includes sigmoidoscopy (1994); Upper gastrointestinal endoscopy (05/12/94); Upper gastrointestinal endoscopy (11/09/04); Tonsillectomy; eye surgery; Colonoscopy (04/22/99); Colonoscopy (7/13/2015); Inguinal hernia repair (Right, 07/07/2016); Inguinal hernia repair (Right, 04/12/2017); Cardiac surgery (2001); Cardiac valve replacement (09/15/2018); Aortic valve replacement (2018); Percutaneous Transluminal Coronary Angio (2018); hernia repair (Left, 2/5/2020); Gastric fundoplication (N/A, 0/24/8368); and Upper gastrointestinal endoscopy (N/A, 7/8/2021). Restrictions  Restrictions/Precautions  Restrictions/Precautions: General Precautions, Fall Risk  Position Activity Restriction  Other position/activity restrictions: up with assistance, peripheral IV       Subjective   General  Chart Reviewed: Yes  Response To Previous Treatment: Patient with no complaints from previous session.   Family / Caregiver Present: No  Referring Practitioner: Mandy Durand MD  Subjective  Subjective: Pt up to restroom on arrival  General Comment  Comments: RN clears for therapy  Pain Screening  Patient Currently in Pain: Denies  Vital Signs  Patient Currently in Pain: Denies       Orientation  Orientation  Overall Orientation Status: Within Normal Limits     Objective   Bed mobility  Supine to Sit: Unable to assess  Sit to Supine: Unable to assess  Transfers  Sit to Stand: Supervision  Stand to sit: Supervision  Ambulation  Ambulation?: Yes  Ambulation 1  Surface: level tile  Device: Rolling Walker  Assistance: Stand by assistance  Quality of Gait: valgus of knees, with RLE especially in-toeing with internal tibal rotation; excess forefoot pronation during stance; narrow TIAGO; steady throughout  Gait Deviations: Decreased step height;Decreased step length  Distance: 250', 150' with seated rest  Comments: donned stockings and shoue with independence  Stairs/Curb  Stairs?: Yes  Stairs  # Steps : 3  Stairs Height: 6\"  Rails: Bilateral  Device: No Device  Assistance: Supervision  Comment: Pt reports at home he performs with 1 rail and cane       AM-PAC Score  AM-PAC Inpatient Mobility Raw Score : 22 (07/12/21 1340)  AM-PAC Inpatient T-Scale Score : 53.28 (07/12/21 1340)  Mobility Inpatient CMS 0-100% Score: 20.91 (07/12/21 1340)  Mobility Inpatient CMS G-Code Modifier : CJ (07/12/21 1340)          Goals  Short term goals  Time Frame for Short term goals: 1 week (7/13)  Short term goal 1: Pt will perform transfers with supervision  -7/12 S/ mod I  Short term goal 2: Pt will ambulate 100 ft with supervision and LRAD   -7/12 250' + 150' RW S/SBA  Short term goal 3: Pt will negotiate 1 flight of stairs with supervision   -7/12 3 steps B rails S/SBA  Short term goal 4: 7/9: Pt will participate in 12-15 reps of BLE exercises to promote strengthening    -7/10 met  Patient Goals   Patient goals : \"to go home\"    Plan    Plan  Times per week: 3-5 x/week  Times per day: Daily  Specific instructions for Next Treatment: progress mobility as tolerated  Current Treatment Recommendations: Strengthening, ROM, Balance Training, Functional Mobility Training, Transfer Training, Neuromuscular Re-education, Endurance Training, Gait Training  Safety Devices  Type of devices:  All fall risk precautions in place, Call light within reach, Gait belt, Left in chair, Nurse notified  Restraints  Initially in place: No     Therapy Time   Individual Concurrent Group Co-treatment   Time In 0945         Time Out 1010         Minutes [id number], [address] [facility]

## 2021-07-12 NOTE — PROGRESS NOTES
Progress Note    HISTORY     CC:   Weakness             We are following for hyponatremia          Subjective/     The patient was seen and examined; he feels well today with no CP, SOB, nausea or vomiting. Eager to go home    ROS: No fever or chills. Social: Family at bedside. EXAM       Objective/     Vitals:    07/11/21 2044 07/12/21 0234 07/12/21 0534 07/12/21 0815   BP: 129/60 132/68  127/70   Pulse: 59 83  95   Resp: 16 16  17   Temp: 98 °F (36.7 °C) 98.1 °F (36.7 °C)  98.2 °F (36.8 °C)   TempSrc:    Oral   SpO2: 97% 91%  95%   Weight:   160 lb 8 oz (72.8 kg)    Height:         24HR INTAKE/OUTPUT:      Intake/Output Summary (Last 24 hours) at 7/12/2021 1536  Last data filed at 7/12/2021 8481  Gross per 24 hour   Intake 420 ml   Output 1660 ml   Net -1240 ml     Constitutional:  NAD  Neck:  Normal thyroid, no masses   Cardiovascular:  Regular, no rub  Respiratory:  No distress, no wheezing  Psychiatry:  Appropriate mood/affect, alert  Abdomen: +bs, soft, nt, no masses   Musculoskeletal: + LE edema, no clubbing       MEDICAL DECISION MAKING       Data/  No results for input(s): WBC, HGB, HCT, MCV, PLT in the last 72 hours. Recent Labs     07/10/21  0515 07/11/21  0511 07/12/21  0523   * 131* 130*   K 4.3 3.6 3.6   CL 93* 91* 91*   CO2 33* 33* 32   GLUCOSE 105* 119* 100*   BUN 16 11 13   CREATININE 0.7* 0.6* 0.6*   LABGLOM >60 >60 >60   GFRAA >60 >60 >60       Assessment/     Hyponatremia:  Chronic and hypervolemic with history of CHF   - Worsening since recent admission. Also has very poor intake ( BUN 8 ) with superimposed SIADH in the setting on pneumonia   - Given tolvaptan and sodium is up to 903     Systolic Heart Failure:  EF = 40%. Chronic edema     PNA:  On CXR. On room air and antibiotics. Has declined over the last month with multiple hospital stays.      Plan/     Continue Torsemide 40 mg  Daily free water restriction 1 to 1.5 l per day  Increase protein intake   Avoid hypotonic fluids  Monitor serial labs  Pt wishes to follow up with nephrology and will arrange it with Dr Azeem Vaughan in 3-4 weeks

## 2021-07-12 NOTE — PROGRESS NOTES
Reviewed discharge instructions with patient, verbalized understanding. Reviewed CHF info and when to call the doctor. Also discussed hypoglycemic prevention and monitoring BS. No additional questions at this time. IV removed with no complications. Telemetry monitor removed and returned, [facility] notified of discharge. Patient picked up prescriptions from outpatient pharmacy. Belongings gathered and lockbox emptied. Patient escorted out via wheelchair.

## 2021-07-13 LAB — INSULIN A: <0.4 U/ML (ref 0–0.4)

## 2021-07-13 NOTE — DISCHARGE SUMMARY
Hospital Medicine Discharge Summary    Patient ID: Isamar Putnam      Patient's PCP: Jason Dowling    Admit Date: 7/4/2021     Discharge Date: 7/12/2021      Admitting Physician: Radha Jeffery MD     Discharge Physician: Tori Berg MD     Discharge Diagnoses: Active Hospital Problems    Diagnosis     Pneumonia of left lower lobe due to infectious organism [J18.9]     Hyponatremia [E87.1]        The patient was seen and examined on day of discharge and this discharge summary is in conjunction with any daily progress note from day of discharge. Hospital Course:   79 y. o. male who presented to [facility] with cough for 1 week, feeling tired and sweaty no history for fever chills no chest pain positive shortness of breath positive cough with some sticky phlegm per patient's wife no nausea vomiting no diarrhea no melena no hematochezia no recent weight loss or weight gain. He does not smoke or drink alcohol. Patient with a past medical history of coronary artery disease status post stents followed by Dr. Sherri Trinh cardiologist at [facility], hyperlipidemia, hypothyroidism, CHF with a EF of 30%, hyponatremia, SIADH status post hiatal hernia surgery on 6/25/2021 by Dr. Erum Avila. In the emergency room patient was noted to have a hyponatremia sodium of 123, hyperglycemia with a blood sugar of 33 when EMS arrived at home was given a D10 on the route with a normal blood sugar in the emergency room.   Possible left lower lobe pneumonia culture sent in the emergency room started on HCAP pneumonia coverage by ER physician. Hyponatremia nephrology was consulted by ER physician recommended no IV fluid Samsca per nephrology.     Hyponatremia  - likely SIADH and CHF related  - nephrology consulted  - s/p samsca with good response  - diuretic changed to torsemide  - Na largely stable     Hypoglycemia  - unclear etiology  - not on insulin or sulfonylureas at home  - insulinoma labs pending  - episodes seem to be post-prandial  - discussed with endocrinologist on 7/9  - started on cortef BID for possible adrenal insufficiency with improvement in symptoms  - glucometer ordered on discharge  - advised to follow up with endocrinology as outpatient     Esophageal ulceration/stricture s/p hiatal hernia repair  - GI, general surgery consulted  - s/p EGD showing large circumferential ulcer above surgical site  - mechanical soft diet  - may need dilations with EGD in future if symptoms recur  - continue PPI     Chronic systolic heart failure  - appears euvolemic  - echo 6/21 with EF 40-45%  - lasix changed to torsemide  - discontinued enalapril. Continue coreg     CAD  - no active chest pain   - continue home ASA, statin     HTN  - well controlled  - stopped home vasotec due to hyponatremia. Continue Coreg.     HLD  - continue home statin      Hypothyroidism  - continue home synthroid     Severe protein calorie malnutrition  - dietitian consult    Physical Exam Performed:     /70   Pulse 95   Temp 98.2 °F (36.8 °C) (Oral)   Resp 17   Ht 5' 11\" (1.803 m)   Wt 160 lb 8 oz (72.8 kg)   SpO2 95%   BMI 22.39 kg/m²       General appearance:  No apparent distress, appears stated age and cooperative. HEENT:  Normal cephalic, atraumatic without obvious deformity. Pupils equal, round, and reactive to light.  Extra ocular muscles intact. Conjunctivae/corneas clear. Neck: Supple, with full range of motion. No jugular venous distention. Trachea midline. Respiratory:  Normal respiratory effort. Clear to auscultation, bilaterally without Rales/Wheezes/Rhonchi. Cardiovascular:  Regular rate and rhythm with normal S1/S2 without murmurs, rubs or gallops. Abdomen: Soft, non-tender, non-distended with normal bowel sounds. Musculoskeletal:  No clubbing, 2+ edema bilaterally lower extremity.  Full range of motion without deformity.   Skin: Skin color, texture, turgor normal.  No rashes or lesions. Neurologic:  Neurovascularly intact without any focal sensory/motor deficits. Cranial nerves: II-XII intact, grossly non-focal.  Psychiatric:  Alert and oriented, thought content appropriate, normal insight  Capillary Refill: Brisk,3 seconds, normal  Peripheral Pulses: +2 palpable, equal bilaterally    Labs: For convenience and continuity at follow-up the following most recent labs are provided:      CBC:    Lab Results   Component Value Date    WBC 4.5 07/05/2021    HGB 9.8 07/05/2021    HCT 27.8 07/05/2021     07/05/2021       Renal:    Lab Results   Component Value Date     07/12/2021    K 3.6 07/12/2021    K 4.3 07/05/2021    CL 91 07/12/2021    CO2 32 07/12/2021    BUN 13 07/12/2021    CREATININE 0.6 07/12/2021    CALCIUM 8.4 07/12/2021    PHOS 3.5 07/06/2021         Significant Diagnostic Studies    Radiology:   FL UGI   Final Result   Moderate esophageal dysmotility with tertiary contractions. Narrowing of the distal esophagus near the diaphragm, presumably   postoperative, without evidence of functional obstruction to barium passage. Heterogeneous content within the stomach, presumably retained food. Duodenal diverticulum. XR CHEST PORTABLE   Final Result   Left basilar opacity, atelectasis versus pneumonia. Cardiomegaly.                 Consults:     IP CONSULT TO NEPHROLOGY  IP CONSULT TO GI  IP CONSULT TO GENERAL SURGERY  IP CONSULT TO HOME CARE NEEDS    Disposition:  [address] [place] [address]    Condition at Discharge: Stable    Discharge Instructions/Follow-up:  Follow up with PCP, nephrology, endocrinology, GI within 1-2 weeks    Code Status:  Full code    Activity: activity as tolerated    Diet: regular diet      Discharge Medications:     Discharge Medication List as of 7/12/2021 11:33 AM           Details   !! hydrocortisone (CORTEF) 10 MG tablet Take 1 tablet by mouth every morning (before breakfast), Disp-30 tablet, R-0Normal      !! hydrocortisone (CORTEF) 5 MG tablet Take 1 table by mouth daily before dinner, Disp-30 tablet, R-0Normal      torsemide (DEMADEX) 20 MG tablet Take 2 tablets by mouth daily, Disp-30 tablet, R-0Normal      blood glucose monitor kit and supplies Dispense sufficient amount for indicated testing frequency plus additional to accommodate PRN testing needs. Dispense all needed supplies to include: monitor, strips, lancing device, lancets, control solutions, alcohol swabs., Disp-1 kit, R-0, Normal       !! - Potential duplicate medications found. Please discuss with provider. Details   metoclopramide (REGLAN) 10 MG tablet Take 1 tablet by mouth 3 times daily (with meals), Disp-10 tablet, R-0Print      docusate sodium (COLACE, DULCOLAX) 100 MG CAPS Take 100 mg by mouth 2 times daily, Disp-60 capsule, R-1Normal      levothyroxine (SYNTHROID) 150 MCG tablet TAKE 1 TABLET BY MOUTH  DAILY, Disp-90 tablet,R-3Requesting 1 year supplyNormal      atorvastatin (LIPITOR) 80 MG tablet TAKE 1 TABLET BY MOUTH  EVERY DAY, Disp-90 tablet,R-1For high cholesterolNormal      ferrous sulfate (FE TABS) 325 (65 Fe) MG EC tablet Take 1 tablet by mouth daily (with breakfast), Disp-90 tablet, R-0Normal      cetirizine (ZYRTEC) 10 MG tablet Take 10 mg by mouth daily. Multiple Vitamin (THERA) TABS Take 1 tablet by mouth daily 1 Tab daily. Historical Med      aspirin 81 MG chewable tablet Take 81 mg by mouth daily Historical Med             Time Spent on discharge is more than 30 minutes in the examination, evaluation, counseling and review of medications and discharge plan. Signed:    Erick Echeverria MD   7/13/2021      Thank you Prince Espinoza for the opportunity to be involved in this patient's care. If you have any questions or concerns please feel free to contact me at [phone].

## 2021-07-16 LAB
IGF-1 (INSULIN-LIKE GROWTH I): 74 NG/ML (ref 19–189)
INSULIN-LIKE GROWTH FACTOR-1 Z-SCORE: -0.3

## 2021-07-20 ENCOUNTER — OFFICE VISIT (OUTPATIENT)
Dept: SURGERY | Age: 81
End: 2021-07-20
Payer: MEDICARE

## 2021-07-20 VITALS
WEIGHT: 158 LBS | HEART RATE: 98 BPM | SYSTOLIC BLOOD PRESSURE: 107 MMHG | BODY MASS INDEX: 22.12 KG/M2 | DIASTOLIC BLOOD PRESSURE: 61 MMHG | HEIGHT: 71 IN

## 2021-07-20 DIAGNOSIS — R13.19 ESOPHAGEAL DYSPHAGIA: Primary | ICD-10-CM

## 2021-07-20 PROCEDURE — 1111F DSCHRG MED/CURRENT MED MERGE: CPT | Performed by: SURGERY

## 2021-07-20 PROCEDURE — 1036F TOBACCO NON-USER: CPT | Performed by: SURGERY

## 2021-07-20 PROCEDURE — 1123F ACP DISCUSS/DSCN MKR DOCD: CPT | Performed by: SURGERY

## 2021-07-20 PROCEDURE — 4040F PNEUMOC VAC/ADMIN/RCVD: CPT | Performed by: SURGERY

## 2021-07-20 PROCEDURE — 99213 OFFICE O/P EST LOW 20 MIN: CPT | Performed by: SURGERY

## 2021-07-20 PROCEDURE — G8427 DOCREV CUR MEDS BY ELIG CLIN: HCPCS | Performed by: SURGERY

## 2021-07-20 PROCEDURE — G8420 CALC BMI NORM PARAMETERS: HCPCS | Performed by: SURGERY

## 2021-07-21 ENCOUNTER — HOSPITAL ENCOUNTER (INPATIENT)
Age: 81
LOS: 1 days | Discharge: ANOTHER HOSPITAL | DRG: 375 | End: 2021-07-22
Attending: INTERNAL MEDICINE | Admitting: INTERNAL MEDICINE
Payer: MEDICARE

## 2021-07-21 DIAGNOSIS — E16.2 HYPOGLYCEMIA: Primary | ICD-10-CM

## 2021-07-21 LAB
GLUCOSE BLD-MCNC: 129 MG/DL (ref 70–99)
GLUCOSE BLD-MCNC: 181 MG/DL (ref 70–99)
PERFORMED ON: ABNORMAL
PERFORMED ON: ABNORMAL

## 2021-07-21 PROCEDURE — 99284 EMERGENCY DEPT VISIT MOD MDM: CPT

## 2021-07-21 PROCEDURE — 96374 THER/PROPH/DIAG INJ IV PUSH: CPT

## 2021-07-21 PROCEDURE — 85025 COMPLETE CBC W/AUTO DIFF WBC: CPT

## 2021-07-21 PROCEDURE — 80053 COMPREHEN METABOLIC PANEL: CPT

## 2021-07-21 NOTE — PROGRESS NOTES
Pt returns to follow up after recent admission for pneumonia, hyponatremia, spontaneous hypoglycemia, as well as ongoing swallowing difficulties s/p recent complex repair of Type IV paraesophageal hernia. Had EGD and UGI at the time which showed narrowing at the G-E junction, and esophageal hypomotility. Of note, during surgery we needed to perform a Jesús gastroplasty to accomplish an intraabdominal G-E junction. On EGD it was suggested that there was significant ulceration around the G-E junction. Pt has been at home, on a strict fluid restriction due to the hyponatremia, under guidance of Nephrology. Pt has not been able to f/u with Nephro yet. He had been eating better at time of d/c 2 weeks ago, but since then he has noted increased difficulties with swallowing solid foods. And with fluid restriction, hard to maintain calories with liquid supplements. Pt was also being referred to Endocrinology to evaluate the apparent multiple episodes of spontaneous hypoglycemia. But they have not been able to connect and get an appointment for this. Plan:   - continue with low residue diet w/ supplements as best as possible   - will contact GI to discuss this situation, consider repeat EGD for possible esophageal dilation; we may even need to consider a PEG to better achieve nutritional supplementation knowing his esophageal function is so impaired.    - should f/u with his PCP to help facilitate follow up appointments to Nephrology and Endocrinology    DTW

## 2021-07-22 VITALS
OXYGEN SATURATION: 100 % | DIASTOLIC BLOOD PRESSURE: 65 MMHG | BODY MASS INDEX: 22.48 KG/M2 | RESPIRATION RATE: 17 BRPM | WEIGHT: 157 LBS | TEMPERATURE: 97.7 F | SYSTOLIC BLOOD PRESSURE: 111 MMHG | HEIGHT: 70 IN | HEART RATE: 74 BPM

## 2021-07-22 PROBLEM — Z95.2 HISTORY OF TRANSCATHETER AORTIC VALVE REPLACEMENT (TAVR): Status: ACTIVE | Noted: 2021-07-22

## 2021-07-22 PROBLEM — K25.9 GASTROESOPHAGEAL JUNCTION ULCER: Status: ACTIVE | Noted: 2021-07-22

## 2021-07-22 PROBLEM — E16.2 HYPOGLYCEMIA: Status: ACTIVE | Noted: 2021-07-22

## 2021-07-22 LAB
A/G RATIO: 1.2 (ref 1.1–2.2)
ALBUMIN SERPL-MCNC: 3.5 G/DL (ref 3.4–5)
ALP BLD-CCNC: 93 U/L (ref 40–129)
ALT SERPL-CCNC: 16 U/L (ref 10–40)
ANION GAP SERPL CALCULATED.3IONS-SCNC: 17 MMOL/L (ref 3–16)
AST SERPL-CCNC: 35 U/L (ref 15–37)
BASOPHILS ABSOLUTE: 0.1 K/UL (ref 0–0.2)
BASOPHILS RELATIVE PERCENT: 0.9 %
BILIRUB SERPL-MCNC: 0.4 MG/DL (ref 0–1)
BUN BLDV-MCNC: 21 MG/DL (ref 7–20)
CALCIUM SERPL-MCNC: 8.6 MG/DL (ref 8.3–10.6)
CHLORIDE BLD-SCNC: 92 MMOL/L (ref 99–110)
CO2: 20 MMOL/L (ref 21–32)
CREAT SERPL-MCNC: 0.9 MG/DL (ref 0.8–1.3)
EOSINOPHILS ABSOLUTE: 0.1 K/UL (ref 0–0.6)
EOSINOPHILS RELATIVE PERCENT: 1.7 %
GFR AFRICAN AMERICAN: >60
GFR NON-AFRICAN AMERICAN: >60
GLOBULIN: 2.9 G/DL
GLUCOSE BLD-MCNC: 117 MG/DL (ref 70–99)
GLUCOSE BLD-MCNC: 122 MG/DL (ref 70–99)
GLUCOSE BLD-MCNC: 126 MG/DL (ref 70–99)
GLUCOSE BLD-MCNC: 129 MG/DL (ref 70–99)
GLUCOSE BLD-MCNC: 129 MG/DL (ref 70–99)
GLUCOSE BLD-MCNC: 143 MG/DL (ref 70–99)
GLUCOSE BLD-MCNC: 150 MG/DL (ref 70–99)
GLUCOSE BLD-MCNC: 155 MG/DL (ref 70–99)
GLUCOSE BLD-MCNC: 173 MG/DL (ref 70–99)
GLUCOSE BLD-MCNC: 173 MG/DL (ref 70–99)
GLUCOSE BLD-MCNC: 179 MG/DL (ref 70–99)
GLUCOSE BLD-MCNC: 179 MG/DL (ref 70–99)
GLUCOSE BLD-MCNC: 183 MG/DL (ref 70–99)
GLUCOSE BLD-MCNC: 196 MG/DL (ref 70–99)
GLUCOSE BLD-MCNC: 37 MG/DL (ref 70–99)
GLUCOSE BLD-MCNC: 41 MG/DL (ref 70–99)
GLUCOSE BLD-MCNC: 59 MG/DL (ref 70–99)
GLUCOSE BLD-MCNC: 96 MG/DL (ref 70–99)
HCT VFR BLD CALC: 30 % (ref 40.5–52.5)
HEMOGLOBIN: 10.2 G/DL (ref 13.5–17.5)
LYMPHOCYTES ABSOLUTE: 0.6 K/UL (ref 1–5.1)
LYMPHOCYTES RELATIVE PERCENT: 11.3 %
MCH RBC QN AUTO: 33.8 PG (ref 26–34)
MCHC RBC AUTO-ENTMCNC: 34 G/DL (ref 31–36)
MCV RBC AUTO: 99.4 FL (ref 80–100)
MONOCYTES ABSOLUTE: 0.3 K/UL (ref 0–1.3)
MONOCYTES RELATIVE PERCENT: 6 %
NEUTROPHILS ABSOLUTE: 4.4 K/UL (ref 1.7–7.7)
NEUTROPHILS RELATIVE PERCENT: 80.1 %
PDW BLD-RTO: 20.1 % (ref 12.4–15.4)
PERFORMED ON: ABNORMAL
PERFORMED ON: NORMAL
PLATELET # BLD: 201 K/UL (ref 135–450)
PMV BLD AUTO: 6.4 FL (ref 5–10.5)
POTASSIUM SERPL-SCNC: 4.1 MMOL/L (ref 3.5–5.1)
RBC # BLD: 3.02 M/UL (ref 4.2–5.9)
SODIUM BLD-SCNC: 129 MMOL/L (ref 136–145)
TOTAL PROTEIN: 6.4 G/DL (ref 6.4–8.2)
WBC # BLD: 5.5 K/UL (ref 4–11)

## 2021-07-22 PROCEDURE — 6370000000 HC RX 637 (ALT 250 FOR IP): Performed by: INTERNAL MEDICINE

## 2021-07-22 PROCEDURE — 2500000003 HC RX 250 WO HCPCS: Performed by: NURSE PRACTITIONER

## 2021-07-22 PROCEDURE — 6360000002 HC RX W HCPCS: Performed by: INTERNAL MEDICINE

## 2021-07-22 PROCEDURE — 97116 GAIT TRAINING THERAPY: CPT

## 2021-07-22 PROCEDURE — 2580000003 HC RX 258: Performed by: NURSE PRACTITIONER

## 2021-07-22 PROCEDURE — 96361 HYDRATE IV INFUSION ADD-ON: CPT

## 2021-07-22 PROCEDURE — 97161 PT EVAL LOW COMPLEX 20 MIN: CPT

## 2021-07-22 PROCEDURE — 2580000003 HC RX 258: Performed by: INTERNAL MEDICINE

## 2021-07-22 PROCEDURE — 97166 OT EVAL MOD COMPLEX 45 MIN: CPT

## 2021-07-22 PROCEDURE — 96375 TX/PRO/DX INJ NEW DRUG ADDON: CPT

## 2021-07-22 PROCEDURE — 2000000000 HC ICU R&B

## 2021-07-22 PROCEDURE — G0378 HOSPITAL OBSERVATION PER HR: HCPCS

## 2021-07-22 PROCEDURE — 97535 SELF CARE MNGMENT TRAINING: CPT

## 2021-07-22 RX ORDER — DOCUSATE SODIUM 100 MG/1
100 CAPSULE, LIQUID FILLED ORAL 2 TIMES DAILY PRN
COMMUNITY

## 2021-07-22 RX ORDER — HYDROCORTISONE 10 MG/1
10 TABLET ORAL
Status: DISCONTINUED | OUTPATIENT
Start: 2021-07-22 | End: 2021-07-22 | Stop reason: HOSPADM

## 2021-07-22 RX ORDER — PANTOPRAZOLE SODIUM 40 MG/1
40 TABLET, DELAYED RELEASE ORAL
Qty: 60 TABLET | Refills: 0
Start: 2021-07-23

## 2021-07-22 RX ORDER — SODIUM CHLORIDE 0.9 % (FLUSH) 0.9 %
10 SYRINGE (ML) INJECTION PRN
Status: DISCONTINUED | OUTPATIENT
Start: 2021-07-22 | End: 2021-07-22 | Stop reason: HOSPADM

## 2021-07-22 RX ORDER — SODIUM CHLORIDE 9 MG/ML
1000 INJECTION, SOLUTION INTRAVENOUS CONTINUOUS
Status: DISCONTINUED | OUTPATIENT
Start: 2021-07-22 | End: 2021-07-22

## 2021-07-22 RX ORDER — LANOLIN ALCOHOL/MO/W.PET/CERES
3 CREAM (GRAM) TOPICAL NIGHTLY PRN
Status: DISCONTINUED | OUTPATIENT
Start: 2021-07-22 | End: 2021-07-22 | Stop reason: HOSPADM

## 2021-07-22 RX ORDER — ACETAMINOPHEN 325 MG/1
650 TABLET ORAL EVERY 6 HOURS PRN
Status: DISCONTINUED | OUTPATIENT
Start: 2021-07-22 | End: 2021-07-22 | Stop reason: HOSPADM

## 2021-07-22 RX ORDER — TORSEMIDE 20 MG/1
40 TABLET ORAL DAILY
Status: DISCONTINUED | OUTPATIENT
Start: 2021-07-22 | End: 2021-07-22 | Stop reason: HOSPADM

## 2021-07-22 RX ORDER — POTASSIUM CHLORIDE 20 MEQ/1
40 TABLET, EXTENDED RELEASE ORAL PRN
Status: DISCONTINUED | OUTPATIENT
Start: 2021-07-22 | End: 2021-07-22 | Stop reason: HOSPADM

## 2021-07-22 RX ORDER — POTASSIUM CHLORIDE 7.45 MG/ML
10 INJECTION INTRAVENOUS PRN
Status: DISCONTINUED | OUTPATIENT
Start: 2021-07-22 | End: 2021-07-22 | Stop reason: HOSPADM

## 2021-07-22 RX ORDER — LEVOTHYROXINE SODIUM 0.15 MG/1
150 TABLET ORAL DAILY
Status: DISCONTINUED | OUTPATIENT
Start: 2021-07-22 | End: 2021-07-22 | Stop reason: HOSPADM

## 2021-07-22 RX ORDER — ATORVASTATIN CALCIUM 80 MG/1
80 TABLET, FILM COATED ORAL NIGHTLY
COMMUNITY

## 2021-07-22 RX ORDER — DEXTROSE MONOHYDRATE 100 MG/ML
1000 INJECTION, SOLUTION INTRAVENOUS CONTINUOUS
Status: DISCONTINUED | OUTPATIENT
Start: 2021-07-22 | End: 2021-07-22

## 2021-07-22 RX ORDER — DEXTROSE MONOHYDRATE 25 G/50ML
50 INJECTION, SOLUTION INTRAVENOUS ONCE
Status: COMPLETED | OUTPATIENT
Start: 2021-07-22 | End: 2021-07-22

## 2021-07-22 RX ORDER — OCTREOTIDE ACETATE 100 UG/ML
100 INJECTION, SOLUTION INTRAVENOUS; SUBCUTANEOUS ONCE
Status: COMPLETED | OUTPATIENT
Start: 2021-07-22 | End: 2021-07-22

## 2021-07-22 RX ORDER — DEXTROSE AND SODIUM CHLORIDE 5; .45 G/100ML; G/100ML
INJECTION, SOLUTION INTRAVENOUS CONTINUOUS
Status: DISCONTINUED | OUTPATIENT
Start: 2021-07-22 | End: 2021-07-22 | Stop reason: HOSPADM

## 2021-07-22 RX ORDER — ACETAMINOPHEN 650 MG/1
650 SUPPOSITORY RECTAL EVERY 6 HOURS PRN
Status: DISCONTINUED | OUTPATIENT
Start: 2021-07-22 | End: 2021-07-22 | Stop reason: HOSPADM

## 2021-07-22 RX ORDER — ONDANSETRON 2 MG/ML
4 INJECTION INTRAMUSCULAR; INTRAVENOUS EVERY 6 HOURS PRN
Status: DISCONTINUED | OUTPATIENT
Start: 2021-07-22 | End: 2021-07-22 | Stop reason: HOSPADM

## 2021-07-22 RX ORDER — ONDANSETRON 4 MG/1
4 TABLET, ORALLY DISINTEGRATING ORAL EVERY 8 HOURS PRN
Status: DISCONTINUED | OUTPATIENT
Start: 2021-07-22 | End: 2021-07-22 | Stop reason: HOSPADM

## 2021-07-22 RX ORDER — PANTOPRAZOLE SODIUM 40 MG/1
40 TABLET, DELAYED RELEASE ORAL
Status: DISCONTINUED | OUTPATIENT
Start: 2021-07-22 | End: 2021-07-22 | Stop reason: HOSPADM

## 2021-07-22 RX ORDER — DEXTROSE MONOHYDRATE 50 MG/ML
100 INJECTION, SOLUTION INTRAVENOUS PRN
Status: DISCONTINUED | OUTPATIENT
Start: 2021-07-22 | End: 2021-07-22 | Stop reason: HOSPADM

## 2021-07-22 RX ORDER — CARVEDILOL 3.12 MG/1
6.25 TABLET ORAL 2 TIMES DAILY WITH MEALS
Status: DISCONTINUED | OUTPATIENT
Start: 2021-07-22 | End: 2021-07-22 | Stop reason: HOSPADM

## 2021-07-22 RX ORDER — SODIUM CHLORIDE 9 MG/ML
25 INJECTION, SOLUTION INTRAVENOUS PRN
Status: DISCONTINUED | OUTPATIENT
Start: 2021-07-22 | End: 2021-07-22 | Stop reason: HOSPADM

## 2021-07-22 RX ORDER — ATORVASTATIN CALCIUM 80 MG/1
80 TABLET, FILM COATED ORAL NIGHTLY
Status: DISCONTINUED | OUTPATIENT
Start: 2021-07-22 | End: 2021-07-22 | Stop reason: HOSPADM

## 2021-07-22 RX ORDER — ASPIRIN 81 MG/1
81 TABLET, CHEWABLE ORAL DAILY
Status: DISCONTINUED | OUTPATIENT
Start: 2021-07-22 | End: 2021-07-22 | Stop reason: HOSPADM

## 2021-07-22 RX ORDER — HYDROCORTISONE 10 MG/1
5 TABLET ORAL
Status: DISCONTINUED | OUTPATIENT
Start: 2021-07-22 | End: 2021-07-22 | Stop reason: HOSPADM

## 2021-07-22 RX ORDER — DEXTROSE MONOHYDRATE 25 G/50ML
12.5 INJECTION, SOLUTION INTRAVENOUS PRN
Status: DISCONTINUED | OUTPATIENT
Start: 2021-07-22 | End: 2021-07-22 | Stop reason: HOSPADM

## 2021-07-22 RX ORDER — NICOTINE POLACRILEX 4 MG
15 LOZENGE BUCCAL PRN
Status: DISCONTINUED | OUTPATIENT
Start: 2021-07-22 | End: 2021-07-22 | Stop reason: HOSPADM

## 2021-07-22 RX ORDER — MAGNESIUM SULFATE 1 G/100ML
1000 INJECTION INTRAVENOUS PRN
Status: DISCONTINUED | OUTPATIENT
Start: 2021-07-22 | End: 2021-07-22 | Stop reason: HOSPADM

## 2021-07-22 RX ADMIN — DEXTROSE MONOHYDRATE 1000 ML: 100 INJECTION, SOLUTION INTRAVENOUS at 07:55

## 2021-07-22 RX ADMIN — PANTOPRAZOLE SODIUM 40 MG: 40 TABLET, DELAYED RELEASE ORAL at 17:32

## 2021-07-22 RX ADMIN — PANTOPRAZOLE SODIUM 40 MG: 40 TABLET, DELAYED RELEASE ORAL at 08:04

## 2021-07-22 RX ADMIN — TORSEMIDE 40 MG: 20 TABLET ORAL at 08:55

## 2021-07-22 RX ADMIN — HYDROCORTISONE 10 MG: 10 TABLET ORAL at 08:55

## 2021-07-22 RX ADMIN — DEXTROSE MONOHYDRATE 50 ML: 25 INJECTION, SOLUTION INTRAVENOUS at 02:30

## 2021-07-22 RX ADMIN — CARVEDILOL 6.25 MG: 3.12 TABLET, FILM COATED ORAL at 08:54

## 2021-07-22 RX ADMIN — DEXTROSE MONOHYDRATE 1000 ML: 100 INJECTION, SOLUTION INTRAVENOUS at 02:17

## 2021-07-22 RX ADMIN — SODIUM CHLORIDE 1000 ML: 9 INJECTION, SOLUTION INTRAVENOUS at 01:02

## 2021-07-22 RX ADMIN — LEVOTHYROXINE SODIUM 150 MCG: 0.15 TABLET ORAL at 08:55

## 2021-07-22 RX ADMIN — DEXTROSE MONOHYDRATE 50 ML: 25 INJECTION, SOLUTION INTRAVENOUS at 01:03

## 2021-07-22 RX ADMIN — MUPIROCIN: 20 OINTMENT TOPICAL at 08:54

## 2021-07-22 RX ADMIN — DEXTROSE AND SODIUM CHLORIDE: 5; 450 INJECTION, SOLUTION INTRAVENOUS at 16:51

## 2021-07-22 RX ADMIN — ENOXAPARIN SODIUM 40 MG: 40 INJECTION SUBCUTANEOUS at 08:55

## 2021-07-22 RX ADMIN — OCTREOTIDE ACETATE 100 MCG: 100 INJECTION, SOLUTION INTRAVENOUS; SUBCUTANEOUS at 05:12

## 2021-07-22 RX ADMIN — ASPIRIN 81 MG: 81 TABLET, CHEWABLE ORAL at 08:55

## 2021-07-22 RX ADMIN — HYDROCORTISONE 5 MG: 10 TABLET ORAL at 17:35

## 2021-07-22 RX ADMIN — CARVEDILOL 6.25 MG: 3.12 TABLET, FILM COATED ORAL at 17:32

## 2021-07-22 ASSESSMENT — PAIN SCALES - GENERAL: PAINLEVEL_OUTOF10: 0

## 2021-07-22 NOTE — PROGRESS NOTES
Hospital transport form completed by MD and signed by pt. Going to [facility] bed 2014. #[phone] to call for report. Pt has been up to chair most of shift and ambulated a couple times with assist x1 to bathroom. Appetite fair but needs soft diet not puree. Blood glucose stabilized with IV fluids running. Wife updated bedside. Had two BMs today. A/O x4.

## 2021-07-22 NOTE — PROGRESS NOTES
Physical Therapy    Facility/Department: [facility] C2 CARD TELEMETRY  Initial Assessment    NAME: Abad Orlando  : 1940  MRN: [id number]    Date of Service: 2021    Discharge Recommendations:  Home with Home health PT, 24 hour supervision or assist   PT Equipment Recommendations  Equipment Needed: No  Other: pt owns RW    Assessment   Body structures, Functions, Activity limitations: Decreased functional mobility ; Decreased posture;Decreased balance;Decreased strength;Decreased endurance;Decreased coordination  Assessment: Pt is 78 yo male who presents with diagosis of hypoglycemia. Pt mod I to indep with mobility at baseline. Grossly CGA for mobility this date. Agreeable to use RW at home until cleared by home PT. Unsteady with gait d/t gait impairments/mechanics. Pt would benefit from continued skilled therapy to address deficits. Recommend home with 24-hr supervision and home PT at d/c. Treatment Diagnosis: impaired functional mobility  Specific instructions for Next Treatment: progress mobility as tolerated  Prognosis: Good  Decision Making: Low Complexity  PT Education: Goals; General Safety;Gait Training;PT Role;Disease Specific Education;Plan of Care; Functional Mobility Training;Transfer Training  Disease Specific Education: Pt educated on importance of OOB mobility, prevention of complications of bedrest, and general safety during hospitalization. Pt verbalized understanding  Barriers to Learning: none  REQUIRES PT FOLLOW UP: Yes  Activity Tolerance  Activity Tolerance: Patient limited by endurance; Patient Tolerated treatment well;Patient limited by fatigue  Activity Tolerance: /68, HR 79; SpO2 97% on RA       Patient Diagnosis(es): The encounter diagnosis was Hypoglycemia.      has a past medical history of Allergic rhinitis, Anemia, Arthritis, CAD (coronary artery disease), Chronic systolic congestive heart failure ([address] Compression fracture of T11 vertebra ([address] Food allergy, Hiatal hernia, History of blood transfusion, Hyperlipidemia, Hypertension, Hypothyroid, Obesity, Occlusion and stenosis of carotid artery, Osteoarthritis, Recurrent right inguinal hernia, Shingles, Thyroid disease, and TIA (transient ischemic attack). has a past surgical history that includes sigmoidoscopy (1994); Upper gastrointestinal endoscopy (05/12/94); Upper gastrointestinal endoscopy (11/09/04); Tonsillectomy; eye surgery; Colonoscopy (04/22/99); Colonoscopy (7/13/2015); Inguinal hernia repair (Right, 07/07/2016); Inguinal hernia repair (Right, 04/12/2017); Cardiac surgery (2001); Cardiac valve replacement (09/15/2018); Aortic valve replacement (2018); Percutaneous Transluminal Coronary Angio (2018); hernia repair (Left, 2/5/2020); Gastric fundoplication (N/A, 6/85/4742); and Upper gastrointestinal endoscopy (N/A, 7/8/2021).     Restrictions  Restrictions/Precautions  Restrictions/Precautions: General Precautions, Fall Risk  Position Activity Restriction  Other position/activity restrictions: up with assistance, ICU monitoring; low fall risk per nursing assessment  Vision/Hearing  Vision: Impaired  Vision Exceptions: Wears glasses at all times  Hearing: Within functional limits     Subjective  General  Chart Reviewed: Yes  Patient assessed for rehabilitation services?: Yes  Response To Previous Treatment: Not applicable  Family / Caregiver Present: No  Referring Practitioner: Dr. Denisha Tobin MD  Referral Date : 07/22/21  Diagnosis: Hypoglycemia  Follows Commands: Within Functional Limits  General Comment  Comments: Pt resting in bed on approach; RN cleared pt for therapy  Subjective  Subjective: pt agreeable to therapy  Pain Screening  Patient Currently in Pain: Denies    Orientation  Orientation  Overall Orientation Status: Within Functional Limits  Social/Functional History  Social/Functional History  Lives With: Spouse  Type of Home: House  Home Layout: Two level  Home Access: Level entry  Bathroom Shower/Tub: All fall risk precautions in place, Call light within reach, Gait belt, Nurse notified, Left in chair (no chair alarm needed per RN)                                   AM-PAC Score     AM-PAC Inpatient Mobility without Stair Climbing Raw Score : 17 (07/22/21 1422)  AM-PAC Inpatient without Stair Climbing T-Scale Score : 48.47 (07/22/21 1422)  Mobility Inpatient CMS 0-100% Score: 32.72 (07/22/21 1422)  Mobility Inpatient without Stair CMS G-Code Modifier : Homartonny Mccauley (07/22/21 1422)       Goals  Short term goals  Time Frame for Short term goals: 1 week (7/29) unless otherwise specified  Short term goal 1: Pt will be mod I with bed mobility. Short term goal 2: Pt will be supervision for transfers with RW. Short term goal 3: Pt will ambulate 100 ft with RW and supervision. Short term goal 4: 7/25: Pt will participate in 12-15 reps of BLE exercises to promote strength and activity tolerance. Patient Goals   Patient goals : \"to go home\"       Therapy Time   Individual Concurrent Group Co-treatment   Time In 1340         Time Out 1405         Minutes 25         Timed Code Treatment Minutes: 5183 Mendez Cho, PT, DPT  If pt is unable to be seen after this session, please let this note serve as discharge summary. Please see case management note for discharge disposition. Thank you.

## 2021-07-22 NOTE — PROGRESS NOTES
Hospitalist Progress Note      PCP: Dlefino Da Silva    Date of Admission: 7/21/2021    Chief Complaint: Hypoglycemia    Hospital Course:   Maral Ma is a 79 y.o. male. He presents from home via ambulance. He presents for symptomatic hypoglycemia. He has been struggling with episodes of spontaneous hypoglycemia lately. 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 He relates tonight after dinner he suddenly \"felt terrible\" and became sweaty and shaky. He checked his blood glucose and said it was \"low\" but cannot recall the exact number. He was afraid to go to sleep out of fear of hypoglycemia during sleep.     He recently underwent repair of a type IV paraesophageal hernia. He developed dysphagia and was found to have an ulcer at the GE junction. He is still struggling with this but has been tolerating liquid intake. Subjective: BG levels improved this AM. Still with poor PO intake.         Medications:  Reviewed    Infusion Medications    dextrose      sodium chloride      dextrose       Scheduled Medications    aspirin  81 mg Oral Daily    atorvastatin  80 mg Oral Nightly    hydrocortisone  10 mg Oral QAM AC    hydrocortisone  5 mg Oral Dinner    levothyroxine  150 mcg Oral Daily    torsemide  40 mg Oral Daily    enoxaparin  40 mg Subcutaneous Daily    pantoprazole  40 mg Oral BID AC    carvedilol  6.25 mg Oral BID WC    mupirocin   Nasal BID     PRN Meds: glucose, dextrose, glucagon (rDNA), dextrose, sodium chloride flush, sodium chloride, potassium chloride **OR** potassium alternative oral replacement **OR** potassium chloride, magnesium sulfate, ondansetron **OR** ondansetron, acetaminophen **OR** acetaminophen, melatonin      Intake/Output Summary (Last 24 hours) at 7/22/2021 1205  Last data filed at 7/22/2021 1000  Gross per 24 hour   Intake 240 ml   Output    Net 240 ml       Physical Exam Performed:    /67   Pulse 81   Temp 97.7 °F (36.5 °C) (Oral)   Resp 20   Ht 5' 10\" (1.778 m)   Wt 157 lb (71.2 kg)   SpO2 99%   BMI 22.53 kg/m²     General appearance: No apparent distress, appears stated age and cooperative. HEENT: Pupils equal, round, and reactive to light. Conjunctivae/corneas clear. Neck: Supple, with full range of motion. No jugular venous distention. Trachea midline. Respiratory:  Normal respiratory effort. Clear to auscultation, bilaterally without Rales/Wheezes/Rhonchi. Cardiovascular: Regular rate and rhythm with normal S1/S2 without murmurs, rubs or gallops. Abdomen: Soft, non-tender, non-distended with hypoactive bowel sounds. Musculoskeletal: No clubbing, cyanosis or edema bilaterally. Full range of motion without deformity. Skin: Skin color, texture, turgor normal.  No rashes or lesions. Neurologic:  Neurovascularly intact without any focal sensory/motor deficits. Cranial nerves: II-XII intact, grossly non-focal.  Psychiatric: Alert and oriented, thought content appropriate, normal insight  Capillary Refill: Brisk,3 seconds, normal   Peripheral Pulses: +2 palpable, equal bilaterally       Labs:   Recent Labs     07/21/21  2342   WBC 5.5   HGB 10.2*   HCT 30.0*        Recent Labs     07/21/21  2342   *   K 4.1   CL 92*   CO2 20*   BUN 21*   CREATININE 0.9   CALCIUM 8.6     Recent Labs     07/21/21  2342   AST 35   ALT 16   BILITOT 0.4   ALKPHOS 93     No results for input(s): INR in the last 72 hours. No results for input(s): Ran Seed in the last 72 hours.     Urinalysis:      Lab Results   Component Value Date    NITRU Negative 07/04/2021    BLOODU Negative 07/04/2021    SPECGRAV 1.025 07/04/2021    GLUCOSEU Negative 07/04/2021       Radiology:  NM TUMOR LOCALIZATION WHOLE BODY    (Results Pending)           Assessment/Plan:    Active Hospital Problems    Diagnosis     Hypoglycemia [E16.2]     Gastroesophageal junction ulcer [K25.9]     History of transcatheter aortic valve replacement (TAVR) [Z95.2]     Hyponatremia [E87.1]     Chronic systolic congestive heart failure (HCC) [I50.22]     Stented coronary artery [Z95.5]     Hypothyroid [E03.9]     CAD (coronary artery disease) [I25.10]      Hypoglycemia  - extensive work up during last admission inconclusive  - continue hydrocortisone  - started on D10. Will wean to D5 as able  - octreotide scan ordered to evaluate for an insulinoma  - continue to monitor BG levels frequently    Hyponatremia  - chronic 2/2 SIADH and CHF  - continue fluid restriction  - may worsen with hypotonic fluids  - will consult nephrology if needed    Gastroesophageal ulceration s/p hiatal hernia repair  - continue soft diet  - GI consulted  - continue PPI    Chronic systolic heart failure  - appears euvolemic  - last echo 6/21 with EF 40-45%  - continue torsemide, coreg. No ACE due to hyponatremia    CAD  - no active chest pain  - continue home ASA, coreg, statin    Hypothyroidism  - continue home synthroid    DVT Prophylaxis: lovenox  Diet: ADULT DIET; Dysphagia - Pureed;  Low Fat/Low Chol/High Fiber/ANNA; 1500 ml  Code Status: Full Code    PT/OT Eval Status: ordered    Dispo - ICU    Dory Roman MD

## 2021-07-22 NOTE — H&P
Hospital Medicine History & Physical      Patient:  Nichole Fitzpatrick  :   1940  MRN:   [id number]  Date of Service: 21    Chief Complaint   Patient presents with    Hypoglycemia     wife called for hypoglycemia.         HISTORY OF PRESENT ILLNESS:    Nichole Fitzpatrick is a 79 y.o. male. He presents from home via ambulance. He presents for symptomatic hypoglycemia. He has been struggling with episodes of spontaneous hypoglycemia lately. 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 He relates tonight after dinner he suddenly \"felt terrible\" and became sweaty and shaky. He checked his blood glucose and said it was \"low\" but cannot recall the exact number. He was afraid to go to sleep out of fear of hypoglycemia during sleep. He recently underwent repair of a type IV paraesophageal hernia. He developed dysphagia and was found to have an ulcer at the GE junction. He is still struggling with this but has been tolerating liquid intake. Review of Systems:  All pertinent positives and negatives are as noted in the HPI section. All other systems were reviewed and are negative.     Past Medical History:   Diagnosis Date    Allergic rhinitis     Anemia     Arthritis     rt hip    CAD (coronary artery disease) 2001    cardiac stents    Chronic systolic congestive heart failure ([address] 2018    Compression fracture of T11 vertebra (HCC)     back brace    Food allergy     Hiatal hernia     History of blood transfusion     platelets=    Hyperlipidemia     Hypertension     hx of    Hypothyroid     Obesity 10/2/2012    Occlusion and stenosis of carotid artery 10/2/2012    Osteoarthritis     Hip right    Recurrent right inguinal hernia     Shingles 2014    Thyroid disease     TIA (transient ischemic attack)        Past Surgical History:   Procedure Laterality Date    AORTIC VALVE REPLACEMENT  2018   [address]  2001    stents   [facility] VALVE REPLACEMENT  09/15/2018    COLONOSCOPY  04/22/99    COLONOSCOPY  7/13/2015    EYE SURGERY      victorino cataracts    GASTRIC FUNDOPLICATION N/A 4/76/0536    ROBOTIC NISSEN FUNDOPLICATION performed by Yasmine Nagy MD at [facility] Left 2/5/2020    ROBOTIC LEFT INGUINAL HERNIA REPAIR WITH MESH performed by Yasmine Nagy MD at [address] [place] [address] Right 07/07/2016    laparoscopic right inguinal hernia repair with mesh    INGUINAL HERNIA REPAIR Right 04/12/2017    davinci recurrent right inguinal hernia repair with mesh    PTCA  2018    SIGMOIDOSCOPY  1994    TONSILLECTOMY      UPPER GASTROINTESTINAL ENDOSCOPY  05/12/94    UPPER GASTROINTESTINAL ENDOSCOPY  11/09/04    Gastritis and duodenitis    UPPER GASTROINTESTINAL ENDOSCOPY N/A 7/8/2021    EGD ESOPHAGOGASTRODUODENOSCOPY performed by Fide White MD at [address] [place] [address]         Prior to Admission medications    Medication Sig Start Date End Date Taking? Authorizing Provider   hydrocortisone (CORTEF) 10 MG tablet Take 1 tablet by mouth every morning (before breakfast) 7/13/21   Farhana Garcia MD   hydrocortisone (CORTEF) 5 MG tablet Take 1 table by mouth daily before dinner 7/12/21   Farhana Garcia MD   torsemide [facility]) 20 MG tablet Take 2 tablets by mouth daily 7/13/21   Farhana Garcia MD   blood glucose monitor kit and supplies Dispense sufficient amount for indicated testing frequency plus additional to accommodate PRN testing needs. Dispense all needed supplies to include: monitor, strips, lancing device, lancets, control solutions, alcohol swabs.  7/12/21   Farhana Garcia MD   metoclopramide (REGLAN) 10 MG tablet Take 1 tablet by mouth 3 times daily (with meals) 7/1/21   Kathie Pedro MD   docusate sodium (COLACE, DULCOLAX) 100 MG CAPS Take 100 mg by mouth 2 times daily  Patient taking differently: Take 100 mg by mouth 2 times daily as needed  5/7/21   Dell Cox MD   levothyroxine (SYNTHROID) 150 MCG tablet TAKE 1 TABLET BY MOUTH  DAILY 10/16/20   VALE Childers CNP   atorvastatin (LIPITOR) 80 MG tablet TAKE 1 TABLET BY MOUTH  EVERY DAY  Patient taking differently: Take 80 mg by mouth nightly TAKE 1 TABLET BY MOUTH EVERY DAY 6/12/20   Deidramorris Parkih MD   ferrous sulfate (FE TABS) 325 (65 Fe) MG EC tablet Take 1 tablet by mouth daily (with breakfast) 6/28/19   VALE Childers CNP   cetirizine (ZYRTEC) 10 MG tablet Take 10 mg by mouth daily. Historical Provider, MD   Multiple Vitamin (THERA) TABS Take 1 tablet by mouth daily 1 Tab daily. Historical Provider, MD   aspirin 81 MG chewable tablet Take 81 mg by mouth daily     Historical Provider, MD       Allergies:   Reopro [abciximab injection]    Social:   reports that he quit smoking about 25 years ago. He started smoking about 62 years ago. He has a 7.50 pack-year smoking history. He has never used smokeless tobacco.   reports no history of alcohol use. Social History     Substance and Sexual Activity   Drug Use No       Family History   Problem Relation Age of Onset    Hypertension Mother     Coronary Art Dis Father     Hypertension Father     Heart Disease Father     Prostate Cancer Brother     Cancer Brother 61        Prostate    Heart Disease Sister        PHYSICAL EXAM:  I performed this physical examination. Vitals:  Patient Vitals for the past 24 hrs:   BP Temp Temp src Pulse Resp SpO2 Height Weight   07/22/21 0100 136/75   88 18      07/21/21 2304 (!) 140/77 98.6 °F (37 °C) Oral 91 18 98 % 5' 10\" (1.778 m) 157 lb (71.2 kg)     No intake or output data in the 24 hours ending 07/22/21 0228    Vent Settings:  Room air    GEN:  Appearance:  Age appropriate male in NAD. Still lightly diaphoretic. Level of Consciousness:  alert . Orientation:  full    HEENT: Sclera anicteric.  no conjunctival chemosis. moist mucus membranes. no specific or diagnostic oral lesions.     NECK:  no signs of meningismus. Jugular veins not distended but prominent v-waves noted. Carotid pulses  2+.  no cervical lymphadenopathy. no thyromegaly. CV:  regular rhythm. normal S1 & S2.    2/6 systolic murmur best over the LLSB. no rub.  no gallop. PULM:  Chest excursion is symmetric. Breath sounds are vesicular. Adventitious sounds:  none    AB:  Abdominal shape is normal.  Bowel sounds are very hypoactive but are present. Generally soft to palpation. no tenderness is present. no involuntary guarding. no rebound guarding. EXTR:  Skin is warm. Capillary refill brisk. no specific or pathognomic rash. non clubbing. 2+ pitting edema of the left leg to mid shin  no active wound or ulcer. LABS:  Lab Results   Component Value Date    WBC 5.5 07/21/2021    HGB 10.2 (L) 07/21/2021    HCT 30.0 (L) 07/21/2021    MCV 99.4 07/21/2021     07/21/2021     Lab Results   Component Value Date    CREATININE 0.9 07/21/2021    BUN 21 (H) 07/21/2021     (L) 07/21/2021    K 4.1 07/21/2021    CL 92 (L) 07/21/2021    CO2 20 (L) 07/21/2021     Lab Results   Component Value Date    ALT 16 07/21/2021    AST 35 07/21/2021    ALKPHOS 93 07/21/2021    BILITOT 0.4 07/21/2021     Lab Results   Component Value Date    TROPONINI <0.01 07/04/2021     No results for input(s): PHART, NRL7TKG, PO2ART in the last 72 hours. IMAGING:  CT ABDOMEN PELVIS WO CONTRAST Additional Contrast? Oral    Result Date: 6/30/2021  EXAMINATION: CT OF THE ABDOMEN AND PELVIS WITHOUT CONTRAST 6/30/2021 11:02 pm TECHNIQUE: CT of the abdomen and pelvis was performed without the administration of intravenous contrast. Multiplanar reformatted images are provided for review. Dose modulation, iterative reconstruction, and/or weight based adjustment of the mA/kV was utilized to reduce the radiation dose to as low as reasonably achievable.  COMPARISON: 05/03/2021 HISTORY: ORDERING SYSTEM PROVIDED HISTORY: recent hiatal hernia repair, now unable to keep food down. TECHNOLOGIST PROVIDED HISTORY: Reason for exam:->recent hiatal hernia repair, now unable to keep food down. Additional Contrast?->Oral Decision Support Exception - unselect if not a suspected or confirmed emergency medical condition->Emergency Medical Condition (MA) Reason for Exam: s/p hiatal hernia surgery,having trouble keeping good down Acuity: Acute Type of Exam: Initial Additional signs and symptoms: fatigue,hypoglycemia FINDINGS: Lower Chest: There is bibasilar atelectasis. There are small bilateral pleural effusions. There is diffuse infiltration of the posterior mediastinal fat along the course of the distal esophagus after hiatal hernia repair. There may be a residual hiatal hernia. Organs: The liver, spleen, pancreas, gallbladder, and adrenal glands are grossly negative given the limitations of the noncontrast technique. There are punctate calculi in both kidneys without hydronephrosis. GI/Bowel: Small bowel caliber is normal.  The appendix is normal.  The colon is unremarkable. Pelvis: The bladder is grossly negative. Peritoneum/Retroperitoneum: No adenopathy, mesenteric stranding or free fluid. Aortic caliber is normal. Bones/Soft Tissues: Old T11 and T12 compression fractures are again seen. There is diffuse body wall edema. Changes from hiatal hernia repair with no evidence for bowel obstruction. There may be a residual hiatal hernia. XR CHEST PORTABLE    Result Date: 7/4/2021  EXAMINATION: ONE XRAY VIEW OF THE CHEST 7/4/2021 2:22 pm COMPARISON: 06/22/2021 HISTORY: ORDERING SYSTEM PROVIDED HISTORY: fatigue TECHNOLOGIST PROVIDED HISTORY: Reason for exam:->fatigue Reason for Exam: fatigue Acuity: Acute Type of Exam: Initial FINDINGS: TAVR is noted. The cardiac silhouette is prominent. There is moderate thoracic aortic calcification. Patchy opacity is in the retrocardiac area. No pneumothorax is identified. Left basilar opacity, atelectasis versus pneumonia. Cardiomegaly. XR CHEST PORTABLE    Result Date: 6/22/2021  EXAMINATION: ONE XRAY VIEW OF THE CHEST 6/22/2021 2:58 pm COMPARISON: Prior study(s) most recent 06/01/2021. HISTORY: ORDERING SYSTEM PROVIDED HISTORY: low na TECHNOLOGIST PROVIDED HISTORY: Reason for exam:->low na Reason for Exam: PCP sent him for low sodium levels Acuity: Acute Type of Exam: Initial FINDINGS: The heart is upper normal, similar or slightly decreased from the recent study. There is basilar airspace disease bilaterally. This partially obscures the left diaphragm, however there is moderate improved aeration left lower lobe retrocardiac region since the prior exam.  There may be some degree of pleural effusion residual.  Upper lungs are relatively clear. No pneumothorax. Partial clearing of airspace disease and/or effusion particularly left lower lobe. There is some residual basilar disease and possible residual pleural effusion as well. FL UGI    Result Date: 7/7/2021  EXAMINATION: SINGLE CONTRAST UPPER GI SERIES 7/7/2021 HISTORY: ORDERING SYSTEM PROVIDED HISTORY: dysphagia s/p recent complex paraesophageal hernia repair TECHNOLOGIST PROVIDED HISTORY: Reason for exam:->dysphagia s/p recent complex paraesophageal hernia repair Reason for Exam: difficulty eating anything Acuity: Chronic Type of Exam: Ongoing COMPARISON: None. TECHNIQUE: Multiple single contrast images of the esophagus, gastroesophageal junction and stomach were obtained following the oral administration of water soluble contrast FLUOROSCOPY DOSE AND TYPE OR TIME AND EXPOSURES: 1.5 minutes fluoroscopy, 7 fluoroscopic loop images FINDINGS: Barium was given to patient by mouth. Moderate esophageal dysmotility was demonstrated with tertiary contractions. Narrowing is seen of the distal esophagus near the GE junction, likely postoperative in this patient with history of hernia repair. Contrast did empty from the esophagus.   Prompt transit of contrast was seen from the stomach to the duodenum. Duodenal diverticulum demonstrated distally. Heterogeneous intraluminal content within the stomach, likely food. Status post valve replacement. Patient was unable to tolerate trial of barium tablet. Moderate esophageal dysmotility with tertiary contractions. Narrowing of the distal esophagus near the diaphragm, presumably postoperative, without evidence of functional obstruction to barium passage. Heterogeneous content within the stomach, presumably retained food. Duodenal diverticulum. I directly reviewed all recent imaging studies as well as pertinent prior studies. Radiology reports may or may not be available at the time of my review. Active Hospital Problems    Diagnosis Date Noted    Hypoglycemia [E16.2] 07/22/2021    Gastroesophageal junction ulcer [K25.9] 07/22/2021    Hyponatremia [E87.1] 07/04/2021    Chronic systolic congestive heart failure ([facility] [I50.22] 08/21/2018    Stented coronary artery [Z95.5] 04/24/2013    Hypothyroid [E03.9]     CAD (coronary artery disease) [I25.10]        ASSESSMENT & PLAN  Spontaneous Hypoglycemia  -  Currently maintained on D10 @ 100mL/hr. Will check BGL q2h on step-down for now. 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 Will give octreotide 100mcg s.c. and monitor response.  -  Random insulin level will be added onto labs obtained in the ER while patient was hypoglycemic.  -  Patient was started on hydrocortisone empirically last admission out of concern for adrenal insufficiency and for now this will be continued. -  Octreotide scan will be requested for the morning for tumor localization. Hyponatremia  -  Chronic and mild. Suspected due to heart failure. -  Free water restrict to 1.5 L/day    GastroEsophageal ulceration/stricture, s/p hiatal hernia repair  -  EGD 7/8/21 showed a large circumferential ulcer above surgical site. -  Mechanical soft diet.   -  May need dilations with EGD in future if symptoms recur.  -  Continue PPI. Chronic systolic heart failure / Ischemic CM, CAD s/p PCI  - appears euvolemic  - TTE 6/21 with LVEF = 40-45% which is improved compared to prior.  - Continue home torsemide and coreg. Continue ASA and statin. - ACE inhibitor was d/c'd last admission due to hyponatremia. Hypothyroidism  -  Continue home synthroid.     DVT prophylaxis: SCDs, lovenox  Code Status:  Full  Disposition:  Inpatient    Clauida Florez MD MD

## 2021-07-22 NOTE — DISCHARGE INSTR - COC
03/10/2021    Influenza Virus Vaccine 10/30/2003, 10/24/2006, 10/28/2006, 10/06/2007, 10/11/2008, 10/02/2010, 10/21/2011    Influenza, High Dose (Fluzone 65 yrs and older) 09/24/2014, 09/29/2015, 09/30/2016, 10/06/2017, 09/26/2018    Pneumococcal Conjugate 13-valent (Plsseos54) 09/29/2015    Pneumococcal Conjugate 7-valent (Prevnar7) 10/15/2000, 11/11/2003    Pneumococcal Polysaccharide ([id number]) 12/05/2016    Tdap (Boostrix, Adacel) 10/23/2009    Zoster Live (Zostavax) 12/12/2011       Active Problems:  Patient Active Problem List   Diagnosis Code    Generalized weakness R53.1    Hypothyroid E03.9    Food allergy Z91.018    CAD (coronary artery disease) I25.10    TIA (transient ischemic attack) G45.9    Hyperlipidemia E78.5    Osteoarthritis M19.90    Cough R05    Herpes zoster B02.9    Vertigo R42    Eustachian tube dysfunction H69.80    Aortic stenosis I35.0    Unilateral recurrent inguinal hernia without obstruction or gangrene K40.91    Murmur R01.1    Obesity E66.9    Occlusion and stenosis of carotid artery I65.29    Stented coronary artery Z95.5    Groin pain, chronic, right R10.31, G89.29    Chronic systolic congestive heart failure ([facility]) I50.22    Non-recurrent unilateral inguinal hernia without obstruction or gangrene K40.90    Postoperative pain G89.18    Postoperative hematoma of subcutaneous tissue following non-dermatologic procedure L76.32    SBO (small bowel obstruction) ([facility]) K56.609    Hiatal hernia K44.9    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    Transaminitis R74.01    Chronic hyponatremia E87.1    Hypoalbuminemia E88.09    Pleural effusion on right J90    Acute on chronic combined systolic and diastolic CHF (congestive heart failure) ([facility]) I50.43    Hyponatremia E87.1    Pneumonia of left lower lobe due to infectious organism J18.9    Hypoglycemia E16.2    Gastroesophageal junction ulcer K25.9    History of transcatheter aortic valve replacement (TAVR) Z95.2 Isolation/Infection:   Isolation          No Isolation        Patient Infection Status     None to display          Nurse Assessment:  Last Vital Signs: /67   Pulse 81   Temp 97.7 °F (36.5 °C) (Oral)   Resp 20   Ht 5' 10\" (1.778 m)   Wt 157 lb (71.2 kg)   SpO2 99%   BMI 22.53 kg/m²     Last documented pain score (0-10 scale):    Last Weight:   Wt Readings from Last 1 Encounters:   21 157 lb (71.2 kg)     Mental Status:  {IP PT MENTAL STATUS:}    IV Access:  { GAUTAM IV ACCESS:196625454}    Nursing Mobility/ADLs:  Walking   {P DME WUMV:636532543}  Transfer  {P DME QHOW:749091403}  Bathing  {CHP DME EFEC:169711090}  Dressing  {CHP DME WHDN:102741683}  Toileting  {P DME IIJS:783412392}  Feeding  {[facility] DME OTH}  Med Admin  {[facility] DME DNKB:528945908}  Med Delivery   { GAUTAM MED Delivery:982172197}    Wound Care Documentation and Therapy:        Elimination:  Continence:   · Bowel: {YES / QZ:75598}  · Bladder: {YES / VW:08312}  Urinary Catheter: {Urinary Catheter:258725214}   Colostomy/Ileostomy/Ileal Conduit: {YES / TN:06013}       Date of Last BM: ***    Intake/Output Summary (Last 24 hours) at 2021 1123  Last data filed at 2021 1000  Gross per 24 hour   Intake 240 ml   Output    Net 240 ml     No intake/output data recorded.     Safety Concerns:     [address] Safety Concerns:251857036}    Impairments/Disabilities:      [address] Impairments/Disabilities:252646857}    Nutrition Therapy:  Current Nutrition Therapy:   [address] [employer] Diet List:117152632}    Routes of Feeding: {P DME Other Feedings:023975036}  Liquids: {Slp liquid thickness:97854}  Daily Fluid Restriction: {CHP DME Yes amt example:682750416}  Last Modified Barium Swallow with Video (Video Swallowing Test): {Done Not Done OVCP:447453033}    Treatments at the Time of Hospital Discharge:   Respiratory Treatments: ***  Oxygen Therapy:  {Therapy; copd oxygen:92462}  Ventilator:    {MH CC Vent IZFQ:330963778}    Rehab Therapies: {THERAPEUTIC INTERVENTION:0569181410}  Weight Bearing Status/Restrictions: [employer] [facility] CC Weight Bearin}  Other Medical Equipment (for information only, NOT a DME order):  {EQUIPMENT:560146701}  Other Treatments: ***    Patient's personal belongings (please select all that are sent with patient):  {CHP DME Belongings:747610733}    RN SIGNATURE:  {Esignature:057184700}    CASE MANAGEMENT/SOCIAL WORK SECTION    Inpatient Status Date: 21     Readmission Risk Assessment Score:  Readmission Risk              Risk of Unplanned Readmission:  34           Discharging to Facility/ Agency   · Name: [facility]  · Address:  · Phone: [phone]  · Fax: [phone]    / signature: {Esignature:987113344}    PHYSICIAN SECTION    Prognosis: {Prognosis:2317364828}    Condition at Discharge: [address] [facility] Patient Condition:355094907}    Rehab Potential (if transferring to Rehab): {Prognosis:6711353725}    Recommended Labs or Other Treatments After Discharge: ***    Physician Certification: I certify the above information and transfer of Cindy Yee  is necessary for the continuing treatment of the diagnosis listed and that he requires {Admit to Appropriate Level of Care:88398} for {GREATER/LESS:316326166} 30 days.      Update Admission H&P: {CHP DME Changes in KBSXU:938700101}    PHYSICIAN SIGNATURE:  {Esignature:558926391}

## 2021-07-22 NOTE — PROGRESS NOTES
Occupational Therapy   Occupational Therapy Initial Assessment//Treatment  Date: 2021   Patient Name: Tano Henriquez  MRN: [id number]     : 1940    Date of Service: 2021    Discharge Recommendations:  24 hour supervision or assist, Home with Home health OT       Assessment   Performance deficits / Impairments: Decreased functional mobility ; Decreased safe awareness;Decreased balance;Decreased ADL status; Decreased endurance;Decreased high-level IADLs;Decreased strength    Assessment: Pt 78 yo male functioning with deficits in the areas listed above following Hypoglycemia. Pt reports IND PLOF and lives at home with spouse. Pt is currently at [address] for functional mobility and standing level adls. Pt fatigued with activity demo'ing increased balance deficits in standing. Pt educated on increased safety awareness and importance of continued activity. Pt would benefit from skilled OT services while in acute care. Disease Specific Education: Pt educated on importance of OOB mobility, prevention of complications of bedrest, and general safety during hospitalization. Pt verbalized understanding    Prognosis: Good  Decision Making: Medium Complexity  OT Education: OT Role;Plan of Care;Home Exercise Program;Precautions; ADL Adaptive Strategies;Transfer Training;Energy Conservation  REQUIRES OT FOLLOW UP: Yes  Activity Tolerance  Activity Tolerance: Patient Tolerated treatment well;Patient limited by fatigue  Activity Tolerance: /81 HR 77 O2 92%  Safety Devices  Safety Devices in place: Yes  Type of devices: Call light within reach; Left in chair;Bed alarm in place;Nurse notified           Patient Diagnosis(es): The encounter diagnosis was Hypoglycemia.      has a past medical history of Allergic rhinitis, Anemia, Arthritis, CAD (coronary artery disease), Chronic systolic congestive heart failure ([address] Compression fracture of T11 vertebra ([address] Food allergy, Hiatal hernia, History of blood transfusion, Hyperlipidemia, Hypertension, Hypothyroid, Obesity, Occlusion and stenosis of carotid artery, Osteoarthritis, Recurrent right inguinal hernia, Shingles, Thyroid disease, and TIA (transient ischemic attack). has a past surgical history that includes sigmoidoscopy (1994); Upper gastrointestinal endoscopy (05/12/94); Upper gastrointestinal endoscopy (11/09/04); Tonsillectomy; eye surgery; Colonoscopy (04/22/99); Colonoscopy (7/13/2015); Inguinal hernia repair (Right, 07/07/2016); Inguinal hernia repair (Right, 04/12/2017); Cardiac surgery (2001); Cardiac valve replacement (09/15/2018); Aortic valve replacement (2018); Percutaneous Transluminal Coronary Angio (2018); hernia repair (Left, 2/5/2020); Gastric fundoplication (N/A, 3/20/7760); and Upper gastrointestinal endoscopy (N/A, 7/8/2021).            Restrictions  Restrictions/Precautions  Restrictions/Precautions: General Precautions, Fall Risk  Position Activity Restriction  Other position/activity restrictions: up with assistance, ICU monitoring; low fall risk per nursing assessment    Subjective   General  Chart Reviewed: Yes  Patient assessed for rehabilitation services?: Yes  Family / Caregiver Present: Yes (spouse)  Referring Practitioner: Karuna Mead MD  Diagnosis: Hypoglycemia  Subjective  Subjective: Pt agreeable to therapy  General Comment  Comments: RN approved therapy and okay for up in chair without alarm  Patient Currently in Pain: Denies  Pain Assessment  Pain Assessment: 0-10  Pain Level: 0  Vital Signs  Pulse: 81  Heart Rate Source: Monitor  Resp: 18  BP: 131/81  BP Location: Left upper arm  MAP (mmHg): 93  Patient Position: Up in chair  Level of Consciousness: Alert (0)  Patient Currently in Pain: Denies  Oxygen Therapy  SpO2: 99 %  O2 Device: None (Room air)       Social/Functional History  Social/Functional History  Lives With: Spouse  Type of Home: House  Home Layout: Two level  Home Access: Level entry  Bathroom Shower/Tub: Walk-in shower, Shower chair with back  Bathroom Toilet: Handicap height  Home Equipment: Cane, Rolling walker  Rece[facility] Help [facility]: [facility])  ADL Assistance: Independent  Homemaking Assistance: Needs assistance (wife completes)  Ambulation Assistance: Independent (uses cane or RW)  Transfer Assistance: Independent  Active : Yes  Occupation: Retired  Leisure & Hobbies: computers, reading  Additional Comments: denies falls       Objective        Orientation  Overall Orientation Status: Within Functional Limits  Observation/Palpation  Posture: Fair  Balance  Sitting Balance: Stand by assistance  Standing Balance: Contact guard assistance (no AD)  Standing Balance  Time: ~5 minutes x2  Activity: bathroom mobility  Functional Mobility  Functional - Mobility Device: No device  Activity: To/from bathroom  Assist Level: Contact guard assistance  Functional Mobility Comments: fatigued with increased activity  Toilet Transfers  Toilet - Technique: Ambulating  Equipment Used: Grab bars  Toilet Transfer: Contact guard assistance  ADL  Grooming: Contact guard assistance (in stance at sink for oral care)  Toileting: Contact guard assistance (for balance during pericare)  Tone RUE  RUE Tone: Normotonic  Tone LUE  LUE Tone: Normotonic  Coordination  Movements Are Fluid And Coordinated: Yes     Bed mobility  Supine to Sit: Stand by assistance (HOB elevated, use of bedrail)  Sit to Supine: Unable to assess (up in chair at end of session)  Transfers  Sit to stand: Contact guard assistance  Stand to sit: Contact guard assistance     Cognition  Overall Cognitive Status: WFL        Sensation  Overall Sensation Status: WFL        LUE AROM (degrees)  LUE AROM : WFL  RUE AROM (degrees)  RUE AROM : WFL  LUE Strength  L Hand General: 4/5  RUE Strength  R Hand General: 4/5                   Plan   Plan  Times per week: 4-5x/wk  Current Treatment Recommendations: Strengthening, Endurance Training, Balance Training,

## 2021-07-22 NOTE — CARE COORDINATION
CASE MANAGEMENT INITIAL ASSESSMENT      Reviewed chart and completed assessment at bedside with patient and spouse    Explained Case Management role/services. yes    Health Care Decision Maker :   Primary Decision Maker: Brook Soler Spouse - [phone]    Secondary Decision Maker: Jonnathan Pitts Child - [phone]        Admit date/status: 07/22/21 inpatient   Diagnosis: hypoglycemia     Is this a Readmission?:  Yes. Previous admission 7/4-7/12 for PNA and hyponatremia. dc'd home with spouse and [facility]. Current admission for hypoglycemia. Insurance: [facility] Medicare     Precert required for SNF: Yes       3 night stay required: No    Living arrangements, Adls, care needs, prior to admission: lives w/spouse, IPTA    Transportation: TBD     Durable Medical Equipment at home:  Walker_x_Cane_x_RTS__ BSC__Shower Chair_x_  02__ HHN__ CPAP__  BiPap__  Hospital Bed__ W/C__    Services in the home and/or outpatient, prior to admission: [facility]. RN 1x/week. PT/OT 2x/week    PT/OT recs:  Not completed at this time. Hospital Exemption Notification (HEN): not started    Barriers to discharge: none    Plan/comments: pt intends to discharge home with home care resumption. However, is open to SNF if recommended by provider or therapy team. First choice for SNF if [facility]. [facility]OC on chart for MD signature. CM called and spoke with Vaughn Senior from [facility] regarding pt admission and need for resumption at d/c. Vaughn Senior states no barriers to accepting pt back and she would follow in [facility].      Ishaan Akins, RN

## 2021-07-22 NOTE — ED PROVIDER NOTES
EMERGENCY DEPARTMENT ENCOUNTER      This patient was seen and evaluated by the attending physician. Pt Name: Celena Hall  MRN: [id number]  [place] 1940  Date of evaluation: 7/21/2021  Provider: VALE MandujanoC  PCP: Janeen Rodriguez ED Attending: No att. providers found    History provided by the patient. CHIEF COMPLAINT:     Chief Complaint   Patient presents with    Hypoglycemia     wife called for hypoglycemia.         HISTORY OF PRESENT ILLNESS:      Celena Hall is a 79 y.o. male who presents [facility]  ED with complaints of hypoglycemia. Patient had an episode hypoglycemia this evening, patient states that his glucoses have been wildly fluctuating he was recently admitted here to the hospital for evaluation of this and he states that they did not really find anything. Patient states that his glucose dropped today, he states that he is afraid to go home and go to sleep because he is afraid that he will die with his glucose dropping while he is asleep. He denies any pain, he is resting comfortably currently with a glucose of 181. Location:-  Quality:-  Severity:-  Duration:-  Modifying factors:-    Nursing Notes were reviewed     REVIEW OF SYSTEMS:     Review of Systems  All systems, atotal of 10, are reviewed and negative except for those that were just noted in history present illness.         PAST MEDICAL HISTORY:     Past Medical History:   Diagnosis Date    Allergic rhinitis     Anemia     Arthritis     rt hip    CAD (coronary artery disease) 2001    cardiac stents    Chronic systolic congestive heart failure ([facility] [address] 8/21/2018    Compression fracture of T11 vertebra ([facility])     back brace    Food allergy     Hiatal hernia     History of blood transfusion     platelets=2001    Hyperlipidemia     Hypertension     hx of    Hypothyroid     Obesity 10/2/2012    Occlusion and stenosis of carotid artery 10/2/2012    Osteoarthritis     Hip right    Recurrent right inguinal hernia     Shingles 2014    Thyroid disease     TIA (transient ischemic attack)          SURGICAL HISTORY:      Past Surgical History:   Procedure Laterality Date    AORTIC VALVE REPLACEMENT  2018    CARDIAC SURGERY  2001    stents    CARDIAC VALVE REPLACEMENT  09/15/2018    COLONOSCOPY  04/22/99    COLONOSCOPY  7/13/2015    EYE SURGERY      victorino cataracts    GASTRIC FUNDOPLICATION N/A 0/18/5221    ROBOTIC NISSEN FUNDOPLICATION performed by Juliane Jones MD at [facility] Left 2/5/2020    ROBOTIC LEFT INGUINAL HERNIA REPAIR WITH MESH performed by Juliane Jones MD at [address] [place] [address] Right 07/07/2016    laparoscopic right inguinal hernia repair with mesh    INGUINAL HERNIA REPAIR Right 04/12/2017    davinci recurrent right inguinal hernia repair with mesh    PTCA  2018    SIGMOIDOSCOPY  1994    TONSILLECTOMY      UPPER GASTROINTESTINAL ENDOSCOPY  05/12/94    UPPER GASTROINTESTINAL ENDOSCOPY  11/09/04    Gastritis and duodenitis    UPPER GASTROINTESTINAL ENDOSCOPY N/A 7/8/2021    EGD ESOPHAGOGASTRODUODENOSCOPY performed by Mila Rodas MD at [address] [place]:       Previous Medications    ASPIRIN 81 MG CHEWABLE TABLET    Take 81 mg by mouth daily     ATORVASTATIN (LIPITOR) 80 MG TABLET    TAKE 1 TABLET BY MOUTH  EVERY DAY    BLOOD GLUCOSE MONITOR KIT AND SUPPLIES    Dispense sufficient amount for indicated testing frequency plus additional to accommodate PRN testing needs. Dispense all needed supplies to include: monitor, strips, lancing device, lancets, control solutions, alcohol swabs. CETIRIZINE (ZYRTEC) 10 MG TABLET    Take 10 mg by mouth daily.     DOCUSATE SODIUM (COLACE, DULCOLAX) 100 MG CAPS    Take 100 mg by mouth 2 times daily    FERROUS SULFATE (FE TABS) 325 (65 FE) MG EC TABLET    Take 1 tablet by mouth daily (with breakfast)    HYDROCORTISONE (CORTEF) 10 MG TABLET    Take 1 tablet by mouth every morning (before breakfast)    HYDROCORTISONE (CORTEF) 5 MG TABLET    Take 1 table by mouth daily before dinner    LEVOTHYROXINE (SYNTHROID) 150 MCG TABLET    TAKE 1 TABLET BY MOUTH  DAILY    METOCLOPRAMIDE (REGLAN) 10 MG TABLET    Take 1 tablet by mouth 3 times daily (with meals)    MULTIPLE VITAMIN (THERA) TABS    Take 1 tablet by mouth daily 1 Tab daily. TORSEMIDE (DEMADEX) 20 MG TABLET    Take 2 tablets by mouth daily         ALLERGIES:    Reopro [abciximab injection]    FAMILY HISTORY:       Family History   Problem Relation Age of Onset    Hypertension Mother     Coronary Art Dis Father     Hypertension Father     Heart Disease Father     Prostate Cancer Brother     Cancer Brother 61        Prostate    Heart Disease Sister           SOCIAL HISTORY:       Social History     Socioeconomic History    Marital status:      Spouse name: Not on file    Number of children: Not on file    Years of education: Not on file    Highest education level: Not on file   Occupational History    Not on file   Tobacco Use    Smoking status: Former Smoker     Packs/day: 0.50     Years: 15.00     Pack years: 7.50     Start date:      Quit date: 1995     Years since quittin.8    Smokeless tobacco: Never Used   Vaping Use    Vaping Use: Never used   Substance and Sexual Activity    Alcohol use: No    Drug use: No    Sexual activity: Never   Other Topics Concern    Not on file   Social History Narrative    Not on file     Social Determinants of Health     Financial Resource Strain:     Difficulty of Paying Living Expenses:    Food Insecurity:     Worried About Running Out of Food in the Last Year:     Ran Out of Food in the Last Year:    Transportation Needs:     Lack of Transportation (Medical):      Lack of Transportation (Non-Medical):    Physical Activity:     Days of Exercise per Week:     Minutes of Exercise per Session:    Stress:     Feeling of Stress :    Social Connections:     Frequency of Communication with Friends and Family:     Frequency of Social Gatherings with Friends and Family:     Attends Rastafari Services:     Active Member of Clubs or Organizations:     Attends Club or Organization Meetings:     Marital Status:    Intimate Partner Violence:     Fear of Current or Ex-Partner:     Emotionally Abused:     Physically Abused:     Sexually Abused:        SCREENINGS:            PHYSICAL EXAM:       ED Triage Vitals [07/21/21 2304]   BP Temp Temp Source Pulse Resp SpO2 Height Weight   (!) 140/77 98.6 °F (37 °C) Oral 91 18 98 % 5' 10\" (1.778 m) 157 lb (71.2 kg)       Physical Exam    CONSTITUTIONAL: Awake and alert. Cooperative. Well-developed. Well-nourished. Non-toxic. No acute distress. Vitals:    07/21/21 2304 07/22/21 0100   BP: (!) 140/77 136/75   Pulse: 91 88   Resp: 18 18   Temp: 98.6 °F (37 °C)    TempSrc: Oral    SpO2: 98%    Weight: 157 lb (71.2 kg)    Height: 5' 10\" (1.778 m)      HENT: Normocephalic. Atraumatic. External ears normal, without discharge. TMs clear bilaterally. No nasal discharge. Oropharynx clear, no erythema. Mucous membranes moist.  EYES: Conjunctiva non-injected, no lid abnormalities noted. No scleral icterus. PERRL. EOM's grossly intact. Anterior chambers clear. NECK: Supple. Normal ROM. No meningismus. No thyroid tenderness or swelling noted. CARDIOVASCULAR: RRR. No Murmer. PULMONARY/CHEST WALL: Effort normal. No tachypnea. Lungs clear to ausculation. ABDOMEN: Normal BS. Soft. Nondistended. No tenderness to palpate. No guarding. No hernias noted. No splenomegaly. Back: Spine is midline. No ecchymosis. No crepitus on palpation. No obvious subluxation of vertebral column. No saddle anesthesia or evidence of cauda equina. /ANORECTAL: Not assessed  MUSKULOSKELETAL: Normal ROM. No acute deformities. No edema. No tenderness to palpate. SKIN: Warm and dry.     NEUROLOGICAL:  GCS 15. CN II-XII grossly intact. Strength is 5/5 in allextremities and sensation is intact. PSYCHIATRIC: Normal affect, normal insight and judgement. Alert andoriented x 3.         DIAGNOSTIC RESULTS:     LABS:    Results for orders placed or performed during the hospital encounter of 07/21/21   CBC auto differential   Result Value Ref Range    WBC 5.5 4.0 - 11.0 K/uL    RBC 3.02 (L) 4.20 - 5.90 M/uL    Hemoglobin 10.2 (L) 13.5 - 17.5 g/dL    Hematocrit 30.0 (L) 40.5 - 52.5 %    MCV 99.4 80.0 - 100.0 fL    MCH 33.8 26.0 - 34.0 pg    MCHC 34.0 31.0 - 36.0 g/dL    RDW 20.1 (H) 12.4 - 15.4 %    Platelets 185 685 - 161 K/uL    MPV 6.4 5.0 - 10.5 fL    Neutrophils % 80.1 %    Lymphocytes % 11.3 %    Monocytes % 6.0 %    Eosinophils % 1.7 %    Basophils % 0.9 %    Neutrophils Absolute 4.4 1.7 - 7.7 K/uL    Lymphocytes Absolute 0.6 (L) 1.0 - 5.1 K/uL    Monocytes Absolute 0.3 0.0 - 1.3 K/uL    Eosinophils Absolute 0.1 0.0 - 0.6 K/uL    Basophils Absolute 0.1 0.0 - 0.2 K/uL   Comprehensive metabolic panel   Result Value Ref Range    Sodium 129 (L) 136 - 145 mmol/L    Potassium 4.1 3.5 - 5.1 mmol/L    Chloride 92 (L) 99 - 110 mmol/L    CO2 20 (L) 21 - 32 mmol/L    Anion Gap 17 (H) 3 - 16    Glucose 143 (H) 70 - 99 mg/dL    BUN 21 (H) 7 - 20 mg/dL    CREATININE 0.9 0.8 - 1.3 mg/dL    GFR Non-African American >60 >60    GFR African American >60 >60    Calcium 8.6 8.3 - 10.6 mg/dL    Total Protein 6.4 6.4 - 8.2 g/dL    Albumin 3.5 3.4 - 5.0 g/dL    Albumin/Globulin Ratio 1.2 1.1 - 2.2    Total Bilirubin 0.4 0.0 - 1.0 mg/dL    Alkaline Phosphatase 93 40 - 129 U/L    ALT 16 10 - 40 U/L    AST 35 15 - 37 U/L    Globulin 2.9 g/dL   POCT Glucose   Result Value Ref Range    POC Glucose 129 (H) 70 - 99 mg/dl    Performed on ACCU-CHEK    POCT Glucose   Result Value Ref Range    POC Glucose 181 (H) 70 - 99 mg/dl    Performed on ACCU-CHEK    POCT Glucose   Result Value Ref Range    POC Glucose 41 (LL) 70 - 99 mg/dl    Performed on ACCU-CHEK    POCT Glucose   Result Value Ref Range    POC Glucose 117 (H) 70 - 99 mg/dl    Performed on ACCU-CHEK    POCT Glucose   Result Value Ref Range    POC Glucose 37 (LL) 70 - 99 mg/dl    Performed on ACCU-CHEK          RADIOLOGY:  All x-ray studies are viewed/reviewedby me. Formal interpretations per the radiologist are as follows: No orders to display           EKG:  See EKG interpretation by an attending phsyician      PROCEDURES:   N/A    CRITICAL CARE TIME:   Total critical care time today provided was at least 35 minutes. This excludes seperately billable procedure. Critical care time provided for severe hypoglycemia that required close evaluation and/or intervention with concern for patient decompensation. CONSULTS:  None      EMERGENCYDEPARTMENT COURSE and DIFFERENTIAL DIAGNOSIS/MDM:   Vitals:    Vitals:    07/21/21 2304 07/22/21 0100   BP: (!) 140/77 136/75   Pulse: 91 88   Resp: 18 18   Temp: 98.6 °F (37 °C)    TempSrc: Oral    SpO2: 98%    Weight: 157 lb (71.2 kg)    Height: 5' 10\" (1.778 m)        Patient was given the following medications:  Medications   0.9 % sodium chloride infusion (1,000 mLs Intravenous New Bag 7/22/21 0102)   dextrose 10 % infusion (has no administration in time range)   dextrose 50 % IV solution (50 mLs Intravenous Given 7/22/21 0103)         Patient was evaluated by both myself and Dr. Lucius Lau  Patient presented to the emergency room today with complaints of hypoglycemia patient had episode at home where he was getting hypoglycemic, EMS was called, they gave him food to eat, upon arrival his glucose was 181, after rechecking it patient glucose dropped down to 40, he was given an amp of D50 and his glucose went up over 115, after an hour later patient glucose dropped down into the 30s. Patient was given another amp of D50 and a D10 infusion was started.  Given the patient's refractory hypoglycemia I did feel the patient would benefit from admission to the hospital for further evaluation and treatment. Patient evaluated by the attending physician who agrees with this plan of care. Patient laboratory studies, radiographic imaging, andassessment were all discussed with the patient and/or patient family. There was shared decision-making between myself, the attending physician, as well as the patient and/or their surrogate and we are all in agreement with admission. There was an opportunity for questions and all questions were answered to the best of my ability and to the satisfaction of the patient and/or patient family. FINAL IMPRESSION:      1. Hypoglycemia          DISPOSITION/PLAN:   DISPOSITIONDecision To Admit      PATIENT REFERRED TO:  No follow-up provider specified.     DISCHARGE MEDICATIONS:  New Prescriptions    No medications on file                  (Please note that portions of this note were completed with a voice recognition program.  Efforts were made to edit the dictations, but occasionally words are mis-transcribed.)    VALE Ramirez - CNP-C (electronically signed)        VALE Ramirez CNP  07/22/21 0468

## 2021-07-23 NOTE — DISCHARGE SUMMARY
Hospital Medicine Discharge Summary    Patient ID: Steffi Closs      Patient's PCP: Ochoa Greene    Admit Date: 7/21/2021     Discharge Date: 7/22/2021      Admitting Physician: Mimi Lawrence MD     Discharge Physician: Gabriel Cummings MD     Discharge Diagnoses: Active Hospital Problems    Diagnosis     Hypoglycemia [E16.2]     Gastroesophageal junction ulcer [K25.9]     History of transcatheter aortic valve replacement (TAVR) [Z95.2]     Hyponatremia [E87.1]     Chronic systolic congestive heart failure (HCC) [I50.22]     Stented coronary artery [Z95.5]     Hypothyroid [E03.9]     CAD (coronary artery disease) [I25.10]        The patient was seen and examined on day of discharge and this discharge summary is in conjunction with any daily progress note from day of discharge. Hospital Course:   Misael Davila a 79 y. o. male.   He presents from home via ambulance. Shayan Moctezuma presents for symptomatic hypoglycemia. Shayan Moctezuma has been struggling with episodes of spontaneous hypoglycemia lately. Shayan Moctezuma is suspected of having an insulinoma but this is not confirmed. Shayan Moctezuma relates tonight after dinner he suddenly \"felt terrible\" and became sweaty and shaky.  He checked his blood glucose and said it was \"low\" but cannot recall the exact number. Shayan Moctezuma was afraid to go to sleep out of fear of hypoglycemia during sleep.     He recently underwent repair of a type IV paraesophageal hernia.  He developed dysphagia and was found to have an ulcer at the GE junction. Shayan Moctezuma is still struggling with this but has been tolerating liquid intake.     Hypoglycemia  - extensive work up during last admission inconclusive  - continue hydrocortisone  - D10 changed to D5  - continue to monitor BG levels frequently  - transferred to [facility] for octreotide scan and endocrinology consult to rule out insulinoma     Hyponatremia  - chronic 2/2 SIADH and CHF  - continue fluid restriction  - may worsen with hypotonic fluids     Gastroesophageal ulceration s/p hiatal hernia repair  - continue soft diet  - continue PPI     Chronic systolic heart failure  - appears euvolemic  - last echo 6/21 with EF 40-45%  - continue torsemide, coreg. No ACE due to hyponatremia    CAD  - no active chest pain  - continue home ASA, coreg, statin     Hypothyroidism  - continue home synthroid    Physical Exam Performed:     /65   Pulse 74   Temp 97.7 °F (36.5 °C) (Oral)   Resp 17   Ht 5' 10\" (1.778 m)   Wt 157 lb (71.2 kg)   SpO2 100%   BMI 22.53 kg/m²       General appearance: No apparent distress, appears stated age and cooperative. HEENT: Pupils equal, round, and reactive to light. Conjunctivae/corneas clear. Neck: Supple, with full range of motion. No jugular venous distention. Trachea midline. Respiratory:  Normal respiratory effort. Clear to auscultation, bilaterally without Rales/Wheezes/Rhonchi. Cardiovascular: Regular rate and rhythm with normal S1/S2 without murmurs, rubs or gallops. Abdomen: Soft, non-tender, non-distended with hypoactive bowel sounds. Musculoskeletal: No clubbing, cyanosis or edema bilaterally. Full range of motion without deformity. Skin: Skin color, texture, turgor normal.  No rashes or lesions. Neurologic:  Neurovascularly intact without any focal sensory/motor deficits. Cranial nerves: II-XII intact, grossly non-focal.  Psychiatric: Alert and oriented, thought content appropriate, normal insight  Capillary Refill: Brisk,3 seconds, normal   Peripheral Pulses: +2 palpable, equal bilaterally     Labs:  For convenience and continuity at follow-up the following most recent labs are provided:      CBC:    Lab Results   Component Value Date    WBC 5.5 07/21/2021    HGB 10.2 07/21/2021    HCT 30.0 07/21/2021     07/21/2021       Renal:    Lab Results   Component Value Date     07/21/2021    K 4.1 07/21/2021    K 4.3 07/05/2021    CL 92 07/21/2021    CO2 20 07/21/2021    BUN 21 07/21/2021 CREATININE 0.9 07/21/2021    CALCIUM 8.6 07/21/2021    PHOS 3.5 07/06/2021         Significant Diagnostic Studies    Radiology:   No orders to display          Consults:     None    Disposition: [facility]     Condition at Discharge: Stable    Discharge Instructions/Follow-up:  Follow up with PCP, nephrology, general surgery, GI within 1-2 weeks    Code Status:  Full code    Activity: activity as tolerated    Diet: regular diet      Discharge Medications:     Discharge Medication List as of 7/22/2021  7:22 PM           Details   pantoprazole (PROTONIX) 40 MG tablet Take 1 tablet by mouth 2 times daily (before meals), Disp-60 tablet, R-0NO PRINT              Details   atorvastatin (LIPITOR) 80 MG tablet Take 80 mg by mouth nightlyHistorical Med      docusate sodium (COLACE) 100 MG capsule Take 100 mg by mouth 2 times daily as needed for ConstipationHistorical Med      !! hydrocortisone (CORTEF) 10 MG tablet Take 1 tablet by mouth every morning (before breakfast), Disp-30 tablet, R-0Normal      !! hydrocortisone (CORTEF) 5 MG tablet Take 1 table by mouth daily before dinner, Disp-30 tablet, R-0Normal      torsemide (DEMADEX) 20 MG tablet Take 2 tablets by mouth daily, Disp-30 tablet, R-0Normal      blood glucose monitor kit and supplies Dispense sufficient amount for indicated testing frequency plus additional to accommodate PRN testing needs. Dispense all needed supplies to include: monitor, strips, lancing device, lancets, control solutions, alcohol swabs., Disp-1 kit, R-0, Normal      metoclopramide (REGLAN) 10 MG tablet Take 1 tablet by mouth 3 times daily (with meals), Disp-10 tablet, R-0Print      levothyroxine (SYNTHROID) 150 MCG tablet TAKE 1 TABLET BY MOUTH  DAILY, Disp-90 tablet,R-3Requesting 1 year supplyNormal      ferrous sulfate (FE TABS) 325 (65 Fe) MG EC tablet Take 1 tablet by mouth daily (with breakfast), Disp-90 tablet, R-0Normal      cetirizine (ZYRTEC) 10 MG tablet Take 10 mg by mouth daily. Multiple Vitamin (THERA) TABS Take 1 tablet by mouth daily Historical Med      aspirin 81 MG chewable tablet Take 81 mg by mouth daily Historical Med       !! - Potential duplicate medications found. Please discuss with provider. Time Spent on discharge is more than 30 minutes in the examination, evaluation, counseling and review of medications and discharge plan. Signed:    Flaquita Smith MD   7/23/2021      Thank you Joan Kwan for the opportunity to be involved in this patient's care. If you have any questions or concerns please feel free to contact me at [phone].

## 2021-07-26 ENCOUNTER — TELEPHONE (OUTPATIENT)
Dept: SURGERY | Age: 81
End: 2021-07-26

## 2021-07-26 NOTE — TELEPHONE ENCOUNTER
Pt called stating he has been admitted to [facility] and to cancel his appt this Friday w/ Dr Miguel Angel Nicolas.

## 2021-07-26 NOTE — ED PROVIDER NOTES
Emergency Department Provider Note     Location: [facility] C2 CARD TELEMETRY  7/21/2021    I independently performed a history and physical on Maral Coello. All diagnostic, treatment, and disposition decisions were made by myself in conjunction with the mid-level provider. Briefly, this is a 79 y.o. male here for hypoglycemia     ED Triage Vitals [07/21/21 2304]   BP Temp Temp Source Pulse Resp SpO2 Height Weight   (!) 140/77 98.6 °F (37 °C) Oral 91 18 98 % 5' 10\" (1.778 m) 157 lb (71.2 kg)        Patient resting comfortably in no acute distress. Heart is regular rate and rhythm. Lungs clear to auscultation bilaterally. Abdomen is soft, nondistended, and nontender. No peripheral edema noted. Patient seen and examined. No results found.       Results for orders placed or performed during the hospital encounter of 07/21/21   CBC auto differential   Result Value Ref Range    WBC 5.5 4.0 - 11.0 K/uL    RBC 3.02 (L) 4.20 - 5.90 M/uL    Hemoglobin 10.2 (L) 13.5 - 17.5 g/dL    Hematocrit 30.0 (L) 40.5 - 52.5 %    MCV 99.4 80.0 - 100.0 fL    MCH 33.8 26.0 - 34.0 pg    MCHC 34.0 31.0 - 36.0 g/dL    RDW 20.1 (H) 12.4 - 15.4 %    Platelets 929 349 - 097 K/uL    MPV 6.4 5.0 - 10.5 fL    Neutrophils % 80.1 %    Lymphocytes % 11.3 %    Monocytes % 6.0 %    Eosinophils % 1.7 %    Basophils % 0.9 %    Neutrophils Absolute 4.4 1.7 - 7.7 K/uL    Lymphocytes Absolute 0.6 (L) 1.0 - 5.1 K/uL    Monocytes Absolute 0.3 0.0 - 1.3 K/uL    Eosinophils Absolute 0.1 0.0 - 0.6 K/uL    Basophils Absolute 0.1 0.0 - 0.2 K/uL   Comprehensive metabolic panel   Result Value Ref Range    Sodium 129 (L) 136 - 145 mmol/L    Potassium 4.1 3.5 - 5.1 mmol/L    Chloride 92 (L) 99 - 110 mmol/L    CO2 20 (L) 21 - 32 mmol/L    Anion Gap 17 (H) 3 - 16    Glucose 143 (H) 70 - 99 mg/dL    BUN 21 (H) 7 - 20 mg/dL    CREATININE 0.9 0.8 - 1.3 mg/dL    GFR Non-African American >60 >60    GFR African American >60 >60    Calcium 8.6 8.3 - 10.6 mg/dL Total Protein 6.4 6.4 - 8.2 g/dL    Albumin 3.5 3.4 - 5.0 g/dL    Albumin/Globulin Ratio 1.2 1.1 - 2.2    Total Bilirubin 0.4 0.0 - 1.0 mg/dL    Alkaline Phosphatase 93 40 - 129 U/L    ALT 16 10 - 40 U/L    AST 35 15 - 37 U/L    Globulin 2.9 g/dL   POCT Glucose   Result Value Ref Range    POC Glucose 129 (H) 70 - 99 mg/dl    Performed on ACCU-CHEK    POCT Glucose   Result Value Ref Range    POC Glucose 181 (H) 70 - 99 mg/dl    Performed on ACCU-CHEK    POCT Glucose   Result Value Ref Range    POC Glucose 41 (LL) 70 - 99 mg/dl    Performed on ACCU-CHEK    POCT Glucose   Result Value Ref Range    POC Glucose 117 (H) 70 - 99 mg/dl    Performed on ACCU-CHEK    POCT Glucose   Result Value Ref Range    POC Glucose 37 (LL) 70 - 99 mg/dl    Performed on ACCU-CHEK    POCT Glucose   Result Value Ref Range    POC Glucose 59 (L) 70 - 99 mg/dl    Performed on ACCU-CHEK    POCT Glucose   Result Value Ref Range    POC Glucose 96 70 - 99 mg/dl    Performed on ACCU-CHEK    POCT Glucose   Result Value Ref Range    POC Glucose 129 (H) 70 - 99 mg/dl    Performed on ACCU-CHEK    POCT Glucose   Result Value Ref Range    POC Glucose 129 (H) 70 - 99 mg/dl    Performed on ACCU-CHEK    POCT Glucose   Result Value Ref Range    POC Glucose 126 (H) 70 - 99 mg/dl    Performed on ACCU-CHEK    POCT Glucose   Result Value Ref Range    POC Glucose 150 (H) 70 - 99 mg/dl    Performed on ACCU-CHEK    POCT Glucose   Result Value Ref Range    POC Glucose 173 (H) 70 - 99 mg/dl    Performed on ACCU-CHEK    POCT Glucose   Result Value Ref Range    POC Glucose 183 (H) 70 - 99 mg/dl    Performed on ACCU-CHEK    POCT Glucose   Result Value Ref Range    POC Glucose 179 (H) 70 - 99 mg/dl    Performed on ACCU-CHEK    POCT Glucose   Result Value Ref Range    POC Glucose 179 (H) 70 - 99 mg/dl    Performed on ACCU-CHEK    POCT Glucose   Result Value Ref Range    POC Glucose 173 (H) 70 - 99 mg/dl    Performed on ACCU-CHEK    POCT Glucose   Result Value Ref Range POC Glucose 155 (H) 70 - 99 mg/dl    Performed on ACCU-CHEK    POCT Glucose   Result Value Ref Range    POC Glucose 196 (H) 70 - 99 mg/dl    Performed on ACCU-CHEK    POCT Glucose   Result Value Ref Range    POC Glucose 122 (H) 70 - 99 mg/dl    Performed on ACCU-CHEK        For further details of Vani Del Castillo emergency department encounter, please see Amaya's documentation. 1. Hypoglycemia          This chart was generated in part by using Dragon Dictation system and may contain errors related to that system including errors in grammar, punctuation, and spelling, as well as words and phrases that may be inappropriate. If there are any questions or concerns please feel free to contact the dictating provider for clarification.            Jonny Salguero MD  07/26/21 8998

## 2021-09-08 DIAGNOSIS — E03.9 ACQUIRED HYPOTHYROIDISM: ICD-10-CM

## 2021-09-09 RX ORDER — LEVOTHYROXINE SODIUM 0.15 MG/1
150 TABLET ORAL DAILY
Qty: 90 TABLET | Refills: 3 | OUTPATIENT
Start: 2021-09-09

## 2022-10-05 NOTE — CARE COORDINATION
CASE MANAGEMENT INITIAL ASSESSMENT      Reviewed chart and completed assessment via telephone with: Pt  Explained Case Management role/services. Health Care Decision Maker :   Primary Decision Maker: Remberto Luciano - [phone]    Secondary Decision Maker: Keysha Cortés - [phone]        Admit date/status: inpt 7/4/21  Diagnosis: Hyponatremia    Is this a Readmission?:  Yes      Insurance: [facility] Medicare    Precert required for SNF: Yes       3 night stay required: No    Living arrangements, Adls, care needs, prior to admission: Pt lives at home with his spouse, and is independent. Transportation: Pt to arrange     Durable Medical Equipment at home:  Walker_x_Cane_x_RTS__ BSC__Shower Chair_x_  02__ HHN__ CPAP__  BiPap__  Hospital Bed__ W/C___ Other__________    Services in the home and/or outpatient, prior to admission: Pt is active with [facility]. Elsie called to verify. PT/OT recs: N/A    Hospital Exemption Notification (HEN): N/A    Barriers to discharge: N/A    Plan/comments: SW spoke with pt on this day and he plans to return home at discharge. He is active with therapy per his report but would like to add nursing. Elsie left VM with Yahir with [facility] to verify and ask for nursing as well.       ECOC on chart for MD signature Adult

## 2024-02-05 ENCOUNTER — HOSPITAL ENCOUNTER (OUTPATIENT)
Dept: GENERAL RADIOLOGY | Age: 84
Discharge: HOME | End: 2024-02-05
Attending: INTERNAL MEDICINE
Payer: MEDICARE

## 2024-02-05 DIAGNOSIS — R13.19 ESOPHAGEAL DYSPHAGIA: ICD-10-CM

## 2024-02-05 PROCEDURE — 74220 X-RAY XM ESOPHAGUS 1CNTRST: CPT

## 2024-07-08 ENCOUNTER — HOSPITAL ENCOUNTER (OUTPATIENT)
Dept: CARDIAC REHAB | Age: 84
Setting detail: THERAPIES SERIES
Discharge: HOME | End: 2024-07-08

## 2024-07-15 ENCOUNTER — HOSPITAL ENCOUNTER (OUTPATIENT)
Dept: CARDIAC REHAB | Age: 84
Setting detail: THERAPIES SERIES
Discharge: HOME | End: 2024-07-15
Payer: MEDICARE

## 2024-07-15 PROCEDURE — 93798 PHYS/QHP OP CAR RHAB W/ECG: CPT

## 2024-07-17 ENCOUNTER — HOSPITAL ENCOUNTER (OUTPATIENT)
Dept: CARDIAC REHAB | Age: 84
Setting detail: THERAPIES SERIES
Discharge: HOME | End: 2024-07-17
Payer: MEDICARE

## 2024-07-17 PROCEDURE — 93798 PHYS/QHP OP CAR RHAB W/ECG: CPT

## 2024-07-19 ENCOUNTER — HOSPITAL ENCOUNTER (OUTPATIENT)
Dept: CARDIAC REHAB | Age: 84
Setting detail: THERAPIES SERIES
Discharge: HOME | End: 2024-07-19
Payer: MEDICARE

## 2024-07-19 PROCEDURE — 93798 PHYS/QHP OP CAR RHAB W/ECG: CPT

## 2024-07-22 ENCOUNTER — HOSPITAL ENCOUNTER (OUTPATIENT)
Dept: CARDIAC REHAB | Age: 84
Setting detail: THERAPIES SERIES
Discharge: HOME | End: 2024-07-22
Payer: MEDICARE

## 2024-07-22 PROCEDURE — 93798 PHYS/QHP OP CAR RHAB W/ECG: CPT

## 2024-07-24 ENCOUNTER — HOSPITAL ENCOUNTER (OUTPATIENT)
Dept: CARDIAC REHAB | Age: 84
Setting detail: THERAPIES SERIES
End: 2024-07-24
Payer: MEDICARE

## 2024-07-26 ENCOUNTER — HOSPITAL ENCOUNTER (OUTPATIENT)
Dept: CARDIAC REHAB | Age: 84
Setting detail: THERAPIES SERIES
Discharge: HOME | End: 2024-07-26
Payer: MEDICARE

## 2024-07-26 PROCEDURE — 93798 PHYS/QHP OP CAR RHAB W/ECG: CPT

## 2024-07-29 ENCOUNTER — HOSPITAL ENCOUNTER (OUTPATIENT)
Dept: CARDIAC REHAB | Age: 84
Setting detail: THERAPIES SERIES
Discharge: HOME | End: 2024-07-29
Payer: MEDICARE

## 2024-07-29 PROCEDURE — 93798 PHYS/QHP OP CAR RHAB W/ECG: CPT

## 2024-07-31 ENCOUNTER — HOSPITAL ENCOUNTER (OUTPATIENT)
Dept: CARDIAC REHAB | Age: 84
Setting detail: THERAPIES SERIES
Discharge: HOME | End: 2024-07-31
Payer: MEDICARE

## 2024-07-31 PROCEDURE — 93798 PHYS/QHP OP CAR RHAB W/ECG: CPT

## 2024-08-02 ENCOUNTER — HOSPITAL ENCOUNTER (OUTPATIENT)
Dept: CARDIAC REHAB | Age: 84
Setting detail: THERAPIES SERIES
Discharge: HOME | End: 2024-08-02
Payer: MEDICARE

## 2024-08-02 PROCEDURE — 93798 PHYS/QHP OP CAR RHAB W/ECG: CPT

## 2024-08-05 ENCOUNTER — HOSPITAL ENCOUNTER (OUTPATIENT)
Dept: CARDIAC REHAB | Age: 84
Setting detail: THERAPIES SERIES
End: 2024-08-05
Payer: MEDICARE

## 2024-08-07 ENCOUNTER — HOSPITAL ENCOUNTER (OUTPATIENT)
Dept: CARDIAC REHAB | Age: 84
Setting detail: THERAPIES SERIES
Discharge: HOME | End: 2024-08-07
Payer: MEDICARE

## 2024-08-07 PROCEDURE — 93798 PHYS/QHP OP CAR RHAB W/ECG: CPT

## 2024-08-09 ENCOUNTER — HOSPITAL ENCOUNTER (OUTPATIENT)
Dept: CARDIAC REHAB | Age: 84
Setting detail: THERAPIES SERIES
End: 2024-08-09
Payer: MEDICARE

## 2024-08-12 ENCOUNTER — HOSPITAL ENCOUNTER (OUTPATIENT)
Dept: CARDIAC REHAB | Age: 84
Setting detail: THERAPIES SERIES
Discharge: HOME | End: 2024-08-12
Payer: MEDICARE

## 2024-08-12 PROCEDURE — 93798 PHYS/QHP OP CAR RHAB W/ECG: CPT

## 2024-08-14 ENCOUNTER — HOSPITAL ENCOUNTER (OUTPATIENT)
Dept: CARDIAC REHAB | Age: 84
Setting detail: THERAPIES SERIES
Discharge: HOME | End: 2024-08-14
Payer: MEDICARE

## 2024-08-14 PROCEDURE — 93798 PHYS/QHP OP CAR RHAB W/ECG: CPT

## 2024-08-16 ENCOUNTER — HOSPITAL ENCOUNTER (OUTPATIENT)
Dept: CARDIAC REHAB | Age: 84
Setting detail: THERAPIES SERIES
Discharge: HOME | End: 2024-08-16
Payer: MEDICARE

## 2024-08-16 PROCEDURE — 93798 PHYS/QHP OP CAR RHAB W/ECG: CPT

## 2024-08-19 ENCOUNTER — HOSPITAL ENCOUNTER (OUTPATIENT)
Dept: CARDIAC REHAB | Age: 84
Setting detail: THERAPIES SERIES
Discharge: HOME | End: 2024-08-19
Payer: MEDICARE

## 2024-08-19 PROCEDURE — 93798 PHYS/QHP OP CAR RHAB W/ECG: CPT

## 2024-08-21 ENCOUNTER — HOSPITAL ENCOUNTER (OUTPATIENT)
Dept: CARDIAC REHAB | Age: 84
Setting detail: THERAPIES SERIES
Discharge: HOME | End: 2024-08-21
Payer: MEDICARE

## 2024-08-21 PROCEDURE — 93798 PHYS/QHP OP CAR RHAB W/ECG: CPT

## 2024-08-23 ENCOUNTER — APPOINTMENT (OUTPATIENT)
Dept: CARDIAC REHAB | Age: 84
End: 2024-08-23
Payer: MEDICARE

## 2024-08-26 ENCOUNTER — APPOINTMENT (OUTPATIENT)
Dept: CARDIAC REHAB | Age: 84
End: 2024-08-26
Payer: MEDICARE

## 2024-08-28 ENCOUNTER — APPOINTMENT (OUTPATIENT)
Dept: CARDIAC REHAB | Age: 84
End: 2024-08-28
Payer: MEDICARE

## 2024-08-30 ENCOUNTER — APPOINTMENT (OUTPATIENT)
Dept: CARDIAC REHAB | Age: 84
End: 2024-08-30
Payer: MEDICARE

## 2024-09-04 ENCOUNTER — HOSPITAL ENCOUNTER (OUTPATIENT)
Dept: CARDIAC REHAB | Age: 84
Setting detail: THERAPIES SERIES
End: 2024-09-04
Payer: MEDICARE

## 2024-09-09 ENCOUNTER — HOSPITAL ENCOUNTER (OUTPATIENT)
Dept: CARDIAC REHAB | Age: 84
Setting detail: THERAPIES SERIES
Discharge: HOME | End: 2024-09-09
Payer: MEDICARE

## 2024-09-09 PROCEDURE — 93798 PHYS/QHP OP CAR RHAB W/ECG: CPT

## 2024-09-11 ENCOUNTER — HOSPITAL ENCOUNTER (OUTPATIENT)
Dept: CARDIAC REHAB | Age: 84
Setting detail: THERAPIES SERIES
Discharge: HOME | End: 2024-09-11
Payer: MEDICARE

## 2024-09-11 PROCEDURE — 93798 PHYS/QHP OP CAR RHAB W/ECG: CPT

## 2024-09-13 ENCOUNTER — HOSPITAL ENCOUNTER (OUTPATIENT)
Dept: CARDIAC REHAB | Age: 84
Setting detail: THERAPIES SERIES
Discharge: HOME | End: 2024-09-13
Payer: MEDICARE

## 2024-09-13 PROCEDURE — 93798 PHYS/QHP OP CAR RHAB W/ECG: CPT

## 2024-09-16 ENCOUNTER — HOSPITAL ENCOUNTER (OUTPATIENT)
Dept: CARDIAC REHAB | Age: 84
Setting detail: THERAPIES SERIES
Discharge: HOME | End: 2024-09-16
Payer: MEDICARE

## 2024-09-16 PROCEDURE — 93798 PHYS/QHP OP CAR RHAB W/ECG: CPT

## 2024-09-18 ENCOUNTER — HOSPITAL ENCOUNTER (OUTPATIENT)
Dept: CARDIAC REHAB | Age: 84
Setting detail: THERAPIES SERIES
Discharge: HOME | End: 2024-09-18
Payer: MEDICARE

## 2024-09-18 PROCEDURE — 93798 PHYS/QHP OP CAR RHAB W/ECG: CPT

## 2024-09-20 ENCOUNTER — HOSPITAL ENCOUNTER (OUTPATIENT)
Dept: CARDIAC REHAB | Age: 84
Setting detail: THERAPIES SERIES
Discharge: HOME | End: 2024-09-20
Payer: MEDICARE

## 2024-09-20 PROCEDURE — 93798 PHYS/QHP OP CAR RHAB W/ECG: CPT

## 2024-09-23 ENCOUNTER — HOSPITAL ENCOUNTER (OUTPATIENT)
Dept: CARDIAC REHAB | Age: 84
Setting detail: THERAPIES SERIES
Discharge: HOME | End: 2024-09-23
Payer: MEDICARE

## 2024-09-23 PROCEDURE — 93798 PHYS/QHP OP CAR RHAB W/ECG: CPT

## 2024-09-25 ENCOUNTER — HOSPITAL ENCOUNTER (OUTPATIENT)
Dept: CARDIAC REHAB | Age: 84
Setting detail: THERAPIES SERIES
Discharge: HOME | End: 2024-09-25
Payer: MEDICARE

## 2024-09-25 PROCEDURE — 93798 PHYS/QHP OP CAR RHAB W/ECG: CPT

## 2024-09-30 ENCOUNTER — HOSPITAL ENCOUNTER (OUTPATIENT)
Dept: CARDIAC REHAB | Age: 84
Setting detail: THERAPIES SERIES
Discharge: HOME | End: 2024-09-30
Payer: MEDICARE

## 2024-09-30 PROCEDURE — 93798 PHYS/QHP OP CAR RHAB W/ECG: CPT

## 2024-10-02 ENCOUNTER — HOSPITAL ENCOUNTER (OUTPATIENT)
Dept: CARDIAC REHAB | Age: 84
Setting detail: THERAPIES SERIES
Discharge: HOME | End: 2024-10-02
Payer: MEDICARE

## 2024-10-02 PROCEDURE — 93798 PHYS/QHP OP CAR RHAB W/ECG: CPT

## 2024-10-04 ENCOUNTER — HOSPITAL ENCOUNTER (OUTPATIENT)
Dept: CARDIAC REHAB | Age: 84
Setting detail: THERAPIES SERIES
Discharge: HOME | End: 2024-10-04
Payer: MEDICARE

## 2024-10-04 PROCEDURE — 93798 PHYS/QHP OP CAR RHAB W/ECG: CPT

## 2024-10-07 ENCOUNTER — HOSPITAL ENCOUNTER (OUTPATIENT)
Dept: CARDIAC REHAB | Age: 84
Setting detail: THERAPIES SERIES
Discharge: HOME | End: 2024-10-07
Payer: MEDICARE

## 2024-10-07 PROCEDURE — 93798 PHYS/QHP OP CAR RHAB W/ECG: CPT

## 2024-10-09 ENCOUNTER — HOSPITAL ENCOUNTER (OUTPATIENT)
Dept: CARDIAC REHAB | Age: 84
Setting detail: THERAPIES SERIES
Discharge: HOME | End: 2024-10-09
Payer: MEDICARE

## 2024-10-09 PROCEDURE — 93798 PHYS/QHP OP CAR RHAB W/ECG: CPT

## 2024-10-11 ENCOUNTER — HOSPITAL ENCOUNTER (OUTPATIENT)
Dept: CARDIAC REHAB | Age: 84
Setting detail: THERAPIES SERIES
Discharge: HOME | End: 2024-10-11
Payer: MEDICARE

## 2024-10-11 PROCEDURE — 93798 PHYS/QHP OP CAR RHAB W/ECG: CPT

## 2024-10-14 ENCOUNTER — HOSPITAL ENCOUNTER (OUTPATIENT)
Dept: CARDIAC REHAB | Age: 84
Setting detail: THERAPIES SERIES
Discharge: HOME | End: 2024-10-14
Payer: MEDICARE

## 2024-10-14 PROCEDURE — 93798 PHYS/QHP OP CAR RHAB W/ECG: CPT

## 2024-10-16 ENCOUNTER — HOSPITAL ENCOUNTER (OUTPATIENT)
Dept: CARDIAC REHAB | Age: 84
Setting detail: THERAPIES SERIES
Discharge: HOME | End: 2024-10-16
Payer: MEDICARE

## 2024-10-16 PROCEDURE — 93798 PHYS/QHP OP CAR RHAB W/ECG: CPT

## 2024-10-18 ENCOUNTER — HOSPITAL ENCOUNTER (OUTPATIENT)
Dept: CARDIAC REHAB | Age: 84
Setting detail: THERAPIES SERIES
Discharge: HOME | End: 2024-10-18
Payer: MEDICARE

## 2024-10-18 PROCEDURE — 93798 PHYS/QHP OP CAR RHAB W/ECG: CPT

## 2024-10-21 ENCOUNTER — HOSPITAL ENCOUNTER (OUTPATIENT)
Dept: CARDIAC REHAB | Age: 84
Setting detail: THERAPIES SERIES
Discharge: HOME | End: 2024-10-21
Payer: MEDICARE

## 2024-10-21 PROCEDURE — 93798 PHYS/QHP OP CAR RHAB W/ECG: CPT

## 2024-10-23 ENCOUNTER — HOSPITAL ENCOUNTER (OUTPATIENT)
Dept: CARDIAC REHAB | Age: 84
Setting detail: THERAPIES SERIES
Discharge: HOME | End: 2024-10-23
Payer: MEDICARE

## 2024-10-23 PROCEDURE — 93798 PHYS/QHP OP CAR RHAB W/ECG: CPT

## 2024-10-25 ENCOUNTER — HOSPITAL ENCOUNTER (OUTPATIENT)
Dept: CARDIAC REHAB | Age: 84
Setting detail: THERAPIES SERIES
Discharge: HOME | End: 2024-10-25
Payer: MEDICARE

## 2024-10-25 PROCEDURE — 93798 PHYS/QHP OP CAR RHAB W/ECG: CPT

## 2024-10-28 ENCOUNTER — HOSPITAL ENCOUNTER (OUTPATIENT)
Dept: CARDIAC REHAB | Age: 84
Setting detail: THERAPIES SERIES
Discharge: HOME | End: 2024-10-28
Payer: MEDICARE

## 2024-10-28 PROCEDURE — 93798 PHYS/QHP OP CAR RHAB W/ECG: CPT

## 2024-10-30 ENCOUNTER — HOSPITAL ENCOUNTER (OUTPATIENT)
Dept: CARDIAC REHAB | Age: 84
Setting detail: THERAPIES SERIES
Discharge: HOME | End: 2024-10-30
Payer: MEDICARE

## 2024-10-30 PROCEDURE — 93798 PHYS/QHP OP CAR RHAB W/ECG: CPT

## 2025-01-31 DIAGNOSIS — M81.0 SENILE OSTEOPOROSIS: Primary | ICD-10-CM

## 2025-01-31 RX ORDER — ZOLEDRONIC ACID 0.05 MG/ML
5 INJECTION, SOLUTION INTRAVENOUS ONCE
Status: CANCELLED | OUTPATIENT
Start: 2025-01-31 | End: 2025-01-31

## 2025-03-23 ENCOUNTER — HOSPITAL ENCOUNTER (INPATIENT)
Age: 85
LOS: 8 days | Discharge: NURSING, REHAB OR LONG-TERM CARE | DRG: 291 | End: 2025-03-31
Attending: STUDENT IN AN ORGANIZED HEALTH CARE EDUCATION/TRAINING PROGRAM
Payer: MEDICARE

## 2025-03-23 ENCOUNTER — APPOINTMENT (OUTPATIENT)
Dept: GENERAL RADIOLOGY | Age: 85
DRG: 291 | End: 2025-03-23
Payer: MEDICARE

## 2025-03-23 DIAGNOSIS — I50.9 CONGESTIVE HEART FAILURE, UNSPECIFIED HF CHRONICITY, UNSPECIFIED HEART FAILURE TYPE (HCC): ICD-10-CM

## 2025-03-23 DIAGNOSIS — R79.89 ELEVATED TROPONIN: ICD-10-CM

## 2025-03-23 DIAGNOSIS — M54.50 CHRONIC MIDLINE LOW BACK PAIN WITHOUT SCIATICA: ICD-10-CM

## 2025-03-23 DIAGNOSIS — M79.89 LEG SWELLING: ICD-10-CM

## 2025-03-23 DIAGNOSIS — E83.41 HYPERMAGNESEMIA: ICD-10-CM

## 2025-03-23 DIAGNOSIS — I50.9 ACUTE ON CHRONIC CONGESTIVE HEART FAILURE, UNSPECIFIED HEART FAILURE TYPE (HCC): Primary | ICD-10-CM

## 2025-03-23 DIAGNOSIS — G89.29 CHRONIC MIDLINE LOW BACK PAIN WITHOUT SCIATICA: ICD-10-CM

## 2025-03-23 DIAGNOSIS — D64.9 NORMOCYTIC ANEMIA: ICD-10-CM

## 2025-03-23 DIAGNOSIS — R52 PAIN: ICD-10-CM

## 2025-03-23 LAB
ALBUMIN SERPL-MCNC: 3.5 G/DL (ref 3.4–5)
ALBUMIN/GLOB SERPL: 1.2 {RATIO} (ref 1.1–2.2)
ALP SERPL-CCNC: 120 U/L (ref 40–129)
ALT SERPL-CCNC: 35 U/L (ref 10–40)
ANION GAP SERPL CALCULATED.3IONS-SCNC: 10 MMOL/L (ref 3–16)
AST SERPL-CCNC: 30 U/L (ref 15–37)
BASOPHILS # BLD: 0 K/UL (ref 0–0.2)
BASOPHILS NFR BLD: 0.5 %
BILIRUB SERPL-MCNC: 0.6 MG/DL (ref 0–1)
BUN SERPL-MCNC: 30 MG/DL (ref 7–20)
CALCIUM SERPL-MCNC: 8.9 MG/DL (ref 8.3–10.6)
CHLORIDE SERPL-SCNC: 95 MMOL/L (ref 99–110)
CO2 SERPL-SCNC: 31 MMOL/L (ref 21–32)
CREAT SERPL-MCNC: 0.8 MG/DL (ref 0.8–1.3)
DEPRECATED RDW RBC AUTO: 18.1 % (ref 12.4–15.4)
EKG ATRIAL RATE: 82 BPM
EKG DIAGNOSIS: NORMAL
EKG P AXIS: 22 DEGREES
EKG P-R INTERVAL: 106 MS
EKG Q-T INTERVAL: 412 MS
EKG QRS DURATION: 142 MS
EKG QTC CALCULATION (BAZETT): 481 MS
EKG R AXIS: -82 DEGREES
EKG T AXIS: 67 DEGREES
EKG VENTRICULAR RATE: 82 BPM
EOSINOPHIL # BLD: 0.1 K/UL (ref 0–0.6)
EOSINOPHIL NFR BLD: 1 %
GFR SERPLBLD CREATININE-BSD FMLA CKD-EPI: 87 ML/MIN/{1.73_M2}
GLUCOSE BLD-MCNC: 134 MG/DL (ref 70–99)
GLUCOSE BLD-MCNC: 88 MG/DL (ref 70–99)
GLUCOSE SERPL-MCNC: 99 MG/DL (ref 70–99)
HCT VFR BLD AUTO: 34.5 % (ref 40.5–52.5)
HGB BLD-MCNC: 11.6 G/DL (ref 13.5–17.5)
LYMPHOCYTES # BLD: 0.8 K/UL (ref 1–5.1)
LYMPHOCYTES NFR BLD: 8.9 %
MAGNESIUM SERPL-MCNC: 2.49 MG/DL (ref 1.8–2.4)
MCH RBC QN AUTO: 32.8 PG (ref 26–34)
MCHC RBC AUTO-ENTMCNC: 33.7 G/DL (ref 31–36)
MCV RBC AUTO: 97.3 FL (ref 80–100)
MONOCYTES # BLD: 0.4 K/UL (ref 0–1.3)
MONOCYTES NFR BLD: 4.9 %
NEUTROPHILS # BLD: 7.5 K/UL (ref 1.7–7.7)
NEUTROPHILS NFR BLD: 84.7 %
NT-PROBNP SERPL-MCNC: 8937 PG/ML (ref 0–449)
PERFORMED ON: ABNORMAL
PERFORMED ON: NORMAL
PLATELET # BLD AUTO: 186 K/UL (ref 135–450)
PMV BLD AUTO: 7.8 FL (ref 5–10.5)
POTASSIUM SERPL-SCNC: 3.7 MMOL/L (ref 3.5–5.1)
PROT SERPL-MCNC: 6.5 G/DL (ref 6.4–8.2)
RBC # BLD AUTO: 3.55 M/UL (ref 4.2–5.9)
SODIUM SERPL-SCNC: 136 MMOL/L (ref 136–145)
TROPONIN, HIGH SENSITIVITY: 108 NG/L (ref 0–22)
TROPONIN, HIGH SENSITIVITY: 112 NG/L (ref 0–22)
WBC # BLD AUTO: 8.8 K/UL (ref 4–11)

## 2025-03-23 PROCEDURE — 83735 ASSAY OF MAGNESIUM: CPT

## 2025-03-23 PROCEDURE — 36415 COLL VENOUS BLD VENIPUNCTURE: CPT

## 2025-03-23 PROCEDURE — 93010 ELECTROCARDIOGRAM REPORT: CPT | Performed by: STUDENT IN AN ORGANIZED HEALTH CARE EDUCATION/TRAINING PROGRAM

## 2025-03-23 PROCEDURE — 93005 ELECTROCARDIOGRAM TRACING: CPT | Performed by: STUDENT IN AN ORGANIZED HEALTH CARE EDUCATION/TRAINING PROGRAM

## 2025-03-23 PROCEDURE — 71045 X-RAY EXAM CHEST 1 VIEW: CPT

## 2025-03-23 PROCEDURE — 85025 COMPLETE CBC W/AUTO DIFF WBC: CPT

## 2025-03-23 PROCEDURE — 6360000002 HC RX W HCPCS: Performed by: STUDENT IN AN ORGANIZED HEALTH CARE EDUCATION/TRAINING PROGRAM

## 2025-03-23 PROCEDURE — 6360000002 HC RX W HCPCS

## 2025-03-23 PROCEDURE — 1200000000 HC SEMI PRIVATE

## 2025-03-23 PROCEDURE — 99285 EMERGENCY DEPT VISIT HI MDM: CPT

## 2025-03-23 PROCEDURE — 6370000000 HC RX 637 (ALT 250 FOR IP)

## 2025-03-23 PROCEDURE — 80053 COMPREHEN METABOLIC PANEL: CPT

## 2025-03-23 PROCEDURE — 84484 ASSAY OF TROPONIN QUANT: CPT

## 2025-03-23 PROCEDURE — 83880 ASSAY OF NATRIURETIC PEPTIDE: CPT

## 2025-03-23 PROCEDURE — 2500000003 HC RX 250 WO HCPCS

## 2025-03-23 PROCEDURE — 6370000000 HC RX 637 (ALT 250 FOR IP): Performed by: INTERNAL MEDICINE

## 2025-03-23 RX ORDER — ACETAMINOPHEN 650 MG/1
650 SUPPOSITORY RECTAL EVERY 6 HOURS PRN
Status: DISCONTINUED | OUTPATIENT
Start: 2025-03-23 | End: 2025-03-31 | Stop reason: HOSPADM

## 2025-03-23 RX ORDER — LACTULOSE 10 G/15ML
30 SOLUTION ORAL 3 TIMES DAILY
COMMUNITY

## 2025-03-23 RX ORDER — ACARBOSE 50 MG/1
25 TABLET ORAL
Status: DISCONTINUED | OUTPATIENT
Start: 2025-03-23 | End: 2025-03-23

## 2025-03-23 RX ORDER — SODIUM CHLORIDE 9 MG/ML
INJECTION, SOLUTION INTRAVENOUS PRN
Status: DISCONTINUED | OUTPATIENT
Start: 2025-03-23 | End: 2025-03-31 | Stop reason: HOSPADM

## 2025-03-23 RX ORDER — M-VIT,TX,IRON,MINS/CALC/FOLIC 27MG-0.4MG
1 TABLET ORAL DAILY
Status: DISCONTINUED | OUTPATIENT
Start: 2025-03-23 | End: 2025-03-31 | Stop reason: HOSPADM

## 2025-03-23 RX ORDER — FUROSEMIDE 10 MG/ML
40 INJECTION INTRAMUSCULAR; INTRAVENOUS DAILY
Status: DISCONTINUED | OUTPATIENT
Start: 2025-03-23 | End: 2025-03-25

## 2025-03-23 RX ORDER — ASPIRIN 81 MG/1
81 TABLET, CHEWABLE ORAL DAILY
Status: DISCONTINUED | OUTPATIENT
Start: 2025-03-23 | End: 2025-03-31 | Stop reason: HOSPADM

## 2025-03-23 RX ORDER — ONDANSETRON 4 MG/1
4 TABLET, ORALLY DISINTEGRATING ORAL EVERY 8 HOURS PRN
Status: DISCONTINUED | OUTPATIENT
Start: 2025-03-23 | End: 2025-03-31 | Stop reason: HOSPADM

## 2025-03-23 RX ORDER — INSULIN LISPRO 100 [IU]/ML
0-4 INJECTION, SOLUTION INTRAVENOUS; SUBCUTANEOUS
Status: DISCONTINUED | OUTPATIENT
Start: 2025-03-23 | End: 2025-03-31 | Stop reason: HOSPADM

## 2025-03-23 RX ORDER — PREDNISONE 5 MG/1
5 TABLET ORAL 2 TIMES DAILY
Status: DISCONTINUED | OUTPATIENT
Start: 2025-03-23 | End: 2025-03-31 | Stop reason: HOSPADM

## 2025-03-23 RX ORDER — SODIUM CHLORIDE 0.9 % (FLUSH) 0.9 %
5-40 SYRINGE (ML) INJECTION PRN
Status: DISCONTINUED | OUTPATIENT
Start: 2025-03-23 | End: 2025-03-31 | Stop reason: HOSPADM

## 2025-03-23 RX ORDER — PANTOPRAZOLE SODIUM 40 MG/1
40 TABLET, DELAYED RELEASE ORAL
Status: DISCONTINUED | OUTPATIENT
Start: 2025-03-24 | End: 2025-03-31 | Stop reason: HOSPADM

## 2025-03-23 RX ORDER — GLUCAGON 1 MG/ML
1 KIT INJECTION PRN
Status: DISCONTINUED | OUTPATIENT
Start: 2025-03-23 | End: 2025-03-31 | Stop reason: HOSPADM

## 2025-03-23 RX ORDER — POTASSIUM CHLORIDE 1500 MG/1
40 TABLET, EXTENDED RELEASE ORAL ONCE
Status: COMPLETED | OUTPATIENT
Start: 2025-03-23 | End: 2025-03-23

## 2025-03-23 RX ORDER — POTASSIUM CHLORIDE 7.45 MG/ML
10 INJECTION INTRAVENOUS PRN
Status: DISCONTINUED | OUTPATIENT
Start: 2025-03-23 | End: 2025-03-31 | Stop reason: HOSPADM

## 2025-03-23 RX ORDER — MAGNESIUM SULFATE IN WATER 40 MG/ML
2000 INJECTION, SOLUTION INTRAVENOUS PRN
Status: DISCONTINUED | OUTPATIENT
Start: 2025-03-23 | End: 2025-03-31 | Stop reason: HOSPADM

## 2025-03-23 RX ORDER — ONDANSETRON 2 MG/ML
4 INJECTION INTRAMUSCULAR; INTRAVENOUS EVERY 6 HOURS PRN
Status: DISCONTINUED | OUTPATIENT
Start: 2025-03-23 | End: 2025-03-31 | Stop reason: HOSPADM

## 2025-03-23 RX ORDER — ENOXAPARIN SODIUM 100 MG/ML
40 INJECTION SUBCUTANEOUS DAILY
Status: DISCONTINUED | OUTPATIENT
Start: 2025-03-23 | End: 2025-03-31 | Stop reason: HOSPADM

## 2025-03-23 RX ORDER — POTASSIUM CHLORIDE 1500 MG/1
40 TABLET, EXTENDED RELEASE ORAL PRN
Status: DISCONTINUED | OUTPATIENT
Start: 2025-03-23 | End: 2025-03-31 | Stop reason: HOSPADM

## 2025-03-23 RX ORDER — ACARBOSE 50 MG/1
100 TABLET ORAL
Status: DISCONTINUED | OUTPATIENT
Start: 2025-03-23 | End: 2025-03-31 | Stop reason: HOSPADM

## 2025-03-23 RX ORDER — POLYETHYLENE GLYCOL 3350 17 G/17G
17 POWDER, FOR SOLUTION ORAL DAILY PRN
Status: DISCONTINUED | OUTPATIENT
Start: 2025-03-23 | End: 2025-03-31 | Stop reason: HOSPADM

## 2025-03-23 RX ORDER — SACUBITRIL AND VALSARTAN 24; 26 MG/1; MG/1
1 TABLET, FILM COATED ORAL 2 TIMES DAILY
Status: ON HOLD | COMMUNITY
End: 2025-03-29 | Stop reason: HOSPADM

## 2025-03-23 RX ORDER — LEVOTHYROXINE SODIUM 125 UG/1
125 TABLET ORAL DAILY
Status: DISCONTINUED | OUTPATIENT
Start: 2025-03-23 | End: 2025-03-31 | Stop reason: HOSPADM

## 2025-03-23 RX ORDER — PREDNISONE 5 MG/1
5 TABLET ORAL 2 TIMES DAILY
COMMUNITY

## 2025-03-23 RX ORDER — PREDNISONE 5 MG/1
5 TABLET ORAL 2 TIMES DAILY
Status: DISCONTINUED | OUTPATIENT
Start: 2025-03-23 | End: 2025-03-23

## 2025-03-23 RX ORDER — ATORVASTATIN CALCIUM 40 MG/1
80 TABLET, FILM COATED ORAL NIGHTLY
Status: DISCONTINUED | OUTPATIENT
Start: 2025-03-23 | End: 2025-03-24

## 2025-03-23 RX ORDER — ACARBOSE 25 MG/1
25 TABLET ORAL
COMMUNITY

## 2025-03-23 RX ORDER — OXYCODONE AND ACETAMINOPHEN 5; 325 MG/1; MG/1
1 TABLET ORAL ONCE
Refills: 0 | Status: DISCONTINUED | OUTPATIENT
Start: 2025-03-23 | End: 2025-03-25

## 2025-03-23 RX ORDER — SODIUM CHLORIDE 0.9 % (FLUSH) 0.9 %
5-40 SYRINGE (ML) INJECTION EVERY 12 HOURS SCHEDULED
Status: DISCONTINUED | OUTPATIENT
Start: 2025-03-23 | End: 2025-03-31 | Stop reason: HOSPADM

## 2025-03-23 RX ORDER — CLOPIDOGREL BISULFATE 75 MG/1
75 TABLET ORAL DAILY
Status: ON HOLD | COMMUNITY
End: 2025-03-29 | Stop reason: HOSPADM

## 2025-03-23 RX ORDER — DEXTROSE MONOHYDRATE 100 MG/ML
INJECTION, SOLUTION INTRAVENOUS CONTINUOUS PRN
Status: DISCONTINUED | OUTPATIENT
Start: 2025-03-23 | End: 2025-03-31 | Stop reason: HOSPADM

## 2025-03-23 RX ORDER — ACETAMINOPHEN 325 MG/1
650 TABLET ORAL EVERY 6 HOURS PRN
Status: DISCONTINUED | OUTPATIENT
Start: 2025-03-23 | End: 2025-03-31 | Stop reason: HOSPADM

## 2025-03-23 RX ORDER — LACTULOSE 10 G/15ML
20 SOLUTION ORAL 3 TIMES DAILY
Status: DISCONTINUED | OUTPATIENT
Start: 2025-03-23 | End: 2025-03-31 | Stop reason: HOSPADM

## 2025-03-23 RX ORDER — FUROSEMIDE 10 MG/ML
40 INJECTION INTRAMUSCULAR; INTRAVENOUS ONCE
Status: COMPLETED | OUTPATIENT
Start: 2025-03-23 | End: 2025-03-23

## 2025-03-23 RX ADMIN — LACTULOSE 20 G: 20 SOLUTION ORAL at 14:01

## 2025-03-23 RX ADMIN — ACARBOSE 100 MG: 50 TABLET ORAL at 18:23

## 2025-03-23 RX ADMIN — LACTULOSE 20 G: 20 SOLUTION ORAL at 21:28

## 2025-03-23 RX ADMIN — ASPIRIN 81 MG: 81 TABLET, CHEWABLE ORAL at 14:01

## 2025-03-23 RX ADMIN — ENOXAPARIN SODIUM 40 MG: 100 INJECTION SUBCUTANEOUS at 14:01

## 2025-03-23 RX ADMIN — POTASSIUM CHLORIDE 40 MEQ: 1500 TABLET, EXTENDED RELEASE ORAL at 14:01

## 2025-03-23 RX ADMIN — Medication 1 TABLET: at 14:01

## 2025-03-23 RX ADMIN — ATORVASTATIN CALCIUM 80 MG: 40 TABLET, FILM COATED ORAL at 21:28

## 2025-03-23 RX ADMIN — SODIUM CHLORIDE, PRESERVATIVE FREE 10 ML: 5 INJECTION INTRAVENOUS at 21:28

## 2025-03-23 RX ADMIN — FUROSEMIDE 40 MG: 10 INJECTION, SOLUTION INTRAMUSCULAR; INTRAVENOUS at 12:51

## 2025-03-23 RX ADMIN — PREDNISONE 5 MG: 5 TABLET ORAL at 18:23

## 2025-03-23 ASSESSMENT — PAIN SCALES - GENERAL
PAINLEVEL_OUTOF10: 0
PAINLEVEL_OUTOF10: 10

## 2025-03-23 ASSESSMENT — PAIN - FUNCTIONAL ASSESSMENT: PAIN_FUNCTIONAL_ASSESSMENT: 0-10

## 2025-03-23 ASSESSMENT — PAIN DESCRIPTION - LOCATION: LOCATION: BACK

## 2025-03-23 ASSESSMENT — LIFESTYLE VARIABLES
HOW OFTEN DO YOU HAVE A DRINK CONTAINING ALCOHOL: NEVER
HOW MANY STANDARD DRINKS CONTAINING ALCOHOL DO YOU HAVE ON A TYPICAL DAY: PATIENT DOES NOT DRINK

## 2025-03-23 ASSESSMENT — PAIN DESCRIPTION - ORIENTATION: ORIENTATION: LOWER

## 2025-03-23 NOTE — ED NOTES
Reza Blanco is a 84 y.o. male admitted for  Principal Problem:    CHF (congestive heart failure), NYHA class III, unspecified failure chronicity, unspecified type ([facility])  Resolved Problems:    * No resolved hospital problems. *  .   Patient Home via EMS transportation with   Chief Complaint   Patient presents with    Leg Swelling     Pt states that Thursday 3/20/25 was at a cardiology appt and told he had swelling in his legs.  States that this AM he started to feel SOB and thinks swelling has gotten worse.  MD told to come to ER.  Pt currently denies feeling SOB or CP    Back Pain     Back 10/10 in lower back, states that he has been seen for this and had CT scans, told he has osteoporosis   .  Patient is alert and Person, Place, Time, and Situation  Patient's baseline mobility: Baseline Mobility: Unknown did not get pt up  Code Status: Full Code   Cardiac Rhythm: Cardiac Rhythm: Ventricular paced     Is patient on baseline Oxygen: no how many Liters:   Abnormal Assessment Findings:     Isolation: None      NIH Score:    C-SSRS: Risk of Suicide: No Risk  Bedside swallow:        Active LDA's:   Peripheral IV 03/23/25 Right Forearm (Active)   Site Assessment Clean, dry & intact 03/23/25 1140   Line Status Blood return noted;Flushed 03/23/25 1140   Phlebitis Assessment No symptoms 03/23/25 1140   Infiltration Assessment 0 03/23/25 1140   Dressing Status New dressing applied;Clean, dry & intact 03/23/25 1140   Dressing Type Transparent 03/23/25 1140   Dressing Intervention New 03/23/25 1140     Patient admitted with a hood: no If the hood is chronic was it exchanged:No  Reason for hood:   Patient admitted with Central Line:  . PICC line placement confirmed: YES OR NO:[id number]}   Reason for Central line:   Was central line Inserted from an outside facility:        Family/Caregiver Present yes Any Concerns: no   Restraints no  Sitter no         Vitals: MEWS Score: 0    Vitals:    03/23/25 1135 03/23/25 1210   BP:

## 2025-03-23 NOTE — ED PROVIDER NOTES
Emergency Department Provider Note  Location: [facility] EMERGENCY DEPARTMENT  3/23/2025     Patient Identification  Reza Blanco is a 84 y.o. male    Chief Complaint  Leg Swelling (Pt states that Thursday 3/20/25 was at a cardiology appt and told he had swelling in his legs.  States that this AM he started to feel SOB and thinks swelling has gotten worse.  MD told to come to ER.  Pt currently denies feeling SOB or CP) and Back Pain (Back 10/10 in lower back, states that he has been seen for this and had CT scans, told he has osteoporosis)      Mode of Arrival  EMS    HPI  (History provided by patient)  This is a 84 y.o. male with a PMH significant for CHF, CAD, hypertension, hyperlipidemia  presented today for worsening leg swelling and shortness of breath.  Patient reports he was seen by his cardiologist on March 20.  He was told he had swelling in his legs.  He reports that this morning he started to feel short of breath and think swelling is gotten worse.  He called his doctor who told him to come to the ER.  He currently denies any shortness of breath or chest pain at this time.  Patient has been compliant on torsemide 40 mg a day daily.  He denies any fever or productive cough.  He is endorsing subjective weight gain.    ROS  Review of Systems   Respiratory:  Positive for shortness of breath.    Cardiovascular:  Positive for leg swelling.   All other systems reviewed and are negative.        I have reviewed the following nursing documentation:  Allergies:   Allergies   Allergen Reactions    Reopro [Abciximab Injection]      \"destroyed platelets\"       Past medical history:  has a past medical history of Allergic rhinitis, Anemia, Arthritis, CAD (coronary artery disease) (2001), Chronic systolic congestive heart failure (HCC) (8/21/2018), Compression fracture of T11 vertebra ([facility]), Food allergy, Hiatal hernia, History of blood transfusion, Hyperlipidemia, Hypertension, Hypothyroid, Obesity

## 2025-03-23 NOTE — H&P
[facility] Medicine History & Physical    V 1.6    Date of Admission: 3/23/2025    Date of Service:  Pt seen/examined on 3/23/25     [x]Admitted to Inpatient with expected LOS greater than two midnights due to medical therapy.  []Placed in Observation status.    Chief Admission Complaint:  SOB    Presenting Admission History:      84 y.o. male who presented to [facility] with SOB.  PMHx significant for Hypothyroidism, CAD, TIA, HLD, SBO, CHF, TAVR, OA. Patient seen at bedside. Accompanied by family. Patient mentions ongoing sob for past 1 week, and LE swelling since January. Patient also endorses an ongoing dry cough. Patient also has ongoing chronic back pain 5/10 in severity. Patient unable to independently ambulate. Patient has [facility] at home for assistance with ADLs. Patient denies any chest pain, pain anywhere else, fevers, chills, light headedness, trouble urinating or defecating.      Assessment/Plan:      Current Principal Problem:  CHF (congestive heart failure), NYHA class III, unspecified failure chronicity, unspecified type (HCC)    #CHFrEF, acute on chronic exacerbation  #Elevated troponin, likely demand ischemia  #CAD hx  #TIA hx  #TAVR hx  #HLD hx  Plan:  Cardiology consulted, appreciate recommendations  Daily weights  Intake and Output monitor  Telemetry in place  Furosemide 40mg IV daily  Echo ordered, pending  To begin GDMT as tolerated  Aspirin, statin in place  Adjust regimen as needed  Trend troponin    #Hypothyroidism  #Chronic constipation  Plan:  TSH ordered, pending  Continue home medications    #OA hx  Plan:  Continue to monitor  Adjust pain regimen as needed  PT/OT consulted    Discussed management and the need for Hospitalization of the patient w/ the Emergency Department Provider: None    CXR: I have reviewed the CXR with the following interpretation: Consolidation, likely CHF  EKG:  I have reviewed the EKG with the following interpretation: Pacemaker, unremarkable, normal

## 2025-03-24 ENCOUNTER — APPOINTMENT (OUTPATIENT)
Age: 85
DRG: 291 | End: 2025-03-24
Payer: MEDICARE

## 2025-03-24 LAB
ANION GAP SERPL CALCULATED.3IONS-SCNC: 11 MMOL/L (ref 3–16)
BASOPHILS # BLD: 0 K/UL (ref 0–0.2)
BASOPHILS NFR BLD: 0.4 %
BUN SERPL-MCNC: 42 MG/DL (ref 7–20)
CALCIUM SERPL-MCNC: 8.7 MG/DL (ref 8.3–10.6)
CHLORIDE SERPL-SCNC: 98 MMOL/L (ref 99–110)
CO2 SERPL-SCNC: 29 MMOL/L (ref 21–32)
CREAT SERPL-MCNC: 1 MG/DL (ref 0.8–1.3)
DEPRECATED RDW RBC AUTO: 17.4 % (ref 12.4–15.4)
ECHO AR MAX VEL PISA: 4.7 M/S
ECHO AV MEAN GRADIENT: 15 MMHG
ECHO AV MEAN VELOCITY: 1.8 M/S
ECHO AV PEAK GRADIENT: 28 MMHG
ECHO AV PEAK VELOCITY: 2.7 M/S
ECHO AV REGURGITANT PHT: 386 MS
ECHO AV VELOCITY RATIO: 0.26
ECHO AV VTI: 55.7 CM
ECHO BSA: 1.79 M2
ECHO EST RA PRESSURE: 8 MMHG
ECHO LA AREA 2C: 26.5 CM2
ECHO LA AREA 4C: 26 CM2
ECHO LA DIAMETER INDEX: 2.76 CM/M2
ECHO LA DIAMETER: 5 CM
ECHO LA MAJOR AXIS: 8.4 CM
ECHO LA MINOR AXIS: 6.4 CM
ECHO LA VOL MOD A2C: 89 ML (ref 18–58)
ECHO LA VOL MOD A4C: 69 ML (ref 18–58)
ECHO LA VOLUME INDEX MOD A2C: 49 ML/M2 (ref 16–34)
ECHO LA VOLUME INDEX MOD A4C: 38 ML/M2 (ref 16–34)
ECHO LV E' LATERAL VELOCITY: 3.98 CM/S
ECHO LV E' SEPTAL VELOCITY: 2.25 CM/S
ECHO LV EDV A2C: 270 ML
ECHO LV EDV A4C: 277 ML
ECHO LV EDV INDEX A4C: 153 ML/M2
ECHO LV EDV NDEX A2C: 149 ML/M2
ECHO LV EF PHYSICIAN: 26 %
ECHO LV EJECTION FRACTION A2C: 23 %
ECHO LV EJECTION FRACTION A4C: 30 %
ECHO LV EJECTION FRACTION BIPLANE: 26 % (ref 55–100)
ECHO LV ESV A2C: 209 ML
ECHO LV ESV A4C: 194 ML
ECHO LV ESV INDEX A2C: 115 ML/M2
ECHO LV ESV INDEX A4C: 107 ML/M2
ECHO LV FRACTIONAL SHORTENING: 13 % (ref 28–44)
ECHO LV INTERNAL DIMENSION DIASTOLE INDEX: 3.09 CM/M2
ECHO LV INTERNAL DIMENSION DIASTOLIC: 5.6 CM (ref 4.2–5.9)
ECHO LV INTERNAL DIMENSION SYSTOLIC INDEX: 2.71 CM/M2
ECHO LV INTERNAL DIMENSION SYSTOLIC: 4.9 CM
ECHO LV IVSD: 0.9 CM (ref 0.6–1)
ECHO LV MASS 2D: 191.6 G (ref 88–224)
ECHO LV MASS INDEX 2D: 105.9 G/M2 (ref 49–115)
ECHO LV POSTERIOR WALL DIASTOLIC: 0.9 CM (ref 0.6–1)
ECHO LV RELATIVE WALL THICKNESS RATIO: 0.32
ECHO LVOT AV VTI INDEX: 0.24
ECHO LVOT MEAN GRADIENT: 1 MMHG
ECHO LVOT PEAK GRADIENT: 2 MMHG
ECHO LVOT PEAK VELOCITY: 0.7 M/S
ECHO LVOT VTI: 13.1 CM
ECHO MV A VELOCITY: 1.58 M/S
ECHO MV E DECELERATION TIME (DT): 190 MS
ECHO MV E VELOCITY: 0.99 M/S
ECHO MV E/A RATIO: 0.63
ECHO MV E/E' LATERAL: 24.87
ECHO MV E/E' RATIO (AVERAGED): 34.44
ECHO MV E/E' SEPTAL: 44
ECHO MV LVOT VTI INDEX: 3.69
ECHO MV MAX VELOCITY: 1.9 M/S
ECHO MV MEAN GRADIENT: 5 MMHG
ECHO MV MEAN VELOCITY: 1 M/S
ECHO MV PEAK GRADIENT: 14 MMHG
ECHO MV VTI: 48.4 CM
ECHO RA AREA 4C: 22 CM2
ECHO RA END SYSTOLIC VOLUME APICAL 4 CHAMBER INDEX BSA: 43 ML/M2
ECHO RA VOLUME: 77 ML
ECHO RIGHT VENTRICULAR SYSTOLIC PRESSURE (RVSP): 43 MMHG
ECHO RV BASAL DIMENSION: 4.3 CM
ECHO RV FREE WALL PEAK S': 13.5 CM/S
ECHO RV LONGITUDINAL DIMENSION: 7.2 CM
ECHO RV MID DIMENSION: 2.5 CM
ECHO RV TAPSE: 2.4 CM (ref 1.7–?)
ECHO TV REGURGITANT MAX VELOCITY: 2.95 M/S
ECHO TV REGURGITANT PEAK GRADIENT: 35 MMHG
EOSINOPHIL # BLD: 0.1 K/UL (ref 0–0.6)
EOSINOPHIL NFR BLD: 0.9 %
EST. AVERAGE GLUCOSE BLD GHB EST-MCNC: 105.4 MG/DL
FOLATE SERPL-MCNC: 19.4 NG/ML (ref 4.78–24.2)
GFR SERPLBLD CREATININE-BSD FMLA CKD-EPI: 74 ML/MIN/{1.73_M2}
GLUCOSE BLD-MCNC: 102 MG/DL (ref 70–99)
GLUCOSE BLD-MCNC: 107 MG/DL (ref 70–99)
GLUCOSE BLD-MCNC: 121 MG/DL (ref 70–99)
GLUCOSE BLD-MCNC: 123 MG/DL (ref 70–99)
GLUCOSE BLD-MCNC: 144 MG/DL (ref 70–99)
GLUCOSE SERPL-MCNC: 108 MG/DL (ref 70–99)
HAPTOGLOB SERPL-MCNC: 266 MG/DL (ref 30–200)
HBA1C MFR BLD: 5.3 %
HCT VFR BLD AUTO: 32.6 % (ref 40.5–52.5)
HGB BLD-MCNC: 11.1 G/DL (ref 13.5–17.5)
IRON SATN MFR SERPL: 21 % (ref 20–50)
IRON SERPL-MCNC: 44 UG/DL (ref 59–158)
LDH SERPL L TO P-CCNC: 301 U/L (ref 100–190)
LYMPHOCYTES # BLD: 0.6 K/UL (ref 1–5.1)
LYMPHOCYTES NFR BLD: 7.3 %
MAGNESIUM SERPL-MCNC: 2.5 MG/DL (ref 1.8–2.4)
MCH RBC QN AUTO: 33.2 PG (ref 26–34)
MCHC RBC AUTO-ENTMCNC: 34.1 G/DL (ref 31–36)
MCV RBC AUTO: 97.4 FL (ref 80–100)
MONOCYTES # BLD: 0.4 K/UL (ref 0–1.3)
MONOCYTES NFR BLD: 5.4 %
NEUTROPHILS # BLD: 6.5 K/UL (ref 1.7–7.7)
NEUTROPHILS NFR BLD: 86 %
PERFORMED ON: ABNORMAL
PHOSPHATE SERPL-MCNC: 2.8 MG/DL (ref 2.5–4.9)
PLATELET # BLD AUTO: 166 K/UL (ref 135–450)
PMV BLD AUTO: 7.8 FL (ref 5–10.5)
POTASSIUM SERPL-SCNC: 4.3 MMOL/L (ref 3.5–5.1)
RBC # BLD AUTO: 3.34 M/UL (ref 4.2–5.9)
SODIUM SERPL-SCNC: 138 MMOL/L (ref 136–145)
TIBC SERPL-MCNC: 208 UG/DL (ref 260–445)
TRANSFERRIN SERPL-MCNC: 164 MG/DL (ref 200–360)
TSH SERPL DL<=0.005 MIU/L-ACNC: 22.9 UIU/ML (ref 0.27–4.2)
VIT B12 SERPL-MCNC: 973 PG/ML (ref 211–911)
WBC # BLD AUTO: 7.6 K/UL (ref 4–11)

## 2025-03-24 PROCEDURE — 83615 LACTATE (LD) (LDH) ENZYME: CPT

## 2025-03-24 PROCEDURE — 6370000000 HC RX 637 (ALT 250 FOR IP): Performed by: INTERNAL MEDICINE

## 2025-03-24 PROCEDURE — 97162 PT EVAL MOD COMPLEX 30 MIN: CPT

## 2025-03-24 PROCEDURE — 82607 VITAMIN B-12: CPT

## 2025-03-24 PROCEDURE — 83010 ASSAY OF HAPTOGLOBIN QUANT: CPT

## 2025-03-24 PROCEDURE — 84466 ASSAY OF TRANSFERRIN: CPT

## 2025-03-24 PROCEDURE — 6370000000 HC RX 637 (ALT 250 FOR IP)

## 2025-03-24 PROCEDURE — 1200000000 HC SEMI PRIVATE

## 2025-03-24 PROCEDURE — C8929 TTE W OR WO FOL WCON,DOPPLER: HCPCS

## 2025-03-24 PROCEDURE — 93306 TTE W/DOPPLER COMPLETE: CPT | Performed by: INTERNAL MEDICINE

## 2025-03-24 PROCEDURE — 85025 COMPLETE CBC W/AUTO DIFF WBC: CPT

## 2025-03-24 PROCEDURE — 6360000002 HC RX W HCPCS

## 2025-03-24 PROCEDURE — 83735 ASSAY OF MAGNESIUM: CPT

## 2025-03-24 PROCEDURE — 97116 GAIT TRAINING THERAPY: CPT

## 2025-03-24 PROCEDURE — 80048 BASIC METABOLIC PNL TOTAL CA: CPT

## 2025-03-24 PROCEDURE — 97530 THERAPEUTIC ACTIVITIES: CPT

## 2025-03-24 PROCEDURE — 83036 HEMOGLOBIN GLYCOSYLATED A1C: CPT

## 2025-03-24 PROCEDURE — 36415 COLL VENOUS BLD VENIPUNCTURE: CPT

## 2025-03-24 PROCEDURE — 6360000004 HC RX CONTRAST MEDICATION

## 2025-03-24 PROCEDURE — 97166 OT EVAL MOD COMPLEX 45 MIN: CPT

## 2025-03-24 PROCEDURE — 99223 1ST HOSP IP/OBS HIGH 75: CPT | Performed by: INTERNAL MEDICINE

## 2025-03-24 PROCEDURE — 84100 ASSAY OF PHOSPHORUS: CPT

## 2025-03-24 PROCEDURE — 83540 ASSAY OF IRON: CPT

## 2025-03-24 PROCEDURE — 2500000003 HC RX 250 WO HCPCS

## 2025-03-24 PROCEDURE — 84443 ASSAY THYROID STIM HORMONE: CPT

## 2025-03-24 PROCEDURE — 82746 ASSAY OF FOLIC ACID SERUM: CPT

## 2025-03-24 RX ORDER — HYDROCORTISONE 10 MG/1
5 TABLET ORAL EVERY EVENING
Status: DISCONTINUED | OUTPATIENT
Start: 2025-03-24 | End: 2025-03-31 | Stop reason: HOSPADM

## 2025-03-24 RX ORDER — HYDROCORTISONE 10 MG/1
10 TABLET ORAL EVERY MORNING
Status: DISCONTINUED | OUTPATIENT
Start: 2025-03-24 | End: 2025-03-31 | Stop reason: HOSPADM

## 2025-03-24 RX ORDER — METOPROLOL SUCCINATE 25 MG/1
12.5 TABLET, EXTENDED RELEASE ORAL DAILY
Status: DISCONTINUED | OUTPATIENT
Start: 2025-03-24 | End: 2025-03-28

## 2025-03-24 RX ORDER — FERROUS SULFATE 325(65) MG
325 TABLET ORAL
Status: DISCONTINUED | OUTPATIENT
Start: 2025-03-25 | End: 2025-03-31 | Stop reason: HOSPADM

## 2025-03-24 RX ORDER — ATORVASTATIN CALCIUM 40 MG/1
40 TABLET, FILM COATED ORAL NIGHTLY
Status: DISCONTINUED | OUTPATIENT
Start: 2025-03-24 | End: 2025-03-31 | Stop reason: HOSPADM

## 2025-03-24 RX ORDER — SPIRONOLACTONE 25 MG/1
25 TABLET ORAL DAILY
Status: DISCONTINUED | OUTPATIENT
Start: 2025-03-24 | End: 2025-03-28

## 2025-03-24 RX ADMIN — ACETAMINOPHEN 650 MG: 325 TABLET ORAL at 05:00

## 2025-03-24 RX ADMIN — HYDROCORTISONE 5 MG: 10 TABLET ORAL at 18:37

## 2025-03-24 RX ADMIN — LACTULOSE 20 G: 20 SOLUTION ORAL at 20:52

## 2025-03-24 RX ADMIN — PREDNISONE 5 MG: 5 TABLET ORAL at 08:43

## 2025-03-24 RX ADMIN — SACUBITRIL AND VALSARTAN 1 TABLET: 24; 26 TABLET, FILM COATED ORAL at 12:07

## 2025-03-24 RX ADMIN — Medication 1 TABLET: at 08:42

## 2025-03-24 RX ADMIN — LACTULOSE 20 G: 20 SOLUTION ORAL at 16:21

## 2025-03-24 RX ADMIN — ASPIRIN 81 MG: 81 TABLET, CHEWABLE ORAL at 08:43

## 2025-03-24 RX ADMIN — SODIUM CHLORIDE, PRESERVATIVE FREE 10 ML: 5 INJECTION INTRAVENOUS at 08:46

## 2025-03-24 RX ADMIN — ACARBOSE 100 MG: 50 TABLET ORAL at 12:08

## 2025-03-24 RX ADMIN — ENOXAPARIN SODIUM 40 MG: 100 INJECTION SUBCUTANEOUS at 08:44

## 2025-03-24 RX ADMIN — PREDNISONE 5 MG: 5 TABLET ORAL at 16:21

## 2025-03-24 RX ADMIN — LEVOTHYROXINE SODIUM 125 MCG: 0.12 TABLET ORAL at 05:00

## 2025-03-24 RX ADMIN — SPIRONOLACTONE 25 MG: 25 TABLET, FILM COATED ORAL at 12:08

## 2025-03-24 RX ADMIN — ACARBOSE 100 MG: 50 TABLET ORAL at 18:37

## 2025-03-24 RX ADMIN — LACTULOSE 20 G: 20 SOLUTION ORAL at 08:47

## 2025-03-24 RX ADMIN — EMPAGLIFLOZIN 5 MG: 10 TABLET, FILM COATED ORAL at 12:07

## 2025-03-24 RX ADMIN — PANTOPRAZOLE SODIUM 40 MG: 40 TABLET, DELAYED RELEASE ORAL at 05:00

## 2025-03-24 RX ADMIN — ACARBOSE 100 MG: 50 TABLET ORAL at 08:44

## 2025-03-24 RX ADMIN — SULFUR HEXAFLUORIDE 2 ML: 60.7; .19; .19 INJECTION, POWDER, LYOPHILIZED, FOR SUSPENSION INTRAVENOUS; INTRAVESICAL at 09:24

## 2025-03-24 RX ADMIN — FUROSEMIDE 40 MG: 10 INJECTION, SOLUTION INTRAMUSCULAR; INTRAVENOUS at 08:42

## 2025-03-24 RX ADMIN — HYDROCORTISONE 10 MG: 10 TABLET ORAL at 12:07

## 2025-03-24 RX ADMIN — SODIUM CHLORIDE, PRESERVATIVE FREE 10 ML: 5 INJECTION INTRAVENOUS at 20:52

## 2025-03-24 RX ADMIN — ATORVASTATIN CALCIUM 40 MG: 40 TABLET, FILM COATED ORAL at 20:52

## 2025-03-24 ASSESSMENT — PAIN SCALES - GENERAL: PAINLEVEL_OUTOF10: 6

## 2025-03-24 ASSESSMENT — PAIN DESCRIPTION - LOCATION: LOCATION: BACK

## 2025-03-24 NOTE — CONSULTS
[facility] Continence Nurse  Consult Note       NAME:  Reza Blanco  MEDICAL RECORD NUMBER:  [id number]  AGE: 84 y.o.   GENDER: male  : 1940  TODAY'S DATE:  3/24/2025    Subjective; Yes it did bleed before.     Spouse at bedside.   Reason for WOCN Evaluation and Assessment:     AILEEN Blanco is a 84 y.o. male referred by:   [] Physician  [x] Nursing  [] Other:     Wound Identification:  Wound Type: venous  Contributing Factors: venous stasis    Wound History: chronic  Current Wound Care Treatment:  foam    Patient Goal of Care:  [x] Wound Healing  [] Odor Control  [] Palliative Care  [x] Pain Control   [] Other:         PAST MEDICAL HISTORY        Diagnosis Date    Allergic rhinitis     Anemia     Arthritis     rt hip    CAD (coronary artery disease) 2001    cardiac stents    Chronic systolic congestive heart failure (HCC) 2018    Compression fracture of T11 vertebra (HCC)     back brace    Food allergy     Hiatal hernia     History of blood transfusion     platelets=    Hyperlipidemia     Hypertension     hx of    Hypothyroid     Obesity 10/2/2012    Occlusion and stenosis of carotid artery 10/2/2012    Osteoarthritis     Hip right    Recurrent right inguinal hernia     Shingles 2014    Thyroid disease     TIA (transient ischemic attack)        PAST SURGICAL HISTORY    Past Surgical History:   Procedure Laterality Date    AORTIC VALVE REPLACEMENT  2018    CARDIAC SURGERY  2001    stents    CARDIAC VALVE REPLACEMENT  09/15/2018    COLONOSCOPY  99    COLONOSCOPY  2015    EYE SURGERY      victorino cataracts    GASTRIC FUNDOPLICATION N/A 2021    ROBOTIC NISSEN FUNDOPLICATION performed by Reza Mcclendon MD at [facility] OR    HERNIA REPAIR Left 2020    ROBOTIC LEFT INGUINAL HERNIA REPAIR WITH MESH performed by Reza Mcclendon MD at [facility] OR    INGUINAL HERNIA REPAIR Right 2016    laparoscopic right inguinal hernia repair with mesh    INGUINAL HERNIA

## 2025-03-24 NOTE — DISCHARGE INSTRUCTIONS
Heart Failure Resources:  Heart Failure Interactive Workbook:  Go to [email or web address].CipherMax/publication/?p=647135 for a Free Heart Failure Interactive Workbook provided by The American Heart Association. This interactive workbook will provide information on Healthier Living with Heart Failure. Please copy and paste link into search bar. Use your mouse to scroll through the pages.    HF [place] jose:   Heart Failure Free smart phone jose available for iPhone and Android download. Use your phone to track sodium intake, fluid intake, symptoms, and weight.     Low Sodium Diet / Recipes:  Go to www.[employer] website for “renal” diet which is Low Sodium! [employer] is a dialysis company, but this website offers free seasonal cookbooks. Each quarter, they will release 25-30 new recipes with a breakdown of calories, sodium, and glucose. You can also go to www.Dataminr/recipes website for free recipes.     Home Exercise Program:   Identification of Green/Yellow/Red zones:  You should be able to identify when you feel good (green zone), if you have 1-2 symptoms of HF (yellow zone), or if you are in need of medical attention (red zone).  In your CHF education folder you were provided a “stop light tool” to outline this information.     We want to you to rate your exertion levels:    Our therapy team has discussed means of identification with you such as the \"Clinton scale.\"  The Clinton rating scale ranges from 6 to 20, where 6 means \"no exertion at all\" and 20 means \"maximal exertion.\" The goal is to use this to gauge how much effort it is taking for you to do your normal daily tasks.   You should be able to recognize when too much exertion is being expended.    Elements of Energy Conservation:   Prioritize/Plan: Decide what needs to be done today, and what can wait for a later date, write to do lists, plan ahead to avoid extra trips, and gather supplies and equipment needed before starting an activity.   Position:

## 2025-03-24 NOTE — ACP (ADVANCE CARE PLANNING)
Advance Care Planning   General Advance Care Planning (ACP) Conversation    Date of Conversation: 3/23/2025  Conducted with: Patient with Decision Making Capacity  Other persons present: Spouse Paula    Healthcare Decision Maker:    Primary Decision Maker: Paula Blanco - Spouse - [phone]    Secondary Decision Maker: Vale Cardona - Child - [phone]    Today we documented Decision Maker(s) consistent with Legal Next of Kin hierarchy.  Content/Action Overview:  Has NO ACP documents-Information provided  Reviewed DNR/DNI and patient elects Full Code (Attempt Resuscitation)      Length of Voluntary ACP Conversation in minutes:  <16 minutes (Non-Billable)    Cierra Raya RN

## 2025-03-24 NOTE — CONSULTS
Consult Placed     Who: Jose Braxton  Date: 3/24/25  Time: 1638     Electronically signed by Monica Guy RN on 3/24/2025 at 4:37 PM

## 2025-03-24 NOTE — CONSULTS
[facility]   CONSULTATION  [phone]        Reason for Consultation/Chief Complaint: \"I have been having .\"    History of Present Illness:  Reza Blanco is a 84 y.o. patient who presented to the hospital with complaints of  shortness of breath and swelling of legs over last several days.  No chest pain no fever but has cough non productive.  S/p TAVR 2018  Redo TAVR May 2024  DC pacemaker August 2024  T9 Kyphoplasty Jan 2025  chronic systolic CHF, Nonischemic cardiomyopathy Biventricular cardiac pacemaker.     I have been asked to provide consultation regarding further management and testing.      Past Medical History:   has a past medical history of Allergic rhinitis, Anemia, Arthritis, CAD (coronary artery disease), Chronic systolic congestive heart failure (HCC), Compression fracture of T11 vertebra (HCC), Food allergy, Hiatal hernia, History of blood transfusion, Hyperlipidemia, Hypertension, Hypothyroid, Obesity, Occlusion and stenosis of carotid artery, Osteoarthritis, Recurrent right inguinal hernia, Shingles, Thyroid disease, and TIA (transient ischemic attack).    Surgical History:   has a past surgical history that includes sigmoidoscopy (1994); Upper gastrointestinal endoscopy (05/12/94); Upper gastrointestinal endoscopy (11/09/04); Tonsillectomy; eye surgery; Colonoscopy (04/22/99); Colonoscopy (7/13/2015); Inguinal hernia repair (Right, 07/07/2016); Inguinal hernia repair (Right, 04/12/2017); Cardiac surgery (2001); Cardiac valve replacement (09/15/2018); Aortic valve replacement (2018); Percutaneous Transluminal Coronary Angio (2018); hernia repair (Left, 2/5/2020); Gastric fundoplication (N/A, 5/25/2021); and Upper gastrointestinal endoscopy (N/A, 7/8/2021).     Social History:   reports that he quit smoking about 29 years ago. He started smoking about 66 years ago. He has a 18.4 pack-year smoking history. He has never used smokeless tobacco. He reports that he does not drink

## 2025-03-25 PROBLEM — I42.0 DILATED CARDIOMYOPATHY (HCC): Status: ACTIVE | Noted: 2025-03-25

## 2025-03-25 PROBLEM — I35.9 AORTIC VALVE DISEASE: Status: ACTIVE | Noted: 2025-03-25

## 2025-03-25 LAB
ANION GAP SERPL CALCULATED.3IONS-SCNC: 10 MMOL/L (ref 3–16)
BASOPHILS # BLD: 0 K/UL (ref 0–0.2)
BASOPHILS NFR BLD: 0.2 %
BUN SERPL-MCNC: 42 MG/DL (ref 7–20)
CALCIUM SERPL-MCNC: 9 MG/DL (ref 8.3–10.6)
CHLORIDE SERPL-SCNC: 97 MMOL/L (ref 99–110)
CO2 SERPL-SCNC: 30 MMOL/L (ref 21–32)
CREAT SERPL-MCNC: 1.1 MG/DL (ref 0.8–1.3)
DEPRECATED RDW RBC AUTO: 17.7 % (ref 12.4–15.4)
EOSINOPHIL # BLD: 0 K/UL (ref 0–0.6)
EOSINOPHIL NFR BLD: 0.7 %
GFR SERPLBLD CREATININE-BSD FMLA CKD-EPI: 66 ML/MIN/{1.73_M2}
GLUCOSE BLD-MCNC: 110 MG/DL (ref 70–99)
GLUCOSE BLD-MCNC: 114 MG/DL (ref 70–99)
GLUCOSE BLD-MCNC: 122 MG/DL (ref 70–99)
GLUCOSE BLD-MCNC: 131 MG/DL (ref 70–99)
GLUCOSE SERPL-MCNC: 104 MG/DL (ref 70–99)
HCT VFR BLD AUTO: 32.8 % (ref 40.5–52.5)
HEMOCCULT SP1 STL QL: NORMAL
HGB BLD-MCNC: 11.3 G/DL (ref 13.5–17.5)
LYMPHOCYTES # BLD: 0.5 K/UL (ref 1–5.1)
LYMPHOCYTES NFR BLD: 7.3 %
MAGNESIUM SERPL-MCNC: 2.88 MG/DL (ref 1.8–2.4)
MCH RBC QN AUTO: 33.5 PG (ref 26–34)
MCHC RBC AUTO-ENTMCNC: 34.5 G/DL (ref 31–36)
MCV RBC AUTO: 97 FL (ref 80–100)
MONOCYTES # BLD: 0.3 K/UL (ref 0–1.3)
MONOCYTES NFR BLD: 4.8 %
NEUTROPHILS # BLD: 6.3 K/UL (ref 1.7–7.7)
NEUTROPHILS NFR BLD: 87 %
PERFORMED ON: ABNORMAL
PHOSPHATE SERPL-MCNC: 3.5 MG/DL (ref 2.5–4.9)
PLATELET # BLD AUTO: 187 K/UL (ref 135–450)
PMV BLD AUTO: 7.9 FL (ref 5–10.5)
POTASSIUM SERPL-SCNC: 4.3 MMOL/L (ref 3.5–5.1)
RBC # BLD AUTO: 3.38 M/UL (ref 4.2–5.9)
SODIUM SERPL-SCNC: 137 MMOL/L (ref 136–145)
WBC # BLD AUTO: 7.3 K/UL (ref 4–11)

## 2025-03-25 PROCEDURE — 83735 ASSAY OF MAGNESIUM: CPT

## 2025-03-25 PROCEDURE — 6370000000 HC RX 637 (ALT 250 FOR IP): Performed by: NURSE PRACTITIONER

## 2025-03-25 PROCEDURE — 97116 GAIT TRAINING THERAPY: CPT

## 2025-03-25 PROCEDURE — 36415 COLL VENOUS BLD VENIPUNCTURE: CPT

## 2025-03-25 PROCEDURE — 6370000000 HC RX 637 (ALT 250 FOR IP): Performed by: INTERNAL MEDICINE

## 2025-03-25 PROCEDURE — 80048 BASIC METABOLIC PNL TOTAL CA: CPT

## 2025-03-25 PROCEDURE — 82270 OCCULT BLOOD FECES: CPT

## 2025-03-25 PROCEDURE — 84100 ASSAY OF PHOSPHORUS: CPT

## 2025-03-25 PROCEDURE — 2500000003 HC RX 250 WO HCPCS

## 2025-03-25 PROCEDURE — 6370000000 HC RX 637 (ALT 250 FOR IP)

## 2025-03-25 PROCEDURE — 99232 SBSQ HOSP IP/OBS MODERATE 35: CPT | Performed by: INTERNAL MEDICINE

## 2025-03-25 PROCEDURE — 85025 COMPLETE CBC W/AUTO DIFF WBC: CPT

## 2025-03-25 PROCEDURE — 1200000000 HC SEMI PRIVATE

## 2025-03-25 PROCEDURE — 6360000002 HC RX W HCPCS

## 2025-03-25 PROCEDURE — 97530 THERAPEUTIC ACTIVITIES: CPT

## 2025-03-25 PROCEDURE — 97110 THERAPEUTIC EXERCISES: CPT

## 2025-03-25 RX ORDER — TORSEMIDE 20 MG/1
40 TABLET ORAL DAILY
Status: DISCONTINUED | OUTPATIENT
Start: 2025-03-26 | End: 2025-03-28

## 2025-03-25 RX ORDER — TORSEMIDE 20 MG/1
20 TABLET ORAL DAILY
Status: DISCONTINUED | OUTPATIENT
Start: 2025-03-25 | End: 2025-03-26

## 2025-03-25 RX ORDER — FUROSEMIDE 40 MG/1
80 TABLET ORAL DAILY
Status: DISCONTINUED | OUTPATIENT
Start: 2025-03-25 | End: 2025-03-25

## 2025-03-25 RX ORDER — LIDOCAINE 4 G/G
1 PATCH TOPICAL DAILY
Status: DISCONTINUED | OUTPATIENT
Start: 2025-03-25 | End: 2025-03-31 | Stop reason: HOSPADM

## 2025-03-25 RX ORDER — TRAMADOL HYDROCHLORIDE 50 MG/1
50 TABLET ORAL EVERY 6 HOURS PRN
Status: DISCONTINUED | OUTPATIENT
Start: 2025-03-25 | End: 2025-03-31 | Stop reason: HOSPADM

## 2025-03-25 RX ORDER — OXYCODONE HYDROCHLORIDE 5 MG/1
5 TABLET ORAL EVERY 4 HOURS PRN
Refills: 0 | Status: DISCONTINUED | OUTPATIENT
Start: 2025-03-25 | End: 2025-03-31 | Stop reason: HOSPADM

## 2025-03-25 RX ADMIN — LACTULOSE 20 G: 20 SOLUTION ORAL at 15:16

## 2025-03-25 RX ADMIN — SODIUM CHLORIDE, PRESERVATIVE FREE 10 ML: 5 INJECTION INTRAVENOUS at 19:53

## 2025-03-25 RX ADMIN — FERROUS SULFATE TAB 325 MG (65 MG ELEMENTAL FE) 325 MG: 325 (65 FE) TAB at 08:40

## 2025-03-25 RX ADMIN — PANTOPRAZOLE SODIUM 40 MG: 40 TABLET, DELAYED RELEASE ORAL at 06:22

## 2025-03-25 RX ADMIN — ASPIRIN 81 MG: 81 TABLET, CHEWABLE ORAL at 08:40

## 2025-03-25 RX ADMIN — PREDNISONE 5 MG: 5 TABLET ORAL at 17:06

## 2025-03-25 RX ADMIN — SACUBITRIL AND VALSARTAN 1 TABLET: 24; 26 TABLET, FILM COATED ORAL at 21:14

## 2025-03-25 RX ADMIN — ACETAMINOPHEN 650 MG: 325 TABLET ORAL at 11:08

## 2025-03-25 RX ADMIN — ACARBOSE 100 MG: 50 TABLET ORAL at 17:08

## 2025-03-25 RX ADMIN — TORSEMIDE 20 MG: 20 TABLET ORAL at 17:06

## 2025-03-25 RX ADMIN — Medication 1 TABLET: at 08:40

## 2025-03-25 RX ADMIN — HYDROCORTISONE 5 MG: 10 TABLET ORAL at 17:06

## 2025-03-25 RX ADMIN — SODIUM CHLORIDE, PRESERVATIVE FREE 10 ML: 5 INJECTION INTRAVENOUS at 08:44

## 2025-03-25 RX ADMIN — ACARBOSE 100 MG: 50 TABLET ORAL at 08:46

## 2025-03-25 RX ADMIN — PREDNISONE 5 MG: 5 TABLET ORAL at 08:40

## 2025-03-25 RX ADMIN — ACARBOSE 100 MG: 50 TABLET ORAL at 11:11

## 2025-03-25 RX ADMIN — HYDROCORTISONE 10 MG: 10 TABLET ORAL at 06:22

## 2025-03-25 RX ADMIN — ENOXAPARIN SODIUM 40 MG: 100 INJECTION SUBCUTANEOUS at 08:40

## 2025-03-25 RX ADMIN — LEVOTHYROXINE SODIUM 125 MCG: 0.12 TABLET ORAL at 06:22

## 2025-03-25 RX ADMIN — FUROSEMIDE 80 MG: 40 TABLET ORAL at 11:08

## 2025-03-25 RX ADMIN — ATORVASTATIN CALCIUM 40 MG: 40 TABLET, FILM COATED ORAL at 19:51

## 2025-03-25 RX ADMIN — ACETAMINOPHEN 650 MG: 325 TABLET ORAL at 19:51

## 2025-03-25 RX ADMIN — TRAMADOL HYDROCHLORIDE 50 MG: 50 TABLET, COATED ORAL at 13:08

## 2025-03-25 ASSESSMENT — PAIN DESCRIPTION - ORIENTATION
ORIENTATION: LOWER;MID
ORIENTATION: RIGHT

## 2025-03-25 ASSESSMENT — PAIN DESCRIPTION - LOCATION
LOCATION: HIP;BACK
LOCATION: BACK

## 2025-03-25 ASSESSMENT — PAIN SCALES - GENERAL
PAINLEVEL_OUTOF10: 4
PAINLEVEL_OUTOF10: 5
PAINLEVEL_OUTOF10: 5

## 2025-03-25 ASSESSMENT — PAIN DESCRIPTION - DESCRIPTORS
DESCRIPTORS: ACHING
DESCRIPTORS: ACHING

## 2025-03-25 ASSESSMENT — PAIN - FUNCTIONAL ASSESSMENT
PAIN_FUNCTIONAL_ASSESSMENT: PREVENTS OR INTERFERES SOME ACTIVE ACTIVITIES AND ADLS
PAIN_FUNCTIONAL_ASSESSMENT: PREVENTS OR INTERFERES SOME ACTIVE ACTIVITIES AND ADLS

## 2025-03-25 NOTE — CONSULTS
PALLIATIVE MEDICINE CONSULTATION     Patient name:Reza Blanco   MRN:[id number]    :1940  Room/Bed:0362/0362-01   LOS: 2 days         Date of consult:3/25/2025    Inpatient consult to Palliative Care  Consult performed by: Rosalie Knox APRN - CNP  Consult ordered by: Nakul Crenshaw MD          Consult ordered for: goals of care    ASSESSMENT/RECOMMENDATIONS     84 y.o. male with Class III NICM (EF 25-30%), AS s/p TAVR (), s/p BiV PM, chronic back pain s/p T9 kyphoplasty (2025), and chronic constipation  living at home with wife. Baseline PPS 50.  Patient admitted with CHF exacerbation.   Wife states that over the past few months patient has had significant functional decline due to his back pain to the point he needs help with all ADLs, is chair-bound and sleeping more.  Patient is alert and oriented and on room air.  He complains of significant low and mid back pain.  PT/OT recs are for home care.    Number of hospital visits in past 12 months:  2  Last hospitalization: 24      Goals of Care:  Unknown.  Patient not able to engage in discussion this morning 2/2 back pain and wife states they have never discussed goals of care.  Will have follow up discussion with patient once pain under control.  Patient is currently a full code   Update:  patient still having pain after the tylenol.  Per wife request, will follow up tomorrow am (~10)    Recommendations:   Ongoing discussions for determining goals of care/ACP  Completion of advance directive  Resources for wife to get additional in-home care support  Pain management for back pain - patient recently had imaging with specialist at [facility] - wife not sure of what long term plan is at this time with his back pain  Community palliative care referral for disease optimization and education, continued goals of care discussions, and symptom management    Patient/Family Goals of Care :    3/25/25    Spoke with patient at

## 2025-03-26 LAB
ANION GAP SERPL CALCULATED.3IONS-SCNC: 10 MMOL/L (ref 3–16)
BASOPHILS # BLD: 0 K/UL (ref 0–0.2)
BASOPHILS NFR BLD: 0.2 %
BUN SERPL-MCNC: 42 MG/DL (ref 7–20)
CALCIUM SERPL-MCNC: 8.8 MG/DL (ref 8.3–10.6)
CHLORIDE SERPL-SCNC: 94 MMOL/L (ref 99–110)
CO2 SERPL-SCNC: 30 MMOL/L (ref 21–32)
CREAT SERPL-MCNC: 1.2 MG/DL (ref 0.8–1.3)
DEPRECATED RDW RBC AUTO: 17.9 % (ref 12.4–15.4)
EOSINOPHIL # BLD: 0.1 K/UL (ref 0–0.6)
EOSINOPHIL NFR BLD: 0.6 %
GFR SERPLBLD CREATININE-BSD FMLA CKD-EPI: 60 ML/MIN/{1.73_M2}
GLUCOSE BLD-MCNC: 111 MG/DL (ref 70–99)
GLUCOSE BLD-MCNC: 118 MG/DL (ref 70–99)
GLUCOSE BLD-MCNC: 120 MG/DL (ref 70–99)
GLUCOSE BLD-MCNC: 127 MG/DL (ref 70–99)
GLUCOSE SERPL-MCNC: 96 MG/DL (ref 70–99)
HCT VFR BLD AUTO: 34.4 % (ref 40.5–52.5)
HGB BLD-MCNC: 11.4 G/DL (ref 13.5–17.5)
LYMPHOCYTES # BLD: 0.7 K/UL (ref 1–5.1)
LYMPHOCYTES NFR BLD: 6.1 %
MAGNESIUM SERPL-MCNC: 2.64 MG/DL (ref 1.8–2.4)
MCH RBC QN AUTO: 32.5 PG (ref 26–34)
MCHC RBC AUTO-ENTMCNC: 33.2 G/DL (ref 31–36)
MCV RBC AUTO: 97.8 FL (ref 80–100)
MONOCYTES # BLD: 0.5 K/UL (ref 0–1.3)
MONOCYTES NFR BLD: 4.3 %
NEUTROPHILS # BLD: 10 K/UL (ref 1.7–7.7)
NEUTROPHILS NFR BLD: 88.8 %
PERFORMED ON: ABNORMAL
PHOSPHATE SERPL-MCNC: 4 MG/DL (ref 2.5–4.9)
PLATELET # BLD AUTO: 195 K/UL (ref 135–450)
PMV BLD AUTO: 8 FL (ref 5–10.5)
POTASSIUM SERPL-SCNC: 4.7 MMOL/L (ref 3.5–5.1)
RBC # BLD AUTO: 3.52 M/UL (ref 4.2–5.9)
SODIUM SERPL-SCNC: 134 MMOL/L (ref 136–145)
WBC # BLD AUTO: 11.3 K/UL (ref 4–11)

## 2025-03-26 PROCEDURE — 2500000003 HC RX 250 WO HCPCS

## 2025-03-26 PROCEDURE — 36415 COLL VENOUS BLD VENIPUNCTURE: CPT

## 2025-03-26 PROCEDURE — 6370000000 HC RX 637 (ALT 250 FOR IP): Performed by: INTERNAL MEDICINE

## 2025-03-26 PROCEDURE — 80048 BASIC METABOLIC PNL TOTAL CA: CPT

## 2025-03-26 PROCEDURE — 2580000003 HC RX 258

## 2025-03-26 PROCEDURE — 6370000000 HC RX 637 (ALT 250 FOR IP): Performed by: NURSE PRACTITIONER

## 2025-03-26 PROCEDURE — 85025 COMPLETE CBC W/AUTO DIFF WBC: CPT

## 2025-03-26 PROCEDURE — 1200000000 HC SEMI PRIVATE

## 2025-03-26 PROCEDURE — P9045 ALBUMIN (HUMAN), 5%, 250 ML: HCPCS | Performed by: NURSE PRACTITIONER

## 2025-03-26 PROCEDURE — 6360000002 HC RX W HCPCS: Performed by: NURSE PRACTITIONER

## 2025-03-26 PROCEDURE — 83735 ASSAY OF MAGNESIUM: CPT

## 2025-03-26 PROCEDURE — 6370000000 HC RX 637 (ALT 250 FOR IP)

## 2025-03-26 PROCEDURE — 84100 ASSAY OF PHOSPHORUS: CPT

## 2025-03-26 PROCEDURE — 6360000002 HC RX W HCPCS

## 2025-03-26 PROCEDURE — 99232 SBSQ HOSP IP/OBS MODERATE 35: CPT | Performed by: NURSE PRACTITIONER

## 2025-03-26 RX ORDER — 0.9 % SODIUM CHLORIDE 0.9 %
500 INTRAVENOUS SOLUTION INTRAVENOUS ONCE
Status: COMPLETED | OUTPATIENT
Start: 2025-03-26 | End: 2025-03-26

## 2025-03-26 RX ORDER — ALBUMIN HUMAN 50 G/1000ML
25 SOLUTION INTRAVENOUS ONCE
Status: COMPLETED | OUTPATIENT
Start: 2025-03-26 | End: 2025-03-26

## 2025-03-26 RX ADMIN — SODIUM CHLORIDE, PRESERVATIVE FREE 10 ML: 5 INJECTION INTRAVENOUS at 20:59

## 2025-03-26 RX ADMIN — FERROUS SULFATE TAB 325 MG (65 MG ELEMENTAL FE) 325 MG: 325 (65 FE) TAB at 07:55

## 2025-03-26 RX ADMIN — PREDNISONE 5 MG: 5 TABLET ORAL at 15:22

## 2025-03-26 RX ADMIN — LEVOTHYROXINE SODIUM 125 MCG: 0.12 TABLET ORAL at 05:35

## 2025-03-26 RX ADMIN — ASPIRIN 81 MG: 81 TABLET, CHEWABLE ORAL at 07:55

## 2025-03-26 RX ADMIN — PREDNISONE 5 MG: 5 TABLET ORAL at 07:57

## 2025-03-26 RX ADMIN — EMPAGLIFLOZIN 5 MG: 10 TABLET, FILM COATED ORAL at 07:56

## 2025-03-26 RX ADMIN — HYDROCORTISONE 5 MG: 10 TABLET ORAL at 16:47

## 2025-03-26 RX ADMIN — LACTULOSE 20 G: 20 SOLUTION ORAL at 07:55

## 2025-03-26 RX ADMIN — ACARBOSE 100 MG: 50 TABLET ORAL at 07:56

## 2025-03-26 RX ADMIN — SODIUM CHLORIDE 500 ML: 0.9 INJECTION, SOLUTION INTRAVENOUS at 17:25

## 2025-03-26 RX ADMIN — TORSEMIDE 40 MG: 20 TABLET ORAL at 07:57

## 2025-03-26 RX ADMIN — HYDROCORTISONE 10 MG: 10 TABLET ORAL at 05:38

## 2025-03-26 RX ADMIN — SPIRONOLACTONE 25 MG: 25 TABLET, FILM COATED ORAL at 07:56

## 2025-03-26 RX ADMIN — SACUBITRIL AND VALSARTAN 1 TABLET: 24; 26 TABLET, FILM COATED ORAL at 07:55

## 2025-03-26 RX ADMIN — PANTOPRAZOLE SODIUM 40 MG: 40 TABLET, DELAYED RELEASE ORAL at 05:34

## 2025-03-26 RX ADMIN — LACTULOSE 20 G: 20 SOLUTION ORAL at 13:37

## 2025-03-26 RX ADMIN — OXYCODONE 5 MG: 5 TABLET ORAL at 12:30

## 2025-03-26 RX ADMIN — ENOXAPARIN SODIUM 40 MG: 100 INJECTION SUBCUTANEOUS at 07:55

## 2025-03-26 RX ADMIN — Medication 1 TABLET: at 07:56

## 2025-03-26 RX ADMIN — ATORVASTATIN CALCIUM 40 MG: 40 TABLET, FILM COATED ORAL at 20:59

## 2025-03-26 RX ADMIN — Medication 10 ML: at 17:25

## 2025-03-26 RX ADMIN — SODIUM CHLORIDE, PRESERVATIVE FREE 10 ML: 5 INJECTION INTRAVENOUS at 07:55

## 2025-03-26 RX ADMIN — ACARBOSE 100 MG: 50 TABLET ORAL at 16:47

## 2025-03-26 RX ADMIN — ALBUMIN (HUMAN) 25 G: 12.5 INJECTION, SOLUTION INTRAVENOUS at 21:04

## 2025-03-26 RX ADMIN — ACARBOSE 100 MG: 50 TABLET ORAL at 12:30

## 2025-03-26 RX ADMIN — TRAMADOL HYDROCHLORIDE 50 MG: 50 TABLET, COATED ORAL at 09:38

## 2025-03-26 RX ADMIN — DICLOFENAC SODIUM 4 G: 10 GEL TOPICAL at 21:00

## 2025-03-26 ASSESSMENT — PAIN SCALES - GENERAL
PAINLEVEL_OUTOF10: 0
PAINLEVEL_OUTOF10: 5
PAINLEVEL_OUTOF10: 0
PAINLEVEL_OUTOF10: 0

## 2025-03-26 ASSESSMENT — PAIN DESCRIPTION - LOCATION: LOCATION: BACK

## 2025-03-26 NOTE — CONSULTS
Comprehensive Nutrition Assessment    Type and Reason for Visit:  Initial, Consult    Nutrition Recommendations/Plan:   Modify heart healthy diet to ANNA (3-4 g/day) to promote PO intake during stay.   Encourage PO intake as tolerated.   Monitor nutrition adequacy, pertinent labs, bowel habits, wt changes, and clinical progress.      Malnutrition Assessment:  Malnutrition Status:  At risk for malnutrition (03/26/25 1314)    Context:  Chronic Illness     Findings of the 6 clinical characteristics of malnutrition:  Energy Intake:  Mild decrease in energy intake  Weight Loss:  Unable to assess (limited recent hx)     Body Fat Loss:  Mild body fat loss (question natural aging versus malnutrition) Orbital   Muscle Mass Loss:  Mild muscle mass loss (question natural aging versus malnutrition) Temples (temporalis)  Fluid Accumulation:  Severe Extremities   Strength:  Not Performed    Nutrition Assessment:    Pt and family member visited for consult (poor intake/appetite for 5 or more days). Admitted w/ SOB but found to have acute on chronic CHF exacerbation. PMH of CAD, CHF, SBO, and HLD. Palliative care following. Pt reports his appetite is fair currently and was prior. Currently on a heart healthy diet w/ variable PO intakes of 1-100%. Will liberalize diet to ANNA (3-4 g/day) to promote PO intake during stay. Denies unintentional wt loss or gain from his reported UBW of 151 lbs. Wts difficult to assess in EMR d/t limited recent hx. Family member states she has been bringing in [employer] drinks for pt. Reports constipation (last BM documented 3/25). Denies diet or nutrition-related questions at this time. Will continue to monitor.    Nutrition Related Findings:    Last BM documented 3/25. Magnesium: 2.64. +4 pitting BLE edema. +lactulose, iron, and multivitamin. Wound Type: Venous Stasis       Current Nutrition Intake & Therapies:    Average Meal Intake: 1-25%, 26-50%, 51-75%, %  Average Supplements Intake:

## 2025-03-27 LAB
ANION GAP SERPL CALCULATED.3IONS-SCNC: 10 MMOL/L (ref 3–16)
BACTERIA URNS QL MICRO: ABNORMAL /HPF
BILIRUB UR QL STRIP.AUTO: NEGATIVE
BUN SERPL-MCNC: 47 MG/DL (ref 7–20)
CALCIUM SERPL-MCNC: 8.6 MG/DL (ref 8.3–10.6)
CHLORIDE SERPL-SCNC: 93 MMOL/L (ref 99–110)
CLARITY UR: ABNORMAL
CO2 SERPL-SCNC: 28 MMOL/L (ref 21–32)
COLOR UR: YELLOW
CREAT SERPL-MCNC: 1 MG/DL (ref 0.8–1.3)
EPI CELLS #/AREA URNS HPF: ABNORMAL /HPF (ref 0–5)
GFR SERPLBLD CREATININE-BSD FMLA CKD-EPI: 74 ML/MIN/{1.73_M2}
GLUCOSE BLD-MCNC: 106 MG/DL (ref 70–99)
GLUCOSE BLD-MCNC: 113 MG/DL (ref 70–99)
GLUCOSE BLD-MCNC: 125 MG/DL (ref 70–99)
GLUCOSE BLD-MCNC: 138 MG/DL (ref 70–99)
GLUCOSE SERPL-MCNC: 94 MG/DL (ref 70–99)
GLUCOSE UR STRIP.AUTO-MCNC: 100 MG/DL
HGB UR QL STRIP.AUTO: ABNORMAL
KETONES UR STRIP.AUTO-MCNC: NEGATIVE MG/DL
LEUKOCYTE ESTERASE UR QL STRIP.AUTO: NEGATIVE
NITRITE UR QL STRIP.AUTO: NEGATIVE
NT-PROBNP SERPL-MCNC: 7086 PG/ML (ref 0–449)
PERFORMED ON: ABNORMAL
PH UR STRIP.AUTO: 7 [PH] (ref 5–8)
POTASSIUM SERPL-SCNC: 4.2 MMOL/L (ref 3.5–5.1)
PROT UR STRIP.AUTO-MCNC: 30 MG/DL
RBC #/AREA URNS HPF: ABNORMAL /HPF (ref 0–4)
SODIUM SERPL-SCNC: 131 MMOL/L (ref 136–145)
SP GR UR STRIP.AUTO: 1.01 (ref 1–1.03)
UA COMPLETE W REFLEX CULTURE PNL UR: ABNORMAL
UA DIPSTICK W REFLEX MICRO PNL UR: YES
URN SPEC COLLECT METH UR: ABNORMAL
UROBILINOGEN UR STRIP-ACNC: 0.2 E.U./DL
WBC #/AREA URNS HPF: ABNORMAL /HPF (ref 0–5)

## 2025-03-27 PROCEDURE — 6370000000 HC RX 637 (ALT 250 FOR IP)

## 2025-03-27 PROCEDURE — 2500000003 HC RX 250 WO HCPCS

## 2025-03-27 PROCEDURE — 6370000000 HC RX 637 (ALT 250 FOR IP): Performed by: NURSE PRACTITIONER

## 2025-03-27 PROCEDURE — 80048 BASIC METABOLIC PNL TOTAL CA: CPT

## 2025-03-27 PROCEDURE — 81001 URINALYSIS AUTO W/SCOPE: CPT

## 2025-03-27 PROCEDURE — 6360000002 HC RX W HCPCS

## 2025-03-27 PROCEDURE — 99232 SBSQ HOSP IP/OBS MODERATE 35: CPT | Performed by: NURSE PRACTITIONER

## 2025-03-27 PROCEDURE — 1200000000 HC SEMI PRIVATE

## 2025-03-27 PROCEDURE — 97530 THERAPEUTIC ACTIVITIES: CPT

## 2025-03-27 PROCEDURE — 36415 COLL VENOUS BLD VENIPUNCTURE: CPT

## 2025-03-27 PROCEDURE — 6370000000 HC RX 637 (ALT 250 FOR IP): Performed by: INTERNAL MEDICINE

## 2025-03-27 PROCEDURE — 97535 SELF CARE MNGMENT TRAINING: CPT

## 2025-03-27 PROCEDURE — 83880 ASSAY OF NATRIURETIC PEPTIDE: CPT

## 2025-03-27 RX ADMIN — PANTOPRAZOLE SODIUM 40 MG: 40 TABLET, DELAYED RELEASE ORAL at 05:18

## 2025-03-27 RX ADMIN — EMPAGLIFLOZIN 5 MG: 10 TABLET, FILM COATED ORAL at 09:20

## 2025-03-27 RX ADMIN — ACETAMINOPHEN 650 MG: 325 TABLET ORAL at 14:37

## 2025-03-27 RX ADMIN — ATORVASTATIN CALCIUM 40 MG: 40 TABLET, FILM COATED ORAL at 20:35

## 2025-03-27 RX ADMIN — PREDNISONE 5 MG: 5 TABLET ORAL at 17:37

## 2025-03-27 RX ADMIN — DICLOFENAC SODIUM 4 G: 10 GEL TOPICAL at 09:27

## 2025-03-27 RX ADMIN — OXYCODONE 5 MG: 5 TABLET ORAL at 11:25

## 2025-03-27 RX ADMIN — DICLOFENAC SODIUM 4 G: 10 GEL TOPICAL at 20:36

## 2025-03-27 RX ADMIN — LEVOTHYROXINE SODIUM 125 MCG: 0.12 TABLET ORAL at 05:18

## 2025-03-27 RX ADMIN — FERROUS SULFATE TAB 325 MG (65 MG ELEMENTAL FE) 325 MG: 325 (65 FE) TAB at 09:21

## 2025-03-27 RX ADMIN — ACARBOSE 100 MG: 50 TABLET ORAL at 13:01

## 2025-03-27 RX ADMIN — SODIUM CHLORIDE, PRESERVATIVE FREE 10 ML: 5 INJECTION INTRAVENOUS at 20:36

## 2025-03-27 RX ADMIN — LACTULOSE 20 G: 20 SOLUTION ORAL at 20:35

## 2025-03-27 RX ADMIN — ASPIRIN 81 MG: 81 TABLET, CHEWABLE ORAL at 09:21

## 2025-03-27 RX ADMIN — ACARBOSE 100 MG: 50 TABLET ORAL at 17:37

## 2025-03-27 RX ADMIN — HYDROCORTISONE 5 MG: 10 TABLET ORAL at 17:37

## 2025-03-27 RX ADMIN — HYDROCORTISONE 10 MG: 10 TABLET ORAL at 05:18

## 2025-03-27 RX ADMIN — LACTULOSE 20 G: 20 SOLUTION ORAL at 17:38

## 2025-03-27 RX ADMIN — SODIUM CHLORIDE, PRESERVATIVE FREE 10 ML: 5 INJECTION INTRAVENOUS at 09:26

## 2025-03-27 RX ADMIN — ACARBOSE 100 MG: 50 TABLET ORAL at 09:21

## 2025-03-27 RX ADMIN — ENOXAPARIN SODIUM 40 MG: 100 INJECTION SUBCUTANEOUS at 09:20

## 2025-03-27 RX ADMIN — Medication 1 TABLET: at 09:21

## 2025-03-27 RX ADMIN — PREDNISONE 5 MG: 5 TABLET ORAL at 09:20

## 2025-03-27 ASSESSMENT — PAIN DESCRIPTION - ORIENTATION
ORIENTATION: MID;LOWER
ORIENTATION: LOWER;MID
ORIENTATION: LOWER

## 2025-03-27 ASSESSMENT — PAIN SCALES - GENERAL
PAINLEVEL_OUTOF10: 6
PAINLEVEL_OUTOF10: 8
PAINLEVEL_OUTOF10: 7
PAINLEVEL_OUTOF10: 0
PAINLEVEL_OUTOF10: 0
PAINLEVEL_OUTOF10: 4
PAINLEVEL_OUTOF10: 8

## 2025-03-27 ASSESSMENT — PAIN DESCRIPTION - LOCATION
LOCATION: BACK

## 2025-03-27 ASSESSMENT — PAIN DESCRIPTION - DESCRIPTORS
DESCRIPTORS: ACHING;DISCOMFORT

## 2025-03-28 ENCOUNTER — APPOINTMENT (OUTPATIENT)
Dept: GENERAL RADIOLOGY | Age: 85
DRG: 291 | End: 2025-03-28
Payer: MEDICARE

## 2025-03-28 LAB
ANION GAP SERPL CALCULATED.3IONS-SCNC: 8 MMOL/L (ref 3–16)
BUN SERPL-MCNC: 43 MG/DL (ref 7–20)
CALCIUM SERPL-MCNC: 8.5 MG/DL (ref 8.3–10.6)
CHLORIDE SERPL-SCNC: 96 MMOL/L (ref 99–110)
CO2 SERPL-SCNC: 28 MMOL/L (ref 21–32)
CREAT SERPL-MCNC: 0.9 MG/DL (ref 0.8–1.3)
GFR SERPLBLD CREATININE-BSD FMLA CKD-EPI: 84 ML/MIN/{1.73_M2}
GLUCOSE BLD-MCNC: 103 MG/DL (ref 70–99)
GLUCOSE BLD-MCNC: 105 MG/DL (ref 70–99)
GLUCOSE BLD-MCNC: 111 MG/DL (ref 70–99)
GLUCOSE BLD-MCNC: 115 MG/DL (ref 70–99)
GLUCOSE SERPL-MCNC: 96 MG/DL (ref 70–99)
MAGNESIUM SERPL-MCNC: 2.5 MG/DL (ref 1.8–2.4)
PERFORMED ON: ABNORMAL
PHOSPHATE SERPL-MCNC: 3 MG/DL (ref 2.5–4.9)
POTASSIUM SERPL-SCNC: 4.2 MMOL/L (ref 3.5–5.1)
SODIUM SERPL-SCNC: 132 MMOL/L (ref 136–145)

## 2025-03-28 PROCEDURE — 83735 ASSAY OF MAGNESIUM: CPT

## 2025-03-28 PROCEDURE — 6370000000 HC RX 637 (ALT 250 FOR IP): Performed by: NURSE PRACTITIONER

## 2025-03-28 PROCEDURE — 36415 COLL VENOUS BLD VENIPUNCTURE: CPT

## 2025-03-28 PROCEDURE — 73522 X-RAY EXAM HIPS BI 3-4 VIEWS: CPT

## 2025-03-28 PROCEDURE — 6370000000 HC RX 637 (ALT 250 FOR IP): Performed by: INTERNAL MEDICINE

## 2025-03-28 PROCEDURE — 2500000003 HC RX 250 WO HCPCS

## 2025-03-28 PROCEDURE — 6360000002 HC RX W HCPCS

## 2025-03-28 PROCEDURE — 6370000000 HC RX 637 (ALT 250 FOR IP)

## 2025-03-28 PROCEDURE — 80048 BASIC METABOLIC PNL TOTAL CA: CPT

## 2025-03-28 PROCEDURE — 99232 SBSQ HOSP IP/OBS MODERATE 35: CPT | Performed by: NURSE PRACTITIONER

## 2025-03-28 PROCEDURE — 97110 THERAPEUTIC EXERCISES: CPT

## 2025-03-28 PROCEDURE — 84100 ASSAY OF PHOSPHORUS: CPT

## 2025-03-28 PROCEDURE — 97530 THERAPEUTIC ACTIVITIES: CPT

## 2025-03-28 PROCEDURE — 1200000000 HC SEMI PRIVATE

## 2025-03-28 RX ORDER — TORSEMIDE 20 MG/1
20 TABLET ORAL DAILY
Status: DISCONTINUED | OUTPATIENT
Start: 2025-03-28 | End: 2025-03-31 | Stop reason: HOSPADM

## 2025-03-28 RX ADMIN — SODIUM CHLORIDE, PRESERVATIVE FREE 10 ML: 5 INJECTION INTRAVENOUS at 21:12

## 2025-03-28 RX ADMIN — SACUBITRIL AND VALSARTAN 0.5 TABLET: 24; 26 TABLET, FILM COATED ORAL at 09:57

## 2025-03-28 RX ADMIN — ATORVASTATIN CALCIUM 40 MG: 40 TABLET, FILM COATED ORAL at 21:11

## 2025-03-28 RX ADMIN — FERROUS SULFATE TAB 325 MG (65 MG ELEMENTAL FE) 325 MG: 325 (65 FE) TAB at 08:28

## 2025-03-28 RX ADMIN — SACUBITRIL AND VALSARTAN 0.5 TABLET: 24; 26 TABLET, FILM COATED ORAL at 21:11

## 2025-03-28 RX ADMIN — EMPAGLIFLOZIN 5 MG: 10 TABLET, FILM COATED ORAL at 08:28

## 2025-03-28 RX ADMIN — PREDNISONE 5 MG: 5 TABLET ORAL at 08:28

## 2025-03-28 RX ADMIN — LACTULOSE 20 G: 20 SOLUTION ORAL at 21:11

## 2025-03-28 RX ADMIN — LACTULOSE 20 G: 20 SOLUTION ORAL at 12:17

## 2025-03-28 RX ADMIN — ONDANSETRON 4 MG: 4 TABLET, ORALLY DISINTEGRATING ORAL at 13:55

## 2025-03-28 RX ADMIN — ACARBOSE 100 MG: 50 TABLET ORAL at 08:28

## 2025-03-28 RX ADMIN — PREDNISONE 5 MG: 5 TABLET ORAL at 17:59

## 2025-03-28 RX ADMIN — LEVOTHYROXINE SODIUM 125 MCG: 0.12 TABLET ORAL at 05:30

## 2025-03-28 RX ADMIN — HYDROCORTISONE 10 MG: 10 TABLET ORAL at 05:30

## 2025-03-28 RX ADMIN — HYDROCORTISONE 5 MG: 10 TABLET ORAL at 17:59

## 2025-03-28 RX ADMIN — LACTULOSE 20 G: 20 SOLUTION ORAL at 08:27

## 2025-03-28 RX ADMIN — ACARBOSE 100 MG: 50 TABLET ORAL at 12:17

## 2025-03-28 RX ADMIN — Medication 1 TABLET: at 08:28

## 2025-03-28 RX ADMIN — TRAMADOL HYDROCHLORIDE 50 MG: 50 TABLET, COATED ORAL at 10:09

## 2025-03-28 RX ADMIN — ASPIRIN 81 MG: 81 TABLET, CHEWABLE ORAL at 08:28

## 2025-03-28 RX ADMIN — OXYCODONE 5 MG: 5 TABLET ORAL at 08:27

## 2025-03-28 RX ADMIN — DICLOFENAC SODIUM 4 G: 10 GEL TOPICAL at 08:29

## 2025-03-28 RX ADMIN — ENOXAPARIN SODIUM 40 MG: 100 INJECTION SUBCUTANEOUS at 08:28

## 2025-03-28 RX ADMIN — SODIUM CHLORIDE, PRESERVATIVE FREE 10 ML: 5 INJECTION INTRAVENOUS at 08:34

## 2025-03-28 RX ADMIN — PANTOPRAZOLE SODIUM 40 MG: 40 TABLET, DELAYED RELEASE ORAL at 05:30

## 2025-03-28 RX ADMIN — ACARBOSE 100 MG: 50 TABLET ORAL at 17:59

## 2025-03-28 RX ADMIN — DICLOFENAC SODIUM 4 G: 10 GEL TOPICAL at 21:12

## 2025-03-28 ASSESSMENT — PAIN DESCRIPTION - ORIENTATION
ORIENTATION: LOWER;RIGHT
ORIENTATION: RIGHT

## 2025-03-28 ASSESSMENT — PAIN DESCRIPTION - LOCATION
LOCATION: BACK
LOCATION: HIP

## 2025-03-28 ASSESSMENT — PAIN DESCRIPTION - DESCRIPTORS
DESCRIPTORS: ACHING;DISCOMFORT
DESCRIPTORS: ACHING;DISCOMFORT

## 2025-03-28 ASSESSMENT — PAIN SCALES - GENERAL
PAINLEVEL_OUTOF10: 7
PAINLEVEL_OUTOF10: 5
PAINLEVEL_OUTOF10: 0
PAINLEVEL_OUTOF10: 6
PAINLEVEL_OUTOF10: 5

## 2025-03-29 PROBLEM — R52 PAIN: Status: ACTIVE | Noted: 2025-03-29

## 2025-03-29 LAB
ANION GAP SERPL CALCULATED.3IONS-SCNC: 10 MMOL/L (ref 3–16)
BASOPHILS # BLD: 0 K/UL (ref 0–0.2)
BASOPHILS NFR BLD: 0.3 %
BUN SERPL-MCNC: 43 MG/DL (ref 7–20)
CALCIUM SERPL-MCNC: 8.5 MG/DL (ref 8.3–10.6)
CHLORIDE SERPL-SCNC: 94 MMOL/L (ref 99–110)
CO2 SERPL-SCNC: 28 MMOL/L (ref 21–32)
CREAT SERPL-MCNC: 0.7 MG/DL (ref 0.8–1.3)
DEPRECATED RDW RBC AUTO: 17.9 % (ref 12.4–15.4)
EOSINOPHIL # BLD: 0 K/UL (ref 0–0.6)
EOSINOPHIL NFR BLD: 0.6 %
GFR SERPLBLD CREATININE-BSD FMLA CKD-EPI: >90 ML/MIN/{1.73_M2}
GLUCOSE BLD-MCNC: 100 MG/DL (ref 70–99)
GLUCOSE BLD-MCNC: 109 MG/DL (ref 70–99)
GLUCOSE BLD-MCNC: 116 MG/DL (ref 70–99)
GLUCOSE BLD-MCNC: 126 MG/DL (ref 70–99)
GLUCOSE SERPL-MCNC: 94 MG/DL (ref 70–99)
HCT VFR BLD AUTO: 32.9 % (ref 40.5–52.5)
HGB BLD-MCNC: 11.1 G/DL (ref 13.5–17.5)
LYMPHOCYTES # BLD: 0.7 K/UL (ref 1–5.1)
LYMPHOCYTES NFR BLD: 9.8 %
MAGNESIUM SERPL-MCNC: 2.64 MG/DL (ref 1.8–2.4)
MCH RBC QN AUTO: 33.1 PG (ref 26–34)
MCHC RBC AUTO-ENTMCNC: 33.8 G/DL (ref 31–36)
MCV RBC AUTO: 98.1 FL (ref 80–100)
MONOCYTES # BLD: 0.3 K/UL (ref 0–1.3)
MONOCYTES NFR BLD: 4.5 %
NEUTROPHILS # BLD: 5.8 K/UL (ref 1.7–7.7)
NEUTROPHILS NFR BLD: 84.8 %
PERFORMED ON: ABNORMAL
PHOSPHATE SERPL-MCNC: 3.2 MG/DL (ref 2.5–4.9)
PLATELET # BLD AUTO: 191 K/UL (ref 135–450)
PMV BLD AUTO: 7.8 FL (ref 5–10.5)
POTASSIUM SERPL-SCNC: 4.5 MMOL/L (ref 3.5–5.1)
RBC # BLD AUTO: 3.36 M/UL (ref 4.2–5.9)
SODIUM SERPL-SCNC: 132 MMOL/L (ref 136–145)
WBC # BLD AUTO: 6.8 K/UL (ref 4–11)

## 2025-03-29 PROCEDURE — 85025 COMPLETE CBC W/AUTO DIFF WBC: CPT

## 2025-03-29 PROCEDURE — 83735 ASSAY OF MAGNESIUM: CPT

## 2025-03-29 PROCEDURE — 6370000000 HC RX 637 (ALT 250 FOR IP)

## 2025-03-29 PROCEDURE — 84100 ASSAY OF PHOSPHORUS: CPT

## 2025-03-29 PROCEDURE — 80048 BASIC METABOLIC PNL TOTAL CA: CPT

## 2025-03-29 PROCEDURE — 6360000002 HC RX W HCPCS

## 2025-03-29 PROCEDURE — 6370000000 HC RX 637 (ALT 250 FOR IP): Performed by: INTERNAL MEDICINE

## 2025-03-29 PROCEDURE — 6370000000 HC RX 637 (ALT 250 FOR IP): Performed by: NURSE PRACTITIONER

## 2025-03-29 PROCEDURE — 2580000003 HC RX 258

## 2025-03-29 PROCEDURE — 1200000000 HC SEMI PRIVATE

## 2025-03-29 PROCEDURE — 36415 COLL VENOUS BLD VENIPUNCTURE: CPT

## 2025-03-29 PROCEDURE — 2500000003 HC RX 250 WO HCPCS

## 2025-03-29 RX ORDER — MIDODRINE HYDROCHLORIDE 5 MG/1
5 TABLET ORAL
Qty: 90 TABLET | Refills: 3 | Status: SHIPPED | OUTPATIENT
Start: 2025-03-29 | End: 2025-03-31

## 2025-03-29 RX ORDER — OXYCODONE HYDROCHLORIDE 5 MG/1
5 TABLET ORAL EVERY 4 HOURS PRN
Qty: 18 TABLET | Refills: 0 | Status: SHIPPED | OUTPATIENT
Start: 2025-03-29 | End: 2025-03-31 | Stop reason: HOSPADM

## 2025-03-29 RX ORDER — ATORVASTATIN CALCIUM 80 MG/1
40 TABLET, FILM COATED ORAL NIGHTLY
Qty: 15 TABLET | Refills: 3 | Status: SHIPPED | OUTPATIENT
Start: 2025-03-29

## 2025-03-29 RX ORDER — TORSEMIDE 20 MG/1
20 TABLET ORAL DAILY
Qty: 30 TABLET | Refills: 3 | Status: SHIPPED | OUTPATIENT
Start: 2025-03-30 | End: 2025-03-31

## 2025-03-29 RX ORDER — TORSEMIDE 20 MG/1
20 TABLET ORAL DAILY
Qty: 30 TABLET | Refills: 3 | Status: SHIPPED | OUTPATIENT
Start: 2025-03-29 | End: 2025-03-29

## 2025-03-29 RX ORDER — M-VIT,TX,IRON,MINS/CALC/FOLIC 27MG-0.4MG
1 TABLET ORAL DAILY
Qty: 30 TABLET | Refills: 0 | Status: SHIPPED | OUTPATIENT
Start: 2025-03-29 | End: 2025-04-28

## 2025-03-29 RX ORDER — MIDODRINE HYDROCHLORIDE 5 MG/1
5 TABLET ORAL
Status: DISCONTINUED | OUTPATIENT
Start: 2025-03-29 | End: 2025-03-31

## 2025-03-29 RX ORDER — TRAMADOL HYDROCHLORIDE 50 MG/1
50 TABLET ORAL EVERY 6 HOURS PRN
Qty: 12 TABLET | Refills: 0 | Status: SHIPPED | OUTPATIENT
Start: 2025-03-29 | End: 2025-03-31

## 2025-03-29 RX ORDER — POLYETHYLENE GLYCOL 3350 17 G/17G
17 POWDER, FOR SOLUTION ORAL DAILY PRN
Qty: 30 PACKET | Refills: 0 | Status: SHIPPED | OUTPATIENT
Start: 2025-03-29 | End: 2025-04-28

## 2025-03-29 RX ORDER — 0.9 % SODIUM CHLORIDE 0.9 %
500 INTRAVENOUS SOLUTION INTRAVENOUS ONCE
Status: COMPLETED | OUTPATIENT
Start: 2025-03-29 | End: 2025-03-29

## 2025-03-29 RX ADMIN — HYDROCORTISONE 5 MG: 10 TABLET ORAL at 17:42

## 2025-03-29 RX ADMIN — ACARBOSE 100 MG: 50 TABLET ORAL at 09:08

## 2025-03-29 RX ADMIN — ENOXAPARIN SODIUM 40 MG: 100 INJECTION SUBCUTANEOUS at 09:10

## 2025-03-29 RX ADMIN — DICLOFENAC SODIUM 4 G: 10 GEL TOPICAL at 09:11

## 2025-03-29 RX ADMIN — MIDODRINE HYDROCHLORIDE 5 MG: 5 TABLET ORAL at 17:42

## 2025-03-29 RX ADMIN — DICLOFENAC SODIUM 4 G: 10 GEL TOPICAL at 19:51

## 2025-03-29 RX ADMIN — ACARBOSE 100 MG: 50 TABLET ORAL at 12:50

## 2025-03-29 RX ADMIN — SODIUM CHLORIDE, PRESERVATIVE FREE 10 ML: 5 INJECTION INTRAVENOUS at 09:11

## 2025-03-29 RX ADMIN — LACTULOSE 20 G: 20 SOLUTION ORAL at 19:51

## 2025-03-29 RX ADMIN — ATORVASTATIN CALCIUM 40 MG: 40 TABLET, FILM COATED ORAL at 19:51

## 2025-03-29 RX ADMIN — PANTOPRAZOLE SODIUM 40 MG: 40 TABLET, DELAYED RELEASE ORAL at 06:04

## 2025-03-29 RX ADMIN — SACUBITRIL AND VALSARTAN 0.5 TABLET: 24; 26 TABLET, FILM COATED ORAL at 09:07

## 2025-03-29 RX ADMIN — PREDNISONE 5 MG: 5 TABLET ORAL at 17:43

## 2025-03-29 RX ADMIN — FERROUS SULFATE TAB 325 MG (65 MG ELEMENTAL FE) 325 MG: 325 (65 FE) TAB at 09:08

## 2025-03-29 RX ADMIN — LEVOTHYROXINE SODIUM 125 MCG: 0.12 TABLET ORAL at 06:04

## 2025-03-29 RX ADMIN — SODIUM CHLORIDE 500 ML: 0.9 INJECTION, SOLUTION INTRAVENOUS at 12:49

## 2025-03-29 RX ADMIN — Medication 1 TABLET: at 09:07

## 2025-03-29 RX ADMIN — SODIUM CHLORIDE, PRESERVATIVE FREE 10 ML: 5 INJECTION INTRAVENOUS at 19:51

## 2025-03-29 RX ADMIN — ASPIRIN 81 MG: 81 TABLET, CHEWABLE ORAL at 09:08

## 2025-03-29 RX ADMIN — PREDNISONE 5 MG: 5 TABLET ORAL at 09:07

## 2025-03-29 RX ADMIN — ACARBOSE 100 MG: 50 TABLET ORAL at 17:43

## 2025-03-29 RX ADMIN — LACTULOSE 20 G: 20 SOLUTION ORAL at 09:08

## 2025-03-29 RX ADMIN — EMPAGLIFLOZIN 5 MG: 10 TABLET, FILM COATED ORAL at 09:07

## 2025-03-29 RX ADMIN — HYDROCORTISONE 10 MG: 10 TABLET ORAL at 06:04

## 2025-03-29 RX ADMIN — MIDODRINE HYDROCHLORIDE 5 MG: 5 TABLET ORAL at 12:50

## 2025-03-29 RX ADMIN — LACTULOSE 20 G: 20 SOLUTION ORAL at 12:50

## 2025-03-29 NOTE — DISCHARGE INSTR - COC
Continuity of Care Form    Patient Name: Reza Blanco   :  1940  MRN:  [id number]    Admit date:  3/23/2025  Discharge date:  25    Code Status Order: Full Code   Advance Directives:     Admitting Physician:  Nakul Crenshaw MD  PCP: Duc Willams MD    Discharging Nurse: [facility] Unit/Room#: 0362/0362-01  Discharging Unit Phone Number: [phone]    Emergency Contact:   Extended Emergency Contact Information  Primary Emergency Contact: Paula Blanco  Address: [address] [place] [address]  Home Phone: [phone]  Mobile Phone: [phone]  Relation: Spouse  Secondary Emergency Contact: Vale Cardona  Home Phone: [phone]  Mobile Phone: [phone]  Relation: Child    Past Surgical History:  Past Surgical History:   Procedure Laterality Date    AORTIC VALVE REPLACEMENT  2018    CARDIAC SURGERY  2001    stents    CARDIAC VALVE REPLACEMENT  09/15/2018    COLONOSCOPY  99    COLONOSCOPY  2015    EYE SURGERY      victorino cataracts    GASTRIC FUNDOPLICATION N/A 2021    ROBOTIC NISSEN FUNDOPLICATION performed by Reza Mcclendon MD at [facility] OR    HERNIA REPAIR Left 2020    ROBOTIC LEFT INGUINAL HERNIA REPAIR WITH MESH performed by Reza Mcclendon MD at [facility] OR    INGUINAL HERNIA REPAIR Right 2016    laparoscopic right inguinal hernia repair with mesh    INGUINAL HERNIA REPAIR Right 2017    davinci recurrent right inguinal hernia repair with mesh    PTCA  2018    SIGMOIDOSCOPY  1994    TONSILLECTOMY      UPPER GASTROINTESTINAL ENDOSCOPY  94    UPPER GASTROINTESTINAL ENDOSCOPY  04    Gastritis and duodenitis    UPPER GASTROINTESTINAL ENDOSCOPY N/A 2021    EGD ESOPHAGOGASTRODUODENOSCOPY performed by Weston Tipton MD at [facility] ASC ENDOSCOPY       Immunization History:   Immunization History   Administered Date(s) Administered    COVID-19, MODERNA BLUE border, Primary or

## 2025-03-30 LAB
GLUCOSE BLD-MCNC: 101 MG/DL (ref 70–99)
GLUCOSE BLD-MCNC: 113 MG/DL (ref 70–99)
GLUCOSE BLD-MCNC: 113 MG/DL (ref 70–99)
GLUCOSE BLD-MCNC: 115 MG/DL (ref 70–99)
GLUCOSE BLD-MCNC: 96 MG/DL (ref 70–99)
PERFORMED ON: ABNORMAL
PERFORMED ON: NORMAL

## 2025-03-30 PROCEDURE — 6370000000 HC RX 637 (ALT 250 FOR IP)

## 2025-03-30 PROCEDURE — 6370000000 HC RX 637 (ALT 250 FOR IP): Performed by: NURSE PRACTITIONER

## 2025-03-30 PROCEDURE — 6370000000 HC RX 637 (ALT 250 FOR IP): Performed by: INTERNAL MEDICINE

## 2025-03-30 PROCEDURE — 6360000002 HC RX W HCPCS

## 2025-03-30 PROCEDURE — 1200000000 HC SEMI PRIVATE

## 2025-03-30 PROCEDURE — 2500000003 HC RX 250 WO HCPCS

## 2025-03-30 RX ADMIN — Medication 1 TABLET: at 08:00

## 2025-03-30 RX ADMIN — OXYCODONE 5 MG: 5 TABLET ORAL at 19:47

## 2025-03-30 RX ADMIN — EMPAGLIFLOZIN 5 MG: 10 TABLET, FILM COATED ORAL at 08:00

## 2025-03-30 RX ADMIN — LACTULOSE 20 G: 20 SOLUTION ORAL at 19:47

## 2025-03-30 RX ADMIN — LACTULOSE 20 G: 20 SOLUTION ORAL at 08:01

## 2025-03-30 RX ADMIN — LEVOTHYROXINE SODIUM 125 MCG: 0.12 TABLET ORAL at 05:05

## 2025-03-30 RX ADMIN — ASPIRIN 81 MG: 81 TABLET, CHEWABLE ORAL at 08:00

## 2025-03-30 RX ADMIN — PREDNISONE 5 MG: 5 TABLET ORAL at 08:00

## 2025-03-30 RX ADMIN — DICLOFENAC SODIUM 4 G: 10 GEL TOPICAL at 19:47

## 2025-03-30 RX ADMIN — ACARBOSE 100 MG: 50 TABLET ORAL at 17:27

## 2025-03-30 RX ADMIN — ENOXAPARIN SODIUM 40 MG: 100 INJECTION SUBCUTANEOUS at 08:01

## 2025-03-30 RX ADMIN — DICLOFENAC SODIUM 4 G: 10 GEL TOPICAL at 08:03

## 2025-03-30 RX ADMIN — SODIUM CHLORIDE, PRESERVATIVE FREE 10 ML: 5 INJECTION INTRAVENOUS at 08:03

## 2025-03-30 RX ADMIN — FERROUS SULFATE TAB 325 MG (65 MG ELEMENTAL FE) 325 MG: 325 (65 FE) TAB at 08:00

## 2025-03-30 RX ADMIN — HYDROCORTISONE 10 MG: 10 TABLET ORAL at 05:05

## 2025-03-30 RX ADMIN — MIDODRINE HYDROCHLORIDE 5 MG: 5 TABLET ORAL at 12:26

## 2025-03-30 RX ADMIN — ACARBOSE 100 MG: 50 TABLET ORAL at 07:59

## 2025-03-30 RX ADMIN — MIDODRINE HYDROCHLORIDE 5 MG: 5 TABLET ORAL at 17:28

## 2025-03-30 RX ADMIN — SODIUM CHLORIDE, PRESERVATIVE FREE 10 ML: 5 INJECTION INTRAVENOUS at 19:47

## 2025-03-30 RX ADMIN — PANTOPRAZOLE SODIUM 40 MG: 40 TABLET, DELAYED RELEASE ORAL at 05:05

## 2025-03-30 RX ADMIN — LACTULOSE 20 G: 20 SOLUTION ORAL at 12:26

## 2025-03-30 RX ADMIN — ATORVASTATIN CALCIUM 40 MG: 40 TABLET, FILM COATED ORAL at 19:47

## 2025-03-30 RX ADMIN — ACARBOSE 100 MG: 50 TABLET ORAL at 12:26

## 2025-03-30 RX ADMIN — HYDROCORTISONE 5 MG: 10 TABLET ORAL at 17:28

## 2025-03-30 RX ADMIN — OXYCODONE 5 MG: 5 TABLET ORAL at 12:26

## 2025-03-30 RX ADMIN — PREDNISONE 5 MG: 5 TABLET ORAL at 17:28

## 2025-03-30 ASSESSMENT — PAIN DESCRIPTION - LOCATION
LOCATION: BACK
LOCATION: BACK

## 2025-03-30 ASSESSMENT — PAIN SCALES - GENERAL
PAINLEVEL_OUTOF10: 7
PAINLEVEL_OUTOF10: 4

## 2025-03-30 ASSESSMENT — PAIN DESCRIPTION - DESCRIPTORS: DESCRIPTORS: ACHING;DISCOMFORT

## 2025-03-30 ASSESSMENT — PAIN DESCRIPTION - ORIENTATION: ORIENTATION: LOWER

## 2025-03-31 VITALS
BODY MASS INDEX: 22.36 KG/M2 | OXYGEN SATURATION: 94 % | DIASTOLIC BLOOD PRESSURE: 71 MMHG | TEMPERATURE: 97.3 F | HEIGHT: 70 IN | SYSTOLIC BLOOD PRESSURE: 122 MMHG | HEART RATE: 77 BPM | WEIGHT: 156.2 LBS | RESPIRATION RATE: 18 BRPM

## 2025-03-31 LAB
ALBUMIN SERPL-MCNC: 3.1 G/DL (ref 3.4–5)
ALP SERPL-CCNC: 103 U/L (ref 40–129)
ALT SERPL-CCNC: 29 U/L (ref 10–40)
ANION GAP SERPL CALCULATED.3IONS-SCNC: 8 MMOL/L (ref 3–16)
AST SERPL-CCNC: 21 U/L (ref 15–37)
BASOPHILS # BLD: 0 K/UL (ref 0–0.2)
BASOPHILS NFR BLD: 0.4 %
BILIRUB DIRECT SERPL-MCNC: 0.2 MG/DL (ref 0–0.3)
BILIRUB INDIRECT SERPL-MCNC: 0.2 MG/DL (ref 0–1)
BILIRUB SERPL-MCNC: 0.4 MG/DL (ref 0–1)
BUN SERPL-MCNC: 38 MG/DL (ref 7–20)
CALCIUM SERPL-MCNC: 8.4 MG/DL (ref 8.3–10.6)
CHLORIDE SERPL-SCNC: 98 MMOL/L (ref 99–110)
CO2 SERPL-SCNC: 28 MMOL/L (ref 21–32)
CREAT SERPL-MCNC: 0.8 MG/DL (ref 0.8–1.3)
DEPRECATED RDW RBC AUTO: 17.3 % (ref 12.4–15.4)
EOSINOPHIL # BLD: 0.1 K/UL (ref 0–0.6)
EOSINOPHIL NFR BLD: 1.3 %
GFR SERPLBLD CREATININE-BSD FMLA CKD-EPI: 87 ML/MIN/{1.73_M2}
GLUCOSE BLD-MCNC: 120 MG/DL (ref 70–99)
GLUCOSE BLD-MCNC: 130 MG/DL (ref 70–99)
GLUCOSE SERPL-MCNC: 94 MG/DL (ref 70–99)
HCT VFR BLD AUTO: 30.7 % (ref 40.5–52.5)
HGB BLD-MCNC: 10.5 G/DL (ref 13.5–17.5)
LYMPHOCYTES # BLD: 0.7 K/UL (ref 1–5.1)
LYMPHOCYTES NFR BLD: 11.9 %
MAGNESIUM SERPL-MCNC: 2.74 MG/DL (ref 1.8–2.4)
MCH RBC QN AUTO: 33.2 PG (ref 26–34)
MCHC RBC AUTO-ENTMCNC: 34.3 G/DL (ref 31–36)
MCV RBC AUTO: 96.8 FL (ref 80–100)
MONOCYTES # BLD: 0.4 K/UL (ref 0–1.3)
MONOCYTES NFR BLD: 7 %
NEUTROPHILS # BLD: 4.7 K/UL (ref 1.7–7.7)
NEUTROPHILS NFR BLD: 79.4 %
NT-PROBNP SERPL-MCNC: 3580 PG/ML (ref 0–449)
PERFORMED ON: ABNORMAL
PERFORMED ON: ABNORMAL
PHOSPHATE SERPL-MCNC: 2.7 MG/DL (ref 2.5–4.9)
PLATELET # BLD AUTO: 170 K/UL (ref 135–450)
PMV BLD AUTO: 7.4 FL (ref 5–10.5)
POTASSIUM SERPL-SCNC: 4.7 MMOL/L (ref 3.5–5.1)
PROT SERPL-MCNC: 5.5 G/DL (ref 6.4–8.2)
RBC # BLD AUTO: 3.17 M/UL (ref 4.2–5.9)
SODIUM SERPL-SCNC: 134 MMOL/L (ref 136–145)
WBC # BLD AUTO: 6 K/UL (ref 4–11)

## 2025-03-31 PROCEDURE — 2500000003 HC RX 250 WO HCPCS

## 2025-03-31 PROCEDURE — 83735 ASSAY OF MAGNESIUM: CPT

## 2025-03-31 PROCEDURE — 80048 BASIC METABOLIC PNL TOTAL CA: CPT

## 2025-03-31 PROCEDURE — 97530 THERAPEUTIC ACTIVITIES: CPT

## 2025-03-31 PROCEDURE — 84100 ASSAY OF PHOSPHORUS: CPT

## 2025-03-31 PROCEDURE — 36415 COLL VENOUS BLD VENIPUNCTURE: CPT

## 2025-03-31 PROCEDURE — 83880 ASSAY OF NATRIURETIC PEPTIDE: CPT

## 2025-03-31 PROCEDURE — 97116 GAIT TRAINING THERAPY: CPT

## 2025-03-31 PROCEDURE — 6360000002 HC RX W HCPCS

## 2025-03-31 PROCEDURE — 6370000000 HC RX 637 (ALT 250 FOR IP)

## 2025-03-31 PROCEDURE — 6370000000 HC RX 637 (ALT 250 FOR IP): Performed by: INTERNAL MEDICINE

## 2025-03-31 PROCEDURE — 6370000000 HC RX 637 (ALT 250 FOR IP): Performed by: NURSE PRACTITIONER

## 2025-03-31 PROCEDURE — 85025 COMPLETE CBC W/AUTO DIFF WBC: CPT

## 2025-03-31 PROCEDURE — 80076 HEPATIC FUNCTION PANEL: CPT

## 2025-03-31 RX ORDER — TORSEMIDE 20 MG/1
10 TABLET ORAL EVERY OTHER DAY
DISCHARGE
Start: 2025-03-31

## 2025-03-31 RX ORDER — TRAMADOL HYDROCHLORIDE 50 MG/1
50 TABLET ORAL EVERY 6 HOURS PRN
Qty: 12 TABLET | Refills: 0 | Status: SHIPPED | OUTPATIENT
Start: 2025-03-31 | End: 2025-04-03

## 2025-03-31 RX ORDER — MIDODRINE HYDROCHLORIDE 5 MG/1
2.5 TABLET ORAL
DISCHARGE
Start: 2025-03-31

## 2025-03-31 RX ORDER — MIDODRINE HYDROCHLORIDE 5 MG/1
2.5 TABLET ORAL
Status: DISCONTINUED | OUTPATIENT
Start: 2025-03-31 | End: 2025-03-31 | Stop reason: HOSPADM

## 2025-03-31 RX ADMIN — ACARBOSE 100 MG: 50 TABLET ORAL at 12:06

## 2025-03-31 RX ADMIN — ENOXAPARIN SODIUM 40 MG: 100 INJECTION SUBCUTANEOUS at 08:46

## 2025-03-31 RX ADMIN — LACTULOSE 20 G: 20 SOLUTION ORAL at 08:47

## 2025-03-31 RX ADMIN — Medication 1 TABLET: at 08:46

## 2025-03-31 RX ADMIN — LACTULOSE 20 G: 20 SOLUTION ORAL at 13:25

## 2025-03-31 RX ADMIN — SODIUM CHLORIDE, PRESERVATIVE FREE 10 ML: 5 INJECTION INTRAVENOUS at 08:47

## 2025-03-31 RX ADMIN — EMPAGLIFLOZIN 5 MG: 10 TABLET, FILM COATED ORAL at 08:46

## 2025-03-31 RX ADMIN — FERROUS SULFATE TAB 325 MG (65 MG ELEMENTAL FE) 325 MG: 325 (65 FE) TAB at 08:46

## 2025-03-31 RX ADMIN — MIDODRINE HYDROCHLORIDE 5 MG: 5 TABLET ORAL at 08:46

## 2025-03-31 RX ADMIN — MIDODRINE HYDROCHLORIDE 2.5 MG: 5 TABLET ORAL at 12:05

## 2025-03-31 RX ADMIN — PANTOPRAZOLE SODIUM 40 MG: 40 TABLET, DELAYED RELEASE ORAL at 05:11

## 2025-03-31 RX ADMIN — LEVOTHYROXINE SODIUM 125 MCG: 0.12 TABLET ORAL at 05:11

## 2025-03-31 RX ADMIN — PREDNISONE 5 MG: 5 TABLET ORAL at 08:46

## 2025-03-31 RX ADMIN — DICLOFENAC SODIUM 4 G: 10 GEL TOPICAL at 08:46

## 2025-03-31 RX ADMIN — ACARBOSE 100 MG: 50 TABLET ORAL at 08:46

## 2025-03-31 RX ADMIN — HYDROCORTISONE 10 MG: 10 TABLET ORAL at 05:11

## 2025-03-31 RX ADMIN — ASPIRIN 81 MG: 81 TABLET, CHEWABLE ORAL at 08:46

## 2025-03-31 ASSESSMENT — PAIN SCALES - GENERAL
PAINLEVEL_OUTOF10: 0
PAINLEVEL_OUTOF10: 0

## 2025-03-31 NOTE — ACP (ADVANCE CARE PLANNING)
Advance Care Planning     Advance Care Planning Inpatient Note  [facility] Department    Today's Date: 3/31/2025  Unit: [facility] B3 - MED SURG    Received request from IDT Member, patient, and family.  Upon review of chart and communication with care team, patient's decision making abilities are not in question.. Patient and Spouse was/were present in the room during visit.    Goals of ACP Conversation:  Discuss advance care planning documents  Facilitate a discussion related to patient's goals of care as they align with the patient's values and beliefs.    Health Care Decision Makers:       Primary Decision Maker: Solange Blanco \"Paula\" - Spouse - [phone]    Secondary Decision Maker: Vale Cardona - Child - [phone]    Supplemental (Other) Decision Maker: Tip Blanco - Child - [phone]    Supplemental (Other) Decision Maker: KarenRosalie - Child - [phone]  Summary:  Completed New Documents  Updated Healthcare Decision Maker    Advance Care Planning Documents (Patient Wishes):  Healthcare Power of /Advance Directive Appointment of Health Care Agent  Living Will/Advance Directive     Assessment:  Pt does not want to be intubated and does not want CPR done.     Interventions:  Provided education on documents for clarity and greater understanding  Discussed and provided education on state decision maker hierarchy  Assisted in the completion of documents according to patient's wishes at this time    Care Preferences Communicated:     Hospitalization:  If the patient's health worsens and it becomes clear that the chance of recovery is unlikely,     the patient prefers comfort-focused treatment without hospitalization.    Ventilation:   If the patient, in their present state of health, suddenly became very ill and unable to breathe on their own,     the patient would NOT desire the use of a ventilator (breathing machine).    If their health worsens and it becomes clear that the change of

## 2025-03-31 NOTE — DISCHARGE SUMMARY
Discharge Summary    Name:  Reza Blanco /Age/Sex: 1940  (84 y.o. male)   MRN & CSN:  [id number] & [id number] Admission Date/Time: 3/23/2025 11:28 AM   Attending:  Sonal Connors MD Discharging Physician: Sonal Connors MD     Discharge diagnosis and plan:  Presenting Admission History:     84 y.o. male who presented to [facility] with SOB.  PMHx significant for Hypothyroidism, CAD, TIA, HLD, SBO, CHF, TAVR, OA. Patient seen at bedside. Accompanied by family. Patient mentions ongoing sob for past 1 week, and LE swelling since January. Patient also endorses an ongoing dry cough. Patient also has ongoing chronic back pain 5/10 in severity. Patient unable to independently ambulate. Patient has [facility] at home for assistance with ADLs. Patient denies any chest pain, pain anywhere else, fevers, chills, light headedness, trouble urinating or defecating.       #CHFrEF, acute on chronic exacerbation  #Elevated troponin, likely demand ischemia  #CAD hx  #TIA hx  #TAVR hx  #HLD hx  Plan:  Cardiology team were onboard, appreciate recommendations. Daily weight monitored. Intake and Output monitored. Placed in Telemetry.  He was started on IV diuresis then oral torsemide.  Good diuresis.  But torsemide held in setting of soft blood pressures and improvement in fluid status.  Entresto and spironolactone were held also due to intolerance - soft blood pressure.  Patient will be continued on aspirin and statin.  Entresto and spironolactone will be held for now.  Patient is requiring midodrine-dose decreased to 2.5 Mg 3 times daily.  This will be further adjusted and weaned off as tolerated in skilled nursing facility.  Torsemide 10 mg every other day.  Patient to follow-up with cardiology team outpatient for adjustment in GDMT.       #Hypothyroidism  #Chronic constipation  Plan:  Continue home medications  Continue to monitor     #OA hx  Plan:  Patient will be continued on Tylenol and

## 2025-03-31 NOTE — PLAN OF CARE
Problem: Cardiovascular - Adult  Goal: Maintains optimal cardiac output and hemodynamic stability  3/27/2025 2219 by Maddie Duque RN  Outcome: Progressing   CHF Care Plan      Patient's EF (Ejection Fraction) is less than 40%    Heart Failure Medications:  Diuretics:: on hold (torsemide, aldactone)     (One of the following REQUIRED for EF </= 40%/SYSTOLIC FAILURE but MAY be used in EF% >40%/DIASTOLIC FAILURE)        ACE:: None        ARB:: None         ARNI:: Sacubitril/Valsartan-Entresto (on hold)     (Beta Blockers)  NON- Evidenced Based Beta Blocker (for EF% >40%/DIASTOLIC FAILURE): None    Evidenced Based Beta Blocker::(REQUIRED for EF% <40%/SYSTOLIC FAILURE) Metoprolol SUCCinate- Toprol XL (on hold)   ...................................................................................................................................................    Failed to redirect to the Timeline version of the [employer] SmartLink.      Patient's weights and intake/output reviewed    Daily Weight log at bedside, patient/family participation in use of log: \"yes    Patient's current weight today shows a difference of 3 lbs more than last documented weight.      Intake/Output Summary (Last 24 hours) at 3/27/2025 2220  Last data filed at 3/27/2025 1658  Gross per 24 hour   Intake 660.47 ml   Output 2000 ml   Net -1339.53 ml       Education Booklet Provided: yes    Comorbidities Reviewed Yes    Patient has a past medical history of Allergic rhinitis, Anemia, Arthritis, CAD (coronary artery disease), Chronic systolic congestive heart failure (HCC), Compression fracture of T11 vertebra (HCC), Food allergy, Hiatal hernia, History of blood transfusion, Hyperlipidemia, Hypertension, Hypothyroid, Obesity, Occlusion and stenosis of carotid artery, Osteoarthritis, Recurrent right inguinal hernia, Shingles, Thyroid disease, and TIA (transient ischemic attack).     >>For CHF and Comorbidity documentation on Education Time and 
  Problem: Cardiovascular - Adult  Goal: Maintains optimal cardiac output and hemodynamic stability  Outcome: Progressing   CHF Care Plan      Patient's EF (Ejection Fraction) is less than 40%    Heart Failure Medications:  Diuretics:: Torsemide    (One of the following REQUIRED for EF </= 40%/SYSTOLIC FAILURE but MAY be used in EF% >40%/DIASTOLIC FAILURE)        ACE:: None        ARB:: None         ARNI:: None    (Beta Blockers)  NON- Evidenced Based Beta Blocker (for EF% >40%/DIASTOLIC FAILURE): None    Evidenced Based Beta Blocker::(REQUIRED for EF% <40%/SYSTOLIC FAILURE) None  ...................................................................................................................................................    Failed to redirect to the Timeline version of the [employer] SmartLink.      Patient's weights and intake/output reviewed    Daily Weight log at bedside, patient/family participation in use of log: \"yes    Patient's current weight today shows a difference of 0 lbs  than last documented weight.      Intake/Output Summary (Last 24 hours) at 3/26/2025 2214  Last data filed at 3/26/2025 1759  Gross per 24 hour   Intake 1107 ml   Output 2200 ml   Net -1093 ml       Education Booklet Provided: yes    Comorbidities Reviewed Yes    Patient has a past medical history of Allergic rhinitis, Anemia, Arthritis, CAD (coronary artery disease), Chronic systolic congestive heart failure (HCC), Compression fracture of T11 vertebra (HCC), Food allergy, Hiatal hernia, History of blood transfusion, Hyperlipidemia, Hypertension, Hypothyroid, Obesity, Occlusion and stenosis of carotid artery, Osteoarthritis, Recurrent right inguinal hernia, Shingles, Thyroid disease, and TIA (transient ischemic attack).     >>For CHF and Comorbidity documentation on Education Time and Topics, please see Education Tab      CHF Education    Learners:  Patient  Readineess:   Acceptance  Method:   Explanation  Response:   Verbalizes 
  Problem: Chronic Conditions and Co-morbidities  Goal: Patient's chronic conditions and co-morbidity symptoms are monitored and maintained or improved  3/25/2025 1142 by Magy Frias RN  Outcome: Progressing  3/25/2025 0552 by Sydni Sullivan LPN  Outcome: Progressing     Problem: Discharge Planning  Goal: Discharge to home or other facility with appropriate resources  3/25/2025 1142 by Magy Frias RN  Outcome: Progressing  3/25/2025 0552 by Sydni Sullivan LPN  Outcome: Progressing     Problem: Pain  Goal: Verbalizes/displays adequate comfort level or baseline comfort level  3/25/2025 1142 by Magy Frias RN  Outcome: Progressing  3/25/2025 0552 by Sydni Sullivan LPN  Outcome: Progressing     Problem: Skin/Tissue Integrity  Goal: Skin integrity remains intact  Description: 1.  Monitor for areas of redness and/or skin breakdown  2.  Assess vascular access sites hourly  3.  Every 4-6 hours minimum:  Change oxygen saturation probe site  4.  Every 4-6 hours:  If on nasal continuous positive airway pressure, respiratory therapy assess nares and determine need for appliance change or resting period  3/25/2025 1142 by Magy Frias RN  Outcome: Progressing  3/25/2025 0552 by Sydni Sullivan LPN  Outcome: Progressing     Problem: Safety - Adult  Goal: Free from fall injury  3/25/2025 1142 by Magy Frias RN  Outcome: Progressing  3/25/2025 0552 by Sydni Sullivan LPN  Outcome: Progressing     Problem: ABCDS Injury Assessment  Goal: Absence of physical injury  3/25/2025 1142 by Magy Frias RN  Outcome: Progressing  3/25/2025 0552 by Sydni Sullivan LPN  Outcome: Progressing     
  Problem: Chronic Conditions and Co-morbidities  Goal: Patient's chronic conditions and co-morbidity symptoms are monitored and maintained or improved  3/25/2025 2240 by Buzz Sullivan RN  Outcome: Progressing  3/25/2025 1142 by Magy Frias RN  Outcome: Progressing     Problem: Discharge Planning  Goal: Discharge to home or other facility with appropriate resources  3/25/2025 2240 by Buzz Sullivan RN  Outcome: Progressing  3/25/2025 1142 by Magy Frias RN  Outcome: Progressing     Problem: Pain  Goal: Verbalizes/displays adequate comfort level or baseline comfort level  3/25/2025 2240 by Buzz Sullivan RN  Outcome: Progressing  3/25/2025 1142 by Magy Frias RN  Outcome: Progressing     Problem: Skin/Tissue Integrity  Goal: Skin integrity remains intact  Description: 1.  Monitor for areas of redness and/or skin breakdown  2.  Assess vascular access sites hourly  3.  Every 4-6 hours minimum:  Change oxygen saturation probe site  4.  Every 4-6 hours:  If on nasal continuous positive airway pressure, respiratory therapy assess nares and determine need for appliance change or resting period  3/25/2025 2240 by Buzz Sullivan RN  Outcome: Progressing  3/25/2025 1142 by Magy Frias RN  Outcome: Progressing     Problem: Safety - Adult  Goal: Free from fall injury  3/25/2025 2240 by Buzz Sullivan RN  Outcome: Progressing  3/25/2025 1142 by Magy Frias RN  Outcome: Progressing     Problem: Metabolic/Fluid and Electrolytes - Adult  Goal: Glucose maintained within prescribed range  Outcome: Progressing     Problem: Skin/Tissue Integrity - Adult  Goal: Skin integrity remains intact  Description: 1.  Monitor for areas of redness and/or skin breakdown  2.  Assess vascular access sites hourly  3.  Every 4-6 hours minimum:  Change oxygen saturation probe site  4.  Every 4-6 hours:  If on nasal continuous positive airway pressure, respiratory therapy assess nares and determine need for appliance change or resting 
  Problem: Chronic Conditions and Co-morbidities  Goal: Patient's chronic conditions and co-morbidity symptoms are monitored and maintained or improved  3/29/2025 0102 by Madalyn Drew RN  Outcome: Progressing     Problem: Discharge Planning  Goal: Discharge to home or other facility with appropriate resources  3/29/2025 0102 by Madalyn Drew RN  Outcome: Progressing     Problem: Pain  Goal: Verbalizes/displays adequate comfort level or baseline comfort level  3/29/2025 0102 by Madalyn Drew RN  Outcome: Progressing     Problem: Skin/Tissue Integrity - Adult  Goal: Skin integrity remains intact  Description: 1.  Monitor for areas of redness and/or skin breakdown  2.  Assess vascular access sites hourly  3.  Every 4-6 hours minimum:  Change oxygen saturation probe site  4.  Every 4-6 hours:  If on nasal continuous positive airway pressure, respiratory therapy assess nares and determine need for appliance change or resting period  3/29/2025 0102 by Madalyn Drwe, RN  Outcome: Progressing     Continue with plan of care.   
  Problem: Chronic Conditions and Co-morbidities  Goal: Patient's chronic conditions and co-morbidity symptoms are monitored and maintained or improved  3/29/2025 1013 by Wendy Mathis RN  Outcome: Progressing  3/29/2025 0102 by Madalyn Drew RN  Outcome: Progressing     Problem: Discharge Planning  Goal: Discharge to home or other facility with appropriate resources  3/29/2025 1013 by Wendy Mathis RN  Outcome: Progressing  3/29/2025 0102 by Madlayn Drew RN  Outcome: Progressing     Problem: Pain  Goal: Verbalizes/displays adequate comfort level or baseline comfort level  3/29/2025 1013 by Wendy Mathis RN  Outcome: Progressing  3/29/2025 0102 by Madalyn Drew RN  Outcome: Progressing     Problem: Skin/Tissue Integrity  Goal: Skin integrity remains intact  Description: 1.  Monitor for areas of redness and/or skin breakdown  2.  Assess vascular access sites hourly  3.  Every 4-6 hours minimum:  Change oxygen saturation probe site  4.  Every 4-6 hours:  If on nasal continuous positive airway pressure, respiratory therapy assess nares and determine need for appliance change or resting period  3/29/2025 0102 by Madalyn Drew RN  Outcome: Progressing     Problem: Safety - Adult  Goal: Free from fall injury  Recent Flowsheet Documentation  Taken 3/29/2025 0422 by Madalyn Drew RN  Free From Fall Injury: Instruct family/caregiver on patient safety  3/29/2025 0102 by Madalyn Drew RN  Outcome: Progressing     Problem: ABCDS Injury Assessment  Goal: Absence of physical injury  3/29/2025 0102 by Madalyn Drew RN  Outcome: Progressing     Problem: Skin/Tissue Integrity - Adult  Goal: Skin integrity remains intact  Description: 1.  Monitor for areas of redness and/or skin breakdown  2.  Assess vascular access sites hourly  3.  Every 4-6 hours minimum:  Change oxygen saturation probe site  4.  Every 4-6 hours:  If on nasal continuous positive airway pressure, respiratory therapy assess nares and determine need for 
  Problem: Chronic Conditions and Co-morbidities  Goal: Patient's chronic conditions and co-morbidity symptoms are monitored and maintained or improved  3/29/2025 1958 by Nery Don RN  Outcome: Progressing  Flowsheets (Taken 3/29/2025 1948)  Care Plan - Patient's Chronic Conditions and Co-Morbidity Symptoms are Monitored and Maintained or Improved: Monitor and assess patient's chronic conditions and comorbid symptoms for stability, deterioration, or improvement     Problem: Discharge Planning  Goal: Discharge to home or other facility with appropriate resources  3/29/2025 1958 by Nery Don RN  Outcome: Progressing  Flowsheets (Taken 3/29/2025 1948)  Discharge to home or other facility with appropriate resources: Identify barriers to discharge with patient and caregiver     Problem: Pain  Goal: Verbalizes/displays adequate comfort level or baseline comfort level  3/29/2025 1958 by Nery Don RN  Outcome: Progressing  Flowsheets (Taken 3/29/2025 1945)  Verbalizes/displays adequate comfort level or baseline comfort level: Encourage patient to monitor pain and request assistance     Problem: Skin/Tissue Integrity  Goal: Skin integrity remains intact  Description: 1.  Monitor for areas of redness and/or skin breakdown  2.  Assess vascular access sites hourly  3.  Every 4-6 hours minimum:  Change oxygen saturation probe site  4.  Every 4-6 hours:  If on nasal continuous positive airway pressure, respiratory therapy assess nares and determine need for appliance change or resting period  Outcome: Progressing  Flowsheets  Taken 3/29/2025 1958  Skin Integrity Remains Intact: Monitor for areas of redness and/or skin breakdown  Taken 3/29/2025 1948  Skin Integrity Remains Intact: Monitor for areas of redness and/or skin breakdown     Problem: Safety - Adult  Goal: Free from fall injury  Outcome: Progressing     Problem: ABCDS Injury Assessment  Goal: Absence of physical injury  Outcome: Progressing     Problem: 
  Problem: Chronic Conditions and Co-morbidities  Goal: Patient's chronic conditions and co-morbidity symptoms are monitored and maintained or improved  3/30/2025 2059 by Nery Don RN  Outcome: Progressing  Flowsheets (Taken 3/30/2025 2055)  Care Plan - Patient's Chronic Conditions and Co-Morbidity Symptoms are Monitored and Maintained or Improved: Monitor and assess patient's chronic conditions and comorbid symptoms for stability, deterioration, or improvement     Problem: Discharge Planning  Goal: Discharge to home or other facility with appropriate resources  3/30/2025 2059 by Nery Don RN  Outcome: Progressing  Flowsheets (Taken 3/30/2025 2055)  Discharge to home or other facility with appropriate resources: Identify barriers to discharge with patient and caregiver     Problem: Pain  Goal: Verbalizes/displays adequate comfort level or baseline comfort level  3/30/2025 2059 by Nery Don RN  Outcome: Progressing  Flowsheets (Taken 3/30/2025 1945)  Verbalizes/displays adequate comfort level or baseline comfort level: Encourage patient to monitor pain and request assistance     Problem: Skin/Tissue Integrity  Goal: Skin integrity remains intact  Description: 1.  Monitor for areas of redness and/or skin breakdown  2.  Assess vascular access sites hourly  3.  Every 4-6 hours minimum:  Change oxygen saturation probe site  4.  Every 4-6 hours:  If on nasal continuous positive airway pressure, respiratory therapy assess nares and determine need for appliance change or resting period  Outcome: Progressing  Flowsheets  Taken 3/30/2025 2059  Skin Integrity Remains Intact: Monitor for areas of redness and/or skin breakdown  Taken 3/30/2025 2055  Skin Integrity Remains Intact: Monitor for areas of redness and/or skin breakdown     Problem: Safety - Adult  Goal: Free from fall injury  Outcome: Progressing     Problem: ABCDS Injury Assessment  Goal: Absence of physical injury  Outcome: Progressing     Problem: 
  Problem: Chronic Conditions and Co-morbidities  Goal: Patient's chronic conditions and co-morbidity symptoms are monitored and maintained or improved  3/31/2025 0958 by Anurag Villarreal RN  Outcome: Progressing     Problem: Discharge Planning  Goal: Discharge to home or other facility with appropriate resources  3/31/2025 0958 by Anurag Villarreal RN  Outcome: Progressing     Problem: Pain  Goal: Verbalizes/displays adequate comfort level or baseline comfort level  3/31/2025 0958 by Anurag Villarreal RN  Outcome: Progressing     Problem: Skin/Tissue Integrity  Goal: Skin integrity remains intact  Description: 1.  Monitor for areas of redness and/or skin breakdown  2.  Assess vascular access sites hourly  3.  Every 4-6 hours minimum:  Change oxygen saturation probe site  4.  Every 4-6 hours:  If on nasal continuous positive airway pressure, respiratory therapy assess nares and determine need for appliance change or resting period  3/31/2025 0958 by Anurag Villarreal RN  Outcome: Progressing     Problem: Safety - Adult  Goal: Free from fall injury  3/31/2025 0958 by Anurag Villarreal RN  Outcome: Progressing     Problem: Skin/Tissue Integrity - Adult  Goal: Skin integrity remains intact  Description: 1.  Monitor for areas of redness and/or skin breakdown  2.  Assess vascular access sites hourly  3.  Every 4-6 hours minimum:  Change oxygen saturation probe site  4.  Every 4-6 hours:  If on nasal continuous positive airway pressure, respiratory therapy assess nares and determine need for appliance change or resting period  3/31/2025 0958 by Anurag Villarreal RN  Outcome: Progressing     Problem: Musculoskeletal - Adult  Goal: Return mobility to safest level of function  3/31/2025 0958 by Anurag Villarreal RN  Outcome: Progressing     Problem: Metabolic/Fluid and Electrolytes - Adult  Goal: Glucose maintained within prescribed range  3/31/2025 0958 by Anurag Villarreal RN  Outcome: Progressing     Problem: Cardiovascular - Adult  Goal: 
  Problem: Chronic Conditions and Co-morbidities  Goal: Patient's chronic conditions and co-morbidity symptoms are monitored and maintained or improved  Outcome: Progressing     Problem: Discharge Planning  Goal: Discharge to home or other facility with appropriate resources  Outcome: Progressing     Problem: Pain  Goal: Verbalizes/displays adequate comfort level or baseline comfort level  Outcome: Progressing     
  Problem: Chronic Conditions and Co-morbidities  Goal: Patient's chronic conditions and co-morbidity symptoms are monitored and maintained or improved  Outcome: Progressing     Problem: Discharge Planning  Goal: Discharge to home or other facility with appropriate resources  Outcome: Progressing     Problem: Pain  Goal: Verbalizes/displays adequate comfort level or baseline comfort level  Outcome: Progressing     Problem: Skin/Tissue Integrity  Goal: Skin integrity remains intact  Description: 1.  Monitor for areas of redness and/or skin breakdown  2.  Assess vascular access sites hourly  3.  Every 4-6 hours minimum:  Change oxygen saturation probe site  4.  Every 4-6 hours:  If on nasal continuous positive airway pressure, respiratory therapy assess nares and determine need for appliance change or resting period  Outcome: Progressing     Problem: Safety - Adult  Goal: Free from fall injury  Outcome: Progressing     Problem: ABCDS Injury Assessment  Goal: Absence of physical injury  Outcome: Progressing     
  Problem: Chronic Conditions and Co-morbidities  Goal: Patient's chronic conditions and co-morbidity symptoms are monitored and maintained or improved  Outcome: Progressing     Problem: Discharge Planning  Goal: Discharge to home or other facility with appropriate resources  Outcome: Progressing     Problem: Pain  Goal: Verbalizes/displays adequate comfort level or baseline comfort level  Outcome: Progressing     Problem: Skin/Tissue Integrity  Goal: Skin integrity remains intact  Description: 1.  Monitor for areas of redness and/or skin breakdown  2.  Assess vascular access sites hourly  3.  Every 4-6 hours minimum:  Change oxygen saturation probe site  4.  Every 4-6 hours:  If on nasal continuous positive airway pressure, respiratory therapy assess nares and determine need for appliance change or resting period  Outcome: Progressing     Problem: Safety - Adult  Goal: Free from fall injury  Outcome: Progressing     Problem: ABCDS Injury Assessment  Goal: Absence of physical injury  Outcome: Progressing     Problem: Skin/Tissue Integrity - Adult  Goal: Skin integrity remains intact  Description: 1.  Monitor for areas of redness and/or skin breakdown  2.  Assess vascular access sites hourly  3.  Every 4-6 hours minimum:  Change oxygen saturation probe site  4.  Every 4-6 hours:  If on nasal continuous positive airway pressure, respiratory therapy assess nares and determine need for appliance change or resting period  Outcome: Progressing  Goal: Incisions, wounds, or drain sites healing without S/S of infection  Outcome: Progressing     Problem: Musculoskeletal - Adult  Goal: Return mobility to safest level of function  Outcome: Progressing     Problem: Metabolic/Fluid and Electrolytes - Adult  Goal: Glucose maintained within prescribed range  Outcome: Progressing     Problem: Respiratory - Adult  Goal: Achieves optimal ventilation and oxygenation  Outcome: Progressing     Problem: Cardiovascular - Adult  Goal: 
  Problem: Chronic Conditions and Co-morbidities  Goal: Patient's chronic conditions and co-morbidity symptoms are monitored and maintained or improved  Outcome: Progressing     Problem: Discharge Planning  Goal: Discharge to home or other facility with appropriate resources  Outcome: Progressing     Problem: Pain  Goal: Verbalizes/displays adequate comfort level or baseline comfort level  Outcome: Progressing     Problem: Skin/Tissue Integrity  Goal: Skin integrity remains intact  Description: 1.  Monitor for areas of redness and/or skin breakdown  2.  Assess vascular access sites hourly  3.  Every 4-6 hours minimum:  Change oxygen saturation probe site  4.  Every 4-6 hours:  If on nasal continuous positive airway pressure, respiratory therapy assess nares and determine need for appliance change or resting period  Outcome: Progressing     Problem: Safety - Adult  Goal: Free from fall injury  Outcome: Progressing     Problem: ABCDS Injury Assessment  Goal: Absence of physical injury  Outcome: Progressing     Problem: Skin/Tissue Integrity - Adult  Goal: Skin integrity remains intact  Description: 1.  Monitor for areas of redness and/or skin breakdown  2.  Assess vascular access sites hourly  3.  Every 4-6 hours minimum:  Change oxygen saturation probe site  4.  Every 4-6 hours:  If on nasal continuous positive airway pressure, respiratory therapy assess nares and determine need for appliance change or resting period  Outcome: Progressing  Goal: Incisions, wounds, or drain sites healing without S/S of infection  Outcome: Progressing     Problem: Musculoskeletal - Adult  Goal: Return mobility to safest level of function  Outcome: Progressing     Problem: Metabolic/Fluid and Electrolytes - Adult  Goal: Glucose maintained within prescribed range  Outcome: Progressing     Problem: Respiratory - Adult  Goal: Achieves optimal ventilation and oxygenation  Outcome: Progressing     Problem: Cardiovascular - Adult  Goal: 
  Problem: Chronic Conditions and Co-morbidities  Goal: Patient's chronic conditions and co-morbidity symptoms are monitored and maintained or improved  Outcome: Progressing     Problem: Discharge Planning  Goal: Discharge to home or other facility with appropriate resources  Outcome: Progressing  Flowsheets (Taken 3/23/2025 1713 by Mariana Mcneill, RN)  Discharge to home or other facility with appropriate resources: Identify barriers to discharge with patient and caregiver     Problem: Pain  Goal: Verbalizes/displays adequate comfort level or baseline comfort level  Outcome: Progressing     Problem: Skin/Tissue Integrity  Goal: Skin integrity remains intact  Description: 1.  Monitor for areas of redness and/or skin breakdown  2.  Assess vascular access sites hourly  3.  Every 4-6 hours minimum:  Change oxygen saturation probe site  4.  Every 4-6 hours:  If on nasal continuous positive airway pressure, respiratory therapy assess nares and determine need for appliance change or resting period  Outcome: Progressing     Problem: Safety - Adult  Goal: Free from fall injury  Outcome: Progressing     Problem: ABCDS Injury Assessment  Goal: Absence of physical injury  Outcome: Progressing     
CHF Care Plan      Patient's EF (Ejection Fraction) is less than 40%    Heart Failure Medications:  Diuretics:: None    (One of the following REQUIRED for EF </= 40%/SYSTOLIC FAILURE but MAY be used in EF% >40%/DIASTOLIC FAILURE)        ACE:: None        ARB:: None         ARNI:: Sacubitril/Valsartan-Entresto    (Beta Blockers)  NON- Evidenced Based Beta Blocker (for EF% >40%/DIASTOLIC FAILURE): None    Evidenced Based Beta Blocker::(REQUIRED for EF% <40%/SYSTOLIC FAILURE) None  ...................................................................................................................................................    Failed to redirect to the Timeline version of the [employer].      Patient's weights and intake/output reviewed    Daily Weight log at bedside, patient/family participation in use of log: \"yes    Patient's current weight today shows a difference of 1 lbs more than last documented weight.      Intake/Output Summary (Last 24 hours) at 3/28/2025 1846  Last data filed at 3/28/2025 1802  Gross per 24 hour   Intake 480 ml   Output 2150 ml   Net -1670 ml       Education Booklet Provided: yes    Comorbidities Reviewed Yes    Patient has a past medical history of Allergic rhinitis, Anemia, Arthritis, CAD (coronary artery disease), Chronic systolic congestive heart failure (HCC), Compression fracture of T11 vertebra (HCC), Food allergy, Hiatal hernia, History of blood transfusion, Hyperlipidemia, Hypertension, Hypothyroid, Obesity, Occlusion and stenosis of carotid artery, Osteoarthritis, Recurrent right inguinal hernia, Shingles, Thyroid disease, and TIA (transient ischemic attack).     >>For CHF and Comorbidity documentation on Education Time and Topics, please see Education Tab      CHF Education    Learners:  Patient  Readineess:   Acceptance  Method:   Explanation and Handout  Response:   Verbalizes Understanding  Comments: na    Time Spent: 10 minutes        Pt resting in bed at this time on 
CHF Care Plan      Patient's EF (Ejection Fraction) is less than 40%    Heart Failure Medications:  Diuretics:: Torsemide    (One of the following REQUIRED for EF </= 40%/SYSTOLIC FAILURE but MAY be used in EF% >40%/DIASTOLIC FAILURE)        ACE:: None        ARB:: None         ARNI:: None    (Beta Blockers)  NON- Evidenced Based Beta Blocker (for EF% >40%/DIASTOLIC FAILURE): None    Evidenced Based Beta Blocker::(REQUIRED for EF% <40%/SYSTOLIC FAILURE) None  ...................................................................................................................................................    Failed to redirect to the Timeline version of the [employer] SmartLink.      Patient's weights and intake/output reviewed    Daily Weight log at bedside, patient/family participation in use of log: \"yes    Patient's current weight today shows a difference of 0   lbs less than last documented weight.      Intake/Output Summary (Last 24 hours) at 3/27/2025 1707  Last data filed at 3/27/2025 1658  Gross per 24 hour   Intake 660.47 ml   Output 2300 ml   Net -1639.53 ml       Education Booklet Provided: yes    Comorbidities Reviewed Yes    Patient has a past medical history of Allergic rhinitis, Anemia, Arthritis, CAD (coronary artery disease), Chronic systolic congestive heart failure (HCC), Compression fracture of T11 vertebra (HCC), Food allergy, Hiatal hernia, History of blood transfusion, Hyperlipidemia, Hypertension, Hypothyroid, Obesity, Occlusion and stenosis of carotid artery, Osteoarthritis, Recurrent right inguinal hernia, Shingles, Thyroid disease, and TIA (transient ischemic attack).     >>For CHF and Comorbidity documentation on Education Time and Topics, please see Education Tab      CHF Education    Learners:  Patient  Readineess:   Acceptance  Method:   Explanation and Handout  Response:   Verbalizes Understanding  Comments: na    Time Spent: 10 minutes        Pt resting in bed at this time on room air. Pt 
CHF Care Plan      Patient's EF (Ejection Fraction) is less than 40%    Heart Failure Medications:  Diuretics:: Torsemide    (One of the following REQUIRED for EF </= 40%/SYSTOLIC FAILURE but MAY be used in EF% >40%/DIASTOLIC FAILURE)        ACE:: None        ARB:: None         ARNI:: Sacubitril/Valsartan-Entresto    (Beta Blockers)  NON- Evidenced Based Beta Blocker (for EF% >40%/DIASTOLIC FAILURE): None    Evidenced Based Beta Blocker::(REQUIRED for EF% <40%/SYSTOLIC FAILURE) Metoprolol SUCCinate- Toprol XL held  ...................................................................................................................................................    Failed to redirect to the Timeline version of the [employer] SmartLink.      Patient's weights and intake/output reviewed    Daily Weight log at bedside, patient/family participation in use of log: \"yes    Patient's current weight today shows a difference of 0.22 lbs more than last documented weight.      Intake/Output Summary (Last 24 hours) at 3/26/2025 0628  Last data filed at 3/26/2025 0621  Gross per 24 hour   Intake 992 ml   Output 1450 ml   Net -458 ml       Education Booklet Provided: yes    Comorbidities Reviewed Yes    Patient has a past medical history of Allergic rhinitis, Anemia, Arthritis, CAD (coronary artery disease), Chronic systolic congestive heart failure (HCC), Compression fracture of T11 vertebra (HCC), Food allergy, Hiatal hernia, History of blood transfusion, Hyperlipidemia, Hypertension, Hypothyroid, Obesity, Occlusion and stenosis of carotid artery, Osteoarthritis, Recurrent right inguinal hernia, Shingles, Thyroid disease, and TIA (transient ischemic attack).     >>For CHF and Comorbidity documentation on Education Time and Topics, please see Education Tab      CHF Education    Learners:  Patient  Readineess:   Acceptance  Method:   Explanation  Response:   Demonstrated Understanding  Comments:     Time Spent: 5 minutes      Pt resting 
CHF Care Plan      Patient's EF (Ejection Fraction) is less than 40%    Heart Failure Medications:  Diuretics:: Torsemide (held)    (One of the following REQUIRED for EF </= 40%/SYSTOLIC FAILURE but MAY be used in EF% >40%/DIASTOLIC FAILURE)        ACE:: None        ARB:: None         ARNI:: Sacubitril/Valsartan-Entresto (held)    (Beta Blockers)  NON- Evidenced Based Beta Blocker (for EF% >40%/DIASTOLIC FAILURE): None    Evidenced Based Beta Blocker::(REQUIRED for EF% <40%/SYSTOLIC FAILURE) None  ...................................................................................................................................................    Failed to redirect to the Timeline version of the [employer] SmartLink.      Patient's weights and intake/output reviewed    Daily Weight log at bedside, patient/family participation in use of log: \"yes    Patient's current weight today shows a difference of 1 lbs more than last documented weight.      Intake/Output Summary (Last 24 hours) at 3/30/2025 0509  Last data filed at 3/29/2025 2108  Gross per 24 hour   Intake 920 ml   Output 1000 ml   Net -80 ml       Education Booklet Provided: yes    Comorbidities Reviewed Yes    Patient has a past medical history of Allergic rhinitis, Anemia, Arthritis, CAD (coronary artery disease), Chronic systolic congestive heart failure (HCC), Compression fracture of T11 vertebra (HCC), Food allergy, Hiatal hernia, History of blood transfusion, Hyperlipidemia, Hypertension, Hypothyroid, Obesity, Occlusion and stenosis of carotid artery, Osteoarthritis, Recurrent right inguinal hernia, Shingles, Thyroid disease, and TIA (transient ischemic attack).     >>For CHF and Comorbidity documentation on Education Time and Topics, please see Education Tab      CHF Education    Learners:  Patient  Readineess:   Acceptance  Method:   Explanation  Response:   Needs Reinforcement  Comments: n/a    Time Spent: 5min      Pt resting in bed at this time on room 
CHF Care Plan      Patient's EF (Ejection Fraction) is less than 40%    Heart Failure Medications:  Diuretics:: Torsemide (held)    (One of the following REQUIRED for EF </= 40%/SYSTOLIC FAILURE but MAY be used in EF% >40%/DIASTOLIC FAILURE)        ACE:: None        ARB:: None         ARNI:: Sacubitril/Valsartan-Entresto (held)    (Beta Blockers)  NON- Evidenced Based Beta Blocker (for EF% >40%/DIASTOLIC FAILURE): None    Evidenced Based Beta Blocker::(REQUIRED for EF% <40%/SYSTOLIC FAILURE) None  ...................................................................................................................................................    Failed to redirect to the Timeline version of the [employer] SmartLink.      Patient's weights and intake/output reviewed    Daily Weight log at bedside, patient/family participation in use of log: \"yes    Patient's current weight today shows a difference of 6 lbs more than last documented weight.      Intake/Output Summary (Last 24 hours) at 3/31/2025 0515  Last data filed at 3/31/2025 0514  Gross per 24 hour   Intake 960 ml   Output 2050 ml   Net -1090 ml       Education Booklet Provided: yes    Comorbidities Reviewed Yes    Patient has a past medical history of Allergic rhinitis, Anemia, Arthritis, CAD (coronary artery disease), Chronic systolic congestive heart failure (HCC), Compression fracture of T11 vertebra (HCC), Food allergy, Hiatal hernia, History of blood transfusion, Hyperlipidemia, Hypertension, Hypothyroid, Obesity, Occlusion and stenosis of carotid artery, Osteoarthritis, Recurrent right inguinal hernia, Shingles, Thyroid disease, and TIA (transient ischemic attack).     >>For CHF and Comorbidity documentation on Education Time and Topics, please see Education Tab      CHF Education    Learners:  Patient  Readineess:   Acceptance  Method:   Explanation  Response:   Needs Reinforcement  Comments: n/a    Time Spent: 5min      Pt resting in bed at this time on room 
Increase function to baseline.    
Increase patients ADLs/functional status to baseline.    
Received ED page for admission.  Sent to Dr. Crenshaw admitting hospitalist.     
mins       Pt resting in bed at this time on room air. Pt denies shortness of breath. Pt with nonpitting lower extremity edema.     Patient and/or Family's stated Goal of Care this Admission: reduce shortness of breath, increase activity tolerance, better understand heart failure and disease management, be more comfortable, and reduce lower extremity edema prior to discharge        :   
room air. Pt denies shortness of breath. Pt with pitting lower extremity edema.     Patient and/or Family's stated Goal of Care this Admission: reduce shortness of breath, increase activity tolerance, better understand heart failure and disease management, be more comfortable, and reduce lower extremity edema prior to discharge        :   
prescribed range  3/29/2025 1958 by Nery Don RN  Outcome: Progressing  Flowsheets (Taken 3/29/2025 1948)  Glucose maintained within prescribed range: Monitor blood glucose as ordered     Problem: Respiratory - Adult  Goal: Achieves optimal ventilation and oxygenation  3/29/2025 1958 by Nery Don RN  Outcome: Progressing  Flowsheets (Taken 3/29/2025 1948)  Achieves optimal ventilation and oxygenation: Assess for changes in respiratory status     Problem: Cardiovascular - Adult  Goal: Absence of cardiac dysrhythmias or at baseline  3/29/2025 1958 by Nery Don RN  Outcome: Progressing  Flowsheets (Taken 3/29/2025 1948)  Absence of cardiac dysrhythmias or at baseline: Monitor cardiac rate and rhythm  Goal: Maintains optimal cardiac output and hemodynamic stability  3/29/2025 1958 by Nery Don RN  Outcome: Progressing  Flowsheets (Taken 3/29/2025 1948)  Maintains optimal cardiac output and hemodynamic stability: Monitor blood pressure and heart rate     Problem: Gastrointestinal - Adult  Goal: Maintains adequate nutritional intake  3/29/2025 1958 by Nery Don RN  Outcome: Progressing  Flowsheets (Taken 3/29/2025 1948)  Maintains adequate nutritional intake: Monitor percentage of each meal consumed     Problem: Genitourinary - Adult  Goal: Absence of urinary retention  3/29/2025 1958 by Nery Don RN  Outcome: Progressing  Flowsheets (Taken 3/29/2025 1948)  Absence of urinary retention: Assess patient’s ability to void and empty bladder     Problem: Infection - Adult  Goal: Absence of infection at discharge  3/29/2025 1958 by Nrey Don RN  Outcome: Progressing  Flowsheets (Taken 3/29/2025 1948)  Absence of infection at discharge: Assess and monitor for signs and symptoms of infection     Problem: Nutrition Deficit:  Goal: Optimize nutritional status  3/29/2025 1958 by Nery Don RN  Outcome: Progressing

## 2025-03-31 NOTE — CARE COORDINATION
CASE MANAGEMENT DISCHARGE SUMMARY      Discharge to: [facility]    Precertification completed: 3/31/2025  Hospital Exemption Notification (HENS) completed: [id number]     IMM given: 3/31/2025     Transportation:    Family/car: private    Confirmed discharge plan with:     Patient: yes     Family:  yes     Facility/Agency, name:  [facility] faxed   Phone number for report to facility: [phone]     RN, name: Anurag    Note: Discharging nurse to complete [facility], reconcile [facility], and place final copy with patient's discharge packet. RN to ensure that written prescriptions for  Level II medications are sent with patient to the facility as per protocol.    Follow-Up copy of Important Message from Medicare (IMM2) has been explained to patient and/or designated healthcare decision maker (HCDM). Pt and/or HCDM aware that patient is permitted to stay an additional 4 hours prior to discharge to consider an appeal if they feel as though they are being discharged too soon. Patient may discharge as planned if chooses to do so.  Patient/HCDM voice no other concerns or questions regarding this process.       Cierra Raya RN       
Cert approved. Cierra Raya RN       
Cert remains pending to [facility]  
Cert remains pending to [facility]  
Chart reviewed day 2. Care provided by cards and IM. Pt is from home with his wife. He is active with [facility]. He was seen by palliative today. He has been having back pain that is not well controlled. Will continue to follow course for needs. Cierra Raya RN   
Chart reviewed day 4. Care provided by cards and IM. Pt is from home with his wife. He is active with [facility]. B/P soft overnight at 87/54. Na 131, BUN 47, BNP 7000 today. Pt getting med adjustments per cards. Will continue to follow course for needs. Cierra Raya RN   
Chart reviewed day 5. Care provided by cards and IM. Pt is from home with his wife. He is active with [facility]. Pt has recs for SNF. Pt and wife are discussing. SNF list provided. Pt continues to have bad pain in his lower back and hips. Xrays obtained and results pending. Will continue to follow course for needs. Cierra Raya RN     Referral made to [facility]. Electronically signed by Cierra Raya RN on 3/28/2025 at 2:45 PM    
[facility] requesting updated PT/OT notes for pending cert. No new notes as yet to upload, will upload when available  
The [facility] has accepted and cert is pending. Electronically signed by Cierra Raya RN on 3/28/2025 at 4:15 PM        
Transition of Care is related to the following treatment goals of Hypermagnesemia [E83.41]  Normocytic anemia [D64.9]  Leg swelling [M79.89]  Elevated troponin [R79.89]  CHF (congestive heart failure), NYHA class III, unspecified failure chronicity, unspecified type (HCC) [I50.9]  Chronic midline low back pain without sciatica [M54.50, G89.29]  Acute on chronic congestive heart failure, unspecified heart failure type (HCC) [I50.9]  Congestive heart failure, unspecified HF chronicity, unspecified heart failure type (HCC) [I50.9]    IF APPLICABLE: The Patient and/or patient representative Reza and his family were provided with a choice of provider and agrees with the discharge plan. Freedom of choice list with basic dialogue that supports the patient's individualized plan of care/goals and shares the quality data associated with the providers was provided to: Patient   Patient Representative Name:       The Patient and/or Patient Representative Agree with the Discharge Plan? Yes    Cierra Raya RN  Case Management Department

## 2025-03-31 NOTE — CONSULTS
[facility]  HEART FAILURE PROGRAM      [facility] 1940    History:  Past Medical History:   Diagnosis Date    Allergic rhinitis     Anemia     Arthritis     rt hip    CAD (coronary artery disease) 2001    cardiac stents    Chronic systolic congestive heart failure (HCC) 8/21/2018    Compression fracture of T11 vertebra (HCC)     back brace    Food allergy     Hiatal hernia     History of blood transfusion     platelets=2001    Hyperlipidemia     Hypertension     hx of    Hypothyroid     Obesity 10/2/2012    Occlusion and stenosis of carotid artery 10/2/2012    Osteoarthritis     Hip right    Recurrent right inguinal hernia     Shingles 2014    Thyroid disease     TIA (transient ischemic attack)        ECHO: 3/24/25  EF 25-30%  HgA1C: 3/24/25  5.3  Iron Saturation: 3/24/25  21  Ferritin: 6/24/19  123.1    ACE/ARB/ARNI: Entresto on hold  BB:   Spironolactone:  Ferrous Sulfate 325mg daily  SGLT2:  Jardiance 5mg daily     Last Hospital Admission: 5/30/24  nonrheumatic aortic valve stenosis  Discharge plans:  [facility]     Advanced Directives: patient  full code    Chart review completed.   Patient a 84 year old male, admitted for hypermagnesemia.   Hospital day 8, currently admitted on B3 level of care.  Per hospital medicine pt has had SOB and leg swelling for 1 week prior to admission.   Noted to be in CHF exacerbation.   Torsemide on hold due to BP.   Palliative care has been consulted and met with patient and family.   DPOA discussed with wife.   Plans are for further follow up when patient in a better state to participate in the discussion.  Plans are for patient to discharge to [facility] when medically stable.  There they will monitor for s/s of CHF including daily weight.  They will also assist in diet and fluid restrictions.       Patient recent weights and intake/output reviewed:    Patient Vitals for the past 96 hrs (Last 3 readings):   Weight   03/31/25 0506 70.9 kg (156 lb

## 2025-03-31 NOTE — PROGRESS NOTES
[facility] Medicine Progress Note  V 1.6      Date of Admission: 3/23/2025    Hospital Day: 7      Chief Admission Complaint:  SOB    Subjective:  Patient seen at bedside today. Patient no sob, chest pain, nausea, vomiting, trouble urinating.    Presenting Admission History:       84 y.o. male who presented to [facility] with SOB.  PMHx significant for Hypothyroidism, CAD, TIA, HLD, SBO, CHF, TAVR, OA. Patient seen at bedside. Accompanied by family. Patient mentions ongoing sob for past 1 week, and LE swelling since January. Patient also endorses an ongoing dry cough. Patient also has ongoing chronic back pain 5/10 in severity. Patient unable to independently ambulate. Patient has [facility] at home for assistance with ADLs. Patient denies any chest pain, pain anywhere else, fevers, chills, light headedness, trouble urinating or defecating.       Assessment/Plan:      Current Principal Problem:  CHF (congestive heart failure), NYHA class III, unspecified failure chronicity, unspecified type ([facility])    #CHFrEF, acute on chronic exacerbation  #Elevated troponin, likely demand ischemia  #CAD hx  #TIA hx  #TAVR hx  #HLD hx  Plan:  Cardiology consulted, appreciate recommendations  Daily weights  Intake and Output monitor  Telemetry in place  Torsemide held in setting of soft blood pressures and no fluid overload signs at this time  GDMT as tolerated. Holding as needed in setting of soft blood pressures. Adjust as tolerated.  Aspirin, statin in place  Palliative care consulted, appreciate recommendations and discussion with family     #Hypothyroidism  #Chronic constipation  Plan:  Continue home medications  Continue to monitor     #OA hx  Plan:  Continue to monitor  Adjust pain regimen as needed    Ongoing threat to life and/or bodily function without ongoing treatment due to:  CHF    Consults:      IP CONSULT TO HOSPITALIST  IP CONSULT TO CARDIOLOGY  IP CONSULT TO CASE MANAGEMENT  IP CONSULT TO HEART FAILURE 
  [facility] Medicine Progress Note  V 1.6      Date of Admission: 3/23/2025    Hospital Day: 6      Chief Admission Complaint:  SOB    Subjective:  Patient seen at bedside today. Overnight soft blood pressures. Patient no sob, chest pain, nausea, vomiting, trouble urinating. Patient would like SNF placement and options to be discussed. Mentions wife has trouble with taking care of him at home.    Presenting Admission History:       84 y.o. male who presented to [facility] with SOB.  PMHx significant for Hypothyroidism, CAD, TIA, HLD, SBO, CHF, TAVR, OA. Patient seen at bedside. Accompanied by family. Patient mentions ongoing sob for past 1 week, and LE swelling since January. Patient also endorses an ongoing dry cough. Patient also has ongoing chronic back pain 5/10 in severity. Patient unable to independently ambulate. Patient has [facility] at home for assistance with ADLs. Patient denies any chest pain, pain anywhere else, fevers, chills, light headedness, trouble urinating or defecating.       Assessment/Plan:      Current Principal Problem:  CHF (congestive heart failure), NYHA class III, unspecified failure chronicity, unspecified type ([facility])    #CHFrEF, acute on chronic exacerbation  #Elevated troponin, likely demand ischemia  #CAD hx  #TIA hx  #TAVR hx  #HLD hx  Plan:  Cardiology consulted, appreciate recommendations  Daily weights  Intake and Output monitor  Telemetry in place  Torsemide begun per Cardiology. Held in setting of soft pressures. To restart as tolerated.  GDMT as tolerated. Held in setting of soft blood pressures. Adjust as tolerated.  Aspirin, statin in place  Palliative care consulted, appreciate recommendations and discussion with family     #Hypothyroidism  #Chronic constipation  Plan:  Continue home medications  Continue to monitor     #OA hx  Plan:  Continue to monitor  Adjust pain regimen as needed    Ongoing threat to life and/or bodily function without ongoing treatment due 
  [facility] Medicine Progress Note  V 1.6      Date of Admission: 3/23/2025    Hospital Day: 3      Chief Admission Complaint:  SOB    Subjective:  Patient seen at bedside today. No acute concerns. Ongoing swelling in LE, improved. Patient no sob, chest pain, nausea, vomiting, trouble urinating.    Presenting Admission History:       84 y.o. male who presented to [facility] with SOB.  PMHx significant for Hypothyroidism, CAD, TIA, HLD, SBO, CHF, TAVR, OA. Patient seen at bedside. Accompanied by family. Patient mentions ongoing sob for past 1 week, and LE swelling since January. Patient also endorses an ongoing dry cough. Patient also has ongoing chronic back pain 5/10 in severity. Patient unable to independently ambulate. Patient has [facility] at home for assistance with ADLs. Patient denies any chest pain, pain anywhere else, fevers, chills, light headedness, trouble urinating or defecating.       Assessment/Plan:      Current Principal Problem:  CHF (congestive heart failure), NYHA class III, unspecified failure chronicity, unspecified type ([facility])    #CHFrEF, acute on chronic exacerbation  #Elevated troponin, likely demand ischemia  #CAD hx  #TIA hx  #TAVR hx  #HLD hx  Plan:  Cardiology consulted, appreciate recommendations  Daily weights  Intake and Output monitor  Telemetry in place  Furosemide 80mg PO daily  Echo reviewed with patient at bedside  To begin GDMT as tolerated  Aspirin, statin in place  Adjust regimen as needed  Palliative care consulted, appreciate recommendations and discussion with family     #Hypothyroidism  #Chronic constipation  Plan:  Continue home medications     #OA hx  Plan:  Continue to monitor  Adjust pain regimen as needed    Ongoing threat to life and/or bodily function without ongoing treatment due to:  CHF    Advanced care planning pending, total time spent with Palliative care and Medicine 17 minutes or more.    Consults:      IP CONSULT TO HOSPITALIST  IP CONSULT TO 
  [facility] Medicine Progress Note  V 1.6      Date of Admission: 3/23/2025    Hospital Day: 2      Chief Admission Complaint:  SOB    Subjective:  Patient seen at bedside today. No acute concerns. Ongoing swelling in LE. Patient no sob, chest pain, nausea, vomiting, trouble urinating.    Presenting Admission History:       84 y.o. male who presented to [facility] with SOB.  PMHx significant for Hypothyroidism, CAD, TIA, HLD, SBO, CHF, TAVR, OA. Patient seen at bedside. Accompanied by family. Patient mentions ongoing sob for past 1 week, and LE swelling since January. Patient also endorses an ongoing dry cough. Patient also has ongoing chronic back pain 5/10 in severity. Patient unable to independently ambulate. Patient has [facility] at home for assistance with ADLs. Patient denies any chest pain, pain anywhere else, fevers, chills, light headedness, trouble urinating or defecating.       Assessment/Plan:      Current Principal Problem:  CHF (congestive heart failure), NYHA class III, unspecified failure chronicity, unspecified type ([facility])    #CHFrEF, acute on chronic exacerbation  #Elevated troponin, likely demand ischemia  #CAD hx  #TIA hx  #TAVR hx  #HLD hx  Plan:  Cardiology consulted, appreciate recommendations  Daily weights  Intake and Output monitor  Telemetry in place  Furosemide 40mg IV daily  Echo ordered, pending  To begin GDMT as tolerated  Aspirin, statin in place  Adjust regimen as needed  Trend troponin     #Hypothyroidism  #Chronic constipation  Plan:  Continue home medications     #OA hx  Plan:  Continue to monitor  Adjust pain regimen as needed  PT/OT consulted    Ongoing threat to life and/or bodily function without ongoing treatment due to:  CHF    Consults:      IP CONSULT TO HOSPITALIST  IP CONSULT TO CARDIOLOGY  IP CONSULT TO CASE MANAGEMENT  IP CONSULT TO HEART FAILURE NURSE/COORDINATOR  IP CONSULT TO SPIRITUAL SERVICES  
  [facility] Medicine Progress Note  V 1.6      Date of Admission: 3/23/2025    Hospital Day: 5      Chief Admission Complaint:  SOB    Subjective:  Patient seen at bedside today. Overnight soft blood pressures. Patient no sob, chest pain, nausea, vomiting, trouble urinating.    Presenting Admission History:       84 y.o. male who presented to [facility] with SOB.  PMHx significant for Hypothyroidism, CAD, TIA, HLD, SBO, CHF, TAVR, OA. Patient seen at bedside. Accompanied by family. Patient mentions ongoing sob for past 1 week, and LE swelling since January. Patient also endorses an ongoing dry cough. Patient also has ongoing chronic back pain 5/10 in severity. Patient unable to independently ambulate. Patient has [facility] at home for assistance with ADLs. Patient denies any chest pain, pain anywhere else, fevers, chills, light headedness, trouble urinating or defecating.       Assessment/Plan:      Current Principal Problem:  CHF (congestive heart failure), NYHA class III, unspecified failure chronicity, unspecified type ([facility])    #CHFrEF, acute on chronic exacerbation  #Elevated troponin, likely demand ischemia  #CAD hx  #TIA hx  #TAVR hx  #HLD hx  Plan:  Cardiology consulted, appreciate recommendations  Daily weights  Intake and Output monitor  Telemetry in place  Torsemide begun per Cardiology. Held in setting of soft pressures. To restart as tolerated.  GDMT as tolerated. Held in setting of soft blood pressures. Adjust as tolerated.  Aspirin, statin in place  Palliative care consulted, appreciate recommendations and discussion with family    Pending possible discharge after Cardiology clearance.     #Hypothyroidism  #Chronic constipation  Plan:  Continue home medications     #OA hx  Plan:  Continue to monitor  Adjust pain regimen as needed    Ongoing threat to life and/or bodily function without ongoing treatment due to:  CHF    Consults:      IP CONSULT TO HOSPITALIST  IP CONSULT TO CARDIOLOGY  IP 
  PALLIATIVE MEDICINE PROGRESS NOTE     Patient name:Reza Blanco    MRN:[id number] :1940  Room/Bed:0362/0362-01    LOS: 8 days        ASSESSMENT/RECOMMENDATIONS     84 y.o. male with  Class III NICM (EF 25-30%), AS s/p TAVR (), s/p BiV PM, chronic back pain s/p T9 kyphoplasty (2025), and chronic constipation  living at home with wife. Baseline PPS 40/50.  Patient admitted with CHF exacerbation.   Wife states that over the past few months patient has had significant functional decline due to his back pain to the point he needs help with all ADLs, is chair-bound and sleeping more.  Patient is alert and oriented and on room air.  He complains of significant low and mid back pain.  PT/OT recs are for SNF    Goals of care:  Optimization.  Patient willing to go to SNF.  Patient worries about being a burden to his wife and states if he is not able to maintain his independence, he would want to shift his goals to comfort-focussed care and avoid future hospitalizations.      Amended code status to Limited x 4 Nos        Recommendations:        Home based palliative care referral for disease optimization and education, advanced care planning, and continued goals of care discussions (sent)          Patient/Family Goals of Care :    3/31/25    Met with patient and wife.  Patient sitting up in chair.  Patient is alert and oriented and decisional.  Back pain more tolerable with the oxycodone, but still bothersome. He is agreeable to GOC discussion today.     Discussed goals of care.  Patient wants to try to get better, and thinks he is capable of getting stronger to the point he can take care of himself, use his rollator and contribute to helping his wife.  If he is not able to maintain his relative independence, and is totally dependent on others, he would want to change his goals to comfort -focussed care.  He would not want to return to the hospital for life-prolonging care if he was in a position that he 
  PALLIATIVE MEDICINE PROGRESS NOTE     Patient name:Reza Blanco    MRN:[id number] :1940  Room/Bed:0362/0362-01    LOS: 3 days        ASSESSMENT/RECOMMENDATIONS     84 y.o. male with  Class III NICM (EF 25-30%), AS s/p TAVR (), s/p BiV PM, chronic back pain s/p T9 kyphoplasty (2025), and chronic constipation  living at home with wife. Baseline PPS 40/50.  Patient admitted with CHF exacerbation.   Wife states that over the past few months patient has had significant functional decline due to his back pain to the point he needs help with all ADLs, is chair-bound and sleeping more.  Patient is alert and oriented and on room air.  He complains of significant low and mid back pain.  PT/OT recs are for home care.     Goals of care:  Discussion interrupted and postponed due to visitor arrival.  Known goals at this time are for back pain relief and to remain in his home. Full code status per stated wishes at admission        Recommendations:        Home based palliative care referral for disease optimization and education, advanced care planning, and continued goals of care discussions (sent)  Ongoing assessment and treatment of patient's back and hip pain.  The back pain is a significant barrier at this time and without alleviation of pain, would worry that patient's functional status will significantly decline leading to a poor QOL and poorer prognosis        Patient/Family Goals of Care :    3/26/25    Met with patient and wife.  Patient still lying on his side, complaining of pain.  Wife said the tramadol made him light-headed and dizzy.  Patient states the dizziness resolved on its own. Patient states he hopes he is not being discharged home today because he can't move 2/2 pain and could not possibly get in a car and get into his house.  Wife hoping for some type of injection or other interventional pain procedure.  She does not think he could tolerate a surgery.  She would be interested in a 
  [facility] Medicine Progress Note  V 1.6      Date of Admission: 3/23/2025    Hospital Day: 4      Chief Admission Complaint:  SOB    Subjective:  Patient seen at bedside today. No acute concerns. Patient no sob, chest pain, nausea, vomiting, trouble urinating.    Presenting Admission History:       84 y.o. male who presented to [facility] with SOB.  PMHx significant for Hypothyroidism, CAD, TIA, HLD, SBO, CHF, TAVR, OA. Patient seen at bedside. Accompanied by family. Patient mentions ongoing sob for past 1 week, and LE swelling since January. Patient also endorses an ongoing dry cough. Patient also has ongoing chronic back pain 5/10 in severity. Patient unable to independently ambulate. Patient has [facility] at home for assistance with ADLs. Patient denies any chest pain, pain anywhere else, fevers, chills, light headedness, trouble urinating or defecating.       Assessment/Plan:      Current Principal Problem:  CHF (congestive heart failure), NYHA class III, unspecified failure chronicity, unspecified type ([facility])    #CHFrEF, acute on chronic exacerbation  #Elevated troponin, likely demand ischemia  #CAD hx  #TIA hx  #TAVR hx  #HLD hx  Plan:  Cardiology consulted, appreciate recommendations  Daily weights  Intake and Output monitor  Telemetry in place  Torsemide begun per Cardiology  GDMT as tolerated  Aspirin, statin in place  Adjust regimen as needed  Palliative care consulted, appreciate recommendations and discussion with family    Pending possible discharge today after Cardiology clearance.     #Hypothyroidism  #Chronic constipation  Plan:  Continue home medications     #OA hx  Plan:  Continue to monitor  Adjust pain regimen as needed    Ongoing threat to life and/or bodily function without ongoing treatment due to:  CHF    Consults:      IP CONSULT TO HOSPITALIST  IP CONSULT TO CARDIOLOGY  IP CONSULT TO CASE MANAGEMENT  IP CONSULT TO HEART FAILURE NURSE/COORDINATOR  IP CONSULT TO SPIRITUAL 
4 Eyes Skin Assessment     NAME:  Reza Blanco  YOB: 1940  MEDICAL RECORD NUMBER:  [id number]    The patient is being assessed for  Other low blane    I agree that at least one RN has performed a thorough Head to Toe Skin Assessment on the patient. ALL assessment sites listed below have been assessed.      Areas assessed by both nurses:    Head, Face, Ears, Shoulders, Back, Chest, Arms, Elbows, Hands, Sacrum. Buttock, Coccyx, Ischium, Legs. Feet and Heels, and Under Medical Devices         Does the Patient have a Wound? Yes wound(s) were present on assessment. LDA wound assessment was Initiated and completed by RN    See flowsheets for skin assessment       Blane Prevention initiated by RN: Yes already completed  Wound Care Orders initiated by RN: Yes already completed    Pressure Injury (Stage 3,4, Unstageable, DTI, NWPT, and Complex wounds) if present, place Wound referral order by RN under : Yes already done    New Ostomies, if present place, Ostomy referral order under : No     Nurse 1 eSignature: Electronically signed by Nery Don RN on 3/30/25 at 5:10 AM EDT    **SHARE this note so that the co-signing nurse can place an eSignature**    Nurse 2 eSignature: {Esignature:[id number]}   
4 Eyes Skin Assessment     NAME:  Reza Blanco  YOB: 1940  MEDICAL RECORD NUMBER:  [id number]    The patient is being assessed for  Other low sherry    I agree that at least one RN has performed a thorough Head to Toe Skin Assessment on the patient. ALL assessment sites listed below have been assessed.      Areas assessed by both nurses: yes    Head, Face, Ears, Shoulders, Back, Chest, Arms, Elbows, Hands, Sacrum. Buttock, Coccyx, Ischium, Legs. Feet and Heels, and Under Medical Devices         Does the Patient have a Wound? Yes wound(s) were present on assessment. LDA wound assessment was Initiated and completed by RN       Sherry Prevention initiated by RN: already in place  Wound Care Orders initiated by RN: already in place    Pressure Injury (Stage 3,4, Unstageable, DTI, NWPT, and Complex wounds) if present, place Wound referral order by RN under : No    New Ostomies, if present place, Ostomy referral order under : No     Nurse 1 eSignature: Electronically signed by Maddie Duque RN on 3/27/25 at 10:30 PM EDT    **SHARE this note so that the co-signing nurse can place an eSignature**    Nurse 2 eSignature: Electronically signed by RACHEL BERNAL RN on 3/28/25 at 4:18 AM EDT   
4 Eyes Skin Assessment     NAME:  Reza Blanco  YOB: 1940  MEDICAL RECORD NUMBER:  [id number]    The patient is being assessed for  Other low blane     I agree that at least one RN has performed a thorough Head to Toe Skin Assessment on the patient. ALL assessment sites listed below have been assessed.      Areas assessed by both nurses:    Head, Face, Ears, Shoulders, Back, Chest, Arms, Elbows, Hands, Sacrum. Buttock, Coccyx, Ischium, Legs. Feet and Heels, and Under Medical Devices         Does the Patient have a Wound? Yes wound(s) were present on assessment. LDA wound assessment was Initiated and completed by RN       Blane Prevention initiated by RN: Yes  Wound Care Orders initiated by RN: Yes    Pressure Injury (Stage 3,4, Unstageable, DTI, NWPT, and Complex wounds) if present, place Wound referral order by RN under : No    New Ostomies, if present place, Ostomy referral order under : No     Nurse 1 eSignature: Electronically signed by Anurag Villarreal RN on 3/31/25 at 9:59 AM EDT    **SHARE this note so that the co-signing nurse can place an eSignature**    Nurse 2 eSignature: {Esignature:[id number]}    
4 Eyes Skin Assessment     NAME:  Reza Blanco  YOB: 1940  MEDICAL RECORD NUMBER:  [id number]    The patient is being assessed for  Other low sherry    I agree that at least one RN has performed a thorough Head to Toe Skin Assessment on the patient. ALL assessment sites listed below have been assessed.      Areas assessed by both nurses:    Head, Face, Ears, Shoulders, Back, Chest, Arms, Elbows, Hands, Sacrum. Buttock, Coccyx, Ischium, Legs. Feet and Heels, and Under Medical Devices         Does the Patient have a Wound? Yes wound(s) were present on assessment. LDA wound assessment was Initiated and completed by RN    See flowsheets for skin assessment       Sherry Prevention initiated by RN: Yes already completed  Wound Care Orders initiated by RN: Yes already completed    Pressure Injury (Stage 3,4, Unstageable, DTI, NWPT, and Complex wounds) if present, place Wound referral order by RN under : Yes already done    New Ostomies, if present place, Ostomy referral order under : No     Nurse 1 eSignature: Electronically signed by Nery Don RN on 3/31/25 at 3:40 AM EDT    **SHARE this note so that the co-signing nurse can place an eSignature**    Nurse 2 eSignature: Electronically signed by Sydni Sullivan LPN on 3/31/25 at 3:46 AM EDT   
4 Eyes Skin Assessment     NAME:  Reza Blanco  YOB: 1940  MEDICAL RECORD NUMBER:  [id number]    The patient is being assessed for  Other low blane    I agree that at least one RN has performed a thorough Head to Toe Skin Assessment on the patient. ALL assessment sites listed below have been assessed.      Areas assessed by both nurses:    Head, Face, Ears, Shoulders, Back, Chest, Arms, Elbows, Hands, Sacrum. Buttock, Coccyx, Ischium, Legs. Feet and Heels, and Under Medical Devices         Does the Patient have a Wound? No noted wound(s)    Blanchable redness to sacrum & L hip; preventative dressings in place       Blane Prevention initiated by RN: Yes  Wound Care Orders initiated by RN: No    Pressure Injury (Stage 3,4, Unstageable, DTI, NWPT, and Complex wounds) if present, place Wound referral order by RN under : No    New Ostomies, if present place, Ostomy referral order under : No     Nurse 1 eSignature: Electronically signed by Maddie Duque RN on 3/27/25 at 5:36 AM EDT    **SHARE this note so that the co-signing nurse can place an eSignature**    Nurse 2 eSignature: {Esignature:[id number]}    
4 Eyes Skin Assessment and Patient belongings     The patient is being assess for  Admission    I agree that 2 Nurses have performed a thorough Head to Toe Skin Assessment on the patient. ALL assessment sites listed below have been assessed.       Areas assessed by both nurses:   [x]   Head, Face, and Ears   [x]   Shoulders, Back, and Chest  [x]   Arms, Elbows, and Hands   [x]   Coccyx, Sacrum, and IschIum  [x]   Legs, Feet, and Heels        Does the Patient have Skin Breakdown?  No         Blane Prevention initiated:  Yes   Wound Care Orders initiated:  No      [facility] nurse consulted for Pressure Injury (Stage 3,4, Unstageable, DTI, NWPT, and Complex wounds), New and Established Ostomies:  No      I agree that 2 Nurses have reviewed patient belongings with the patient/family and documented in the flowsheet upon admission or transfer to the unit.     Belongings  Dental Appliances: None  Vision - Corrective Lenses: None  Hearing Aid: None  Clothing: Shorts, Shirt, Slippers, Sweater  Jewelry: Ring  Body Piercings Removed: No  Electronic Devices: Cell Phone,   Other Valuables: Wallet  Home Medications: Locked  Valuables Given To: Patient       Nurse 1 eSignature: Electronically signed by Mariana Mcneill RN on 3/23/25 at 2:07 PM EDT    **SHARE this note so that the co-signing nurse is able to place an eSignature**    Nurse 2 eSignature: {Esignature:[id number]}    
CHF Care Plan      Patient's EF (Ejection Fraction) is less than 40%    Heart Failure Medications:  Diuretics:: None    (One of the following REQUIRED for EF </= 40%/SYSTOLIC FAILURE but MAY be used in EF% >40%/DIASTOLIC FAILURE)        ACE:: None        ARB:: None         ARNI:: Sacubitril/Valsartan-Entresto    (Beta Blockers)  NON- Evidenced Based Beta Blocker (for EF% >40%/DIASTOLIC FAILURE): None    Evidenced Based Beta Blocker::(REQUIRED for EF% <40%/SYSTOLIC FAILURE) None  ...................................................................................................................................................    Failed to redirect to the Timeline version of the [employer] SmartLink.      Patient's weights and intake/output reviewed    Daily Weight log at bedside, patient/family participation in use of log: \"yes    Patient's current weight today shows a difference of 2 lbs less than last documented weight.      Intake/Output Summary (Last 24 hours) at 3/29/2025 0104  Last data filed at 3/29/2025 0029  Gross per 24 hour   Intake 1070 ml   Output 2750 ml   Net -1680 ml       Education Booklet Provided: yes    Comorbidities Reviewed Yes    Patient has a past medical history of Allergic rhinitis, Anemia, Arthritis, CAD (coronary artery disease), Chronic systolic congestive heart failure (HCC), Compression fracture of T11 vertebra (HCC), Food allergy, Hiatal hernia, History of blood transfusion, Hyperlipidemia, Hypertension, Hypothyroid, Obesity, Occlusion and stenosis of carotid artery, Osteoarthritis, Recurrent right inguinal hernia, Shingles, Thyroid disease, and TIA (transient ischemic attack).     >>For CHF and Comorbidity documentation on Education Time and Topics, please see Education Tab      CHF Education    Learners:  Patient  Readineess:   Acceptance  Method:   Explanation, Demonstration, Handout, and Video  Response:   Verbalizes Understanding  Comments: n/a    Time Spent: 15 minutes CHF education 
[facility]   Daily Progress Note      Admit Date:  3/23/2025    Reason for follow up visit: CHF    CC: \"Except for my back I feel okay.\"    85 y/o male with PMH notable for HFrEF, AV disease and S/P TAVR in 2018 and redo TAVR in May 2024 ([facility]), Biv PPM, NICM who was admitted with CHF and back pain. He presented with SOB and LE edema. His home dose torsemide had been increased without much improvement. He denies chest pain, palpitations, dizziness. Cardiology asked to see for further management.    Primary Cardiologist: Dr. Santos ([facility])      Interval History:  Pt. seen and examined; records reviewed  BP stable. Remains on room air  Net diuresis -2.9 L since admit; wt 147#  + LE edema improved  + continual low back pain  Denies chest pain, SOB, cough, palpitations, dizziness  BUN/Cr: 42/1.2  Prealbumin 6.0    Subjective:  Pt with no acute overnight cardiac events.   Pt poor historian  Wife reports ROS  A 12 point review of systems was completed. Pertinent positives were identified in the HPI/Interval history. All other review of symptoms negative unless otherwise noted below.      Objective:   /85   Pulse 63   Temp 97.3 °F (36.3 °C) (Oral)   Resp 18   Ht 1.778 m (5' 10\")   Wt 67.1 kg (147 lb 14.4 oz)   SpO2 90%   BMI 21.22 kg/m²     Intake/Output Summary (Last 24 hours) at 3/26/2025 0907  Last data filed at 3/26/2025 0621  Gross per 24 hour   Intake 992 ml   Output 1450 ml   Net -458 ml     Wt Readings from Last 3 Encounters:   03/26/25 67.1 kg (147 lb 14.4 oz)   07/21/21 71.2 kg (157 lb)   07/20/21 71.7 kg (158 lb)       Physical Exam:  General: In no acute distress. Awake, alert, and oriented X4.  Resting in bed.  HEENT: Sclerae anicteric. Normocephalic  Skin:  Warm and dry.   Neck:  Supple. No JVD appreciated.  Chest: Lungs fairly clear to auscultation and with mildly diminished breath sounds bilaterally.  No wheezes/rhonchi/rales  Cardiovascular:  RRR. Normal S1 and S2; II/VI CODI  Abdomen:  
Discussed risks, benefits, and alternatives with patient.    Patient reason for declining Q2 Turning/position changes: pain when lying on left side, unable to lie comfortably on back.     The following provider was notified of patient's intent to refuse: Dr. Nakul Crenshaw    Feedback from provider: verbalized acknowledgement.     Alternative interventions used in the meantime: Getting patient up to chair intermittently and for meals, pillow support when refusing turns, mepilex application to bony prominences and on preferred side. Pain medication to help encourage movement, heat packs.    
IV and tele removed. D/C instructions given to patient and family. Pt and family verbalizes understanding and all questions answered. Report called to Kayla nurse at [facility]. Level II and AVS/GAUTAM sent per protocol.Patient wheeled to main entrance and assisted into vehicle by staff. Family transporting patient to [facility].   
[facility] Daily Progress Note      Admit Date:  3/23/2025    Subjective:  Mr. Blanco is seen for acute on chronic systolic CHF   Today c/o lower back pain near right sacroiliac joint  He was given tylenol for back pain.  Patient was advised today that he has to ask for it he needs it  Wife wants to know if some one can inject his joint in back.  His shortness of breath is better  Wife does not want him to have guide line directed betablockers afraid it will drop his BP  [place]   Reza Blanco is a 84 y.o. patient who presented to the hospital with complaints of  shortness of breath and swelling of legs over last several days.  No chest pain no fever but has cough non productive.  S/p TAVR 2018  Redo TAVR May 2024  DC pacemaker August 2024  T9 Kyphoplasty Jan 2025  chronic systolic CHF, Nonischemic cardiomyopathy Biventricular cardiac pacemaker.      ROS:  12 point ROS negative in all areas as listed below except as in [place]  Constitutional, EENT, pulmonary, GI, , Musculoskeletal, skin, neurological, hematological, endocrine, Psychiatric    Past Medical History:   Diagnosis Date    Allergic rhinitis     Anemia     Arthritis     rt hip    CAD (coronary artery disease) 2001    cardiac stents    Chronic systolic congestive heart failure (HCC) 8/21/2018    Compression fracture of T11 vertebra (HCC)     back brace    Food allergy     Hiatal hernia     History of blood transfusion     platelets=2001    Hyperlipidemia     Hypertension     hx of    Hypothyroid     Obesity 10/2/2012    Occlusion and stenosis of carotid artery 10/2/2012    Osteoarthritis     Hip right    Recurrent right inguinal hernia     Shingles 2014    Thyroid disease     TIA (transient ischemic attack)      Past Surgical History:   Procedure Laterality Date    AORTIC VALVE REPLACEMENT  2018    CARDIAC SURGERY  2001    stents    CARDIAC VALVE REPLACEMENT  09/15/2018    COLONOSCOPY  04/22/99    COLONOSCOPY  7/13/2015    EYE SURGERY      
Occupational Therapy  Attempted to see pt this late morning. Pt asleep but woke up to voice. Declined therapy at this time 2/2 pain in R hip. Will attempt to see at later time.     Rosa Baker, OTR/L    13:15  Attempted to see patient in afternoon. Offered assistance with OOB ax. Pt declined therapy at this time. Will attempt to see later on this date.    Pallavi Valenzuela, OT  
Occupational Therapy  Facility/Department: [facility] B3 - MED SURG  Daily Treatment Note  NAME: Reza Blanco  : 1940  MRN: [id number]    Date of Service: 3/27/2025    Discharge Recommendations:  Subacute/Skilled Nursing Facility    Therapy discharge recommendations are subject to collaboration from the patient’s interdisciplinary healthcare team, including MD and case management recommendations.    Barriers to Home Discharge:   [] Steps to access home entry or bed/bath:   [x] Unable to transfer, ambulate, or propel wheelchair household distances without assist   [x] Limited available assist at home upon discharge    [] Patient or family requests d/c to post-acute facility    [] Poor cognition/safety awareness for d/c to home alone    [] Unable to maintain ordered weight bearing status    [] Patient with salient signs of long-standing immobility   [x] Decreased independence with ADLs   [x] Increased risk for falls   [] Other:    If pt is unable to be seen after this session, please let this note serve as discharge summary.  Please see case management note for discharge disposition.  Thank you.           Patient Diagnosis(es): The primary encounter diagnosis was Acute on chronic congestive heart failure, unspecified heart failure type (HCC). Diagnoses of Elevated troponin, Hypermagnesemia, Normocytic anemia, Chronic midline low back pain without sciatica, Leg swelling, and Congestive heart failure, unspecified HF chronicity, unspecified heart failure type (HCC) were also pertinent to this visit.     Assessment   Assessment: Patient seen for follow up on this date.  Pt completed sup -> sit with mod A. Pt completed STS x2 from EOB to scale. Pt required min/mod A for initial STS. Pt completed additional STS from EOB to RW with min A. Pt agreeable to complete oral care standing at sink. Pt requires min/CGA for ambulation between bathroom and room. Pt stood at sink with CGA to complete oral care. Pt returned to 
Physical Therapy  Facility/Department: [place] [address] - [facility] SURG  Physical Therapy Initial Assessment    Name: Reza Blanco  : 1940  MRN: [id number]  Date of Service: 3/24/2025    Discharge Recommendations:  24 hour supervision or assist, Home with Home health PT   PT Equipment Recommendations  Equipment Needed: No  Other: Pt has rollators for home use (keeps one on each floor of home)      Patient Diagnosis(es): The primary encounter diagnosis was Acute on chronic congestive heart failure, unspecified heart failure type (HCC). Diagnoses of Elevated troponin, Hypermagnesemia, Normocytic anemia, Chronic midline low back pain without sciatica, Leg swelling, and Congestive heart failure, unspecified HF chronicity, unspecified heart failure type (HCC) were also pertinent to this visit.  Past Medical History:  has a past medical history of Allergic rhinitis, Anemia, Arthritis, CAD (coronary artery disease), Chronic systolic congestive heart failure (HCC), Compression fracture of T11 vertebra (HCC), Food allergy, Hiatal hernia, History of blood transfusion, Hyperlipidemia, Hypertension, Hypothyroid, Obesity, Occlusion and stenosis of carotid artery, Osteoarthritis, Recurrent right inguinal hernia, Shingles, Thyroid disease, and TIA (transient ischemic attack).  Past Surgical History:  has a past surgical history that includes sigmoidoscopy (); Upper gastrointestinal endoscopy (94); Upper gastrointestinal endoscopy (04); Tonsillectomy; eye surgery; Colonoscopy (99); Colonoscopy (2015); Inguinal hernia repair (Right, 2016); Inguinal hernia repair (Right, 2017); Cardiac surgery (); Cardiac valve replacement (09/15/2018); Aortic valve replacement (); Percutaneous Transluminal Coronary Angio (); hernia repair (Left, 2020); Gastric fundoplication (N/A, 2021); and Upper gastrointestinal endoscopy (N/A, 2021).    Assessment  Body Structures, Functions, Activity 
Physical Therapy  Facility/Department: [facility] B3 - MED SURG  Daily Treatment Note  NAME: Reza Blanco  : 1940  MRN: [id number]    Date of Service: 3/25/2025    Discharge Recommendations:  24 hour supervision or assist, Home with Home health PT   PT Equipment Recommendations  Equipment Needed: No  Other: Pt has rollators for home use (keeps one on each floor of home)    Patient Diagnosis(es): The primary encounter diagnosis was Acute on chronic congestive heart failure, unspecified heart failure type (HCC). Diagnoses of Elevated troponin, Hypermagnesemia, Normocytic anemia, Chronic midline low back pain without sciatica, Leg swelling, and Congestive heart failure, unspecified HF chronicity, unspecified heart failure type (HCC) were also pertinent to this visit.    Assessment  Assessment: Pt requiring CG/min A x 1 for functional transfers and amb x short distances with RW (amb limited by 1 pt LOB, min A to correct). Pt verbalizes good understanding of all PT CHF education. Pt will benefit from further PT in acute setting to address above deficits and return to baseline LOF. Recommend pt return home with 24-hr sup/assist from wife (which she typically provides) and home PT services.  Activity Tolerance: Patient tolerated treatment well;Patient limited by endurance  Equipment Needed: No  Other: Pt has rollators for home use (keeps one on each floor of home)    Plan  Physical Therapy Plan  General Plan: 3-5 times per week  Specific Instructions for Next Treatment: Progress ther ex and mobility as tolerated  Current Treatment Recommendations: Strengthening;ROM;Balance training;Functional mobility training;Transfer training;Endurance training;Gait training;Home exercise program;Safety education & training;Patient/Caregiver education & training;Equipment evaluation, education, & procurement;Therapeutic activities    Restrictions  Restrictions/Precautions  Restrictions/Precautions: Fall Risk, General 
Physical Therapy  Facility/Department: [facility] B3 - MED SURG  Daily Treatment Note  NAME: Reza Blanco  : 1940  MRN: [id number]    Date of Service: 3/31/2025    Discharge Recommendations:  Subacute/Skilled Nursing Facility (vs 24-hr sup/assist, home PT)   PT Equipment Recommendations  Equipment Needed: No  Other: Pt has rollators for home use (keeps one on each floor of home)    Patient Diagnosis(es): The primary encounter diagnosis was Acute on chronic congestive heart failure, unspecified heart failure type (HCC). Diagnoses of Elevated troponin, Hypermagnesemia, Normocytic anemia, Chronic midline low back pain without sciatica, Leg swelling, Congestive heart failure, unspecified HF chronicity, unspecified heart failure type (HCC), and Pain were also pertinent to this visit.    Assessment  Assessment: pt limited by activity tolerance on today's date. pt reports increased fatigue with activity and decreased balance and safety with ambulation using rw min A x 1.  pt cotinues to demonstrate decreased strength, endurance, mobility, activity tolerance, balance, and functional capacity and would continue to benefit from skilled PT to address the above stated deficits during his stay in the acute care setting.  PT recommends DC to SNF  Activity Tolerance: Patient tolerated treatment well;Patient limited by endurance  Equipment Needed: No  Other: Pt has rollators for home use (keeps one on each floor of home)    Plan  Physical Therapy Plan  General Plan: 3-5 times per week  Specific Instructions for Next Treatment: Progress ther ex and mobility as tolerated  Current Treatment Recommendations: Strengthening;ROM;Balance training;Functional mobility training;Transfer training;Endurance training;Gait training;Home exercise program;Safety education & training;Patient/Caregiver education & training;Equipment evaluation, education, & procurement;Therapeutic 
Physical Therapy  Facility/Department: [facility] B3 - MED SURG  Daily Treatment Note  NAME: Reza Blanco  : 1940  MRN: [id number]    Date of Service: 3/28/2025    Discharge Recommendations:  24 hour supervision or assist, Home with Home health PT   PT Equipment Recommendations  Equipment Needed: No  Other: Pt has rollators for home use (keeps one on each floor of home)    Patient Diagnosis(es): The primary encounter diagnosis was Acute on chronic congestive heart failure, unspecified heart failure type (HCC). Diagnoses of Elevated troponin, Hypermagnesemia, Normocytic anemia, Chronic midline low back pain without sciatica, Leg swelling, and Congestive heart failure, unspecified HF chronicity, unspecified heart failure type (HCC) were also pertinent to this visit.    Assessment  Assessment: pt found in bed, agreeable to PT treatment, cleared for treatment by RN.  pt limited by pain on today's date, found in L sidelying, states this is the only position he can be in without notableincrease in pain.  pt perofrms several exercises in L sidelying.  reports increased pain with activity.  pt conitnues to demonstrate decreased strength, endurance, mobility, activity tolerance, balance, and functional capacity and would continue to benefit from skilled PT to address the above stated deficits during his stay in the acute care setting.  continue to recommend DC to home with 24 hour assist and HHPT.  Activity Tolerance: Patient tolerated treatment well;Patient limited by pain  Equipment Needed: No  Other: Pt has rollators for home use (keeps one on each floor of home)    Plan  Physical Therapy Plan  General Plan: 3-5 times per week  Specific Instructions for Next Treatment: Progress ther ex and mobility as tolerated  Current Treatment Recommendations: Strengthening;ROM;Balance training;Functional mobility training;Transfer training;Endurance training;Gait training;Home exercise program;Safety education & 
[facility]   Daily Progress Note      Admit Date:  3/23/2025    Reason for follow up visit: CHF    CC: \"I got a little SOB when I got up to the chair, but it may have been from my back pain.\"    83 y/o male with PMH notable for HFrEF, AV disease and S/P TAVR in 2018 and redo TAVR in May 2024 ([facility]), Biv PPM, NICM who was admitted with CHF and back pain. He presented with SOB and LE edema. His home dose torsemide had been increased without much improvement. He denies chest pain, palpitations, dizziness. Cardiology asked to see for further management.     Primary Cardiologist: Dr. Santos ([facility])    Interval History:  Pt. seen and examined; records reviewed  BP low overnight and all cardiac meds held this AM.  He had SBP in the 70-'s-80's overnight and now up in the 90's   Reviewed his home list of BP's from his wife and he normally maintains 100's-110's/60-70's  Up in chair this AM: + back pain  Denies chest pain, cough, palpitations or dizziness  + LE edema improving  + SOBOE: chronic and overall improved  Labs pending    Subjective:  Pt with no acute overnight cardiac events.  Pt poor historian    Objective:   BP (!) 94/59   Pulse 78   Temp 97.3 °F (36.3 °C) (Oral)   Resp 17   Ht 1.778 m (5' 10\")   Wt 68.3 kg (150 lb 9.6 oz)   SpO2 92%   BMI 21.61 kg/m²     Intake/Output Summary (Last 24 hours) at 3/27/2025 1114  Last data filed at 3/27/2025 0927  Gross per 24 hour   Intake 900.47 ml   Output 2300 ml   Net -1399.53 ml     Wt Readings from Last 3 Encounters:   03/27/25 68.3 kg (150 lb 9.6 oz)   07/21/21 71.2 kg (157 lb)   07/20/21 71.7 kg (158 lb)       Physical Exam:  General: In no acute distress. Awake, alert, and oriented X4. Up in chair visiting with wife  Skin:  Warm and dry.    Neck:  Supple. No JVD appreciated.  Chest:Lungs clear with mildly diminished breath sounds posteriorly.  No wheezes/rhonchi/rales. + kyphosis  Cardiovascular:  RRR. Normal S1 and S2; I/VI systolic murmur.   Abdomen:  soft, 
Spiritual Health History and Assessment/Progress Note  [facility]     ,  ,  ,      Name: Reza Blanco MRN: [id number]    Age: 84 y.o.     Sex: male   Language: English   Caodaism: Faith   CHF (congestive heart failure), NYHA class III, unspecified failure chronicity, unspecified type (HCC)     Date: 3/23/2025                           Spiritual Assessment began in [facility] B3 - MED SURG                    Mary, Belief, Meaning:   Patient identifies as spiritual, is connected with a mary tradition or spiritual practice, and has beliefs or practices that help with coping during difficult times.  Patient and spouse of 60+ years used to attend [facility].  They do not attend now.    Family/Friends identify as spiritual, are connected with a mary tradition or spiritual practice, and have beliefs or practices that help with coping during difficult times.  Spouse sharing her thoughts about asking for forgiveness for shortcomings.      Importance and Influence:  Patient has no beliefs influential to healthcare decision-making identified during this visit  Family/Friends have no beliefs influential to healthcare decision-making identified during this visit    Community:  Patient feels well-supported. Support system includes: Children  Family/Friends feel well-supported. Support system includes: Children and Extended family    Assessment and Plan of Care:   Spiritual coping:   affirmed patient and spouses spiritual coping as a foundation for their journey with challenge and illness.   had prayer with patient and spouse.  Patient Interventions include: Facilitated expression of thoughts and feelings  Family/Friends Interventions include: Facilitated expression of thoughts and feelings and Explored spiritual coping/struggle/distress    Patient Plan of Care: No spiritual needs identified for follow-up  Family/Friends Plan of Care: No spiritual needs identified for 
[facility]   Daily Progress Note      Admit Date:  3/23/2025    Reason for follow up visit: CHF    CC: \"My breathing isn't bothering me. It's my back.\"    83 y/o male with PMH notable for HFrEF, AV disease and S/P TAVR in 2018 and redo TAVR in May 2024 ([facility]), Biv PPM, NICM who was admitted with CHF and back pain. He presented with SOB and LE edema. His home dose torsemide had been increased without much improvement. He denies chest pain, palpitations, dizziness. Cardiology asked to see for further management. Has had meds adjusted d/t hypotension.      Primary Cardiologist: Dr. Santos ([facility])    Interval History:  Pt. seen and examined; records reviewed  BP doing better and maintaining in the low 100's  Net diuresis -5.8 L since admit/-1.6L last 24 hours  Wt today 151# (? Accuracy)  Resumed lower dose Entresto and diuretic today  Has had multiple meds held d/t hypotension  Remains on room air  Denies chest pain, cough, palpitations or dizziness  + SOB at his baseline and chronic  + back pain (limiting his activity)    Subjective:  Pt with no acute overnight cardiac events.   A 12 point review of systems was completed. Pertinent positives were identified in the HPI/Interval history. All other review of symptoms negative unless otherwise noted below.       Objective:   BP (!) 104/53   Pulse 67   Temp 97.9 °F (36.6 °C) (Oral)   Resp 18   Ht 1.778 m (5' 10\")   Wt 68.8 kg (151 lb 9.6 oz)   SpO2 94%   BMI 21.75 kg/m²     Intake/Output Summary (Last 24 hours) at 3/28/2025 1452  Last data filed at 3/28/2025 1438  Gross per 24 hour   Intake 480 ml   Output 1850 ml   Net -1370 ml     Wt Readings from Last 3 Encounters:   03/28/25 68.8 kg (151 lb 9.6 oz)   07/21/21 71.2 kg (157 lb)   07/20/21 71.7 kg (158 lb)       Physical Exam:  General: In no acute distress. Awake, alert, and oriented X4. Resting in bed.   Skin:  Warm and dry.    Neck:  Supple. No JVD appreciated.  Chest: Lungs clear to auscultation. No 
(any 3)    [x] Consultant notes from yesterday/today    [x] All available current labs reviewed interpreted for clinical significance    [x] Appropriate follow-up labs were ordered  [] Collateral history obtained     [] Independent Interpretation of tests (any 1)    [] Telemetry (Rhythm Strip) personally reviewed and interpreted        [] Imaging personally reviewed and interpreted     [x] Discussion (any 1)  [x] Multi-Disciplinary Rounds with Case Management  [] Discussed management of the case with           Labs:  Personally reviewed on 3/30/2025 and interpreted for clinical significance as documented above.     Recent Labs     03/29/25  0614   WBC 6.8   HGB 11.1*   HCT 32.9*        Recent Labs     03/27/25  1116 03/28/25  0604 03/29/25  0614   * 132* 132*   K 4.2 4.2 4.5   CL 93* 96* 94*   CO2 28 28 28   BUN 47* 43* 43*   CREATININE 1.0 0.9 0.7*   CALCIUM 8.6 8.5 8.5   MG  --  2.50* 2.64*   PHOS  --  3.0 3.2     Recent Labs     03/27/25  1116   PROBNP 7,086*       No results for input(s): \"LABA1C\" in the last 72 hours.    No results for input(s): \"AST\", \"ALT\", \"BILIDIR\", \"BILITOT\", \"ALKPHOS\" in the last 72 hours.    No results for input(s): \"INR\", \"LACTA\", \"TSH\" in the last 72 hours.      Urine Cultures: No results found for: \"LABURIN\"  Blood Cultures:   Lab Results   Component Value Date/Time    BC No Growth after 4 days of incubation. 07/04/2021 04:00 PM     Lab Results   Component Value Date/Time    BLOODCULT2 No Growth after 4 days of incubation. 07/04/2021 04:05 PM     Organism: No results found for: \"ORG\"      Nakul Crenshaw MD   
Failure Zones Self Check Plan referencing Red/Yellow/Green Light Symbol with:  [] Green Zone/All Clear:   physical activity is normal for you,   No new swelling in feet, ankles, legs or stomach,   No weight gain of more than 2-3 pounds,   No chest pain or worsening of shortness of breath.   (Continue daily: weight check, meds as directed, low salt eating, monitoring of fluid intake, balance activity, follow up visits)  [x] Yellow Zone/Caution:   Increased cough or shortness of breath with activity  Increased swelling in your feet, ankles, legs or stomach from baseline  Weight gain or loss of more than 2-3 pounds in 1 day.  Increase in the number of pillows needed while sleeping  (Check In!: You need to contact your doctor or provider as soon as possible)  [] Red Zone/Medical Alert:  Unrelieved chest pain or shortness of breath, especially while resting  Increased Discomfort or swelling in the abdomen or lower body  Sudden weight gain of more than 5 pounds in a week  Increased cough with bubbly and/or pink sputum  (Warning: You need to be seen right away .  If you cannot reach your physician, call 911)    ------------------------------------------------------------------------------------------------------------------------------------         2)[place] Rating of Perceived Exertion (RPE) Scale     The [place] rating scale ranges from 6 to 20, where 6 means \"no exertion at all\" and 20 means \"maximal exertion.\" The patient chooses the number that best describes their level of exertion. This will objectify the intensity level of the patient's activity, and the therapist can use this information to accelerate or decelerate activity levels to reach the desired range.    The patient should appraise their feeling of exertion as honestly as possible, without thinking about what the actual physical load is. Their feeling of effort and exertion is important, and it should not be compared to the level of others.     [place] RPE

## (undated) DEVICE — SEAL

## (undated) DEVICE — SOLUTION IRRIG 2000ML STRL H2O UROMATIC PLAS CONT USP

## (undated) DEVICE — STAPLER 60: Brand: SUREFORM

## (undated) DEVICE — SOLUTION IRRG STRL H2O 500 ML BTL 16/CA

## (undated) DEVICE — SUTURE VCRL + SZ 0 L27IN ABSRB VLT L26MM UR-6 5/8 CIR VCP603H

## (undated) DEVICE — ARM DRAPE

## (undated) DEVICE — SUTURE ABSORBABLE MONOFILAMENT 2-0 CT2 18 IN UD MONOCRYL + SXMP1B417

## (undated) DEVICE — COLUMN DRAPE

## (undated) DEVICE — SCISSORS SURG DIA8MM MPLR CRV ENDOWRIST

## (undated) DEVICE — SYSTEM SMK EVAC LAP TBNG FILTER HSNG BENT STYL PNK SEE CLR

## (undated) DEVICE — SUTURE VCRL + SZ 3-0 L18IN ABSRB UD SH 1/2 CIR TAPERCUT NDL VCP864D

## (undated) DEVICE — PENROSE DRAIN 12" X 1/4: Brand: CARDINAL HEALTH

## (undated) DEVICE — GOWN,REINF,POLY,AURORA,XLNG/XXL,STRL: Brand: MEDLINE

## (undated) DEVICE — Device

## (undated) DEVICE — CONMED SCOPE SAVER BITE BLOCK, 20X27 MM: Brand: SCOPE SAVER

## (undated) DEVICE — ENDOSCOPIC KIT 2 12 FT OP4 DE2 GWN SYR

## (undated) DEVICE — PUMP SUC IRR TBNG L10FT W/ HNDPC ASSEMB STRYKEFLOW 2

## (undated) DEVICE — SUTURE PERMAHAND SZ 0 L30IN NONABSORBABLE BLK L26MM SH 1/2 K834H

## (undated) DEVICE — TIP-UP FENESTRATED GRASPER: Brand: ENDOWRIST

## (undated) DEVICE — STAPLER 60 RELOAD GREEN: Brand: SUREFORM

## (undated) DEVICE — SUTURE ABSORBABLE MONOFILAMENT 0 CT-2 12 IN STRATAFIX SPRL SXPP1B405

## (undated) DEVICE — ELECTRODE,ECG,STRESS,FOAM,3PK: Brand: MEDLINE

## (undated) DEVICE — CANNULA SEAL

## (undated) DEVICE — Z DISCONTINUED NO SUB IDED DRAIN PENROSE L12IN 0.25IN USED TO PROMOTE DRNAGE IN OPN

## (undated) DEVICE — VESSEL SEALER: Brand: ENDOWRIST

## (undated) DEVICE — MEGA SUTURECUT ND: Brand: ENDOWRIST

## (undated) DEVICE — [HIGH FLOW INSUFFLATOR,  DO NOT USE IF PACKAGE IS DAMAGED,  KEEP DRY,  KEEP AWAY FROM SUNLIGHT,  PROTECT FROM HEAT AND RADIOACTIVE SOURCES.]: Brand: PNEUMOSURE

## (undated) DEVICE — SUTURE VCRL SZ 2-0 L27IN ABSRB VLT L26MM SH 1/2 CIR J317H

## (undated) DEVICE — REDUCER: Brand: ENDOWRIST

## (undated) DEVICE — BLADELESS OBTURATOR: Brand: WECK VISTA

## (undated) DEVICE — TROCAR: Brand: KII FIOS FIRST ENTRY

## (undated) DEVICE — FENESTRATED BIPOLAR FORCEPS: Brand: ENDOWRIST

## (undated) DEVICE — TUBING FLTR PLUME AWAY EVAC W/ SUCT DEV DISP PUREVIEW

## (undated) DEVICE — GLOVE,SURG,SENSICARE SLT,LF,PF,7.5: Brand: MEDLINE

## (undated) DEVICE — SUTURE STRATAFIX SPRL MCRYL + SZ 3 0 L8IN ABSRB UD L26MM SH SXMP1B427

## (undated) DEVICE — SUCTION IRRIGATOR: Brand: ENDOWRIST

## (undated) DEVICE — TIP COVER ACCESSORY

## (undated) DEVICE — SUTURE VCRL + SZ 2-0 L27IN ABSRB VLT SH 1/2 CIR TAPERPOINT VCP317H

## (undated) DEVICE — CADIERE FORCEPS: Brand: ENDOWRIST

## (undated) DEVICE — VESSEL SEALER EXTEND: Brand: ENDOWRIST

## (undated) DEVICE — PROGRASP FORCEPS: Brand: ENDOWRIST

## (undated) DEVICE — SUTURE MCRYL + SZ 4-0 L18IN ABSRB UD L19MM PS-2 3/8 CIR MCP496G

## (undated) DEVICE — CANNULA NSL L4M O2 AD FILTERLINE